# Patient Record
Sex: MALE | Race: WHITE | Employment: OTHER | URBAN - NONMETROPOLITAN AREA
[De-identification: names, ages, dates, MRNs, and addresses within clinical notes are randomized per-mention and may not be internally consistent; named-entity substitution may affect disease eponyms.]

---

## 2020-12-24 ENCOUNTER — APPOINTMENT (OUTPATIENT)
Dept: GENERAL RADIOLOGY | Age: 70
End: 2020-12-24
Payer: MEDICARE

## 2020-12-24 ENCOUNTER — HOSPITAL ENCOUNTER (EMERGENCY)
Age: 70
Discharge: ANOTHER HOSPITAL | End: 2020-12-24
Attending: EMERGENCY MEDICINE
Payer: MEDICARE

## 2020-12-24 VITALS
DIASTOLIC BLOOD PRESSURE: 80 MMHG | HEIGHT: 70 IN | SYSTOLIC BLOOD PRESSURE: 97 MMHG | OXYGEN SATURATION: 97 % | TEMPERATURE: 96.5 F | HEART RATE: 115 BPM | WEIGHT: 152 LBS | BODY MASS INDEX: 21.76 KG/M2 | RESPIRATION RATE: 28 BRPM

## 2020-12-24 PROBLEM — I95.9 ARTERIAL HYPOTENSION: Status: ACTIVE | Noted: 2020-12-24

## 2020-12-24 PROBLEM — R00.0 TACHYCARDIA: Status: ACTIVE | Noted: 2020-12-24

## 2020-12-24 PROBLEM — I44.7 NEW ONSET LEFT BUNDLE BRANCH BLOCK (LBBB): Status: ACTIVE | Noted: 2020-12-24

## 2020-12-24 PROBLEM — I21.4 ACUTE NON-ST ELEVATION MYOCARDIAL INFARCTION (NSTEMI) (HCC): Status: ACTIVE | Noted: 2020-12-24

## 2020-12-24 LAB
ABSOLUTE EOS #: 0.14 K/UL (ref 0–0.44)
ABSOLUTE IMMATURE GRANULOCYTE: 0.03 K/UL (ref 0–0.3)
ABSOLUTE LYMPH #: 2.37 K/UL (ref 1.1–3.7)
ABSOLUTE MONO #: 0.87 K/UL (ref 0.1–1.2)
ANION GAP SERPL CALCULATED.3IONS-SCNC: 11 MMOL/L (ref 9–17)
BASOPHILS # BLD: 0 % (ref 0–2)
BASOPHILS ABSOLUTE: 0.03 K/UL (ref 0–0.2)
BNP INTERPRETATION: ABNORMAL
BUN BLDV-MCNC: 18 MG/DL (ref 8–23)
BUN/CREAT BLD: 22 (ref 9–20)
CALCIUM SERPL-MCNC: 9.6 MG/DL (ref 8.6–10.4)
CHLORIDE BLD-SCNC: 101 MMOL/L (ref 98–107)
CO2: 23 MMOL/L (ref 20–31)
CREAT SERPL-MCNC: 0.82 MG/DL (ref 0.7–1.2)
DIFFERENTIAL TYPE: ABNORMAL
EOSINOPHILS RELATIVE PERCENT: 2 % (ref 1–4)
GFR AFRICAN AMERICAN: >60 ML/MIN
GFR NON-AFRICAN AMERICAN: >60 ML/MIN
GFR SERPL CREATININE-BSD FRML MDRD: ABNORMAL ML/MIN/{1.73_M2}
GFR SERPL CREATININE-BSD FRML MDRD: ABNORMAL ML/MIN/{1.73_M2}
GLUCOSE BLD-MCNC: 156 MG/DL (ref 70–99)
HCT VFR BLD CALC: 40.5 % (ref 40.7–50.3)
HEMOGLOBIN: 12.7 G/DL (ref 13–17)
IMMATURE GRANULOCYTES: 0 %
LYMPHOCYTES # BLD: 27 % (ref 24–43)
MCH RBC QN AUTO: 31.8 PG (ref 25.2–33.5)
MCHC RBC AUTO-ENTMCNC: 31.4 G/DL (ref 28.4–34.8)
MCV RBC AUTO: 101.5 FL (ref 82.6–102.9)
MONOCYTES # BLD: 10 % (ref 3–12)
NRBC AUTOMATED: 0 PER 100 WBC
PDW BLD-RTO: 13.6 % (ref 11.8–14.4)
PLATELET # BLD: 207 K/UL (ref 138–453)
PLATELET ESTIMATE: ABNORMAL
PMV BLD AUTO: 10.8 FL (ref 8.1–13.5)
POTASSIUM SERPL-SCNC: 4 MMOL/L (ref 3.7–5.3)
PRO-BNP: 9840 PG/ML
RBC # BLD: 3.99 M/UL (ref 4.21–5.77)
RBC # BLD: ABNORMAL 10*6/UL
SARS-COV-2, RAPID: NOT DETECTED
SARS-COV-2: NORMAL
SARS-COV-2: NORMAL
SEG NEUTROPHILS: 61 % (ref 36–65)
SEGMENTED NEUTROPHILS ABSOLUTE COUNT: 5.36 K/UL (ref 1.5–8.1)
SODIUM BLD-SCNC: 135 MMOL/L (ref 135–144)
SOURCE: NORMAL
TROPONIN INTERP: ABNORMAL
TROPONIN INTERP: ABNORMAL
TROPONIN T: ABNORMAL NG/ML
TROPONIN T: ABNORMAL NG/ML
TROPONIN, HIGH SENSITIVITY: 1112 NG/L (ref 0–22)
TROPONIN, HIGH SENSITIVITY: 1271 NG/L (ref 0–22)
WBC # BLD: 8.8 K/UL (ref 3.5–11.3)
WBC # BLD: ABNORMAL 10*3/UL

## 2020-12-24 PROCEDURE — 96366 THER/PROPH/DIAG IV INF ADDON: CPT

## 2020-12-24 PROCEDURE — 96372 THER/PROPH/DIAG INJ SC/IM: CPT

## 2020-12-24 PROCEDURE — 71045 X-RAY EXAM CHEST 1 VIEW: CPT

## 2020-12-24 PROCEDURE — 6360000002 HC RX W HCPCS

## 2020-12-24 PROCEDURE — 96367 TX/PROPH/DG ADDL SEQ IV INF: CPT

## 2020-12-24 PROCEDURE — 36415 COLL VENOUS BLD VENIPUNCTURE: CPT

## 2020-12-24 PROCEDURE — 83880 ASSAY OF NATRIURETIC PEPTIDE: CPT

## 2020-12-24 PROCEDURE — 2500000003 HC RX 250 WO HCPCS: Performed by: EMERGENCY MEDICINE

## 2020-12-24 PROCEDURE — U0003 INFECTIOUS AGENT DETECTION BY NUCLEIC ACID (DNA OR RNA); SEVERE ACUTE RESPIRATORY SYNDROME CORONAVIRUS 2 (SARS-COV-2) (CORONAVIRUS DISEASE [COVID-19]), AMPLIFIED PROBE TECHNIQUE, MAKING USE OF HIGH THROUGHPUT TECHNOLOGIES AS DESCRIBED BY CMS-2020-01-R: HCPCS

## 2020-12-24 PROCEDURE — 99291 CRITICAL CARE FIRST HOUR: CPT

## 2020-12-24 PROCEDURE — 85025 COMPLETE CBC W/AUTO DIFF WBC: CPT

## 2020-12-24 PROCEDURE — 99285 EMERGENCY DEPT VISIT HI MDM: CPT

## 2020-12-24 PROCEDURE — 6360000002 HC RX W HCPCS: Performed by: EMERGENCY MEDICINE

## 2020-12-24 PROCEDURE — 2580000003 HC RX 258: Performed by: EMERGENCY MEDICINE

## 2020-12-24 PROCEDURE — 96368 THER/DIAG CONCURRENT INF: CPT

## 2020-12-24 PROCEDURE — U0002 COVID-19 LAB TEST NON-CDC: HCPCS

## 2020-12-24 PROCEDURE — 96375 TX/PRO/DX INJ NEW DRUG ADDON: CPT

## 2020-12-24 PROCEDURE — 93005 ELECTROCARDIOGRAM TRACING: CPT | Performed by: EMERGENCY MEDICINE

## 2020-12-24 PROCEDURE — 84484 ASSAY OF TROPONIN QUANT: CPT

## 2020-12-24 PROCEDURE — 6370000000 HC RX 637 (ALT 250 FOR IP): Performed by: EMERGENCY MEDICINE

## 2020-12-24 PROCEDURE — 96365 THER/PROPH/DIAG IV INF INIT: CPT

## 2020-12-24 PROCEDURE — 80048 BASIC METABOLIC PNL TOTAL CA: CPT

## 2020-12-24 RX ORDER — MORPHINE SULFATE 2 MG/ML
2 INJECTION, SOLUTION INTRAMUSCULAR; INTRAVENOUS ONCE
Status: COMPLETED | OUTPATIENT
Start: 2020-12-24 | End: 2020-12-24

## 2020-12-24 RX ORDER — HEPARIN SODIUM 5000 [USP'U]/ML
4000 INJECTION, SOLUTION INTRAVENOUS; SUBCUTANEOUS ONCE
Status: COMPLETED | OUTPATIENT
Start: 2020-12-24 | End: 2020-12-24

## 2020-12-24 RX ORDER — NITROGLYCERIN 0.4 MG/1
0.4 TABLET SUBLINGUAL ONCE
Status: DISCONTINUED | OUTPATIENT
Start: 2020-12-24 | End: 2020-12-25 | Stop reason: HOSPADM

## 2020-12-24 RX ORDER — TERBUTALINE SULFATE 1 MG/ML
0.25 INJECTION, SOLUTION SUBCUTANEOUS ONCE
Status: COMPLETED | OUTPATIENT
Start: 2020-12-24 | End: 2020-12-24

## 2020-12-24 RX ORDER — METHYLPREDNISOLONE SODIUM SUCCINATE 125 MG/2ML
125 INJECTION, POWDER, LYOPHILIZED, FOR SOLUTION INTRAMUSCULAR; INTRAVENOUS ONCE
Status: COMPLETED | OUTPATIENT
Start: 2020-12-24 | End: 2020-12-24

## 2020-12-24 RX ORDER — SODIUM CHLORIDE 9 MG/ML
INJECTION, SOLUTION INTRAVENOUS CONTINUOUS
Status: DISCONTINUED | OUTPATIENT
Start: 2020-12-24 | End: 2020-12-25 | Stop reason: HOSPADM

## 2020-12-24 RX ORDER — DEXTROSE MONOHYDRATE 50 MG/ML
INJECTION, SOLUTION INTRAVENOUS
Status: DISCONTINUED
Start: 2020-12-24 | End: 2020-12-25 | Stop reason: HOSPADM

## 2020-12-24 RX ORDER — DOPAMINE HYDROCHLORIDE 160 MG/100ML
INJECTION, SOLUTION INTRAVENOUS
Status: COMPLETED
Start: 2020-12-24 | End: 2020-12-24

## 2020-12-24 RX ORDER — DOPAMINE HYDROCHLORIDE 160 MG/100ML
10 INJECTION, SOLUTION INTRAVENOUS CONTINUOUS
Status: DISCONTINUED | OUTPATIENT
Start: 2020-12-24 | End: 2020-12-24

## 2020-12-24 RX ORDER — NOREPINEPHRINE BITARTRATE 1 MG/ML
INJECTION, SOLUTION INTRAVENOUS
Status: DISCONTINUED
Start: 2020-12-24 | End: 2020-12-25 | Stop reason: HOSPADM

## 2020-12-24 RX ORDER — 0.9 % SODIUM CHLORIDE 0.9 %
250 INTRAVENOUS SOLUTION INTRAVENOUS ONCE
Status: COMPLETED | OUTPATIENT
Start: 2020-12-24 | End: 2020-12-24

## 2020-12-24 RX ORDER — ONDANSETRON 2 MG/ML
4 INJECTION INTRAMUSCULAR; INTRAVENOUS ONCE
Status: COMPLETED | OUTPATIENT
Start: 2020-12-24 | End: 2020-12-24

## 2020-12-24 RX ORDER — ASPIRIN 325 MG
325 TABLET ORAL DAILY
COMMUNITY
End: 2021-01-07 | Stop reason: DRUGHIGH

## 2020-12-24 RX ORDER — HEPARIN SODIUM 10000 [USP'U]/100ML
1000 INJECTION, SOLUTION INTRAVENOUS CONTINUOUS
Status: DISCONTINUED | OUTPATIENT
Start: 2020-12-24 | End: 2020-12-25 | Stop reason: HOSPADM

## 2020-12-24 RX ORDER — ASPIRIN 81 MG/1
324 TABLET, CHEWABLE ORAL ONCE
Status: COMPLETED | OUTPATIENT
Start: 2020-12-24 | End: 2020-12-24

## 2020-12-24 RX ADMIN — METHYLPREDNISOLONE SODIUM SUCCINATE 125 MG: 125 INJECTION, POWDER, FOR SOLUTION INTRAMUSCULAR; INTRAVENOUS at 17:27

## 2020-12-24 RX ADMIN — ASPIRIN 81 MG CHEWABLE TABLET 324 MG: 81 TABLET CHEWABLE at 17:25

## 2020-12-24 RX ADMIN — HEPARIN SODIUM 4000 UNITS: 5000 INJECTION INTRAVENOUS; SUBCUTANEOUS at 18:33

## 2020-12-24 RX ADMIN — SODIUM CHLORIDE 250 ML: 9 INJECTION, SOLUTION INTRAVENOUS at 20:05

## 2020-12-24 RX ADMIN — MORPHINE SULFATE 2 MG: 2 INJECTION, SOLUTION INTRAMUSCULAR; INTRAVENOUS at 17:29

## 2020-12-24 RX ADMIN — SODIUM CHLORIDE: 9 INJECTION, SOLUTION INTRAVENOUS at 17:37

## 2020-12-24 RX ADMIN — DOPAMINE HYDROCHLORIDE IN DEXTROSE 10 MCG/KG/MIN: 1.6 INJECTION, SOLUTION INTRAVENOUS at 17:53

## 2020-12-24 RX ADMIN — ONDANSETRON 4 MG: 2 INJECTION INTRAMUSCULAR; INTRAVENOUS at 17:31

## 2020-12-24 RX ADMIN — NOREPINEPHRINE BITARTRATE 3 MCG/MIN: 1 INJECTION, SOLUTION, CONCENTRATE INTRAVENOUS at 20:03

## 2020-12-24 RX ADMIN — DOPAMINE HYDROCHLORIDE 10 MCG/KG/MIN: 160 INJECTION, SOLUTION INTRAVENOUS at 17:53

## 2020-12-24 RX ADMIN — TICAGRELOR 180 MG: 90 TABLET ORAL at 17:25

## 2020-12-24 RX ADMIN — HEPARIN SODIUM AND DEXTROSE 1000 UNITS/HR: 10000; 5 INJECTION INTRAVENOUS at 18:33

## 2020-12-24 RX ADMIN — TERBUTALINE SULFATE 0.25 MG: 1 INJECTION, SOLUTION SUBCUTANEOUS at 17:34

## 2020-12-24 SDOH — HEALTH STABILITY: MENTAL HEALTH: HOW OFTEN DO YOU HAVE A DRINK CONTAINING ALCOHOL?: NEVER

## 2020-12-24 ASSESSMENT — ENCOUNTER SYMPTOMS
DIARRHEA: 0
ABDOMINAL PAIN: 0
COUGH: 1
BACK PAIN: 0
CONSTIPATION: 0
WHEEZING: 0
SHORTNESS OF BREATH: 1
ABDOMINAL DISTENTION: 0
NAUSEA: 0
VOMITING: 0

## 2020-12-24 ASSESSMENT — PAIN DESCRIPTION - FREQUENCY: FREQUENCY: CONTINUOUS

## 2020-12-24 ASSESSMENT — PAIN DESCRIPTION - LOCATION: LOCATION: CHEST

## 2020-12-24 ASSESSMENT — PAIN DESCRIPTION - DESCRIPTORS: DESCRIPTORS: PRESSURE

## 2020-12-24 ASSESSMENT — PAIN SCALES - GENERAL: PAINLEVEL_OUTOF10: 2

## 2020-12-24 ASSESSMENT — PAIN DESCRIPTION - PAIN TYPE: TYPE: ACUTE PAIN

## 2020-12-24 NOTE — ED NOTES
Patient placed on oxygen via nasal cannula at 2L/min.  SpO2 is now 96%     Patricia Cerna RN  12/24/20 1861

## 2020-12-24 NOTE — ED NOTES
Received call from [place] at [facility] as she was unable to get [facility] access. She states they do not have any open beds at this time.       Pita Cornelius  12/24/20 7476

## 2020-12-24 NOTE — ED PROVIDER NOTES
[facility] ED  EMERGENCY DEPARTMENT ENCOUNTER      Pt Name:Pascual Peters Jr. MRN: [id number]  Birthdate 1950  Date of evaluation: 12/24/2020  Provider: MD Taras Phelps     Chief Complaint   Patient presents with    Chest Pain     onset 2 days ago; onset a few hours ago    Shortness of Breath     since Monday         HISTORY OF PRESENT ILLNESS   (Location/Symptom, Timing/Onset, Context/Setting, Quality, Duration, Modifying Factors, Severity)  Note limiting factors. HPI the patient is a 80-year-old male, who has been short of breath for the last 3 days. Over the last 2 days he has had intermittent chest pain. When I questioned him about chest pain he denied having any however, I see the nurse put down chest pain of 2. He has a cough that is productive of yellow mucus. Patient is a smoker. Patient has had 2 MIs in the past.  He has had open heart surgery in the past.    Nursing Notes were reviewed. REVIEW OF SYSTEMS    (2-9 systems for level 4, 10 or more for level 5)     Review of Systems   Constitutional: Positive for activity change. Negative for chills, diaphoresis, fatigue and fever. HENT: Positive for congestion. Eyes: Negative for visual disturbance. Respiratory: Positive for cough and shortness of breath. Negative for wheezing. Cardiovascular: Positive for chest pain. Negative for palpitations and leg swelling. Gastrointestinal: Negative for abdominal distention, abdominal pain, constipation, diarrhea, nausea and vomiting. Genitourinary: Negative for difficulty urinating, dysuria and flank pain. Musculoskeletal: Negative for back pain, gait problem and neck pain. Skin: Negative for pallor. Neurological: Negative for dizziness, light-headedness and headaches. Psychiatric/Behavioral: Negative for confusion, decreased concentration and dysphoric mood. MEDICAL HISTORY   History reviewed.  No pertinent past medical history. SURGICAL HISTORY       Past Surgical History:   Procedure Laterality Date    CARDIAC SURGERY  2008         CURRENT MEDICATIONS       Previous Medications    ASPIRIN 325 MG TABLET    Take 325 mg by mouth daily       ALLERGIES     Patient has no known allergies. FAMILY HISTORY     History reviewed. No pertinent family history. SOCIAL HISTORY       Social History     Socioeconomic History    Marital status: None     Spouse name: None    Number of children: None    Years of education: None    Highest education level: None   Occupational History    None   Social Needs    Financial resource strain: None    Food insecurity     Worry: None     Inability: None    Transportation needs     Medical: None     Non-medical: None   Tobacco Use    Smoking status: Current Every Day Smoker     Packs/day: 1.00     Types: Cigarettes   Substance and Sexual Activity    Alcohol use: Not Currently     Frequency: Never    Drug use: Never    Sexual activity: None   Lifestyle    Physical activity     Days per week: None     Minutes per session: None    Stress: None   Relationships    Social connections     Talks on phone: None     Gets together: None     Attends Orthodoxy service: None     Active member of club or organization: None     Attends meetings of clubs or organizations: None     Relationship status: None    Intimate partner violence     Fear of current or ex partner: None     Emotionally abused: None     Physically abused: None     Forced sexual activity: None   Other Topics Concern    None   Social History Narrative    None       SCREENINGS             PHYSICAL EXAM  (up to 7 for level 4, 8 or more for level 5)     ED Triage Vitals [12/24/20 1646]   BP Temp Temp Source Pulse Resp SpO2 Height Weight   115/69 96.5 °F (35.8 °C) Tympanic 132 24 95 % 5' 10\" (1.778 m) 152 lb (68.9 kg)       Physical Exam  Constitutional:       General: He is not in acute distress.      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 Franklin Quezada radiographic images are visualized and preliminarily interpreted by the emergency physician     Interpretation per the Radiologist below, if available at the time of this note:    XR CHEST 1 VIEW   Final Result   Diffuse bilateral interstitial markings compatible with edema or an atypical   pneumonitis. ED BEDSIDE ULTRASOUND:   Performed by ED Physician - none    LABS:  Labs Reviewed   CBC WITH AUTO DIFFERENTIAL - Abnormal; Notable for the following components:       Result Value    RBC 3.99 (*)     Hemoglobin 12.7 (*)     Hematocrit 40.5 (*)     All other components within normal limits   BASIC METABOLIC PANEL - Abnormal; Notable for the following components:    Glucose 156 (*)     Bun/Cre Ratio 22 (*)     All other components within normal limits   TROPONIN - Abnormal; Notable for the following components:    Troponin, High Sensitivity 1,112 (*)     All other components within normal limits   BRAIN NATRIURETIC PEPTIDE - Abnormal; Notable for the following components:    Pro-BNP 9,840 (*)     All other components within normal limits   COVID-19       EMERGENCY DEPARTMENT COURSE and DIFFERENTIAL DIAGNOSIS/MDM:   Vitals:    Vitals:    12/24/20 1800 12/24/20 1801 12/24/20 1805 12/24/20 1811   BP: (!) 72/59 (!) 72/59 (!) 72/59 95/73   Pulse: 115 125 141 154   Resp: (!) 35 28  20   Temp:       TempSrc:       SpO2: 95% 93% 93% 96%   Weight:       Height:               MDM the patient most probably had his MRI this past Monday which is 4 days ago. His troponin is extremely high. His EKG shows a left bundle branch block which is far as I know is new. Sermon that the MI was an inferior MI. The normal cardiac meds were started. Including heparin. He became hypotensive with a blood pressure 72/59. Dopamine drip has been started. At 15 mics per kilogram per minute the blood pressures up to 95/73. The MAP is 79. And his pulse is 131. The patient is also in congestive heart failure.   I have not given a diuretic because patient was hypotensive. Patient is turned over to Dr. Vera Howard at change of shifts. Currently awaiting [place] to call us back. [facility] in [address] declined taking him. [facility] is not receiving patient is either. CONSULTS:  None    PROCEDURES:  Unless otherwise noted below, none     Procedures    FINAL IMPRESSION      1. Acute non-ST elevation myocardial infarction (NSTEMI) ([facility]    2. Other specified hypotension    3. Tachycardia    4. New onset left bundle branch block (LBBB)          DISPOSITION/PLAN   DISPOSITION Decision To Transfer 12/24/2020 06:28:43 PM      PATIENT REFERRED TO:  No follow-up provider specified. DISCHARGE MEDICATIONS:  New Prescriptions    No medications on file        Due to the immediate potential for life-threatening deterioration due to myocardial infarction and congestive heart failure and hypotension, I spent 60 minutes providing critical care. This time is excluding time spent performing procedures.         (Please note that portions ofthis note were completed with a voice recognition program.  Efforts were made to edit the dictations but occasionally words are mis-transcribed.)      Hugo Harry MD (electronically signed)  Attending Emergency Physician          Jasiel Pavon MD  12/24/20 7497

## 2020-12-24 NOTE — ED NOTES
Called [facility] back to contact [facility] in [place] for possible transfer. They will contact [address] [facility].  Waiting for call back     Nelly Rosa  12/24/20 0809

## 2020-12-25 NOTE — ED NOTES
Called [facility] and they connected call to transfer center at [facility] as they have an intermediate bed.  Connected call to Dr Tye Sellers  12/24/20 1956

## 2020-12-25 NOTE — ED NOTES
[facility] transfer center is looking for open bed for patient     Aldia Larsen  12/24/20 1915

## 2020-12-25 NOTE — ED PROVIDER NOTES
View    Result Date: 12/24/2020  EXAMINATION: ONE XRAY VIEW OF THE CHEST 12/24/2020 5:12 pm COMPARISON: Chest radiograph 12/21/2009. HISTORY: ORDERING SYSTEM PROVIDED HISTORY: Shortness of breath TECHNOLOGIST PROVIDED HISTORY: Shortness of breath FINDINGS: Status post median sternotomy. The cardiac silhouette is upper limits of normal in size. The remaining mediastinal contours are unremarkable. Diffuse bilateral interstitial markings. No pneumothorax, consolidation, or pleural effusion. No acute osseous abnormality. Diffuse bilateral interstitial markings compatible with edema or an atypical pneumonitis. RECENT VITALS:     Temp: 96.5 °F (35.8 °C),  Pulse: 154, Resp: 20, BP: 95/73, SpO2: 96 %    This patient is a 79 y.o. Male with NSTEMI, hypertension, new left bundle branch block, new ST segment depressions and aVF, V6. Patient has a history of CABG, previous physician tried [facility] and [facility] but there was no bed available. I talked with the transfer line at [facility] in [place], patient is accepted but they will let us know the bed situation. I evaluated the patient, patient remained chest pain-free at this time but significant tachycardia and some ectopy on the cardiac monitor. Given the frequent PVCs that the patient was having, I switched his dopamine to Levophed. We will try a small 250 cc bolus of fluids to see if the tachycardia improves. Otherwise patient remains chest pain-free, alert, awake, oriented. Already had aspirin. No STEMI on the EKG but significant depressions and elevated troponins. 8:00 am: Once the dopamine was stopped, tachycardia is now better. His heart rate was in the 140s and 150s while on the dopamine with significant PVCs, his heart rate is down into the 1  range now. On a very low-dose of Levophed at 3 for now, will increase as needed.     Patient continues to do well on the Levophed and was subsequently transferred without any further issue. troponins were continuing to uptrend and patient did remain chest pain-free. He was accepted and transferred to [address] [place] [address] / RECOMMENDATIONS:    1. Reassess, pending transfer     FINAL IMPRESSION:     1. Acute non-ST elevation myocardial infarction (NSTEMI) ([facility]    2. Other specified hypotension    3. Tachycardia    4. New onset left bundle branch block (LBBB)    5. Acute congestive heart failure, unspecified heart failure type ([facility]        DISPOSITION:         DISPOSITION:  []  Discharge   [x]  Transfer    []  Admission -     []  Against Medical Advice   []  Eloped   FOLLOW-UP: No follow-up provider specified.    DISCHARGE MEDICATIONS: New Prescriptions    No medications on file           Stephanie Hidalgo MD  Emergency Medicine Attending        Stephanie Hidalgo MD  12/25/20 5683

## 2020-12-25 NOTE — ED NOTES
[facility] called back with Arturo Avila from [address], connected call to Dr Tye Sellers  12/24/20 1902

## 2020-12-26 LAB
EKG ATRIAL RATE: 115 BPM
EKG ATRIAL RATE: 126 BPM
EKG P AXIS: 47 DEGREES
EKG P AXIS: 67 DEGREES
EKG P-R INTERVAL: 152 MS
EKG P-R INTERVAL: 174 MS
EKG Q-T INTERVAL: 314 MS
EKG Q-T INTERVAL: 388 MS
EKG QRS DURATION: 128 MS
EKG QRS DURATION: 132 MS
EKG QTC CALCULATION (BAZETT): 454 MS
EKG QTC CALCULATION (BAZETT): 536 MS
EKG R AXIS: 110 DEGREES
EKG R AXIS: 167 DEGREES
EKG T AXIS: -66 DEGREES
EKG T AXIS: -68 DEGREES
EKG VENTRICULAR RATE: 115 BPM
EKG VENTRICULAR RATE: 126 BPM

## 2020-12-26 PROCEDURE — 93010 ELECTROCARDIOGRAM REPORT: CPT | Performed by: INTERNAL MEDICINE

## 2021-01-15 ENCOUNTER — HOSPITAL ENCOUNTER (OUTPATIENT)
Age: 71
Discharge: HOME | End: 2021-01-15
Payer: MEDICARE

## 2021-01-15 ENCOUNTER — OFFICE VISIT (OUTPATIENT)
Dept: CARDIOLOGY | Age: 71
End: 2021-01-15
Payer: MEDICARE

## 2021-01-15 VITALS
HEART RATE: 89 BPM | WEIGHT: 161 LBS | RESPIRATION RATE: 18 BRPM | HEIGHT: 70 IN | OXYGEN SATURATION: 97 % | SYSTOLIC BLOOD PRESSURE: 110 MMHG | BODY MASS INDEX: 23.05 KG/M2 | DIASTOLIC BLOOD PRESSURE: 71 MMHG

## 2021-01-15 DIAGNOSIS — Z95.1 S/P CABG X 3: ICD-10-CM

## 2021-01-15 DIAGNOSIS — E78.2 MIXED HYPERLIPIDEMIA: ICD-10-CM

## 2021-01-15 DIAGNOSIS — I95.9 HYPOTENSION, UNSPECIFIED HYPOTENSION TYPE: ICD-10-CM

## 2021-01-15 DIAGNOSIS — I25.5 ISCHEMIC CARDIOMYOPATHY: ICD-10-CM

## 2021-01-15 DIAGNOSIS — I21.4 NSTEMI (NON-ST ELEVATED MYOCARDIAL INFARCTION) (HCC): ICD-10-CM

## 2021-01-15 DIAGNOSIS — I25.810 CORONARY ARTERY DISEASE INVOLVING CORONARY BYPASS GRAFT OF NATIVE HEART WITHOUT ANGINA PECTORIS: Primary | ICD-10-CM

## 2021-01-15 LAB
ANION GAP SERPL CALCULATED.3IONS-SCNC: 10 MMOL/L (ref 9–17)
BUN BLDV-MCNC: 18 MG/DL (ref 8–23)
BUN/CREAT BLD: 24 (ref 9–20)
CALCIUM SERPL-MCNC: 9.3 MG/DL (ref 8.6–10.4)
CHLORIDE BLD-SCNC: 102 MMOL/L (ref 98–107)
CO2: 24 MMOL/L (ref 20–31)
CREAT SERPL-MCNC: 0.76 MG/DL (ref 0.7–1.2)
GFR AFRICAN AMERICAN: >60 ML/MIN
GFR NON-AFRICAN AMERICAN: >60 ML/MIN
GFR SERPL CREATININE-BSD FRML MDRD: ABNORMAL ML/MIN/{1.73_M2}
GFR SERPL CREATININE-BSD FRML MDRD: ABNORMAL ML/MIN/{1.73_M2}
GLUCOSE BLD-MCNC: 96 MG/DL (ref 70–99)
POTASSIUM SERPL-SCNC: 4.2 MMOL/L (ref 3.7–5.3)
SODIUM BLD-SCNC: 136 MMOL/L (ref 135–144)

## 2021-01-15 PROCEDURE — G8427 DOCREV CUR MEDS BY ELIG CLIN: HCPCS | Performed by: FAMILY MEDICINE

## 2021-01-15 PROCEDURE — G8484 FLU IMMUNIZE NO ADMIN: HCPCS | Performed by: FAMILY MEDICINE

## 2021-01-15 PROCEDURE — 1123F ACP DISCUSS/DSCN MKR DOCD: CPT | Performed by: FAMILY MEDICINE

## 2021-01-15 PROCEDURE — 36415 COLL VENOUS BLD VENIPUNCTURE: CPT

## 2021-01-15 PROCEDURE — 4040F PNEUMOC VAC/ADMIN/RCVD: CPT | Performed by: FAMILY MEDICINE

## 2021-01-15 PROCEDURE — G8420 CALC BMI NORM PARAMETERS: HCPCS | Performed by: FAMILY MEDICINE

## 2021-01-15 PROCEDURE — 4004F PT TOBACCO SCREEN RCVD TLK: CPT | Performed by: FAMILY MEDICINE

## 2021-01-15 PROCEDURE — 80048 BASIC METABOLIC PNL TOTAL CA: CPT

## 2021-01-15 PROCEDURE — 99204 OFFICE O/P NEW MOD 45 MIN: CPT | Performed by: FAMILY MEDICINE

## 2021-01-15 PROCEDURE — 99202 OFFICE O/P NEW SF 15 MIN: CPT | Performed by: FAMILY MEDICINE

## 2021-01-15 PROCEDURE — 3017F COLORECTAL CA SCREEN DOC REV: CPT | Performed by: FAMILY MEDICINE

## 2021-01-15 RX ORDER — RAMIPRIL 5 MG/1
5 CAPSULE ORAL 2 TIMES DAILY
COMMUNITY
End: 2021-02-19 | Stop reason: ALTCHOICE

## 2021-01-15 NOTE — PROGRESS NOTES
Silvio Valdovinos am scribing for and in the presence of Linnea Lopez. Martínez BUSTILLOS, MS, F.A.C.C..    Patient: Pascual Peters Jr. : 1950  Date of Visit: January 15, 2021    REASON FOR VISIT / CONSULTATION: New Patient (HX: CAD S/p CABG in , Recent NSTEMI  Cath done on  and had two stents placed, Tachy, LBBB, CHF, HTN, HLD. Pt states he is doing ok. Denies: CP, Palpitaitons, Lightheaded/dizziness, SOB. )      History of Present Illness:        Dear Denman Leyden, MD    I had the pleasure of seeing Carolynree Estrada. in my office today. Mr. Tory Moreno is a 70 y.o. male with a history of atherosclerotic heart disease. He also had a heart attack in . 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 He used to see a cardiologist in the past however he started to feel better and did not think he needed to follow up anymore with any doctor. Most recently linyd came into the ER on 2020 and had a NSTEMI and was transferred to [facility] at [facility] in [place]. He did report that he actually started feeling \"weird\", no pain but just feels doom feeling on the Monday before Susan Paulina however he did not want to think this was a heart attack. He did report feeling about the same as he did in . He went home and went to bed than his breathing got worse and worse and that's when he came into the hospital. He then had a heart cath done on 2020 and had two stents placed REJI x 2 to SVG to posterior lateral branch of the RCA. An echocardiogram at that same time showed an EF of 15% and was sent home with a Life Vest. On 21 spironolactone was added and although he says his BP is usually low, less than 695 systolic, denies any known problems with the medication including any lightheadedness or dizziness. He did not remember the last time he was told what his heart strength was he does not remember. Mr. Tory Moreno today reports doing well.  His breathing has improved since his stents placement. He denies any chest pain now or in the recent past, increased shortness of breath, abdominal pain, bleeding problems, problems with his medications or any other concerns at this time. Bleeding Risks: Mr. Tita Perez denies any current or recent bleeding problems including a history of a GI bleed, ulcers, recent or upcoming surgeries, blood in his stool or black tarry stools or blood in his urine. PAST MEDICAL HISTORY:         Past Medical History:   Diagnosis Date    Coronary artery disease     s/p CABG x3 in 2008 and 2 stents on 12/24/2020    Hyperlipidemia        CURRENT ALLERGIES: Patient has no known allergies. REVIEW OF SYSTEMS: 14 systems were reviewed. Pertinent positives and negatives as above, all else negative.      Past Surgical History:   Procedure Laterality Date    CORONARY ANGIOPLASTY WITH STENT PLACEMENT  12/24/2020    x 2 at [address]  2008    x 3 vessels    Social History:  Social History     Tobacco Use    Smoking status: Current Every Day Smoker     Packs/day: 0.25     Years: 52.00     Pack years: 13.00     Types: Cigarettes    Smokeless tobacco: Never Used   Substance Use Topics    Alcohol use: Not Currently     Frequency: Never    Drug use: Never        CURRENT MEDICATIONS:        Outpatient Medications Marked as Taking for the 1/15/21 encounter (Office Visit) with Eric Crystal MD   Medication Sig Dispense Refill    ramipril (ALTACE) 2.5 MG capsule Take 2.5 mg by mouth daily      atorvastatin (LIPITOR) 80 MG tablet Take 80 mg by mouth nightly      metoprolol succinate (TOPROL XL) 25 MG extended release tablet Take 12.5 mg by mouth daily      nitroGLYCERIN (NITROSTAT) 0.3 MG SL tablet Place 0.3 mg under the tongue every 5 minutes as needed      spironolactone (ALDACTONE) 25 MG tablet Take 12.5 mg by mouth daily      ticagrelor (BRILINTA) 90 MG TABS tablet Take 90 mg by mouth 2 times daily      aspirin 81 MG chewable tablet Take 81 mg by mouth daily         FAMILY HISTORY: family history includes Heart Disease (age of onset: 79) in his father; Other in his mother; Rheum Arthritis in his mother. Physical Examination:     /71 (Site: Right Upper Arm, Position: Sitting, Cuff Size: Small Adult)   Pulse 89   Resp 18   Ht 5' 10\" (1.778 m)   Wt 161 lb (73 kg)   SpO2 97%   BMI 23.10 kg/m²  Body mass index is 23.1 kg/m². Constitutional: He appeared oriented to person and place. He appears well-developed and well-nourished. In no acute distress. HEENT: Normocephalic and atraumatic. No JVD present. Carotid bruit is not present. No mass and no thyromegaly present. No lymphadenopathy noted. Cardiovascular: Normal rate, regular rhythm, normal heart sounds. Exam reveals no gallop and no friction rubs. 1/6 systolic murmur, 5th intercostal space on the LEFT in the mid-clavicular line (cardiac apex). Pulmonary/Chest: Effort normal and breath sounds normal. No respiratory distress. He has no wheezes, rhonchi or rales. Abdominal: Soft, non-tender. He exhibits no organomegaly, mass or bruit. Extremities: None. No cyanosis or clubbing. 2+ radial and carotid pulses. Distal extremity pulses: 2+ bilaterally. .  Neurological: Alertness and orientation as per Constitutional exam. No evidence of gross cranial nerve deficit. Coordination appeared normal.   Skin: Skin is warm and dry. There is no rash or diaphoresis. Psychiatric: He has a normal mood and affect. His speech is normal and behavior is normal.      MOST RECENT LABS ON RECORD:   Lab Results   Component Value Date    WBC 8.8 12/24/2020    HGB 12.7 (L) 12/24/2020    HCT 40.5 (L) 12/24/2020     12/24/2020     12/24/2020    K 4.0 12/24/2020     12/24/2020    CREATININE 0.82 12/24/2020    BUN 18 12/24/2020    CO2 23 12/24/2020       ASSESSMENT:     1. Coronary artery disease involving coronary bypass graft of native heart without angina pectoris    2.  S/P CABG x 3    3. NSTEMI (non-ST elevated myocardial infarction) ([facility]    4. Ischemic cardiomyopathy    5. Hypotension, unspecified hypotension type    6. Mixed hyperlipidemia       PLAN:         Atherosclerotic Heart Disease: History of CABG x3 and most recently NSTEMI with a heart cath that was done on 12/28/2020 and he had two stents placed, REJI x 2 to SVG to posterior lateral branch of the RCA.  Antiplatelet Agent: Continue aspirin 81 mg daily and Ticagrelor (Brilinta) 90 mg twice daily.  Beta Blocker: Continue Metoprolol succinate (Toprol XL) 25 mg 1/2 tab daily.  Anti-anginal medications: Continue nitroglycerin 0.4 mg tablets as needed for chest pain.  Cholesterol Reduction Therapy: Continue Atorvastatin (Lipitor) 80 mg daily.  Additional counseling: I advised them to call our office or go to the emergency room if they developed worsening or persistent chest pain or increased shortness of breath as this could be life threatening.  The access site was clean, dry and free of signs of infection. The radial artery pulse was also intact. He did mention having a pulling feeling when he lifts his left arm up. I did let him know that this could be due to the catheter from his heart cath. I did let him know to keep an eye on this and if his symptoms worsen then he can call my office if needed. He did report that was all ready getting better.  He is scheduled to start phase II cardiac rehab here at [address] We discussed the potential benefits of cardiac rehab to improve both his cardiac condition as well as improve his exercise tolerance and overall quality of life. He was very agreeable with this and therefore I made the referral for Phase II cardiac rehab.  Ischemic Cardiomyopathy: EF from his Heart Cath that was done on 12/28/2020 was 15%. We did discuss the process of getting his heart strength back to normal or at least better.  We also discussed the medications that prove to better his heart strength. However we also discussed the fact that he does have Hypotension and the risks or this getting worse if we were to start him on different medications. In the end we decided to keep his medications the same and we will get a repeat Echo in a month to reassess his heart strength. If it does not improve then we can discussed possible switching him to [facility]. We did discuss having his life vest transfer to us however he would like to keep his apt with Dr. Kevin Bassett on the 27th of this month which of course is fine. We discussed having two much less 3 cardiologists can be difficult for him, especially when it comes to travel and conflicting recommendations, however I did not want to pressure him and told him just to let us know what he decides to do.  Beta Blocker: Continue Metoprolol succinate (Toprol XL) 25 mg 1/2 tab daily.  ACE Inibitor/ARB: Continue Ramipril (Altace) 2.5 mg daily. May consider switching to [facility] in future, especially if BP comes up.  Diuretics: Continue Spironolactone (Aldactone) 25 mg, 1/2 tab daily. I also discussed the potential side effects of this medication including lightheadedness and dizziness and instructed them to stop the medication of this occurs and call our office if this occurs.  Heart failure counseling: I advised them to try and keep their legs up whenever possible and to limit salt in their diet.  Additional Testing List: I ordered a echocardiogram to better assess for the etiology of this problem and to help guide future management. We will have this done in about 4-6 weeks to reassess his heart strength. · Hyperlipidemia: Mixed  · Cholesterol Reduction Therapy: Continue Atorvastatin (Lipitor) 80 mg daily. Finally, I recommended that he continue his current medications and follow up with you as previously scheduled.      FOLLOW UP:   I told Mr. Lesli Bustillo to call my office if he had any problems, but otherwise I asked him to Return in about 6 weeks (around 2/26/2021). However, I would be happy to see him sooner should the need arise. Sincerely,  Madonna . Martínez BUSTILLOS, MS, F.A.C.C. [facility] Cardiology Specialist    [address] [place] [address]  Phone: [phone], Fax: [phone]     I believe that the risk of significant morbidity and mortality related to the patient's current medical conditions are: intermediate-high. The documentation recorded by the scribe, accurately and completely reflects the services I personally performed and the decisions made by me. Alejandrina Breaux MD, MS, F.A.C.C.  January 15, 2021

## 2021-01-22 ENCOUNTER — HOSPITAL ENCOUNTER (OUTPATIENT)
Dept: CARDIAC REHAB | Age: 71
Setting detail: THERAPIES SERIES
Discharge: HOME | End: 2021-01-22
Payer: MEDICARE

## 2021-01-22 VITALS
SYSTOLIC BLOOD PRESSURE: 92 MMHG | RESPIRATION RATE: 20 BRPM | BODY MASS INDEX: 22.02 KG/M2 | OXYGEN SATURATION: 95 % | WEIGHT: 153.8 LBS | HEART RATE: 110 BPM | HEIGHT: 70 IN | DIASTOLIC BLOOD PRESSURE: 60 MMHG

## 2021-01-22 ASSESSMENT — PATIENT HEALTH QUESTIONNAIRE - PHQ9
SUM OF ALL RESPONSES TO PHQ QUESTIONS 1-9: 3
SUM OF ALL RESPONSES TO PHQ QUESTIONS 1-9: 3

## 2021-01-22 NOTE — PROGRESS NOTES
Cardiac Rehab Initial History and Assessment    Pascual Peters Jr. 1950  2021    Primary Diagnosis: NSTEMI/PTCA with stents/Cardiomyopathy with EF 15%  Living Will: No   On File: N/A  Durable Power of :No    Medical History  Past Medical History:   Diagnosis Date    Coronary artery disease     s/p CABG x3 in  and 2 stents on 2020    Hyperlipidemia      Past Surgical History:   Procedure Laterality Date    CORONARY ANGIOPLASTY WITH STENT PLACEMENT  12/24/2020    x 2 at [facility] [address] GRAFT  2008    x 3 vessels       Family History  Family History   Problem Relation Age of Onset    Other Mother          from [place] age\" 68    Rheum Arthritis Mother     Heart Disease Father 79         from CHF at age 79         Symptoms:  3. Angina   [x] None   [] Tightness   [] Shortness of Breath   [] Pressure    [] Nausea   [] Sharp, Stabbing  [] Pallor   [] Indigestion, Heartburn [] Sweaty    Where was discomfort located? Precipitating Factors? Relieved by:    2. Arrhythmia   [x] None   [] Irregular Beats (skips) [] Pacer    [] Atrial Fibrillation  [] AICD    On any Medications? 3. Congestive Heart Failure   [] None   [] Pedal Edema  [] Unusual weight gain   [x] SOB with mild exertion [x] Fatigue    4. Vascular   [x] None   [] Carotid Narrowing  [] R [] L   [] Peripheral claudication [] R [] L    5. Musculoskeletal    [x] None   [] Back Pain  Where? [] Joint discomfort Where?      Socio-Economic    Marital Status:     Nutrition    Appetite:  []  Too Good    [x]  Good  []  Poor    Diet: Low sodium  Eating out Less than1 times/wk  Alcohol Consumption: [] Yes [x] No   Type:  Frequency:    Caffeine: [x] Yes [] No  Type:Coffee  Amount:Less than 1 pot/day    Water intake per day: 1 bottle/day  Vitamins/Natural herbal products: None    Psychological    [] Depression  [] Tearful  [] Fearful  [] Cheerful  [] Anxious  [x] Motivated  [] Overwhelmed    Treatment: N/A    Diabetes    [] Yes  [x] No  How long:   Latest BS:   Frequency of Checks:  Medication:     Stress    Source: Denies  Relaxation techniques: Watch tv  Hobbies: Motorcycles    Level of Education    [] 8th Grade  [] Associates  [] Masters  [x] [employer] [] Bachelor  []  Other:    Depression Screening:  [] Have you been feeling sad. ..down in the dumps? [] Have you lost interest in your job, sports, hobbies, friends? [x] Do you often feel tired? [] Do you have trouble sleeping or do you sleep too much? [] Have you been gaining or losing weight? [] Do you often feel down on yourself, that everything is your fault? [] Do you have troubled making decisions or concentrating on your work? [] Do you often feel agitated or like you can barely move? [] Do you ever feel that life isn't worth living?    *If greater than 5 symptoms listed,  notified. Cardiac Rehab Pre - Test  1. The heart is a muscle that acts like a pump to deliver oxygen and blood to the rest of the body. [x] True   [] False  2. Healing from the damage of a heart attach is complete in two weeks. [] True   [x] False  3. Smoking has no direct effect on the heart - it only effects your lungs. [] True   [x] False  4. Using all the salt you want is acceptable for all heart patients. [] True   [x] False  5. Chest pain that is relieved by rest or nitroglycerine is called Angina. [] True   [x] False  6. Shortness of breath, indigestion, sweating, tightness or pain in your chest are symptoms of a heart attack. [x] True   [] False  7. Swelling of the feet and ankles only means you've been on your feet too much. [] True   [x] False  8. Saturated fats raised your blood cholesterol level more than anything else in your diet. [x] True   [] False  9. High Blood pressure can take care of itself by rest alone. [] True   [x] False  10.  Walking is one of the best exercises for heart attack patients. [x] True   [] False    Physical Findings   BP: 92/60   Pulse: 110   Resp: 20   SpO2: 95 %   Alert and oriented to person, place and time. Skin warm, dry and pale. Respirations regular and unlabored. Reports  dyspnea with any exertion. Lungs clear throughout. Heart tones distant and regular. Reports heart rate usually 026-826 and systolic BP . Wearing [employer] Life vest. Reports   runny nose and cough-productive of clear and sometimes light yellow mucus for 5-6 days. States has been taking Sudafed for same. Dr Trina Leal notified of same and HR and BP. Instructed to stop Sudafed and use Coricidin HB/HBP instead to decrease heart rate. Patient informed of same and instructed to see PCP or go to ER if symptoms persist, increase or other symptoms develop. Understanding voiced.     Goals:   -increased stamina/strength to 30-50 total exercise by increasing 1-2 level/wk and 1-2   min/wk  to achieve THR and RPE 11-13  -introduce weights/ therabands 2-4# for 5-10 reps  -manage BP better  -improved cholesterol and  Triglycerides  -develop regular exercise 30 min daily

## 2021-02-08 ENCOUNTER — HOSPITAL ENCOUNTER (OUTPATIENT)
Dept: NON INVASIVE DIAGNOSTICS | Age: 71
Discharge: HOME | End: 2021-02-08
Payer: MEDICARE

## 2021-02-08 ENCOUNTER — HOSPITAL ENCOUNTER (OUTPATIENT)
Dept: CARDIAC REHAB | Age: 71
Setting detail: THERAPIES SERIES
Discharge: HOME | End: 2021-02-08
Payer: MEDICARE

## 2021-02-08 DIAGNOSIS — I25.810 CORONARY ARTERY DISEASE INVOLVING CORONARY BYPASS GRAFT OF NATIVE HEART WITHOUT ANGINA PECTORIS: ICD-10-CM

## 2021-02-08 DIAGNOSIS — I21.4 NSTEMI (NON-ST ELEVATED MYOCARDIAL INFARCTION) (HCC): ICD-10-CM

## 2021-02-08 DIAGNOSIS — Z95.1 S/P CABG X 3: ICD-10-CM

## 2021-02-08 DIAGNOSIS — I95.9 HYPOTENSION, UNSPECIFIED HYPOTENSION TYPE: ICD-10-CM

## 2021-02-08 DIAGNOSIS — I25.5 ISCHEMIC CARDIOMYOPATHY: ICD-10-CM

## 2021-02-08 DIAGNOSIS — E78.2 MIXED HYPERLIPIDEMIA: ICD-10-CM

## 2021-02-08 LAB
LV EF: 18 %
LVEF MODALITY: NORMAL

## 2021-02-08 PROCEDURE — 93306 TTE W/DOPPLER COMPLETE: CPT

## 2021-02-08 PROCEDURE — 93798 PHYS/QHP OP CAR RHAB W/ECG: CPT

## 2021-02-08 NOTE — PROGRESS NOTES
Cardiac Rehabilitation   Physician Order Form    Pascual Peters .  1950    [x] Phase 2 ECG Monitored Cardiac Rehabilitation    [x] MI   [] PTCA with/without stents  [] CABG  [] Heart Valve Repair/Replaced   [] Stable Angina [] Other:     Onset Date: 12/24/20                    Cardiac Education Goals: (see individualized treatment plan for specific goals, progression & compliance)    [x] Hypertension [x] Physical Inactivity  [x] A & P  [x] Smoking  [x] Coping/Depression [x] Medications  [x] Diabetes  [x] Obesity   [x] Angina  [x] Hyperlipidemia [x] Stress   [x] Home Exercise                     Prescribed Exercise Plan:    Target HR:       Duration: 31-60 minutes  Frequency: 3 x week  Initial Met Level: 2.1  Limitations: wears LifeVest    Modalities:  [x]Treadmill   [x] Recumb. Stepper/bike  [] Elliptical  [x] Weights/therabands    · Aerobic exercise to total 31-60 minutes. Progressing by 1-2 minutes per week and/or 1-2 levels per week per patient tolerance using various modalities; according to Yasmine RPE Scale 12-16 and THR  · Strength training starting with weights 1-3 # / 5-8 reps progressing to 5-10 # / 15-20 reps; therabands red-gray 5-20 reps. Per patient symptoms use:  · Appropriate ACLS Algorhythm for Cardiac Events. · Nitroglycerine 0.4mg SLq 5mins X 3 for angina pain. · 12 lead EKG for c/o chest pain or change in rhythm. · Nasal O2 for SaO2 <90% or symptoms warranted. · Blood sugar monitoring for Hyper/Hypoglycemia symptoms.

## 2021-02-08 NOTE — PROGRESS NOTES
diet    Intervention:   [] Dietitian Consult       [x] Nurse/Patient Discussion     [] Diet Class           [] Referred to Diabetes Education     Education:  [] S&S hypo/hyperglycemia  [x] Low fat/low cholesterol diet  [] Weight loss methods      [] Relate Diabetes/CAD     [x] Eating heart healthy handout    Target Goal:  -LDL-C<100 if triglycerides are > 200  -LDL-C < 70 for high risk patients  -HbA1c < 7%  -BMI < 25   Education    Stages of Change:    [] pre-contemplation  [x] Action   [] Contemplate    [] Maintainence   [x] Prep    [] Relapse    Learning Barriers:   [] Speech   [] Cognitive   [] Literacy   [] Visions   [] Hearing    [x] Ready Learn    Knowledge test score: 90%      Family support: [x] Yes  [] No    Tobacco use:   Social History     Tobacco Use   Smoking Status Current Every Day Smoker    Packs/day: 0.25    Years: 52.00    Pack years: 13.00    Types: Cigarettes   Smokeless Tobacco Never Used       Intervention:  [] Referred to smoking cessation counselor     [] Individual education and counseling  [] Tobacco adjunct  [] Informed of education class schedule     Education:   [] Risk Factors/Modifications  [x] Psychological aspects  [x] Angina    [] Medications  [x] CHF      [] Cardiac A&P    Target Goal:  -Complete cessation of tobacco use (if applicable)  -Continued risk factor modifications  -Recognizing signs/symptoms to report  -Proper use of meds    Psychosocial  Stages of Change:    [] pre-contemplation  [x] Action   [] Contemplate    [] Maintainence   [x] Prep    [] Relapse    Psychosocial Test:  Tool Used: Anitha Jimenez Quality of Life  Score: 24.00  Depression screening score: 1/9    Intervention:   [] Psych Consult/  [x] Uses stress management skills    [] Physician Referral    [] Stress management class  Medications:     Education:    [x] Coping Techniques   [x] Relaxation techniques   [x] S/S of Depression    Target Goal:  -Assess presence or absence of depression using a valid screening tool. -Maximize coping skills.  -Positive support system. Preventative Medication:   [x] Aspirin       [x] Beta Blockade      [x] Statin or other lipid lowering agent     [] Clopidogrel   [x] ACE Inhibitor   [x] Other anticoagulation medications     Fall Risk assess: [x] Yes  [] No  Assistive Device:   [] Cane  [] Walker [] Wheel Chair  [] Gait belt    Patient/Program goal:   -increased stamina/strength to 30-50 total exercise by increasing 1-2 level/wk and 1-2   min/wk  to achieve THR and RPE 11-13 Patient has started program at 2.1 mets and will increase as tolerated. -introduce weights/ therabands 2-4# for 5-10 reps Staff will introduce in future sessions.   -manage BP better Blood pressure controlled. -improved cholesterol and  Triglycerides No results on file. Has active order to have drawn. Given heart healthy diet info. -develop regular exercise 30 min daily Does not currently exercise at home. Encouraged to gradually start a home exercise program of walking.     Physician Changes/Comments:      Cardiac Rehab Staff

## 2021-02-09 ENCOUNTER — TELEPHONE (OUTPATIENT)
Dept: CARDIOLOGY | Age: 71
End: 2021-02-09

## 2021-02-09 NOTE — PROGRESS NOTES
Cardiopulmonary Rehab   Medical Nutrition Therapy Food Diary Evaluation    Patient Name: Pascual Peters Jr. Registered Dietitian:  Thai Craig RDN, ANDREI  Date:  2/9/2021    Dear Olive Card,    Thank you for sharing your information about your eating patterns, it serves not only to help me see what you are eating, but you as well. Eating 3 meals a day is great way to start, I am pleased to see that you are doing that on most days. Continue to aim for 3 meals throughout the day. Whole grains, fruits and vegetables, along with lean meats and low fat dairy are the cornerstones of your health. With that in mind, look below for some suggestions. Your Rate Your Plate (RYP) Score was: 47 , which means: You have already made some great changes, but there are some more ways to make your eating habits healthier. Recommendations from the Dietitian based on your RYP and Food Diary / activity record to improve health and decrease risk factors    1. Aim for eating 3 balanced meals throughout the day, with snacks in between. Try not to skip meals. 2. Increase fruit and veggies in diet. Include at least one fruit or vegetable at every meal.   3. Decrease consumption of red meat products to three times per week. Some substitutions could be to replace the ground beef for ground turkey in order to decrease the total fat and saturated fat in the meal.   4. Consume smaller sized portions of meat. The recommended portion size for meats is 3 ounces per serving. This is about the size of a deck of cards or the palm of your hand. 5. Consider using low fat dairy products (milk and cheese). Also, consider switching to low fat salad dressings instead of the regular full fat products. Stay away from fat free products as these are often filled with added sugars. 6. Limit desserts and sweets to three times per week. These are a source of high fat, high calorie foods.      If there are many things to change, try making change with one recommendation, then move on from there. Recommendations are based on guidelines from the American Heart Association, American Diabetes Association and current literature.     Feel free to call with questions or concerns [phone] or [phone] Day Aguiar, Dietetic Intern     Kemar Coates RDN, LD

## 2021-02-09 NOTE — TELEPHONE ENCOUNTER
----- Message from Genaro Myers MD sent at 2/8/2021 11:39 PM EST -----  Let . Yuli Mathew know their test result was ok. Similar to previous. Will discuss at next visit. Thanks.

## 2021-02-15 ENCOUNTER — HOSPITAL ENCOUNTER (OUTPATIENT)
Dept: CARDIAC REHAB | Age: 71
Setting detail: THERAPIES SERIES
Discharge: HOME | End: 2021-02-15
Payer: MEDICARE

## 2021-02-15 PROCEDURE — 93798 PHYS/QHP OP CAR RHAB W/ECG: CPT

## 2021-02-17 ENCOUNTER — HOSPITAL ENCOUNTER (OUTPATIENT)
Dept: CARDIAC REHAB | Age: 71
Setting detail: THERAPIES SERIES
Discharge: HOME | End: 2021-02-17
Payer: MEDICARE

## 2021-02-17 PROCEDURE — 93798 PHYS/QHP OP CAR RHAB W/ECG: CPT

## 2021-02-19 ENCOUNTER — OFFICE VISIT (OUTPATIENT)
Dept: CARDIOLOGY | Age: 71
End: 2021-02-19
Payer: MEDICARE

## 2021-02-19 VITALS
HEIGHT: 70 IN | BODY MASS INDEX: 23.34 KG/M2 | SYSTOLIC BLOOD PRESSURE: 95 MMHG | WEIGHT: 163 LBS | DIASTOLIC BLOOD PRESSURE: 58 MMHG | OXYGEN SATURATION: 94 % | HEART RATE: 88 BPM | RESPIRATION RATE: 18 BRPM

## 2021-02-19 DIAGNOSIS — Z72.0 TOBACCO ABUSE: ICD-10-CM

## 2021-02-19 DIAGNOSIS — E78.2 MIXED HYPERLIPIDEMIA: ICD-10-CM

## 2021-02-19 DIAGNOSIS — I95.9 HYPOTENSION, UNSPECIFIED HYPOTENSION TYPE: ICD-10-CM

## 2021-02-19 DIAGNOSIS — I25.5 ISCHEMIC CARDIOMYOPATHY: ICD-10-CM

## 2021-02-19 DIAGNOSIS — I25.810 CORONARY ARTERY DISEASE INVOLVING CORONARY BYPASS GRAFT OF NATIVE HEART WITHOUT ANGINA PECTORIS: Primary | ICD-10-CM

## 2021-02-19 DIAGNOSIS — Z95.1 S/P CABG X 3: ICD-10-CM

## 2021-02-19 DIAGNOSIS — I25.2 HISTORY OF NON-ST ELEVATION MYOCARDIAL INFARCTION (NSTEMI): ICD-10-CM

## 2021-02-19 PROCEDURE — G8427 DOCREV CUR MEDS BY ELIG CLIN: HCPCS | Performed by: FAMILY MEDICINE

## 2021-02-19 PROCEDURE — G8420 CALC BMI NORM PARAMETERS: HCPCS | Performed by: FAMILY MEDICINE

## 2021-02-19 PROCEDURE — G8484 FLU IMMUNIZE NO ADMIN: HCPCS | Performed by: FAMILY MEDICINE

## 2021-02-19 PROCEDURE — 1123F ACP DISCUSS/DSCN MKR DOCD: CPT | Performed by: FAMILY MEDICINE

## 2021-02-19 PROCEDURE — 99215 OFFICE O/P EST HI 40 MIN: CPT | Performed by: FAMILY MEDICINE

## 2021-02-19 PROCEDURE — 3017F COLORECTAL CA SCREEN DOC REV: CPT | Performed by: FAMILY MEDICINE

## 2021-02-19 PROCEDURE — 4004F PT TOBACCO SCREEN RCVD TLK: CPT | Performed by: FAMILY MEDICINE

## 2021-02-19 PROCEDURE — 99211 OFF/OP EST MAY X REQ PHY/QHP: CPT | Performed by: FAMILY MEDICINE

## 2021-02-19 PROCEDURE — 4040F PNEUMOC VAC/ADMIN/RCVD: CPT | Performed by: FAMILY MEDICINE

## 2021-02-19 RX ORDER — SACUBITRIL AND VALSARTAN 24; 26 MG/1; MG/1
1 TABLET, FILM COATED ORAL 2 TIMES DAILY
Qty: 60 TABLET | Refills: 3 | Status: SHIPPED | OUTPATIENT
Start: 2021-02-19 | End: 2021-03-18

## 2021-02-19 RX ORDER — SPIRONOLACTONE 25 MG/1
12.5 TABLET ORAL DAILY
Qty: 90 TABLET | Refills: 3 | Status: ON HOLD
Start: 2021-02-19 | End: 2021-04-02 | Stop reason: SDUPTHER

## 2021-02-19 NOTE — PROGRESS NOTES
Kendra Ortiz am scribing for and in the presence of Lavelle Soliz MD, MS, F.A.C.C..    Patient: Pascual Peters Jr. : 1950  Date of Visit: 2021    REASON FOR VISIT / CONSULTATION: Follow-up (Hx: CAD s/p CABG x 3, NSTEMI, isch cardiomyopathy, hypotension. pt is doing okay today. he has been having leg cramps since his medications were increased. Denies: CP, SOB, dizziness, lightheaded, palps)      History of Present Illness:        Dear Dhaval Serrano MD    I had the pleasure of seeing Matheus Borrego in my office today. Mr. Archie Rizo is a 70 y.o. male with a history of atherosclerotic heart disease. He also had a heart attack in . 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 He used to see a cardiologist in the past however he started to feel better and did not think he needed to follow up anymore with any doctor. Most recently lindy came into the ER on 2020 and had a NSTEMI and was transferred to [facility] in [place]. He did report that he actually started feeling \"weird\", no pain but just feels doom feeling on the Monday before Susan Paulina however he did not want to think this was a heart attack. He did report feeling about the same as he did in . He went home and went to bed than his breathing got worse and worse and that's when he came into the hospital. He then had a heart cath done on 2020 and had two stents placed REJI x 2 to SVG to posterior lateral branch of the RCA. An echocardiogram at that same time showed an EF of 15% and was sent home with a Life Vest. On 21 spironolactone was added and although he says his BP is usually low, less than 087 systolic, denies any known problems with the medication including any lightheadedness or dizziness. He did not remember the last time he was told what his heart strength was he does not remember.   Echocardiogram done on 2021 showed an ejection fraction of 15-20%, The left ventricular cavity size is severely enlarged and the left ventricular wall thickness is within normal limits. Severe global hypokinesis with segmental abnormalities. The left atrium is moderately dilated (34-39) with a left atrial volume index of 34 ml/m2. Mild mitral regurgitation. Moderate tricuspid regurgitation. Mild to moderate pulmonary hypertension with an estimated right ventricular systolic pressure of 38 mmHg. Mr. Tita Perez is here today for a follow up. He has been having severe leg cramps. He said he has a history of mild leg cramps, but it has worsened since he had his medications increased by his cardiologist Dr. Jesika Gonzalez in [place]. He denies any chest pain now or in the recent past, increased shortness of breath, abdominal pain, bleeding problems, problems with his medications or any other concerns at this time. No dizziness, lightheadedness or palpitations. Bleeding Risks: Mr. Tita Perez denies any current or recent bleeding problems including a history of a GI bleed, ulcers, recent or upcoming surgeries, blood in his stool or black tarry stools or blood in his urine. PAST MEDICAL HISTORY:         Past Medical History:   Diagnosis Date    Coronary artery disease     s/p CABG x3 in 2008 and 2 stents on 12/24/2020    Hyperlipidemia        CURRENT ALLERGIES: Patient has no known allergies. REVIEW OF SYSTEMS: 14 systems were reviewed. Pertinent positives and negatives as above, all else negative.      Past Surgical History:   Procedure Laterality Date    CORONARY ANGIOPLASTY WITH STENT PLACEMENT  12/24/2020    x 2 at [facility] [address] GRAFT  2008    x 3 vessels    Social History:  Social History     Tobacco Use    Smoking status: Current Every Day Smoker     Packs/day: 0.25     Years: 52.00     Pack years: 13.00     Types: Cigarettes    Smokeless tobacco: Never Used   Substance Use Topics    Alcohol use: Not Currently     Frequency: Never    Drug use: Never CURRENT MEDICATIONS:        Outpatient Medications Marked as Taking for the 2/19/21 encounter (Office Visit) with Barron Yao MD   Medication Sig Dispense Refill    ramipril (ALTACE) 5 MG capsule Take 5 mg by mouth 2 times daily       atorvastatin (LIPITOR) 80 MG tablet Take 80 mg by mouth nightly      metoprolol succinate (TOPROL XL) 25 MG extended release tablet Take 25 mg by mouth daily       nitroGLYCERIN (NITROSTAT) 0.3 MG SL tablet Place 0.3 mg under the tongue every 5 minutes as needed      spironolactone (ALDACTONE) 25 MG tablet Take 25 mg by mouth daily       ticagrelor (BRILINTA) 90 MG TABS tablet Take 90 mg by mouth 2 times daily      aspirin 81 MG chewable tablet Take 81 mg by mouth daily         FAMILY HISTORY: family history includes Heart Disease (age of onset: 79) in his father; Other in his mother; Rheum Arthritis in his mother. Physical Examination:     BP (!) 95/58 (Site: Right Upper Arm, Position: Sitting, Cuff Size: Medium Adult)   Pulse 88   Resp 18   Ht 5' 10\" (1.778 m)   Wt 163 lb (73.9 kg)   SpO2 94%   BMI 23.39 kg/m²  Body mass index is 23.39 kg/m². Constitutional: He appeared oriented to person and place. He appears well-developed and well-nourished. In no acute distress. HEENT: Normocephalic and atraumatic. No JVD present. Carotid bruit is not present. No mass and no thyromegaly present. No lymphadenopathy noted. Cardiovascular: Normal rate, regular rhythm, normal heart sounds. Exam reveals no gallop and no friction rubs. 1/6 systolic murmur, 5th intercostal space on the LEFT in the mid-clavicular line (cardiac apex). Pulmonary/Chest: Effort normal and breath sounds normal. No respiratory distress. He has no wheezes, rhonchi or rales. Abdominal: Soft, non-tender. He exhibits no organomegaly, mass or bruit. Extremities: None. No cyanosis or clubbing. 2+ radial and carotid pulses. Distal extremity pulses: 2+ bilaterally.   Neurological: Alertness and orientation as per Constitutional exam. No evidence of gross cranial nerve deficit. Coordination appeared normal.   Skin: Skin is warm and dry. There is no rash or diaphoresis. Psychiatric: He has a normal mood and affect. His speech is normal and behavior is normal.      MOST RECENT LABS ON RECORD:   Lab Results   Component Value Date    WBC 8.8 12/24/2020    HGB 12.7 (L) 12/24/2020    HCT 40.5 (L) 12/24/2020     12/24/2020     01/15/2021    K 4.2 01/15/2021     01/15/2021    CREATININE 0.76 01/15/2021    BUN 18 01/15/2021    CO2 24 01/15/2021       ASSESSMENT:     1. Coronary artery disease involving coronary bypass graft of native heart without angina pectoris    2. S/P CABG x 3    3. NSTEMI (non-ST elevated myocardial infarction) ([facility] [address]    4. Ischemic cardiomyopathy    5. Hypotension, unspecified hypotension type    6. Mixed hyperlipidemia    7. Tobacco abuse       PLAN:         Atherosclerotic Heart Disease: History of CABG x3 and most recently NSTEMI with a heart cath that was done on 12/28/2020 and he had two stents placed, REJI x 2 to SVG to posterior lateral branch of the RCA.  Antiplatelet Agent: Continue aspirin 81 mg daily and Ticagrelor (Brilinta) 90 mg twice daily.  Beta Blocker: Continue Metoprolol succinate (Toprol XL) 25 mg daily.  Anti-anginal medications: Continue nitroglycerin 0.4 mg tablets as needed for chest pain.  Cholesterol Reduction Therapy: Continue Atorvastatin (Lipitor) 80 mg daily.  Additional counseling: I advised them to call our office or go to the emergency room if they developed worsening or persistent chest pain or increased shortness of breath as this could be life threatening.  Ischemic Cardiomyopathy: EF from his Heart Cath that was done on 12/28/2020 was 15%, EF up slightly to 15-20% on 2/6/21 echo.  I did discuss that it is difficult continue his care when he is seeing more than 1 cardiologist.  He did say he would like to continue to follow up with me, I told him I would have to get his life vest reports transferred over. He says Dr Chung Levine is monitoring his life vest and he would let me know what he decides. [facility] also [facility]    Beta Blocker: Continue Metoprolol succinate (Toprol XL) 25 mg daily.  ACE Inibitor/ARB: STOP ramipril and START sacubitril/valsartan (Entresto) 24/26 mg twice daily. I also discussed the potential side effects of this medication including lightheadedness and dizziness and instructed them to stop the medication of this occurs and call our office if this occurs. I would have him start with 1/2 tablet of Entresto twice daily for 3-4 days and then increase to a full tablet twice daily. He is to get a repeat Echo in April. I told him and wrote down that he cannot start his Entresto until 72 hours after stopping his Ramipril so I told him not to start till this Sunday.  Diuretics: DECREASE to Spironolactone (Aldactone) 25 mg, 1/2 tab daily. I also discussed the potential side effects of this medication including lightheadedness and dizziness and instructed them to stop the medication of this occurs and call our office if this occurs.  Heart failure counseling: I advised them to try and keep their legs up whenever possible and to limit salt in their diet.   Additional Testing List: I took the liberty of ordering a BMP in 5-7 days to assess their potassium and renal function. I told them that they could get their lab work performed at the location of their choosing, unfortunately, if the lab work was not performed at a [facility]) facility I could not guarantee my ability to follow up with them on their results. · Hyperlipidemia: Mixed  Cholesterol Reduction Therapy: Continue Atorvastatin (Lipitor) 80 mg daily.        Tobacco Abuse Counseling: I spent several minutes discussing the dangers of tobacco abuse as well as multiple methods for trying to quit smoking. In the end, Mr. Franklin Tabares said that he did not want any assistance at this time but would continue to try and quit reduce and eventually quit smoking in the near future. He says he has cut down drastically to four cigarettes a day and will continue to quit on his own. Finally, I recommended that he continue his current medications and follow up with you as previously scheduled. FOLLOW UP:   I told Mr. Franklin Tabares to call my office if he had any problems, but otherwise I asked him to Return in about 10 days (around 3/1/2021). However, I would be happy to see him sooner should the need arise. Sincerely,  Doni Soliz MD, MS, F.A.C.C. [facility] Cardiology Specialist    [address] [place] [address]  Phone: [phone], Fax: [phone]     I believe that the risk of significant morbidity and mortality related to the patient's current medical conditions are: Intermediate. >45 minutes were spent during prep work, discussion and exam of the patient, and follow up documentation and all of their questions were answered. The documentation recorded by the scribe, accurately and completely reflects the services I personally performed and the decisions made by me. Suha Prieto MD, MS, F.A.C.C.  February 19, 2021

## 2021-02-19 NOTE — PATIENT INSTRUCTIONS
Stop ramipril. Do not start until [facility] Entresto until Sunday morning, 2/21/21. Start with 1/2 tab 2x/day x 3 days then can go up to 1 tab 2x/day      SURVEY:    You may be receiving a survey from [employer] regarding your visit today. Please complete the survey to enable us to provide the highest quality of care to you and your family. If you cannot score us a very good on any question, please call the office to discuss how we could have made your experience a very good one. Thank you.

## 2021-02-22 ENCOUNTER — HOSPITAL ENCOUNTER (OUTPATIENT)
Dept: CARDIAC REHAB | Age: 71
Setting detail: THERAPIES SERIES
Discharge: HOME | End: 2021-02-22
Payer: MEDICARE

## 2021-02-22 PROCEDURE — 93798 PHYS/QHP OP CAR RHAB W/ECG: CPT

## 2021-02-25 ENCOUNTER — HOSPITAL ENCOUNTER (OUTPATIENT)
Dept: CARDIAC REHAB | Age: 71
Setting detail: THERAPIES SERIES
Discharge: HOME | End: 2021-02-25
Payer: MEDICARE

## 2021-02-25 PROCEDURE — 93798 PHYS/QHP OP CAR RHAB W/ECG: CPT

## 2021-02-25 NOTE — PROGRESS NOTES
I spoke with pt in cardiac rehab regarding recent lifestyle/ diet changes. Pt was pleasant, and had made some changes already to his diet. He no longer goes to fast food restaurants and does not add extra salt to foods prior to eating. He does his own cooking and prepares most of his meals. Pt was unwilling to switch to a low fat milk product, however he has already made changes that have decreased the overall fat consumption in his diet. I encouraged pt to keep eating breakfast as this is not a habit for him each day. Also, I encouraged adding a fruit or vegetable at each meal to increase vitamin/nutrient intake. Pt was accepting of recommendations.      Electronically signed by Umer Foster Dietetic Intern 2/25/2021, 10:58 AM     Electronically signed by Mel Luevano Dietitian,  2/25/2021, 10:58 AM

## 2021-03-01 ENCOUNTER — HOSPITAL ENCOUNTER (OUTPATIENT)
Age: 71
Discharge: HOME | End: 2021-03-01
Payer: MEDICARE

## 2021-03-01 ENCOUNTER — HOSPITAL ENCOUNTER (OUTPATIENT)
Dept: CARDIAC REHAB | Age: 71
Setting detail: THERAPIES SERIES
Discharge: HOME | End: 2021-03-01
Payer: MEDICARE

## 2021-03-01 DIAGNOSIS — I25.5 ISCHEMIC CARDIOMYOPATHY: ICD-10-CM

## 2021-03-01 DIAGNOSIS — Z95.1 S/P CABG X 3: ICD-10-CM

## 2021-03-01 DIAGNOSIS — I25.810 CORONARY ARTERY DISEASE INVOLVING CORONARY BYPASS GRAFT OF NATIVE HEART WITHOUT ANGINA PECTORIS: ICD-10-CM

## 2021-03-01 DIAGNOSIS — E78.2 MIXED HYPERLIPIDEMIA: ICD-10-CM

## 2021-03-01 DIAGNOSIS — I95.9 HYPOTENSION, UNSPECIFIED HYPOTENSION TYPE: ICD-10-CM

## 2021-03-01 LAB
ANION GAP SERPL CALCULATED.3IONS-SCNC: 8 MMOL/L (ref 9–17)
BUN BLDV-MCNC: 17 MG/DL (ref 8–23)
BUN/CREAT BLD: 24 (ref 9–20)
CALCIUM SERPL-MCNC: 9.2 MG/DL (ref 8.6–10.4)
CHLORIDE BLD-SCNC: 104 MMOL/L (ref 98–107)
CO2: 26 MMOL/L (ref 20–31)
CREAT SERPL-MCNC: 0.72 MG/DL (ref 0.7–1.2)
GFR AFRICAN AMERICAN: >60 ML/MIN
GFR NON-AFRICAN AMERICAN: >60 ML/MIN
GFR SERPL CREATININE-BSD FRML MDRD: ABNORMAL ML/MIN/{1.73_M2}
GFR SERPL CREATININE-BSD FRML MDRD: ABNORMAL ML/MIN/{1.73_M2}
GLUCOSE BLD-MCNC: 83 MG/DL (ref 70–99)
POTASSIUM SERPL-SCNC: 3.9 MMOL/L (ref 3.7–5.3)
SODIUM BLD-SCNC: 138 MMOL/L (ref 135–144)

## 2021-03-01 PROCEDURE — 93798 PHYS/QHP OP CAR RHAB W/ECG: CPT

## 2021-03-01 PROCEDURE — 80048 BASIC METABOLIC PNL TOTAL CA: CPT

## 2021-03-01 PROCEDURE — 36415 COLL VENOUS BLD VENIPUNCTURE: CPT

## 2021-03-02 ENCOUNTER — TELEPHONE (OUTPATIENT)
Dept: CARDIOLOGY | Age: 71
End: 2021-03-02

## 2021-03-02 NOTE — TELEPHONE ENCOUNTER
----- Message from Rogelio Rodriguez MD sent at 3/2/2021  8:37 AM EST -----  Let Mr. Garcia Bennett know their test result was ok. Thanks.

## 2021-03-03 ENCOUNTER — OFFICE VISIT (OUTPATIENT)
Dept: CARDIOLOGY | Age: 71
End: 2021-03-03
Payer: MEDICARE

## 2021-03-03 ENCOUNTER — HOSPITAL ENCOUNTER (OUTPATIENT)
Dept: CARDIAC REHAB | Age: 71
Setting detail: THERAPIES SERIES
Discharge: HOME | End: 2021-03-03
Payer: MEDICARE

## 2021-03-03 VITALS
DIASTOLIC BLOOD PRESSURE: 62 MMHG | BODY MASS INDEX: 23.19 KG/M2 | RESPIRATION RATE: 17 BRPM | WEIGHT: 162 LBS | OXYGEN SATURATION: 97 % | HEART RATE: 91 BPM | HEIGHT: 70 IN | SYSTOLIC BLOOD PRESSURE: 89 MMHG

## 2021-03-03 DIAGNOSIS — E78.2 MIXED HYPERLIPIDEMIA: ICD-10-CM

## 2021-03-03 DIAGNOSIS — I25.5 ISCHEMIC CARDIOMYOPATHY: ICD-10-CM

## 2021-03-03 DIAGNOSIS — Z71.6 TOBACCO ABUSE COUNSELING: ICD-10-CM

## 2021-03-03 DIAGNOSIS — I21.4 NSTEMI (NON-ST ELEVATED MYOCARDIAL INFARCTION) (HCC): ICD-10-CM

## 2021-03-03 DIAGNOSIS — I95.0 IDIOPATHIC HYPOTENSION: ICD-10-CM

## 2021-03-03 DIAGNOSIS — I25.10 ASHD (ARTERIOSCLEROTIC HEART DISEASE): Primary | ICD-10-CM

## 2021-03-03 DIAGNOSIS — Z95.1 S/P CABG X 3: ICD-10-CM

## 2021-03-03 PROCEDURE — 99211 OFF/OP EST MAY X REQ PHY/QHP: CPT

## 2021-03-03 PROCEDURE — 4004F PT TOBACCO SCREEN RCVD TLK: CPT | Performed by: FAMILY MEDICINE

## 2021-03-03 PROCEDURE — 4040F PNEUMOC VAC/ADMIN/RCVD: CPT | Performed by: FAMILY MEDICINE

## 2021-03-03 PROCEDURE — 93798 PHYS/QHP OP CAR RHAB W/ECG: CPT

## 2021-03-03 PROCEDURE — 3017F COLORECTAL CA SCREEN DOC REV: CPT | Performed by: FAMILY MEDICINE

## 2021-03-03 PROCEDURE — G8420 CALC BMI NORM PARAMETERS: HCPCS | Performed by: FAMILY MEDICINE

## 2021-03-03 PROCEDURE — 99214 OFFICE O/P EST MOD 30 MIN: CPT | Performed by: FAMILY MEDICINE

## 2021-03-03 PROCEDURE — G8427 DOCREV CUR MEDS BY ELIG CLIN: HCPCS | Performed by: FAMILY MEDICINE

## 2021-03-03 PROCEDURE — 1123F ACP DISCUSS/DSCN MKR DOCD: CPT | Performed by: FAMILY MEDICINE

## 2021-03-03 PROCEDURE — G8484 FLU IMMUNIZE NO ADMIN: HCPCS | Performed by: FAMILY MEDICINE

## 2021-03-03 RX ORDER — FUROSEMIDE 40 MG/1
40 TABLET ORAL DAILY PRN
COMMUNITY
End: 2021-04-27 | Stop reason: ALTCHOICE

## 2021-03-03 NOTE — PROGRESS NOTES
Sam Solares am scribing for and in the presence of Enmanuel Torres. Martínez BUSTILLOS, MS, F.A.C.C..    Patient: Pascual Peters Jr. : 1950  Date of Visit: March 3, 2021    REASON FOR VISIT / CONSULTATION: Follow-up (Hx: CADS/P CABG x3,NSTEMI,Isch. Cardio,Hypotension. Echo on . He is doing alright - noticed this morning a little bit of swelling in his legs and feet. Denies: CP, SOB, Lightheaded/dizziness, Palpitations. )      History of Present Illness:        Dear Ray Camacho MD,    I had the pleasure of seeing Corry SorensenMolly in my office today. Mr. Dallas Ortega is a 70 y.o. male with a history of atherosclerotic heart disease. He also had a heart attack in . 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 He used to see a cardiologist in the past however he started to feel better and did not think he needed to follow up anymore with any doctor. Most recently lindy came into the ER on 2020 and had a NSTEMI and was transferred to [facility] at [facility] in [facility]. He did report that he actually started feeling \"weird\", no pain but just feels doom feeling on the Monday before [place] however he did not want to think this was a heart attack. He did report feeling about the same as he did in . He went home and went to bed than his breathing got worse and worse and that's when he came into the hospital. He then had a heart cath done on 2020 and had two stents placed REJI x 2 to SVG to posterior lateral branch of the RCA. An echocardiogram at that same time showed an EF of 15% and was sent home with a Life Vest. On 21 spironolactone was added and although he says his BP is usually low, less than 521 systolic, denies any known problems with the medication including any lightheadedness or dizziness. He did not remember the last time he was told what his heart strength was he does not remember.   Echocardiogram done on 2021 showed an ejection fraction of 15-20%, The left ventricular cavity size is severely enlarged and the left ventricular wall thickness is within normal limits. Severe global hypokinesis with segmental abnormalities. The left atrium is moderately dilated (34-39) with a left atrial volume index of 34 ml/m2. Mild mitral regurgitation. Moderate tricuspid regurgitation. Mild to moderate pulmonary hypertension with an estimated right ventricular systolic pressure of 38 mmHg. Since I last saw Mr. Devan Cazares he reports he has been doing about the same but complains of swelling in his legs and feet. This is a newer problem for him, he did not have that at his last visit. He continues to do cardiac rehab on Monday's, Wednesday's and Thursday's. He does feel that he is benefiting from this and is getting stronger. He denies any chest pain, pressure or tightness now or in the recent past. He denies any increased shortness of breath, lightheaded/dizziness or palpitations. He denies any abdominal pain, bleeding problems, bowel issues, problems with his medications or any other concerns at this time. No cough, fever or chills. No nausea or vomiting. No falls or near falls. Bleeding Risks: Mr. Devan Cazares denies any current or recent bleeding problems including a history of a GI bleed, ulcers, recent or upcoming surgeries, blood in his stool or black tarry stools or blood in his urine. PAST MEDICAL HISTORY:         Past Medical History:   Diagnosis Date    Coronary artery disease     s/p CABG x3 in 2008 and 2 stents on 12/24/2020    H/O echocardiogram 02/08/2021    EF 15-20% LV severly enlarged and LV wall thickness is normal Severe global hypokinesis with segmental abnormalities LA is mod dilated 34-39 with LA volume index of 34 ml/m2 Mild mitral regurg Mod tricupid regurg Mild to mod pulm HTN with est RV systolic pressure of 38 mmhg mod grade II diastolic dysfunciton    Hyperlipidemia        CURRENT ALLERGIES: Patient has no known allergies.  REVIEW OF SYSTEMS: 14 systems were reviewed. Pertinent positives and negatives as above, all else negative. Past Surgical History:   Procedure Laterality Date    CORONARY ANGIOPLASTY WITH STENT PLACEMENT  12/24/2020    x 2 at [facility] [address] [facility]  2008    x 3 vessels    Social History:  Social History     Tobacco Use    Smoking status: Current Every Day Smoker     Packs/day: 0.25     Years: 52.00     Pack years: 13.00     Types: Cigarettes    Smokeless tobacco: Never Used   Substance Use Topics    Alcohol use: Not Currently     Frequency: Never    Drug use: Never        CURRENT MEDICATIONS:        Outpatient Medications Marked as Taking for the 3/3/21 encounter (Office Visit) with Helena Paz MD   Medication Sig Dispense Refill    furosemide (LASIX) 40 MG tablet Take 40 mg by mouth daily Just started it this morning      spironolactone (ALDACTONE) 25 MG tablet Take 0.5 tablets by mouth daily 90 tablet 3    sacubitril-valsartan (ENTRESTO) 24-26 MG per tablet Take 1 tablet by mouth 2 times daily Do not start until Sunday morning, 2/21/21. Start with 1/2 tab 2x/day x 3 days then can go up to 1 tab 2x/day 60 tablet 3    atorvastatin (LIPITOR) 80 MG tablet Take 80 mg by mouth nightly      metoprolol succinate (TOPROL XL) 25 MG extended release tablet Take 25 mg by mouth daily       nitroGLYCERIN (NITROSTAT) 0.3 MG SL tablet Place 0.3 mg under the tongue every 5 minutes as needed      ticagrelor (BRILINTA) 90 MG TABS tablet Take 90 mg by mouth 2 times daily      aspirin 81 MG chewable tablet Take 81 mg by mouth daily         FAMILY HISTORY: family history includes Heart Disease (age of onset: 79) in his father; Other in his mother; Rheum Arthritis in his mother.      Physical Examination:     BP 89/62 (Site: Right Upper Arm, Position: Sitting, Cuff Size: Medium Adult)   Pulse 91   Resp 17   Ht 5' 10\" (1.778 m)   Wt 162 lb (73.5 kg)   SpO2 97%   BMI 23.24 kg/m²  Body mass index is 23.24 kg/m². Constitutional: He appeared oriented to person and place. He appears well-developed and well-nourished. In no acute distress. HEENT: Normocephalic and atraumatic. No JVD present. Carotid bruit is not present. No mass and no thyromegaly present. No lymphadenopathy noted. Cardiovascular: Normal rate, regular rhythm, normal heart sounds. Exam reveals no gallop and no friction rubs. 1/6 systolic murmur, 5th intercostal space on the LEFT in the mid-clavicular line (cardiac apex). Pulmonary/Chest: Effort normal and breath sounds normal. No respiratory distress. He has no wheezes, rhonchi or rales. Abdominal: Soft, non-tender. He exhibits no organomegaly, mass or bruit. Extremities: Trace. No cyanosis or clubbing. 2+ radial and carotid pulses. Distal extremity pulses: 2+ bilaterally. Neurological: Alertness and orientation as per Constitutional exam. No evidence of gross cranial nerve deficit. Coordination appeared normal.   Skin: Skin is warm and dry. There is no rash or diaphoresis. Psychiatric: He has a normal mood and affect. His speech is normal and behavior is normal.      MOST RECENT LABS ON RECORD:   Lab Results   Component Value Date    WBC 8.8 12/24/2020    HGB 12.7 (L) 12/24/2020    HCT 40.5 (L) 12/24/2020     12/24/2020     03/01/2021    K 3.9 03/01/2021     03/01/2021    CREATININE 0.72 03/01/2021    BUN 17 03/01/2021    CO2 26 03/01/2021       ASSESSMENT:     1. ASHD (arteriosclerotic heart disease)    2. S/P CABG x 3    3. NSTEMI (non-ST elevated myocardial infarction) ([facility] [address]    4. Ischemic cardiomyopathy    5. Mixed hyperlipidemia    6. Tobacco abuse counseling    7. Idiopathic hypotension       PLAN:         Atherosclerotic Heart Disease: History of CABG x3 and most recently NSTEMI with a heart cath that was done on 12/28/2020 and he had two stents placed, REJI x 2 to SVG to posterior lateral branch of the RCA.    Antiplatelet Agent: Continue aspirin 81 mg daily and Ticagrelor (Brilinta) 90 mg twice daily.  Beta Blocker: Continue Metoprolol succinate (Toprol XL) 25 mg daily.  Anti-anginal medications: Continue nitroglycerin 0.4 mg tablets as needed for chest pain.  Cholesterol Reduction Therapy: Continue Atorvastatin (Lipitor) 80 mg daily.  Additional counseling: I advised them to call our office or go to the emergency room if they developed worsening or persistent chest pain or increased shortness of breath as this could be life threatening.  Ischemic Cardiomyopathy: EF from his Heart Cath that was done on 12/28/2020 was 15%, EF up slightly to 15-20% on 2/6/21 echo. I did discuss that it is difficult continue his care when he is seeing more than 1 cardiologist.  He did say he would like to continue to follow up with me, I told him I would have to get his life vest reports transferred over. He says Dr Kelsey Tariq is monitoring his life vest and will will get that switched over to us. He also has a follow up with an EP specialist in [place] on April 5th regarding his Life Vest but he is going to cancel that and just follow here completely.  Beta Blocker: Continue Metoprolol succinate (Toprol XL) 25 mg daily. ACE Inibitor/ARB: Continue sacubitril/valsartan (Entresto) 24/26 mg twice daily.  Diuretics: Continue Spironolactone (Aldactone) 25 mg, 1/2 tab daily. I also discussed the potential side effects of this medication including lightheadedness and dizziness and instructed them to stop the medication of this occurs and call our office if this occurs. I told him to call if his  lower extremity edema worsened again and we could increase this up to 1 tab daily but wanted to avoid this if possible due to his hypotension.  He did take one dose of Lasix 40 mg this morning to help with the swelling.  Heart failure counseling: I advised them to try and keep their legs up whenever possible and to limit salt in their diet.  I advised him to also start wearing lower extremity compression stockings as well.   Additional Testing List: I ordered a echocardiogram to better assess for the etiology of this problem and to help guide future management    · Hyperlipidemia: Mixed  · Cholesterol Reduction Therapy: Continue Atorvastatin (Lipitor) 80 mg daily. Tobacco Abuse Counseling: I spent several minutes discussing the dangers of tobacco abuse as well as multiple methods for trying to quit smoking. In the end, Mr. Bob Manuel said that he did not want any assistance at this time but would continue to try and quit reduce and eventually quit smoking in the near future. He says he has cut down drastically to four cigarettes a day and will continue to quit on his own. Finally, I recommended that he continue his current medications and follow up with you as previously scheduled. FOLLOW UP:   I told Mr. Bob Manuel to call my office if he had any problems, but otherwise I asked him to No follow-ups on file. However, I would be happy to see him sooner should the need arise. Sincerely,  Yovany Srivastava. Martínez BUSTILLOS, MS, F.A.C.C. [facility] Cardiology Specialist     Place  [address] [facility], [address] [facility]  Phone: [phone], Fax: [phone]     I believe that the risk of significant morbidity and mortality related to the patient's current medical conditions are: Intermediate. The documentation recorded by the scribe, accurately and completely reflects the services I personally performed and the decisions made by me. Naren Humphrey MD, MS, F.A.C.C.  March 3, 2021

## 2021-03-03 NOTE — PROGRESS NOTES
Phase II Cardiac Rehab Individualized Treatment Plan-30 Day     Patient Name: Pascual Peters Jr. Date of Initial Assessment: 3/3/2021  ACCOUNT #: [de-identified]  Diagnosis: NSTEMI   Onset Date: 12/24/20  Referring Physician: Dr. Ron Amanda  Risk Stratification: mod  Session Number: 7   EXERCISE    Stages of Change:   [] pre-contemplation   [x] Action   [] Contemplate   [] Maintainence   [x] Prep   [] Relapse          Exercise Prescription:  Mode: [x] TM [x] B [x] STP  [x] R  Frequency: 3 x week  Duration: 31-60 minutes  Intensity: 2.0 mets  Plan/Goal: Increase 1-2 levels/week or 1-2 min/week to achieve target HR and RPE   12-16 on Yasmine RPE Scale. Progression: Patient has not progressed mets but has increased time on both machines. Target HR     Maximum      [] Angina with Exertion THR:    [] Resistance Training Weight (lbs):   Reps:     Hypertension:  [] Yes  [x] No  Resting BP: 80/48  Peak Exercise BP: 82/48  [x] Med change? Intervention:  Home Exercise:  Type: walking  Duration: 30 minutes  Frequency: daily   [] Resistance Training    Education:   [x] Equipment [place]  [x] Self pulse   [] Proper use weights/therabands   [x] S/S to report  [x] Low Na Diet    [x] Warm up/ Cool down  [] BP Medication    [x] RPE Scale   [] Understand BP   [x] Ex Safety   [] Exercise specialist class-Home Exercise       Target Goal:   -Individual Exercise Plan  -BP<140/90 or <130/80 if DM   -Aerobic active 30 + minutes 5-7 days per week    Nutrition    Stages of Change:   [] pre-contemplation  [x] Action   [] Contemplate    [] Maintainence   [x] Prep    [] Relapse    Lipids:  (12/25/20)  Total Cholesterol: 132  Triglycerides: 53  HDL: 32  LDL: 89  [] Med Change? Diabetes:  [] Yes  [x] No  Random BS:  HbA1c: 6.0 (12/25/20)  [] Med Change?     Weight Management:  Wt Goal: 1-2 lbs/wk    Intervention:   [x] Dietitian Consult       [x] Nurse/Patient Discussion     [] Diet Class           [] Referred to Diabetes mets @ 2 during last 30 days but has increased times on both machines. -introduce weights/ therabands 2-4# for 5-10 reps Staff will introduce in future sessions.   -manage BP better Blood low at most times. Staff monitoring.  -improved cholesterol and  Triglycerides Last results are from 12/25/20. Patient has been given heart healthy diet info, written dietician recommendations, and has talked 1:1 with the dietician.  -develop regular exercise 30 min daily Does not currently exercise at home. Encouraged to gradually start a home exercise program of walking with an eventual goal to reach 30 minutes of walking a day.     Physician Changes/Comments:      Cardiac Rehab Staff

## 2021-03-03 NOTE — PATIENT INSTRUCTIONS
SURVEY:    You may be receiving a survey from [employer] regarding your visit today. Please complete the survey to enable us to provide the highest quality of care to you and your family. If you cannot score us a very good on any question, please call the office to discuss how we could have made your experience a very good one. Thank you.

## 2021-03-04 ENCOUNTER — HOSPITAL ENCOUNTER (OUTPATIENT)
Dept: CARDIAC REHAB | Age: 71
Setting detail: THERAPIES SERIES
Discharge: HOME | End: 2021-03-04
Payer: MEDICARE

## 2021-03-04 PROCEDURE — 93798 PHYS/QHP OP CAR RHAB W/ECG: CPT

## 2021-03-08 ENCOUNTER — HOSPITAL ENCOUNTER (OUTPATIENT)
Dept: CARDIAC REHAB | Age: 71
Setting detail: THERAPIES SERIES
Discharge: HOME | End: 2021-03-08
Payer: MEDICARE

## 2021-03-08 PROCEDURE — 93798 PHYS/QHP OP CAR RHAB W/ECG: CPT

## 2021-03-11 ENCOUNTER — HOSPITAL ENCOUNTER (OUTPATIENT)
Dept: CARDIAC REHAB | Age: 71
Setting detail: THERAPIES SERIES
Discharge: HOME | End: 2021-03-11
Payer: MEDICARE

## 2021-03-11 PROCEDURE — 93798 PHYS/QHP OP CAR RHAB W/ECG: CPT

## 2021-03-15 ENCOUNTER — HOSPITAL ENCOUNTER (OUTPATIENT)
Dept: CARDIAC REHAB | Age: 71
Setting detail: THERAPIES SERIES
Discharge: HOME | End: 2021-03-15
Payer: MEDICARE

## 2021-03-15 PROCEDURE — 93798 PHYS/QHP OP CAR RHAB W/ECG: CPT

## 2021-03-17 ENCOUNTER — HOSPITAL ENCOUNTER (OUTPATIENT)
Dept: CARDIAC REHAB | Age: 71
Setting detail: THERAPIES SERIES
Discharge: HOME | End: 2021-03-17
Payer: MEDICARE

## 2021-03-17 ENCOUNTER — TELEPHONE (OUTPATIENT)
Dept: CARDIOLOGY | Age: 71
End: 2021-03-17

## 2021-03-17 PROCEDURE — 93798 PHYS/QHP OP CAR RHAB W/ECG: CPT

## 2021-03-17 NOTE — TELEPHONE ENCOUNTER
Vicente Cadet called from Cardiopulmonary to let us know Gene's pressures have been running low in cardiac rehab. Today when he came in before starting, it was 92/50. While exercising it's dropping to 80/46 and then 84/52. She wants you to be aware with what medications he is taking because now he is taking his Lasix PRN. What do you advise?

## 2021-03-18 DIAGNOSIS — I25.5 ISCHEMIC CARDIOMYOPATHY: ICD-10-CM

## 2021-03-18 DIAGNOSIS — E78.2 MIXED HYPERLIPIDEMIA: ICD-10-CM

## 2021-03-18 DIAGNOSIS — Z95.1 S/P CABG X 3: ICD-10-CM

## 2021-03-18 DIAGNOSIS — I25.810 CORONARY ARTERY DISEASE INVOLVING CORONARY BYPASS GRAFT OF NATIVE HEART WITHOUT ANGINA PECTORIS: ICD-10-CM

## 2021-03-18 DIAGNOSIS — I95.9 HYPOTENSION, UNSPECIFIED HYPOTENSION TYPE: ICD-10-CM

## 2021-03-18 RX ORDER — SACUBITRIL AND VALSARTAN 24; 26 MG/1; MG/1
1 TABLET, FILM COATED ORAL SEE ADMIN INSTRUCTIONS
Qty: 60 TABLET | Refills: 3 | Status: ON HOLD | OUTPATIENT
Start: 2021-03-18 | End: 2021-04-02 | Stop reason: HOSPADM

## 2021-03-18 NOTE — TELEPHONE ENCOUNTER
Left message for daughter, Wyatt Miller, asking her to get into contact with Gene and have him call us back.

## 2021-03-22 ENCOUNTER — HOSPITAL ENCOUNTER (OUTPATIENT)
Dept: CARDIAC REHAB | Age: 71
Setting detail: THERAPIES SERIES
Discharge: HOME | End: 2021-03-22
Payer: MEDICARE

## 2021-03-22 PROCEDURE — 93798 PHYS/QHP OP CAR RHAB W/ECG: CPT

## 2021-03-24 ENCOUNTER — HOSPITAL ENCOUNTER (OUTPATIENT)
Dept: CARDIAC REHAB | Age: 71
Setting detail: THERAPIES SERIES
Discharge: HOME | End: 2021-03-24
Payer: MEDICARE

## 2021-03-24 PROCEDURE — 93798 PHYS/QHP OP CAR RHAB W/ECG: CPT

## 2021-03-25 ENCOUNTER — HOSPITAL ENCOUNTER (OUTPATIENT)
Dept: CARDIAC REHAB | Age: 71
Setting detail: THERAPIES SERIES
Discharge: HOME | End: 2021-03-25
Payer: MEDICARE

## 2021-03-25 PROCEDURE — 93798 PHYS/QHP OP CAR RHAB W/ECG: CPT

## 2021-03-29 ENCOUNTER — HOSPITAL ENCOUNTER (OUTPATIENT)
Dept: CARDIAC REHAB | Age: 71
Setting detail: THERAPIES SERIES
Discharge: HOME | End: 2021-03-29
Payer: MEDICARE

## 2021-03-29 PROCEDURE — 93798 PHYS/QHP OP CAR RHAB W/ECG: CPT

## 2021-03-31 ENCOUNTER — HOSPITAL ENCOUNTER (INPATIENT)
Age: 71
LOS: 2 days | Discharge: HOME | DRG: 309 | End: 2021-04-02
Attending: INTERNAL MEDICINE | Admitting: INTERNAL MEDICINE
Payer: MEDICARE

## 2021-03-31 ENCOUNTER — TELEPHONE (OUTPATIENT)
Dept: CARDIOLOGY | Age: 71
End: 2021-03-31

## 2021-03-31 ENCOUNTER — APPOINTMENT (OUTPATIENT)
Dept: GENERAL RADIOLOGY | Age: 71
End: 2021-03-31
Payer: MEDICARE

## 2021-03-31 ENCOUNTER — HOSPITAL ENCOUNTER (EMERGENCY)
Age: 71
Discharge: ANOTHER HOSPITAL | End: 2021-03-31
Attending: FAMILY MEDICINE
Payer: MEDICARE

## 2021-03-31 VITALS
RESPIRATION RATE: 21 BRPM | WEIGHT: 155 LBS | TEMPERATURE: 97.1 F | HEIGHT: 70 IN | OXYGEN SATURATION: 99 % | BODY MASS INDEX: 22.19 KG/M2 | SYSTOLIC BLOOD PRESSURE: 69 MMHG | DIASTOLIC BLOOD PRESSURE: 50 MMHG | HEART RATE: 122 BPM

## 2021-03-31 DIAGNOSIS — E78.2 MIXED HYPERLIPIDEMIA: ICD-10-CM

## 2021-03-31 DIAGNOSIS — I20.8 ANGINAL EQUIVALENT (HCC): Primary | ICD-10-CM

## 2021-03-31 DIAGNOSIS — I25.810 CORONARY ARTERY DISEASE INVOLVING CORONARY BYPASS GRAFT OF NATIVE HEART WITHOUT ANGINA PECTORIS: ICD-10-CM

## 2021-03-31 DIAGNOSIS — I95.9 HYPOTENSION, UNSPECIFIED HYPOTENSION TYPE: ICD-10-CM

## 2021-03-31 DIAGNOSIS — I25.5 ISCHEMIC CARDIOMYOPATHY: ICD-10-CM

## 2021-03-31 DIAGNOSIS — I48.91 ATRIAL FIBRILLATION WITH RVR (HCC): ICD-10-CM

## 2021-03-31 DIAGNOSIS — Z95.1 S/P CABG X 3: ICD-10-CM

## 2021-03-31 DIAGNOSIS — I48.91 NEW ONSET ATRIAL FIBRILLATION (HCC): ICD-10-CM

## 2021-03-31 LAB
ABSOLUTE EOS #: 0.17 K/UL (ref 0–0.44)
ABSOLUTE IMMATURE GRANULOCYTE: 0.07 K/UL (ref 0–0.3)
ABSOLUTE LYMPH #: 2.02 K/UL (ref 1.1–3.7)
ABSOLUTE MONO #: 1.13 K/UL (ref 0.1–1.2)
ALBUMIN SERPL-MCNC: 3.5 G/DL (ref 3.5–5.1)
ALBUMIN SERPL-MCNC: 3.6 G/DL (ref 3.5–5.2)
ALBUMIN/GLOBULIN RATIO: 1.1 (ref 1–2.5)
ALP BLD-CCNC: 78 U/L (ref 38–126)
ALP BLD-CCNC: 87 U/L (ref 40–129)
ALT SERPL-CCNC: 16 U/L (ref 11–66)
ALT SERPL-CCNC: 17 U/L (ref 5–41)
ANION GAP SERPL CALCULATED.3IONS-SCNC: 10 MEQ/L (ref 8–16)
ANION GAP SERPL CALCULATED.3IONS-SCNC: 13 MMOL/L (ref 9–17)
APTT: 40.2 SECONDS (ref 22–38)
AST SERPL-CCNC: 19 U/L
AST SERPL-CCNC: 19 U/L (ref 5–40)
BASOPHILS # BLD: 0 % (ref 0–2)
BASOPHILS ABSOLUTE: <0.03 K/UL (ref 0–0.2)
BILIRUB SERPL-MCNC: 0.6 MG/DL (ref 0.3–1.2)
BILIRUB SERPL-MCNC: 0.6 MG/DL (ref 0.3–1.2)
BNP INTERPRETATION: ABNORMAL
BUN BLDV-MCNC: 24 MG/DL (ref 7–22)
BUN BLDV-MCNC: 24 MG/DL (ref 8–23)
BUN/CREAT BLD: 24 (ref 9–20)
CALCIUM SERPL-MCNC: 8.7 MG/DL (ref 8.5–10.5)
CALCIUM SERPL-MCNC: 9.6 MG/DL (ref 8.6–10.4)
CHLORIDE BLD-SCNC: 103 MMOL/L (ref 98–107)
CHLORIDE BLD-SCNC: 104 MEQ/L (ref 98–111)
CO2: 20 MEQ/L (ref 23–33)
CO2: 20 MMOL/L (ref 20–31)
CREAT SERPL-MCNC: 0.8 MG/DL (ref 0.4–1.2)
CREAT SERPL-MCNC: 0.99 MG/DL (ref 0.7–1.2)
DIFFERENTIAL TYPE: ABNORMAL
EKG ATRIAL RATE: 86 BPM
EKG Q-T INTERVAL: 292 MS
EKG Q-T INTERVAL: 304 MS
EKG QRS DURATION: 104 MS
EKG QRS DURATION: 108 MS
EKG QTC CALCULATION (BAZETT): 409 MS
EKG QTC CALCULATION (BAZETT): 472 MS
EKG R AXIS: -44 DEGREES
EKG R AXIS: -50 DEGREES
EKG T AXIS: 106 DEGREES
EKG T AXIS: 116 DEGREES
EKG VENTRICULAR RATE: 118 BPM
EKG VENTRICULAR RATE: 145 BPM
EOSINOPHILS RELATIVE PERCENT: 2 % (ref 1–4)
ERYTHROCYTE [DISTWIDTH] IN BLOOD BY AUTOMATED COUNT: 14.5 % (ref 11.5–14.5)
ERYTHROCYTE [DISTWIDTH] IN BLOOD BY AUTOMATED COUNT: 53.9 FL (ref 35–45)
GFR AFRICAN AMERICAN: >60 ML/MIN
GFR NON-AFRICAN AMERICAN: >60 ML/MIN
GFR SERPL CREATININE-BSD FRML MDRD: > 90 ML/MIN/1.73M2
GFR SERPL CREATININE-BSD FRML MDRD: ABNORMAL ML/MIN/{1.73_M2}
GFR SERPL CREATININE-BSD FRML MDRD: ABNORMAL ML/MIN/{1.73_M2}
GLUCOSE BLD-MCNC: 110 MG/DL (ref 70–108)
GLUCOSE BLD-MCNC: 151 MG/DL (ref 70–99)
HCT VFR BLD CALC: 37.9 % (ref 42–52)
HCT VFR BLD CALC: 38.1 % (ref 40.7–50.3)
HEMOGLOBIN: 11.8 GM/DL (ref 14–18)
HEMOGLOBIN: 12.5 G/DL (ref 13–17)
IMMATURE GRANULOCYTES: 1 %
LYMPHOCYTES # BLD: 18 % (ref 24–43)
MCH RBC QN AUTO: 31.6 PG (ref 25.2–33.5)
MCH RBC QN AUTO: 31.6 PG (ref 26–33)
MCHC RBC AUTO-ENTMCNC: 31.1 GM/DL (ref 32.2–35.5)
MCHC RBC AUTO-ENTMCNC: 32.8 G/DL (ref 28.4–34.8)
MCV RBC AUTO: 101.3 FL (ref 80–94)
MCV RBC AUTO: 96.2 FL (ref 82.6–102.9)
MONOCYTES # BLD: 10 % (ref 3–12)
MRSA SCREEN RT-PCR: NEGATIVE
NRBC AUTOMATED: 0 PER 100 WBC
PARTIAL THROMBOPLASTIN TIME: 21 SEC (ref 23.9–33.8)
PDW BLD-RTO: 14.2 % (ref 11.8–14.4)
PLATELET # BLD: 209 THOU/MM3 (ref 130–400)
PLATELET # BLD: 211 K/UL (ref 138–453)
PLATELET ESTIMATE: ABNORMAL
PMV BLD AUTO: 10.7 FL (ref 8.1–13.5)
PMV BLD AUTO: 10.9 FL (ref 9.4–12.4)
POTASSIUM REFLEX MAGNESIUM: 4.1 MEQ/L (ref 3.5–5.2)
POTASSIUM SERPL-SCNC: 3.7 MMOL/L (ref 3.7–5.3)
PRO-BNP: 3852 PG/ML
RBC # BLD: 3.74 MILL/MM3 (ref 4.7–6.1)
RBC # BLD: 3.96 M/UL (ref 4.21–5.77)
RBC # BLD: ABNORMAL 10*6/UL
SARS-COV-2, RAPID: NOT DETECTED
SEG NEUTROPHILS: 69 % (ref 36–65)
SEGMENTED NEUTROPHILS ABSOLUTE COUNT: 7.78 K/UL (ref 1.5–8.1)
SODIUM BLD-SCNC: 134 MEQ/L (ref 135–145)
SODIUM BLD-SCNC: 136 MMOL/L (ref 135–144)
SPECIMEN DESCRIPTION: NORMAL
TOTAL PROTEIN: 6.6 G/DL (ref 6.1–8)
TOTAL PROTEIN: 6.8 G/DL (ref 6.4–8.3)
TROPONIN INTERP: ABNORMAL
TROPONIN T: 0.02 NG/ML
TROPONIN T: ABNORMAL NG/ML
TROPONIN, HIGH SENSITIVITY: 45 NG/L (ref 0–22)
TSH SERPL DL<=0.05 MIU/L-ACNC: 1.56 UIU/ML (ref 0.4–4.2)
TSH SERPL DL<=0.05 MIU/L-ACNC: 1.66 MIU/L (ref 0.3–5)
VANCOMYCIN RESISTANT ENTEROCOCCUS: NEGATIVE
WBC # BLD: 10.5 THOU/MM3 (ref 4.8–10.8)
WBC # BLD: 11.2 K/UL (ref 3.5–11.3)
WBC # BLD: ABNORMAL 10*3/UL

## 2021-03-31 PROCEDURE — 6360000002 HC RX W HCPCS: Performed by: FAMILY MEDICINE

## 2021-03-31 PROCEDURE — C9803 HOPD COVID-19 SPEC COLLECT: HCPCS

## 2021-03-31 PROCEDURE — 2580000003 HC RX 258: Performed by: FAMILY MEDICINE

## 2021-03-31 PROCEDURE — 87635 SARS-COV-2 COVID-19 AMP PRB: CPT

## 2021-03-31 PROCEDURE — 80053 COMPREHEN METABOLIC PANEL: CPT

## 2021-03-31 PROCEDURE — 85027 COMPLETE CBC AUTOMATED: CPT

## 2021-03-31 PROCEDURE — 84443 ASSAY THYROID STIM HORMONE: CPT

## 2021-03-31 PROCEDURE — 99285 EMERGENCY DEPT VISIT HI MDM: CPT | Performed by: FAMILY MEDICINE

## 2021-03-31 PROCEDURE — 96366 THER/PROPH/DIAG IV INF ADDON: CPT

## 2021-03-31 PROCEDURE — 6360000002 HC RX W HCPCS: Performed by: STUDENT IN AN ORGANIZED HEALTH CARE EDUCATION/TRAINING PROGRAM

## 2021-03-31 PROCEDURE — 83880 ASSAY OF NATRIURETIC PEPTIDE: CPT

## 2021-03-31 PROCEDURE — 2000000000 HC ICU R&B

## 2021-03-31 PROCEDURE — 84484 ASSAY OF TROPONIN QUANT: CPT

## 2021-03-31 PROCEDURE — 36415 COLL VENOUS BLD VENIPUNCTURE: CPT

## 2021-03-31 PROCEDURE — 85730 THROMBOPLASTIN TIME PARTIAL: CPT

## 2021-03-31 PROCEDURE — 6370000000 HC RX 637 (ALT 250 FOR IP): Performed by: STUDENT IN AN ORGANIZED HEALTH CARE EDUCATION/TRAINING PROGRAM

## 2021-03-31 PROCEDURE — 93005 ELECTROCARDIOGRAM TRACING: CPT | Performed by: STUDENT IN AN ORGANIZED HEALTH CARE EDUCATION/TRAINING PROGRAM

## 2021-03-31 PROCEDURE — 2580000003 HC RX 258: Performed by: STUDENT IN AN ORGANIZED HEALTH CARE EDUCATION/TRAINING PROGRAM

## 2021-03-31 PROCEDURE — 87641 MR-STAPH DNA AMP PROBE: CPT

## 2021-03-31 PROCEDURE — 2500000003 HC RX 250 WO HCPCS: Performed by: STUDENT IN AN ORGANIZED HEALTH CARE EDUCATION/TRAINING PROGRAM

## 2021-03-31 PROCEDURE — 87500 VANOMYCIN DNA AMP PROBE: CPT

## 2021-03-31 PROCEDURE — 93010 ELECTROCARDIOGRAM REPORT: CPT | Performed by: INTERNAL MEDICINE

## 2021-03-31 PROCEDURE — 71045 X-RAY EXAM CHEST 1 VIEW: CPT

## 2021-03-31 PROCEDURE — 85025 COMPLETE CBC W/AUTO DIFF WBC: CPT

## 2021-03-31 PROCEDURE — 99291 CRITICAL CARE FIRST HOUR: CPT | Performed by: INTERNAL MEDICINE

## 2021-03-31 PROCEDURE — 87081 CULTURE SCREEN ONLY: CPT

## 2021-03-31 PROCEDURE — 99285 EMERGENCY DEPT VISIT HI MDM: CPT

## 2021-03-31 PROCEDURE — 96376 TX/PRO/DX INJ SAME DRUG ADON: CPT

## 2021-03-31 PROCEDURE — 96365 THER/PROPH/DIAG IV INF INIT: CPT

## 2021-03-31 PROCEDURE — 93005 ELECTROCARDIOGRAM TRACING: CPT | Performed by: FAMILY MEDICINE

## 2021-03-31 RX ORDER — ACETAMINOPHEN 325 MG/1
650 TABLET ORAL EVERY 6 HOURS PRN
Status: DISCONTINUED | OUTPATIENT
Start: 2021-03-31 | End: 2021-04-02 | Stop reason: HOSPADM

## 2021-03-31 RX ORDER — PROMETHAZINE HYDROCHLORIDE 25 MG/1
12.5 TABLET ORAL EVERY 6 HOURS PRN
Status: DISCONTINUED | OUTPATIENT
Start: 2021-03-31 | End: 2021-04-02 | Stop reason: HOSPADM

## 2021-03-31 RX ORDER — HEPARIN SODIUM 10000 [USP'U]/100ML
352-1000 INJECTION, SOLUTION INTRAVENOUS CONTINUOUS
Status: DISCONTINUED | OUTPATIENT
Start: 2021-03-31 | End: 2021-03-31 | Stop reason: HOSPADM

## 2021-03-31 RX ORDER — SODIUM CHLORIDE 9 MG/ML
25 INJECTION, SOLUTION INTRAVENOUS PRN
Status: DISCONTINUED | OUTPATIENT
Start: 2021-03-31 | End: 2021-04-02 | Stop reason: HOSPADM

## 2021-03-31 RX ORDER — SODIUM CHLORIDE 9 MG/ML
INJECTION, SOLUTION INTRAVENOUS CONTINUOUS
Status: DISCONTINUED | OUTPATIENT
Start: 2021-03-31 | End: 2021-03-31

## 2021-03-31 RX ORDER — ONDANSETRON 2 MG/ML
4 INJECTION INTRAMUSCULAR; INTRAVENOUS EVERY 6 HOURS PRN
Status: DISCONTINUED | OUTPATIENT
Start: 2021-03-31 | End: 2021-04-02 | Stop reason: HOSPADM

## 2021-03-31 RX ORDER — 0.9 % SODIUM CHLORIDE 0.9 %
500 INTRAVENOUS SOLUTION INTRAVENOUS ONCE
Status: COMPLETED | OUTPATIENT
Start: 2021-03-31 | End: 2021-03-31

## 2021-03-31 RX ORDER — METOPROLOL SUCCINATE 25 MG/1
25 TABLET, EXTENDED RELEASE ORAL DAILY
Status: DISCONTINUED | OUTPATIENT
Start: 2021-03-31 | End: 2021-04-02 | Stop reason: HOSPADM

## 2021-03-31 RX ORDER — SODIUM CHLORIDE 0.9 % (FLUSH) 0.9 %
10 SYRINGE (ML) INJECTION EVERY 12 HOURS SCHEDULED
Status: DISCONTINUED | OUTPATIENT
Start: 2021-03-31 | End: 2021-04-02 | Stop reason: HOSPADM

## 2021-03-31 RX ORDER — ASPIRIN 81 MG/1
81 TABLET, CHEWABLE ORAL DAILY
Status: DISCONTINUED | OUTPATIENT
Start: 2021-04-01 | End: 2021-04-02 | Stop reason: HOSPADM

## 2021-03-31 RX ORDER — HEPARIN SODIUM 1000 [USP'U]/ML
4000 INJECTION, SOLUTION INTRAVENOUS; SUBCUTANEOUS PRN
Status: DISCONTINUED | OUTPATIENT
Start: 2021-03-31 | End: 2021-03-31 | Stop reason: HOSPADM

## 2021-03-31 RX ORDER — ACETAMINOPHEN 650 MG/1
650 SUPPOSITORY RECTAL EVERY 6 HOURS PRN
Status: DISCONTINUED | OUTPATIENT
Start: 2021-03-31 | End: 2021-04-02 | Stop reason: HOSPADM

## 2021-03-31 RX ORDER — HEPARIN SODIUM 1000 [USP'U]/ML
4000 INJECTION, SOLUTION INTRAVENOUS; SUBCUTANEOUS ONCE
Status: COMPLETED | OUTPATIENT
Start: 2021-03-31 | End: 2021-03-31

## 2021-03-31 RX ORDER — NOREPINEPHRINE BIT/0.9 % NACL 16MG/250ML
2-100 INFUSION BOTTLE (ML) INTRAVENOUS CONTINUOUS
Status: DISCONTINUED | OUTPATIENT
Start: 2021-03-31 | End: 2021-04-01

## 2021-03-31 RX ORDER — HEPARIN SODIUM 1000 [USP'U]/ML
2000 INJECTION, SOLUTION INTRAVENOUS; SUBCUTANEOUS PRN
Status: DISCONTINUED | OUTPATIENT
Start: 2021-03-31 | End: 2021-03-31 | Stop reason: HOSPADM

## 2021-03-31 RX ORDER — POLYETHYLENE GLYCOL 3350 17 G/17G
17 POWDER, FOR SOLUTION ORAL DAILY PRN
Status: DISCONTINUED | OUTPATIENT
Start: 2021-03-31 | End: 2021-04-02 | Stop reason: HOSPADM

## 2021-03-31 RX ORDER — SODIUM CHLORIDE 0.9 % (FLUSH) 0.9 %
10 SYRINGE (ML) INJECTION PRN
Status: DISCONTINUED | OUTPATIENT
Start: 2021-03-31 | End: 2021-04-02 | Stop reason: HOSPADM

## 2021-03-31 RX ORDER — HEPARIN SODIUM 10000 [USP'U]/100ML
5-30 INJECTION, SOLUTION INTRAVENOUS CONTINUOUS
Status: DISCONTINUED | OUTPATIENT
Start: 2021-03-31 | End: 2021-04-01

## 2021-03-31 RX ORDER — ATORVASTATIN CALCIUM 80 MG/1
80 TABLET, FILM COATED ORAL NIGHTLY
Status: DISCONTINUED | OUTPATIENT
Start: 2021-03-31 | End: 2021-04-02 | Stop reason: HOSPADM

## 2021-03-31 RX ADMIN — SODIUM CHLORIDE 500 ML: 9 INJECTION, SOLUTION INTRAVENOUS at 12:59

## 2021-03-31 RX ADMIN — AMIODARONE HYDROCHLORIDE 1 MG/MIN: 1.8 INJECTION, SOLUTION INTRAVENOUS at 17:34

## 2021-03-31 RX ADMIN — Medication 2 MCG/MIN: at 15:19

## 2021-03-31 RX ADMIN — SODIUM CHLORIDE 500 ML: 9 INJECTION, SOLUTION INTRAVENOUS at 11:35

## 2021-03-31 RX ADMIN — ATORVASTATIN CALCIUM 80 MG: 80 TABLET, FILM COATED ORAL at 21:05

## 2021-03-31 RX ADMIN — HEPARIN SODIUM AND DEXTROSE 12 UNITS/KG/HR: 10000; 5 INJECTION INTRAVENOUS at 11:46

## 2021-03-31 RX ADMIN — AMIODARONE HYDROCHLORIDE 1 MG/MIN: 50 INJECTION, SOLUTION INTRAVENOUS at 11:39

## 2021-03-31 RX ADMIN — HEPARIN SODIUM 4000 UNITS: 1000 INJECTION INTRAVENOUS; SUBCUTANEOUS at 11:45

## 2021-03-31 RX ADMIN — SODIUM CHLORIDE, PRESERVATIVE FREE 10 ML: 5 INJECTION INTRAVENOUS at 21:06

## 2021-03-31 RX ADMIN — TICAGRELOR 90 MG: 90 TABLET ORAL at 21:05

## 2021-03-31 RX ADMIN — HEPARIN SODIUM 18 UNITS/KG/HR: 10000 INJECTION, SOLUTION INTRAVENOUS at 18:12

## 2021-03-31 RX ADMIN — AMIODARONE HYDROCHLORIDE 150 MG: 1.5 INJECTION, SOLUTION INTRAVENOUS at 17:21

## 2021-03-31 RX ADMIN — SACUBITRIL AND VALSARTAN 0.5 TABLET: 24; 26 TABLET, FILM COATED ORAL at 21:06

## 2021-03-31 RX ADMIN — SODIUM CHLORIDE: 9 INJECTION, SOLUTION INTRAVENOUS at 15:18

## 2021-03-31 ASSESSMENT — PAIN SCALES - GENERAL: PAINLEVEL_OUTOF10: 0

## 2021-03-31 ASSESSMENT — ENCOUNTER SYMPTOMS
ALLERGIC/IMMUNOLOGIC NEGATIVE: 1
COUGH: 0
VOMITING: 0
EYES NEGATIVE: 1
TROUBLE SWALLOWING: 0
DIARRHEA: 0
GASTROINTESTINAL NEGATIVE: 1
SHORTNESS OF BREATH: 0
CHEST TIGHTNESS: 0
APNEA: 0
STRIDOR: 0
BACK PAIN: 0
SHORTNESS OF BREATH: 1
WHEEZING: 0
SINUS PAIN: 0
SORE THROAT: 0
WHEEZING: 0
BLOOD IN STOOL: 0
NAUSEA: 0
COUGH: 0
BACK PAIN: 0
PHOTOPHOBIA: 0
ABDOMINAL PAIN: 0
CHOKING: 0

## 2021-03-31 NOTE — ED NOTES
Dr Tian Chambers at bedside     [facility], [address] [place] [address]  03/31/21 4995

## 2021-03-31 NOTE — ED NOTES
[address] Heather Galindo for [facility] ED per Dr. Te Mora request.     José Thorpe  03/31/21 1142

## 2021-03-31 NOTE — PROGRESS NOTES
Patient is a 70year old from [facility] ED with Dr Wong Him transferring. Patient with history of cardiomyopathy EF 15% arrived with c/o weakness, nausea, and life vest having gone off. Patient was found to be in new onset A-fib RVR rate 140-150's. BP has ranged from 72/54 to 86/58. Patient was started on an amiodarone gtt and HR still in 140's. Patient was given fluids and was started on a Heparin gtt as well. BNP 3852, high sensitivity troponin 45. CXR normal. Patient to be admitted to [facility] under Dr Shilpi Kuhn.

## 2021-03-31 NOTE — ED NOTES
Pt states that his life vest went off once this morning prior to arrival     Renetta Hickman, [facility]  03/31/21 6033

## 2021-03-31 NOTE — ED NOTES
Per Dr. Jerry Call states that per Dr. Semaj Jovel no meds to be given for tachycardia, and Dr. Semaj Jovel will be over to see him.      Laura Horton RN  03/31/21 1473

## 2021-03-31 NOTE — ED NOTES
This writer called Dr Kindra Garsia office to see when he is coming to see pt due to A-fib with RVR and hypotension. Per Deja RN she told him pt was on [facility]. She is updating Dr Beti Gonzalez at this time.      Magda Silverio, TANIKA  03/31/21 1126

## 2021-03-31 NOTE — H&P
lasted approximately 45 minutes before resolution. The patient contacted his cardiologist, Dr. Agata Cummings, who recommended he report to the emergency department. Of considerable note the patient had an MI in 2020 and now wears a life vest secondary to global hypokinesis. The patient stated that at approximately 21  his life vest alerted him of need for defibrillation twice which he canceled both times. The patient decided to report [facility] emergency department at 56. Upon arrival to [facility] Emergency Department the patient was found to be in A-fib with RVR which was new onset. The patient was also hypotensive with SBP ~80 which per the patient is his baseline. The patient was initiated on Amiodarone bolus followed by drip and heparin gtt. The patient was transferred to [facility] ICU as a direct admission via [address]. Upon arrival the patient was alert, oriented, and had no complaints. The patient was restarted on Amiodarone, heparin, and levophed. Past Medical History: Per HPI  Family History: Mother () - RA. Father () - CHF. Social History: Current smoker 13-pack years. Denies ETOH or illicit drug use. Review of Systems   Constitutional: Negative for chills and fever. HENT: Negative for sinus pain, sore throat and trouble swallowing. Eyes: Negative for photophobia and visual disturbance. Respiratory: Negative for cough, shortness of breath and wheezing. Cardiovascular: Negative for chest pain and palpitations. Gastrointestinal: Negative for abdominal pain, blood in stool, diarrhea, nausea and vomiting. Endocrine: Negative for polydipsia and polyuria. Genitourinary: Negative for difficulty urinating, dysuria, hematuria and urgency. Musculoskeletal: Negative for back pain and neck pain. Skin: Negative for rash and wound. Allergic/Immunologic: Negative for food allergies and immunocompromised state.    Neurological: Negative for dizziness, weakness, Neurological:      General: No focal deficit present. Mental Status: He is alert and oriented to person, place, and time. Mental status is at baseline. GCS: GCS eye subscore is 4. GCS verbal subscore is 5. GCS motor subscore is 6. Cranial Nerves: Cranial nerves are intact. Sensory: Sensation is intact. Motor: Motor function is intact. Coordination: Coordination is intact. Psychiatric:         Attention and Perception: Attention and perception normal.         Mood and Affect: Mood and affect normal.         Speech: Speech normal.         Behavior: Behavior normal. Behavior is cooperative. Thought Content: Thought content normal.         Cognition and Memory: Cognition and memory normal.         Judgment: Judgment normal.         Data: (All radiographs, tracings, PFTs, and imaging are personally viewed and interpreted unless otherwise noted).  Sd=365, K=4.1, Chloride=104, CO2=20, BUN=24, Cr=0.8, Glucose=110, Anion Gap=10   WBC=10.5, Hgb=11.8, HCT=37.9, Platelets=209   Telemetry demonstrates A-fib RVR   TSH=1.560   Troponin=0.024   Pro-TYN=3430   CXR 3/31/21 - No acute airspace disease   EKG 3/31/21 at 1622 - A-fib with RVR      Seen with multidisciplinary ICU team.  Meets Continued ICU Level Care Criteria:  [x] Yes  [] No - Transfer Planned to listed location:  [] HOSPITALIST CONTACTED -     Case and plan discussed with Dr. Debbie Lindsey. Electronically signed by Dali Crouch DO on 3/31/2021 at 2:55 PM  CRITICAL CARE SPECIALIST  Patient seen by me. Case discussed with resident physician. May need to add digoxin. CC time 35 minutes. Time was discontiguous. Time does not include procedures. Time does include my direct assessment of the patient and coordination of care.   Electronically signed by Herlinda Sky MD on 3/31/2021 at 7:12 PM

## 2021-03-31 NOTE — ED NOTES
[facility] at bedside for transport. Pt report given to transport crew.       Nathaly Phelan RN  03/31/21 8348

## 2021-03-31 NOTE — ED PROVIDER NOTES
[facility] ED  EMERGENCY DEPARTMENT ENCOUNTER      Pt Name: Clair Miller. MRN: [id number]  Birthdate 1950  Date of evaluation: 3/31/2021  Provider: Karena Davalos MD    [address] [place] [address]     Chief Complaint   Patient presents with    Shortness of Breath     At rest, onset this AM, worse with exertion. Pt has history of MI in Dec and wears a life vest         HISTORY OF PRESENT ILLNESS   (Location/Symptom, Timing/Onset, Context/Setting,Quality, Duration, Modifying Factors, Severity)  Note limiting factors. Pascula Spear. is a78 y.o. male who presents to the emergency department      Is a 70years old gentleman with a history of CAD/ischemic cardiomyopathy/CHF currently on Entresto, Brilinta presented to our ER complaining of not feeling well. Reports having had some jaw pain on the left side however no chest pain. He says he never has a chest pain with his cardiac episodes just mostly jaw pain. At this time/when we see him he reports that his pain is gone and he feels better. He states that he had a similar episode on Sunday that was short-lived and he started feeling better after a while. He reports that he called the office of  -and he was told to come to the ER. Nursing Notes werereviewed. REVIEW OF SYSTEMS    (2-9 systems for level 4, 10 or more for level 5)     Review of Systems   Constitutional: Positive for fatigue. Negative for activity change, appetite change, chills, diaphoresis, fever and unexpected weight change. HENT: Negative. Eyes: Negative. Respiratory: Positive for shortness of breath. Negative for apnea, cough, choking, chest tightness, wheezing and stridor. Cardiovascular:        Jaw pain and palpitations. Gastrointestinal: Negative. Endocrine: Negative. Genitourinary: Negative. Musculoskeletal: Negative for arthralgias, back pain, gait problem, joint swelling, myalgias, neck pain and neck stiffness. Skin: Negative. Allergic/Immunologic: Negative. Neurological: Positive for weakness. Hematological: Negative. Psychiatric/Behavioral: Negative. Except as noted above the remainder of the review of systems was reviewed and negative. PAST MEDICAL HISTORY     Past Medical History:   Diagnosis Date    Coronary artery disease     s/p CABG x3 in 2008 and 2 stents on 12/24/2020    H/O echocardiogram 02/08/2021    EF 15-20% LV severly enlarged and LV wall thickness is normal Severe global hypokinesis with segmental abnormalities LA is mod dilated 34-39 with LA volume index of 34 ml/m2 Mild mitral regurg Mod tricupid regurg Mild to mod pulm HTN with est RV systolic pressure of 38 mmhg mod grade II diastolic dysfunciton    Hyperlipidemia          SURGICALHISTORY       Past Surgical History:   Procedure Laterality Date    CORONARY ANGIOPLASTY WITH STENT PLACEMENT  12/24/2020    x 2 [address]  2008    x 3 vessels         CURRENT MEDICATIONS       Discharge Medication List as of 3/31/2021  2:21 PM      CONTINUE these medications which have NOT CHANGED    Details   sacubitril-valsartan (ENTRESTO) 24-26 MG per tablet Take 1 tablet by mouth See Admin Instructions Do not start until Sunday morning, 2/21/21.  Start with 1/2 tab 2x/day x 3 days then can go up to 1 tab 2x/day    Take 1/2 tablet BID, Disp-60 tablet, R-3Normal      furosemide (LASIX) 40 MG tablet Take 40 mg by mouth daily Just started it this morningHistorical Med      spironolactone (ALDACTONE) 25 MG tablet Take 0.5 tablets by mouth daily, Disp-90 tablet, R-3NO PRINT      atorvastatin (LIPITOR) 80 MG tablet Take 80 mg by mouth nightlyHistorical Med      metoprolol succinate (TOPROL XL) 25 MG extended release tablet Take 25 mg by mouth daily Historical Med      ticagrelor (BRILINTA) 90 MG TABS tablet Take 90 mg by mouth 2 times dailyHistorical Med      aspirin 81 MG chewable tablet Take 81 mg by mouth dailyHistorical ED Triage Vitals   BP Temp Temp src Pulse Resp SpO2 Height Weight   03/31/21 1051 03/31/21 1051 -- 03/31/21 1051 03/31/21 1051 03/31/21 1052 03/31/21 1051 03/31/21 1051   (!) 79/45 97.1 °F (36.2 °C)  154 24 98 % 5' 10\" (1.778 m) 155 lb (70.3 kg)       Physical Exam  Vitals signs and nursing note reviewed. Constitutional:       Appearance: He is well-developed. Interventions: He is not intubated. HENT:      Head: Normocephalic and atraumatic. Mouth/Throat:      Pharynx: No pharyngeal swelling or oropharyngeal exudate. Neck:      Musculoskeletal: Normal range of motion and neck supple. Cardiovascular:      Rate and Rhythm: Tachycardia present. Rhythm irregular. No extrasystoles are present. Pulses: No decreased pulses. Heart sounds: No murmur. No friction rub. No gallop. Pulmonary:      Effort: No tachypnea, bradypnea, accessory muscle usage or respiratory distress. He is not intubated. Breath sounds: No stridor. Examination of the right-lower field reveals rales. Examination of the left-lower field reveals rales. Rales present. No wheezing or rhonchi. Chest:      Chest wall: No mass, deformity, tenderness, crepitus or edema. There is no dullness to percussion. Musculoskeletal: Normal range of motion. Skin:     General: Skin is warm. Capillary Refill: Capillary refill takes less than 2 seconds. Neurological:      General: No focal deficit present. Mental Status: He is alert.          DIAGNOSTIC RESULTS     EKG: All EKG's are interpreted by the Emergency Department Physician who either signs orCo-signs this chart in the absence of a cardiologist.        RADIOLOGY:   plain film images such as CT, Ultrasound and MRI are read by the radiologist. Plain radiographic images are visualized and preliminarily interpreted by the emergency physician with the below findings:        Interpretation per the Radiologist below, ifavailable at the time of this note:    XR CHEST PORTABLE   Final Result   No acute airspace disease identified. ED BEDSIDE ULTRASOUND:   Performed by ED Physician - none    LABS:  Labs Reviewed   CBC WITH AUTO DIFFERENTIAL - Abnormal; Notable for the following components:       Result Value    RBC 3.96 (*)     Hemoglobin 12.5 (*)     Hematocrit 38.1 (*)     Seg Neutrophils 69 (*)     Lymphocytes 18 (*)     Immature Granulocytes 1 (*)     All other components within normal limits   TROPONIN - Abnormal; Notable for the following components:    Troponin, High Sensitivity 45 (*)     All other components within normal limits   BRAIN NATRIURETIC PEPTIDE - Abnormal; Notable for the following components:    Pro-BNP 3,852 (*)     All other components within normal limits   COMPREHENSIVE METABOLIC PANEL - Abnormal; Notable for the following components:    Glucose 151 (*)     BUN 24 (*)     Bun/Cre Ratio 24 (*)     All other components within normal limits   APTT - Abnormal; Notable for the following components:    PTT 21.0 (*)     All other components within normal limits   COVID-19, RAPID   TSH WITHOUT REFLEX   APTT   APTT       All other labs were within normal range ornot returned as of this dictation. EMERGENCY DEPARTMENT COURSE and DIFFERENTIAL DIAGNOSIS/MDM:   Vitals:    Vitals:    03/31/21 1330 03/31/21 1340 03/31/21 1350 03/31/21 1400   BP: (!) 76/51 (!) 84/46 80/62 (!) 69/50   Pulse: 128 121 120 122   Resp: 23 18 24 21   Temp:       SpO2: 96% 96% 98% 99%   Weight:       Height:               MDM  Number of Diagnoses or Management Options  Diagnosis management comments: 3785 -patient with a history of significant CHF/ischemic cardiomyopathy comes to the ER with shortness of breath not feeling well found to be in A. fib with RVR. At this time discussed with Dr. Fam Perez who will notify Heather Hirsch -his cardiologist and he will come see him in the ER.   I did inquire about the possibility of starting him on a rate control medication namely amiodarone or Dr. Brianna Veras started it would be dangerous right now to hold off until Dr. Agata Cummings will be here to see him. He might need to be cardioverted however since we do not really know how long he has been in A. fib with RVR he will need to be anticoagulated as well -due to the fact that he has CHF and A. fib with high risk of forming clots. Return to places patient in [place] since he was there last year in December however we appreciate it would take too long such that we called Marci Scott in [facility] and we have transported down there by [employer]. At time of discharge patient was a stable however his blood pressure was too low and his heart rate was in the 120s. Plan was discussed in detail with the patient and the family and he understands and agrees. CRITICAL CARE TIME   Total CriticalCare time was 90 minutes, excluding separately reportable procedures. There was a high probability of clinically significant/life threatening deterioration in the patient's condition which required my urgent intervention. CONSULTS:  None    PROCEDURES:  Unlessotherwise noted below, none     Procedures    FINAL IMPRESSION      1. Anginal equivalent ([address]    2. Atrial fibrillation with RVR ([address]    3. New onset atrial fibrillation ([address]    4. Ischemic cardiomyopathy    5. Hypotension, unspecified hypotension type          DISPOSITION/PLAN   DISPOSITION        PATIENT REFERRED TO:  No follow-up provider specified.     DISCHARGE MEDICATIONS:  Discharge Medication List as of 3/31/2021  2:21 PM                 (Please note that portions of this note were completed with a voice recognition program.  Efforts were made to edit the dictations but occasionally words are mis-transcribed.)      Osiel Claudio MD (electronically signed)  Attending Emergency Physician            Osiel Claudio MD  03/31/21 0692 [facility], [place]  03/31/21 [id number]

## 2021-03-31 NOTE — TELEPHONE ENCOUNTER
Mr. Vazquez White called and states this morning he suddenly got sweaty, jaw pain, arm weakness he laid down and felt better. He also had episode on Sunday not as bad. I told him he should come to ER he verbalized understanding. His BP 79/52 , 88/73 , 67/48 , 100/90 , 68/41 HR 93 these are today.

## 2021-03-31 NOTE — FLOWSHEET NOTE
1450 Patient arrived to unit from [place] via lifeflight. Patient transferred to ICU bed and placed on continuous ICU bedside monitor. Patient admitted for Hypotension [I95.9]. Vitals obtained. See flowsheets. Patient's IV access includes RFA 20G, RW 20G. Current infusions and rates of infusion include nothing infusing. Heparin and Amiodarone spiked and attached, remains clamped, not infusing. Assessment completed by Ratna Corley. Two nurse skin assessment [facility]. See flowsheets for assessment details. Policies and procedures of ICU able to be explained to patient at this time. Family member(s)/representative(s) present at time of admission include none. Patient rights explained to family member(s)/representatives and patient, as able. Patient/patient's family member(s)/representative(s) N/A to have physician notified of their admission. All questions posed by patient's family member(s)/representative(s) and patient answered at this time. Life vest on.

## 2021-03-31 NOTE — ED NOTES
Connected Dr. Tian Chambers to [facility].      Marisol Thorpe  03/31/21 8718

## 2021-04-01 LAB
ANION GAP SERPL CALCULATED.3IONS-SCNC: 15 MEQ/L (ref 8–16)
APTT: 36.3 SECONDS (ref 22–38)
APTT: 66.2 SECONDS (ref 22–38)
APTT: 93.6 SECONDS (ref 22–38)
BASOPHILS # BLD: 0.3 %
BASOPHILS ABSOLUTE: 0 THOU/MM3 (ref 0–0.1)
BUN BLDV-MCNC: 20 MG/DL (ref 7–22)
CALCIUM IONIZED: 1.05 MMOL/L (ref 1.12–1.32)
CALCIUM SERPL-MCNC: 8.7 MG/DL (ref 8.5–10.5)
CHLORIDE BLD-SCNC: 108 MEQ/L (ref 98–111)
CO2: 17 MEQ/L (ref 23–33)
CREAT SERPL-MCNC: 0.7 MG/DL (ref 0.4–1.2)
EOSINOPHIL # BLD: 1.7 %
EOSINOPHILS ABSOLUTE: 0.2 THOU/MM3 (ref 0–0.4)
ERYTHROCYTE [DISTWIDTH] IN BLOOD BY AUTOMATED COUNT: 14.6 % (ref 11.5–14.5)
ERYTHROCYTE [DISTWIDTH] IN BLOOD BY AUTOMATED COUNT: 55.3 FL (ref 35–45)
GFR SERPL CREATININE-BSD FRML MDRD: > 90 ML/MIN/1.73M2
GLUCOSE BLD-MCNC: 131 MG/DL (ref 70–108)
HCT VFR BLD CALC: 34.9 % (ref 42–52)
HEMOGLOBIN: 10.8 GM/DL (ref 14–18)
IMMATURE GRANS (ABS): 0.03 THOU/MM3 (ref 0–0.07)
IMMATURE GRANULOCYTES: 0.3 %
LYMPHOCYTES # BLD: 19.2 %
LYMPHOCYTES ABSOLUTE: 1.7 THOU/MM3 (ref 1–4.8)
MCH RBC QN AUTO: 31.9 PG (ref 26–33)
MCHC RBC AUTO-ENTMCNC: 30.9 GM/DL (ref 32.2–35.5)
MCV RBC AUTO: 102.9 FL (ref 80–94)
MONOCYTES # BLD: 9.4 %
MONOCYTES ABSOLUTE: 0.8 THOU/MM3 (ref 0.4–1.3)
NUCLEATED RED BLOOD CELLS: 0 /100 WBC
PLATELET # BLD: 191 THOU/MM3 (ref 130–400)
PMV BLD AUTO: 10.9 FL (ref 9.4–12.4)
POTASSIUM SERPL-SCNC: 4.4 MEQ/L (ref 3.5–5.2)
RBC # BLD: 3.39 MILL/MM3 (ref 4.7–6.1)
SEG NEUTROPHILS: 69.1 %
SEGMENTED NEUTROPHILS ABSOLUTE COUNT: 6.2 THOU/MM3 (ref 1.8–7.7)
SODIUM BLD-SCNC: 140 MEQ/L (ref 135–145)
TROPONIN T: 0.02 NG/ML
TROPONIN T: 0.04 NG/ML
WBC # BLD: 9 THOU/MM3 (ref 4.8–10.8)

## 2021-04-01 PROCEDURE — 6370000000 HC RX 637 (ALT 250 FOR IP): Performed by: STUDENT IN AN ORGANIZED HEALTH CARE EDUCATION/TRAINING PROGRAM

## 2021-04-01 PROCEDURE — 93005 ELECTROCARDIOGRAM TRACING: CPT | Performed by: INTERNAL MEDICINE

## 2021-04-01 PROCEDURE — 85730 THROMBOPLASTIN TIME PARTIAL: CPT

## 2021-04-01 PROCEDURE — 2580000003 HC RX 258: Performed by: STUDENT IN AN ORGANIZED HEALTH CARE EDUCATION/TRAINING PROGRAM

## 2021-04-01 PROCEDURE — 94761 N-INVAS EAR/PLS OXIMETRY MLT: CPT

## 2021-04-01 PROCEDURE — 84484 ASSAY OF TROPONIN QUANT: CPT

## 2021-04-01 PROCEDURE — 80048 BASIC METABOLIC PNL TOTAL CA: CPT

## 2021-04-01 PROCEDURE — 36415 COLL VENOUS BLD VENIPUNCTURE: CPT

## 2021-04-01 PROCEDURE — 99291 CRITICAL CARE FIRST HOUR: CPT | Performed by: INTERNAL MEDICINE

## 2021-04-01 PROCEDURE — 85025 COMPLETE CBC W/AUTO DIFF WBC: CPT

## 2021-04-01 PROCEDURE — 2140000000 HC CCU INTERMEDIATE R&B

## 2021-04-01 PROCEDURE — 93307 TTE W/O DOPPLER COMPLETE: CPT

## 2021-04-01 PROCEDURE — 82330 ASSAY OF CALCIUM: CPT

## 2021-04-01 RX ORDER — AMIODARONE HYDROCHLORIDE 200 MG/1
200 TABLET ORAL DAILY
Status: DISCONTINUED | OUTPATIENT
Start: 2021-04-01 | End: 2021-04-02 | Stop reason: HOSPADM

## 2021-04-01 RX ADMIN — ASPIRIN 81 MG: 81 TABLET, CHEWABLE ORAL at 09:01

## 2021-04-01 RX ADMIN — APIXABAN 5 MG: 5 TABLET, FILM COATED ORAL at 13:45

## 2021-04-01 RX ADMIN — SODIUM CHLORIDE, PRESERVATIVE FREE 10 ML: 5 INJECTION INTRAVENOUS at 09:05

## 2021-04-01 RX ADMIN — APIXABAN 5 MG: 5 TABLET, FILM COATED ORAL at 21:07

## 2021-04-01 RX ADMIN — TICAGRELOR 90 MG: 90 TABLET ORAL at 21:07

## 2021-04-01 RX ADMIN — ATORVASTATIN CALCIUM 80 MG: 80 TABLET, FILM COATED ORAL at 21:07

## 2021-04-01 RX ADMIN — SACUBITRIL AND VALSARTAN 0.5 TABLET: 24; 26 TABLET, FILM COATED ORAL at 09:00

## 2021-04-01 RX ADMIN — METOPROLOL SUCCINATE 25 MG: 25 TABLET, FILM COATED, EXTENDED RELEASE ORAL at 09:01

## 2021-04-01 RX ADMIN — AMIODARONE HYDROCHLORIDE 200 MG: 200 TABLET ORAL at 13:45

## 2021-04-01 RX ADMIN — SACUBITRIL AND VALSARTAN 0.5 TABLET: 24; 26 TABLET, FILM COATED ORAL at 21:07

## 2021-04-01 RX ADMIN — TICAGRELOR 90 MG: 90 TABLET ORAL at 09:01

## 2021-04-01 RX ADMIN — SODIUM CHLORIDE, PRESERVATIVE FREE 10 ML: 5 INJECTION INTRAVENOUS at 21:07

## 2021-04-01 ASSESSMENT — PAIN SCALES - GENERAL: PAINLEVEL_OUTOF10: 0

## 2021-04-01 NOTE — SIGNIFICANT EVENT
4/1/21    Patient transferred to [facility]. Patient remains hemodynamically stable.  Report given to Dr. Helena Hendricks MD with Wild Acuna MD   PGY-1 Internal Medicine Resident

## 2021-04-01 NOTE — CONSULTS
Patient: Pascual Peters Jr. : 1950  Date of Visit: 2021    REASON FOR VISIT / CONSULTATION: Shortness of Breath (At rest, onset this AM, worse with exertion. Pt has history of MI in Dec and wears a life vest)      History of Present Illness:          I had the pleasure of seeing Pascual Peters Jr. in urgent ER consultation today. Mr. Cheryl Lau is a 70 y.o. male with a history of atherosclerotic heart disease. He also had a heart attack in . 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 He used to see a cardiologist in the past however he started to feel better and did not think he needed to follow up anymore with any doctor. Most recently lindy came into the ER on 2020 and had a NSTEMI and was transferred to [facility], [facility] in [place]. He did report that he actually started feeling \"weird\", no pain but just feels doom feeling on the Monday before [place] however he did not want to think this was a heart attack. He did report feeling about the same as he did in . He went home and went to bed than his breathing got worse and worse and that's when he came into the hospital. He then had a heart cath done on 2020 and had two stents placed REJI x 2 to SVG to posterior lateral branch of the RCA. An echocardiogram at that same time showed an EF of 15% and was sent home with a Life Vest. On 21 spironolactone was added and although he says his BP is usually low, less than 938 systolic, denies any known problems with the medication including any lightheadedness or dizziness. He did not remember the last time he was told what his heart strength was he does not remember. Echocardiogram done on 2021 showed an ejection fraction of 15-20%, The left ventricular cavity size is severely enlarged and the left ventricular wall thickness is within normal limits. Severe global hypokinesis with segmental abnormalities.  The left atrium is moderately dilated (34-39) with a left atrial volume index of 34 ml/m2. Mild mitral regurgitation. Moderate tricuspid regurgitation. Mild to moderate pulmonary hypertension with an estimated right ventricular systolic pressure of 38 mmHg. Mr. Jannette Nelson came into the ER today after he felt his heart begin to race badly and he developed severe shortness of breath. He says that his life vest tried to shock him 2x but he delayed therapy and he was not shocked. In the ER he was found to be hypotensive in the 70's with  atrial fibrillation with RVR as high as 170's. He reported pretty severe shortness of breath but says this has improved significantly since being in the ER. He denies any current chest pain, pressure or tightness now but says he did have this this morning. He denies any abdominal pain, bleeding problems, bowel issues, problems with his medications or any other concerns at this time. No cough, fever or chills. No nausea or vomiting. No falls or near falls. Bleeding Risks: Mr. Jannette Nelson denies any current or recent bleeding problems including a history of a GI bleed, ulcers, recent or upcoming surgeries, blood in his stool or black tarry stools or blood in his urine. PAST MEDICAL HISTORY:         Past Medical History:   Diagnosis Date    Coronary artery disease     s/p CABG x3 in 2008 and 2 stents on 12/24/2020    H/O echocardiogram 02/08/2021    EF 15-20% LV severly enlarged and LV wall thickness is normal Severe global hypokinesis with segmental abnormalities LA is mod dilated 34-39 with LA volume index of 34 ml/m2 Mild mitral regurg Mod tricupid regurg Mild to mod pulm HTN with est RV systolic pressure of 38 mmhg mod grade II diastolic dysfunciton    Hyperlipidemia     Smoker        CURRENT ALLERGIES: Patient has no known allergies. REVIEW OF SYSTEMS: 14 systems were reviewed. Pertinent positives and negatives as above, all else negative.      Past Surgical History:   Procedure Laterality Date    CARDIAC SURGERY  CORONARY ANGIOPLASTY WITH STENT PLACEMENT  12/24/2020    x 2 [address]  2008    x 3 vessels    VASCULAR SURGERY      cabg harvests    Social History:  Social History     Tobacco Use    Smoking status: Current Every Day Smoker     Packs/day: 0.25     Years: 52.00     Pack years: 13.00     Types: Cigarettes    Smokeless tobacco: Never Used   Substance Use Topics    Alcohol use: Not Currently     Frequency: Never    Drug use: Never        CURRENT MEDICATIONS:        Outpatient Medications Marked as Taking for the 3/31/21 encounter [facility] Encounter)   Medication Sig Dispense Refill    sacubitril-valsartan (ENTRESTO) 24-26 MG per tablet Take 1 tablet by mouth See Admin Instructions Do not start until Sunday morning, 2/21/21. Start with 1/2 tab 2x/day x 3 days then can go up to 1 tab 2x/day    Take 1/2 tablet BID (Patient taking differently: Take 0.5 tablets by mouth 2 times daily Take 1/2 tablet BID) 60 tablet 3    furosemide (LASIX) 40 MG tablet Take 40 mg by mouth daily as needed For feet swelling      spironolactone (ALDACTONE) 25 MG tablet Take 0.5 tablets by mouth daily 90 tablet 3    atorvastatin (LIPITOR) 80 MG tablet Take 80 mg by mouth nightly      metoprolol succinate (TOPROL XL) 25 MG extended release tablet Take 25 mg by mouth nightly       ticagrelor (BRILINTA) 90 MG TABS tablet Take 90 mg by mouth 2 times daily      aspirin 81 MG chewable tablet Take 81 mg by mouth daily         FAMILY HISTORY: family history includes Heart Disease (age of onset: 79) in his father; Other in his mother; Rheum Arthritis in his mother. Physical Examination:     BP (!) 69/50   Pulse 122   Temp 97.1 °F (36.2 °C)   Resp 21   Ht 5' 10\" (1.778 m)   Wt 155 lb (70.3 kg)   SpO2 99%   BMI 22.24 kg/m²  Body mass index is 22.24 kg/m². Constitutional: He appeared oriented to person and place. He appears well-developed and well-nourished. In no acute distress. HEENT: Normocephalic and atraumatic. No JVD present. Carotid bruit is not present. No mass and no thyromegaly present. No lymphadenopathy noted. Cardiovascular: Tachycardic rate, irregularly irregular rhythm, normal heart sounds. Exam reveals no gallop and no friction rubs. 1/6 systolic murmur, 5th intercostal space on the LEFT in the mid-clavicular line (cardiac apex). Pulmonary/Chest: Effort normal and breath sounds normal. No respiratory distress. He has no wheezes, rhonchi or rales. Abdominal: Soft, non-tender. He exhibits no organomegaly, mass or bruit. Extremities: Trace. No cyanosis or clubbing. 2+ radial and carotid pulses. Distal extremity pulses: 1+ bilaterally. Neurological: Alertness and orientation as per Constitutional exam. No evidence of gross cranial nerve deficit. Coordination appeared normal.   Skin: Skin is warm and dry. There is no rash or diaphoresis. Psychiatric: He has a normal mood and affect. His speech is normal and behavior is normal.      MOST RECENT LABS ON RECORD:   Lab Results   Component Value Date    WBC 10.5 03/31/2021    HGB 11.8 (L) 03/31/2021    HCT 37.9 (L) 03/31/2021     03/31/2021    ALT 16 03/31/2021    AST 19 03/31/2021     (L) 03/31/2021    K 4.1 03/31/2021     03/31/2021    CREATININE 0.8 03/31/2021    BUN 24 (H) 03/31/2021    CO2 20 (L) 03/31/2021    TSH 1.560 03/31/2021       ASSESSMENT:     1. Anginal equivalent ([address]    2. Atrial fibrillation with RVR ([address]    3. New onset atrial fibrillation ([address]    4. Ischemic cardiomyopathy    5. Hypotension, unspecified hypotension type       PLAN:        · New onset  atrial fibrillation with RVR with hypotension: Dicussed case with ER and suggested starting IVF and amiodarone drip without the bolus. This has led to improvements in his BP as well as his shortness of breath and he appears more stable.  However, he will clearly need more definitive treatment including the possibility of further amiodarone loading, possible angioplasty, possible placement of and ICD and/or potentially even an ablation. [facility] of his event this morning which reportedly was attempting to deliver defibrillation therapy. I spoke with Dr. Nima Gibbons at [place] [id number] who was also in agreement with accepting him for transfer.  Atherosclerotic Heart Disease: History of CABG x3 and most recently NSTEMI with a heart cath that was done on 12/28/2020 and he had two stents placed, REJI x 2 to SVG to posterior lateral branch of the RCA.  Antiplatelet Agent: Continue aspirin 81 mg daily and Ticagrelor (Brilinta) 90 mg twice daily.  Beta Blocker: Continue Metoprolol succinate (Toprol XL) 25 mg daily. However, hold for now given signs and symptoms.  Anti-anginal medications: Continue nitroglycerin 0.4 mg tablets as needed for chest pain.  Cholesterol Reduction Therapy: Continue Atorvastatin (Lipitor) 80 mg daily.  Additional counseling: I advised them to call our office or go to the emergency room if they developed worsening or persistent chest pain or increased shortness of breath as this could be life threatening.  Ischemic Cardiomyopathy: EF from his Heart Cath that was done on 12/28/2020 was 15%, EF up slightly to 15-20% on 2/6/21 echo. Repeat echo was scheduled for tomorrow.  Beta Blocker: Continue Metoprolol succinate (Toprol XL) 25 mg daily. However, hold for now given signs and symptoms. ACE Inibitor/ARB: Continue sacubitril/valsartan (Entresto) 24/26 mg twice daily. However, hold for now given signs and symptoms.  Diuretics: Continue Spironolactone (Aldactone) 25 mg, 1/2 tab daily. I also discussed the potential side effects of this medication including lightheadedness and dizziness and instructed them to stop the medication of this occurs and call our office if this occurs. However, hold for now given signs and symptoms.    Heart failure counseling: I advised them to try and keep their legs up whenever possible and to limit salt in their diet. I advised him to also start wearing lower extremity compression stockings as well.   Additional Testing List: I ordered a echocardiogram to better assess for the etiology of this problem and to help guide future management    · Hyperlipidemia: Mixed  · Cholesterol Reduction Therapy: Continue Atorvastatin (Lipitor) 80 mg daily. Tobacco Abuse Counseling: I spent several minutes discussing the dangers of tobacco abuse as well as multiple methods for trying to quit smoking. I discussed the plan with the ER doctor who was in agreement with the plan. Sincerely,  Krsitel Xavier. Martínez BUSTILLOS, MS, F.A.[facility] Cardiology Specialist    [address] [facility], [address] [place] [address]  Phone: [phone], Fax: [phone]     I believe that the risk of significant morbidity and mortality related to the patient's current medical conditions are: intermediate-high.         April 1, 2021

## 2021-04-01 NOTE — CARE COORDINATION
4/1/21, 8:06 AM EDT  DISCHARGE PLANNING EVALUATION:    Pascual Peters Jr. Admitted: 3/31/2021/ [facility] day: 1   Location: -06/006-A Reason for admit: Hypotension [I95.9]   PMH:  has a past medical history of Coronary artery disease, H/O echocardiogram, Hyperlipidemia, and Smoker. Procedure:  3/31 CXR: No acute findings    Barriers to Discharge: Presented to [facility] ED with c/o \"not feeling well\" and \"heaviness\" of arms with jaw ache that began in morning of day of presentation. Symptoms lasted 45 minutes before resolving. He contacted his Cardiologist, who referred him to ED. Pt had MI in December 2020 and now wears a life vest. Patient reports that at approximately 21  that morning (3/31) his life vest alerted him of need for defib twice, which he cancelled both times. He then went to ED. On arrival, he was found to be in afib RVR - new onset. Hypotensive with SBP 80's - which patient reports is his baseline; however also had SBP 70's at [place]. Given amio bolus and drip, started on heparin drip, and transferred to [facility] via [address]. On arrival to [facility] ICU, amio and heparin drips were spiked and attached, clamped, not infusing. Cardiology consulted for new afib RVR with life vest. Echo ordered. Levophed drip started yesterday; off early this morning. Afebrile. Afib 90's. On room air. Ox4. Amio @ 0.5 mg/min, heparin drip, asa, lipitor, toprol xl, entresto, brilinta. Na+ 134, pro-bnp 3852, trop 0.024 - then 0.019, hgb 12.5 - now 10.8. COVID 19 was neg. PCP: Bruce Fuller MD  Readmission Risk Score: 15%    Patient Goals/Plan/Treatment Preferences: Spoke with Pascual; states he lives at home alone and did not use any DME or have any HH services PTA. He cares for himself independently. He states he does not drive since getting Life Vest, and his step-daughter and sister help with shopping and taking him to appointments. He has a PCP.  Gene states he plans to return home at discharge, denies needs, and declines HH. Transportation/Food Security/Housekeeping Addressed:  No issues identified.

## 2021-04-01 NOTE — FLOWSHEET NOTE
1300 IV Amiodarone and Heparin stopped. Will transfer to stepdown. Start Eliquist and Cordarone tabs.

## 2021-04-01 NOTE — FLOWSHEET NOTE
0730 Mr Peters rests in the bed without apparent distress. He denies pain, SOB or arm or jaw heaviness. He has converted back to SR this AM. Another EKG has  been ordered. He has Amiodarone and heparin gtts infusing through peripheral IV's. He voids without difficulty. 0830 He was assisted up to the [facility]. He tolerates this well.

## 2021-04-01 NOTE — PROGRESS NOTES
CRITICAL CARE PROGRESS NOTE      Patient:  Pascual Peters Jr. Unit/Bed:4D-06/006-A  YOB: 1950  MRN: [id number]   PCP: Nicole Payan MD  Date of Admission: 3/31/2021  Chief Complaint:- Hypotension, shortness of breath     Assessment and Plan:      1. HFrEF NYHA III- Without exacerbation. No evidence of edema, fluid overload, or CXR with infiltrates ECHO on 2/28/21 demonstrated EF=15-20% with severely enlarged left ventricle with normal wall thickness. There is evidence of global hypokinesis with segmental abnormalities. LA is moderately dilated. Mild mitral regurgitation, moderated tricuspid regurgitation. Echo limited->pending. Continue Entresto 24-26 0.5 mg twice a day, Brilinta 9- mh twice a day, Aspirin 81 mg. EKG does not reveal ischemic changes. 2. Indeterminently elevated troponin - history of MI in 12/20. Initial troponin elevated 0.024. This morning troponin 0.019, trending down. Slight chest tightness but no crushing pain or shortness of breath. Will repeat troponin x1 and an EKG. 3. New onset A-fib with RVR - [id number] 3. On presentation patient in afib. Received Amiodarone bolus and was started on Amiodarone gtt. Converted  normal sinus. Will transition to oral Amiodarone today. Continue Metoprolol 25 mg. Patient is at risk for stroke, on Heparin. Transition to Eliquis. Echo pending to evaluate for change in heart's function/structure. 4. Acute on chronic hypotension - Secondary to HFrEF. Patient stated that his BP has been low since his MI in 2020. His baseline SBP is ~80-85. Patient is requiring vasopressor support to maintain MAP>65 on presentation. MAP>65, Levophed weaned off this morning. Continue to monitor blood pressure.         5. Hx of CAD -  s/p Stent x2 (2001), 3-vessel bypass (2008), and PCI with stenting to vein graft to PL branch (12/24/2020). There was also critical stenosis of native L circulation and small caliber vessels which were not amenable to intervention. Wears life vest.Continue Entresto, Brilinta, aspirin, metoprolol     6. Hx of HLD - Noted. Continue Lipitor 81 mg. INITIAL H AND P AND ICU COURSE:  Pascual Peters Jr. Is a 70 y.o. male with PMHx of CAD, ischemic cardiomyopathy, CHF and HLD who presented to [facility] with complaint of \"not feeling well\". The patient started to have \"heaviness\" of his arms with a jaw ache that began at approximately 0715 today (3/31/21). He stated that these symptoms lasted approximately 45 minutes before resolution. The patient contacted his cardiologist, Dr. Meenakshi Ellison, who recommended he report to the emergency department. Of considerable note the patient had an MI in 2020 and now wears a life vest secondary to global hypokinesis. The patient stated that at approximately 21  his life vest alerted him of need for defibrillation twice which he canceled both times. The patient decided to report [facility] emergency department at 21 .     Upon arrival to [place] [address] Emergency Department the patient was found to be in A-fib with RVR which was new onset. The patient was also hypotensive with SBP ~80 which per the patient is his baseline. The patient was initiated on Amiodarone bolus followed by drip and heparin gtt.     The patient was transferred to [facility] ICU as a direct admission via [address]. Upon arrival the patient was alert, oriented, and had no complaints. The patient was restarted on Amiodarone, heparin, and levophed. Past Medical History: Per HPI  Family History: Mother () - RA. Father () - CHF. Social History: Current smoker 13-pack years. Denies ETOH or illicit drug use. ROS   Constitutional: Negative for chills and fever. HENT: Negative for sinus pain, sore throat and trouble swallowing. Eyes: Negative for photophobia and visual disturbance. Respiratory: Negative for cough, shortness of breath and wheezing.     Cardiovascular: Negative for chest pain and palpitations. Gastrointestinal: Negative for abdominal pain, blood in stool, diarrhea, nausea and vomiting. Endocrine: Negative for polydipsia and polyuria. Genitourinary: Negative for difficulty urinating, dysuria, hematuria and urgency. Musculoskeletal: Negative for back pain and neck pain. Skin: Negative for rash and wound. Allergic/Immunologic: Negative for food allergies and immunocompromised state. Neurological: Negative for dizziness, weakness, numbness and headaches. Hematological: Negative for adenopathy. Does not bruise/bleed easily. Psychiatric/Behavioral: Negative for agitation, confusion and sleep disturbance. All other systems reviewed and are negative. Scheduled Meds:   sodium chloride flush  10 mL Intravenous 2 times per day    metoprolol succinate  25 mg Oral Daily    sacubitril-valsartan  0.5 tablet Oral BID    ticagrelor  90 mg Oral BID    aspirin  81 mg Oral Daily    atorvastatin  80 mg Oral Nightly     Continuous Infusions:   sodium chloride      norepinephrine Stopped (04/01/21 4274)    amiodarone 0.5 mg/min (03/31/21 0960)    heparin (PORCINE) Infusion 18 Units/kg/hr (04/01/21 0105)       PHYSICAL EXAMINATION:  T:  97.7. P:  78. RR:  26. B/P:  116/62. O2 Sat:  94%. I/O: 1.1L/800mL  Body mass index is 22.78 kg/m². GCS:   15  General: Chronically ill male   HEENT:  normocephalic and atraumatic. No scleral icterus. PERR  Neck: supple. No Thyromegaly. Lungs: clear to auscultation. No retractions  Cardiac: RRR. No JVD. Abdomen: soft. Nontender. Extremities:  No clubbing, cyanosis, or edema x 4. Vasculature: capillary refill < 3 seconds. Palpable dorsalis pedis pulses. Skin:  warm and dry. Psych:  Alert and oriented x3. Affect appropriate  Lymph:  No supraclavicular adenopathy. Neurologic:  No focal deficit. No seizures. Data: (All radiographs, tracings, PFTs, and imaging are personally viewed and interpreted unless otherwise noted).     Sodium

## 2021-04-02 VITALS
TEMPERATURE: 97.8 F | HEIGHT: 70 IN | DIASTOLIC BLOOD PRESSURE: 50 MMHG | RESPIRATION RATE: 18 BRPM | OXYGEN SATURATION: 94 % | WEIGHT: 158.63 LBS | SYSTOLIC BLOOD PRESSURE: 82 MMHG | BODY MASS INDEX: 22.71 KG/M2 | HEART RATE: 85 BPM

## 2021-04-02 PROBLEM — I48.91 NEW ONSET ATRIAL FIBRILLATION (HCC): Status: RESOLVED | Noted: 2021-03-31 | Resolved: 2021-04-02

## 2021-04-02 LAB
APTT: 37.1 SECONDS (ref 22–38)
APTT: 37.4 SECONDS (ref 22–38)
APTT: 38.4 SECONDS (ref 22–38)
EKG ATRIAL RATE: 84 BPM
EKG P AXIS: 66 DEGREES
EKG P-R INTERVAL: 202 MS
EKG Q-T INTERVAL: 416 MS
EKG QRS DURATION: 110 MS
EKG QTC CALCULATION (BAZETT): 468 MS
EKG R AXIS: -59 DEGREES
EKG T AXIS: 175 DEGREES
EKG VENTRICULAR RATE: 76 BPM
ERYTHROCYTE [DISTWIDTH] IN BLOOD BY AUTOMATED COUNT: 14.3 % (ref 11.5–14.5)
ERYTHROCYTE [DISTWIDTH] IN BLOOD BY AUTOMATED COUNT: 52.8 FL (ref 35–45)
HCT VFR BLD CALC: 35.7 % (ref 42–52)
HEMOGLOBIN: 11.2 GM/DL (ref 14–18)
MCH RBC QN AUTO: 31.3 PG (ref 26–33)
MCHC RBC AUTO-ENTMCNC: 31.4 GM/DL (ref 32.2–35.5)
MCV RBC AUTO: 99.7 FL (ref 80–94)
MRSA SCREEN: NORMAL
PLATELET # BLD: 188 THOU/MM3 (ref 130–400)
PMV BLD AUTO: 10.3 FL (ref 9.4–12.4)
RBC # BLD: 3.58 MILL/MM3 (ref 4.7–6.1)
WBC # BLD: 8.7 THOU/MM3 (ref 4.8–10.8)

## 2021-04-02 PROCEDURE — 6370000000 HC RX 637 (ALT 250 FOR IP): Performed by: STUDENT IN AN ORGANIZED HEALTH CARE EDUCATION/TRAINING PROGRAM

## 2021-04-02 PROCEDURE — 2580000003 HC RX 258: Performed by: STUDENT IN AN ORGANIZED HEALTH CARE EDUCATION/TRAINING PROGRAM

## 2021-04-02 PROCEDURE — 99239 HOSP IP/OBS DSCHRG MGMT >30: CPT | Performed by: FAMILY MEDICINE

## 2021-04-02 PROCEDURE — 93010 ELECTROCARDIOGRAM REPORT: CPT | Performed by: INTERNAL MEDICINE

## 2021-04-02 PROCEDURE — 36415 COLL VENOUS BLD VENIPUNCTURE: CPT

## 2021-04-02 PROCEDURE — 85730 THROMBOPLASTIN TIME PARTIAL: CPT

## 2021-04-02 PROCEDURE — 85027 COMPLETE CBC AUTOMATED: CPT

## 2021-04-02 RX ORDER — MIDODRINE HYDROCHLORIDE 5 MG/1
5 TABLET ORAL
Status: DISCONTINUED | OUTPATIENT
Start: 2021-04-02 | End: 2021-04-02

## 2021-04-02 RX ORDER — MIDODRINE HYDROCHLORIDE 5 MG/1
5 TABLET ORAL
Status: DISCONTINUED | OUTPATIENT
Start: 2021-04-02 | End: 2021-04-02 | Stop reason: HOSPADM

## 2021-04-02 RX ORDER — MIDODRINE HYDROCHLORIDE 5 MG/1
5 TABLET ORAL
Qty: 90 TABLET | Refills: 0 | Status: SHIPPED | OUTPATIENT
Start: 2021-04-02 | End: 2021-05-03 | Stop reason: SDUPTHER

## 2021-04-02 RX ORDER — SPIRONOLACTONE 25 MG/1
12.5 TABLET ORAL DAILY
Qty: 30 TABLET | Refills: 0 | Status: SHIPPED | OUTPATIENT
Start: 2021-04-02 | End: 2021-07-14 | Stop reason: SDUPTHER

## 2021-04-02 RX ORDER — ATORVASTATIN CALCIUM 80 MG/1
80 TABLET, FILM COATED ORAL NIGHTLY
Qty: 30 TABLET | Refills: 0 | Status: SHIPPED | OUTPATIENT
Start: 2021-04-02 | End: 2021-05-03 | Stop reason: SDUPTHER

## 2021-04-02 RX ORDER — AMIODARONE HYDROCHLORIDE 200 MG/1
200 TABLET ORAL DAILY
Qty: 30 TABLET | Refills: 0 | Status: SHIPPED | OUTPATIENT
Start: 2021-04-03 | End: 2021-05-03 | Stop reason: SDUPTHER

## 2021-04-02 RX ORDER — SACUBITRIL AND VALSARTAN 24; 26 MG/1; MG/1
0.5 TABLET, FILM COATED ORAL 2 TIMES DAILY
Qty: 30 TABLET | Refills: 0 | Status: SHIPPED | OUTPATIENT
Start: 2021-04-02 | End: 2021-05-10

## 2021-04-02 RX ADMIN — TICAGRELOR 90 MG: 90 TABLET ORAL at 08:39

## 2021-04-02 RX ADMIN — SODIUM CHLORIDE, PRESERVATIVE FREE 10 ML: 5 INJECTION INTRAVENOUS at 08:40

## 2021-04-02 RX ADMIN — AMIODARONE HYDROCHLORIDE 200 MG: 200 TABLET ORAL at 08:40

## 2021-04-02 RX ADMIN — ASPIRIN 81 MG: 81 TABLET, CHEWABLE ORAL at 08:40

## 2021-04-02 RX ADMIN — MIDODRINE HYDROCHLORIDE 5 MG: 5 TABLET ORAL at 13:05

## 2021-04-02 RX ADMIN — SACUBITRIL AND VALSARTAN 0.5 TABLET: 24; 26 TABLET, FILM COATED ORAL at 08:40

## 2021-04-02 RX ADMIN — APIXABAN 5 MG: 5 TABLET, FILM COATED ORAL at 08:40

## 2021-04-02 NOTE — PLAN OF CARE
Problem: Falls - Risk of:  Goal: Will remain free from falls  Description: Will remain free from falls  4/2/2021 1259 by Sotero Forbes RN  Note: Patient safety maintained. No falls or injuries to this point in shift. Will continue to monitor. 4/2/2021 0612 by Antonio Sterling RN  Outcome: Ongoing  Note: Patient has been free from falls this shift. Problem: Skin Integrity:  Goal: Will show no infection signs and symptoms  Description: Will show no infection signs and symptoms  4/2/2021 1259 by Sotero Forbes RN  Note: No new skin breakdown noted. Will continue to monitor. 4/2/2021 0612 by Antonio Sterling RN  Outcome: Ongoing  Note: No skin issues noted. Problem: Falls - Risk of:  Goal: Absence of physical injury  Description: Absence of physical injury  Note: Patient safety maintained. No falls or injuries to this point in shift. Will continue to monitor.

## 2021-04-02 NOTE — CARE COORDINATION
4/2/21, 1:45 PM EDT    Patient goals/plan/ treatment preferences discussed by  and . Patient goals/plan/ treatment preferences reviewed with patient/ family. Patient/ family verbalize understanding of discharge plan and are in agreement with goal/plan/treatment preferences. Understanding was demonstrated using the teach back method. AVS provided by RN at time of discharge, which includes all necessary medical information pertaining to the patients current course of illness, treatment, post-discharge goals of care, and treatment preferences. IMM Letter  IMM Letter given to Patient/Family/Significant other/Guardian/POA/by[de-identified]   IMM Letter date given[de-identified] 04/02/21  IMM Letter time given[de-identified] 9102       Transferred from ICU yesterday and discharged home today. Spoke with pt this am, denied any discharge needs.      Electronically signed by Kaley Gutierrez RN on 4/2/2021 at 1:45 PM

## 2021-04-02 NOTE — DISCHARGE SUMMARY
DISCHARGE SUMMARY      Patient Identification:   Pascual Peters Jr. : 1950  MRN: [id number]   Account: [de-identified]      Patient's PCP: James Michael MD    Admit Date: 3/31/2021     Discharge Date: 21    Admitting Physician: Ashley Beal MD     Discharge Physician: Sheri Resendiz DO     Discharge Diagnoses: Active Hospital Problems    Diagnosis Date Noted    Hypotension [I95.9] 2021    Atrial fibrillation (HCC) [I48.91] 2021   Acute on Chronic Hypotension, improved  New Onset Atrial Fibrillation with RVR, converted to NSR  HFrEF NYHA III  Elevated troponin  History of CAD  History of HLD    The patient was seen and examined on day of discharge and this discharge summary is in conjunction with any daily progress note from day of discharge. Patient stable at time of discharge. Hospital Course:   Pascual Newellswjulieth is a 70 y.o. male admitted to [address] [place] [address] on 3/31/2021 for shortness of breath. Pascual Peters Jr. Is a 70 y. o. male with PMHx of CAD, ischemic cardiomyopathy, CHF and HLD who presented to [facility] with complaint of \"not feeling well\". The patient started to have \"heaviness\" of his arms with a jaw ache that began at approximately 0715 today (3/31/21). He stated that these symptoms lasted approximately 45 minutes before resolution. The patient contacted his cardiologist, Dr. Eduardo Resendiz, who recommended he report to the emergency department. Of considerable note the patient had an MI in 2020 and now wears a life vest secondary to global hypokinesis. The patient stated that at approximately 21  his life vest alerted him of need for defibrillation twice which he canceled both times. The patient decided to report [facility] emergency department at 21 [phone].     Upon arrival to [facility] Emergency Department the patient was found to be in A-fib with RVR which was new onset.  The patient was also hypotensive with SBP ~80 which per the patient is his baseline. The patient was initiated on Amiodarone bolus followed by drip and heparin gtt.     The patient was transferred to [facility] ICU as a direct admission via [address]. Upon arrival the patient was alert, oriented, and had no complaints. The patient was restarted on Amiodarone, heparin, and levophed. Patient experienced blood pressures lower than his baseline and required levophed in the ICU, likely acutely exacerbated 2/2 Atrial Fibrillation. Patient was found to have new onset atrial fibrillation with RVR. He was started on amiodarone bolus and amiodarone gtt. He was converted to normal sinus rhythm and was transitioned to oral amiodarone. Patient received Heparin and was transitioned to Eliquis. Consuella Ohara was restarted (patient taking 0.5 mg 24-26 dosage), spironolactone resumed. Midodrine 5 mg TID was added to his regimen. The levophed was weaned off on 4/1 and patient was transitioned to step down with improved blood pressures. He denied symptoms on 4/1 and denied feeling lightheaded or dizzy at time of discharge. Consider discontinuing Entresto if further decline in blood pressure. Discussed with patient having close follow up with Dr Georgia Zaidi. Can consider starting Farxiga to reduce risk of hospitalization due to heart failure. Consider AICD outpatient. Discussed with patient if experiences symptoms to go to ED or call Dr Georgia Zaidi.     Exam:     Vitals:  Vitals:    04/02/21 0015 04/02/21 0430 04/02/21 0826 04/02/21 1113   BP: (!) 86/54 (!) 81/54 94/61 (!) 82/50   Pulse: 88 102 79 85   Resp: 18 18 16 18   Temp:  99.4 °F (37.4 °C) 98.4 °F (36.9 °C) 97.8 °F (36.6 °C)   TempSrc:  Oral Oral Oral   SpO2: 95% 94% 96% 94%   Weight:  158 lb 10 oz (72 kg)     Height:         Weight: Weight: 158 lb 10 oz (72 kg)     24 hour intake/output:    Intake/Output Summary (Last 24 hours) at 4/2/2021 1414  Last data filed at 4/2/2021 1341  Gross per 24 hour   Intake 875 ml   Output 625 ml   Net 250 ml aspirin 81 MG chewable tablet  Take 81 mg by mouth daily             atorvastatin (LIPITOR) 80 MG tablet  Take 1 tablet by mouth nightly             furosemide (LASIX) 40 MG tablet  Take 40 mg by mouth daily as needed For feet swelling             metoprolol succinate (TOPROL XL) 25 MG extended release tablet  Take 25 mg by mouth nightly              midodrine (PROAMATINE) 5 MG tablet  Take 1 tablet by mouth 3 times daily (with meals)             nitroGLYCERIN (NITROSTAT) 0.3 MG SL tablet  Place 0.3 mg under the tongue every 5 minutes as needed             sacubitril-valsartan (ENTRESTO) 24-26 MG per tablet  Take 0.5 tablets by mouth 2 times daily             spironolactone (ALDACTONE) 25 MG tablet  Take 0.5 tablets by mouth daily             ticagrelor (BRILINTA) 90 MG TABS tablet  Take 90 mg by mouth 2 times daily                 Time Spent on discharge is more than 45 minutes in the examination, evaluation, counseling and review of medications and discharge plan. Signed: Thank you Brandie Aguilera MD for the opportunity to be involved in this patient's care.     Electronically signed by Samira Ordoñez DO on 4/2/2021 at 2:14 PM

## 2021-04-02 NOTE — PROGRESS NOTES
IV removed x3, discharge instructions reviewed with patient and signed, patient spoke with sister who is on her way  patient, patient states he can dress on his own and get his personal items together.

## 2021-04-03 ENCOUNTER — CARE COORDINATION (OUTPATIENT)
Dept: CASE MANAGEMENT | Age: 71
End: 2021-04-03

## 2021-04-03 DIAGNOSIS — I48.0 PAROXYSMAL ATRIAL FIBRILLATION (HCC): Primary | ICD-10-CM

## 2021-04-03 PROCEDURE — 1111F DSCHRG MED/CURRENT MED MERGE: CPT | Performed by: INTERNAL MEDICINE

## 2021-04-03 NOTE — CARE COORDINATION
[facility] Transitions Initial Follow Up Call    Call within 2 business days of discharge: Yes    Patient: Pascual Peters Jr. Patient : 1950   MRN: [id number]  Reason for Admission: Coronary artery disease / A-fib  Discharge Date: 21 RARS: Readmission Risk Score: 12      Last Discharge [facility]       Complaint Diagnosis Description Type Department Provider    3/31/21  Coronary artery disease involving coronary bypass graft of native heart without angina pectoris . .. Admission (Discharged) Yemi Baltazar MD    3/31/21 Shortness of Breath Anginal equivalent ([facility] [address] . .. ED (TRANSFER) [facility] AT THE [facility] Austin Stephens MD           Spoke with: Patient    TRANSITION OF CARE CALL: KRISTI INITIAL CALL     Charting Summary:     Patient states he is doing well. Patient denies weight gain, SOB or wheezing, coughing, edema, abdominal edema, adhering to low NA diet and fluid restriction. Denies episodes of rapid heart rate or palpitations, dizziness, fatigue, SOB or chest pain at this time. Instructed patient on the importance of weighing daily, in the morning after urinating, and that if the patient has weight gain of 3# over night or 5# weight gain in a week to call their physician immediately and report. Reviewed medications with patient. Patient confirms they have all medications and are taking medications as directed. Patient has no questions concerning medications at this time. 1111F sent to PCP    Patient is eating well and has s good appetite. Patient is drinking adequate fluids. Normal Bladder and Bowel elimination patterns. Had BM last night. Explained the BPCI-A program and that the patient is followed for 90 days after discharge. Expresses understanding.         Will Hand Off patient to [facility], TANIKA Arteaga, JAZZY    [phone]  Bernice Reason / [address] Planning:   Does patient have an Advance Directive:  not on file.     Was this a readmission? No  Patient stated reason for admission: SOB  Patients top risk factors for readmission: lack of knowledge about disease      Care Transition Nurse (CTN) contacted the patient by telephone to perform post hospital discharge assessment. Verified name and  with patient as identifiers. Provided introduction to self, and explanation of the CTN role. CTN reviewed discharge instructions, medical action plan and red flags with patient who verbalized understanding. Patient given an opportunity to ask questions and does not have any further questions or concerns at this time. Were discharge instructions available to patient? Yes. Reviewed appropriate site of care based on symptoms and resources available to patient including: PCP, Specialist and When to call 911. The patient agrees to contact the PCP office for questions related to their healthcare. Medication reconciliation was performed with patient, who verbalizes understanding of administration of home medications. Advised obtaining a 90-day supply of all daily and as-needed medications. Was patient discharged with a pulse oximeter? No     Discussed follow-up appointments. If no appointment was previously scheduled, appointment scheduling offered: No. Is follow up appointment scheduled within 7 days of discharge? Yes  Non-[facility] follow up appointment(s): 2021    Plan for follow-up call in 5-7 days based on severity of symptoms and risk factors. Plan for next call: symptom management-SOB  CTN provided contact information for future needs.       Key Salvador LPN    [phone]  Rhea Saravia / [address] [place] [address] Transitions 24 Hour Call    Schedule Follow Up Appointment with PCP: Completed  Do you have any ongoing symptoms?: No  Do you have a copy of your discharge instructions?: Yes  Do you have all of your prescriptions and are they filled?: Yes  Have Raven Salguero LPN

## 2021-04-03 NOTE — PROGRESS NOTES
Physician Progress Note      Pritesh Foster  CSN #:                  [id number]  :                       1950  ADMIT DATE:       3/31/2021 2:49 PM  [address] DATE:        2021 4:13 PM  RESPONDING  PROVIDER #:        Quincy Alaniz MD          QUERY TEXT:    Pt admitted with new onset afib with RVR and hypotension secondary to HFrEF   without exacerbation. Pt noted to have P > 100, R 20-30s, hypotension   (systolic as low as 77N), MAP as low as 51. Pt admitted to ICU and is   receiving Levophed drip. If possible, please respond below and document in   the progress notes and discharge summary if you are evaluating and/or treating   any of the following: The medical record reflects the following:  Risk Factors: new onset afib with RVR, HFrEF without exacerbation, advanced   age  Clinical Indicators: H&P states, \"hypotension secondary to HFrEF\"; P > 100, R   20-30s, hypotension (systolic as low as 83S), MAP as low as 51  Treatment: Levophed drip, Amiodarone drip, Heparin drip, metoprolol succinate,   ICU monitoring, tele, pending Cardiology consult    Thank you! Bibi Patel, RN, BSN, RHIT, CCDS, [facility]  RN Clinical   [phone]  Options provided:  -- Cardiogenic Shock  -- Hypovolemic Shock  -- Hypotension without shock  -- Other - I will add my own diagnosis  -- Disagree - Not applicable / Not valid  -- Disagree - Clinically unable to determine / Unknown  -- Refer to Clinical Documentation Reviewer    PROVIDER RESPONSE TEXT:    This patient has hypotension without shock.     Query created by: Dejan Nolen on 2021 10:21 AM      Electronically signed by:  Quincy Alaniz MD 4/3/2021 8:24 AM

## 2021-04-05 ENCOUNTER — TELEPHONE (OUTPATIENT)
Dept: CARDIOLOGY | Age: 71
End: 2021-04-05

## 2021-04-09 ENCOUNTER — CARE COORDINATION (OUTPATIENT)
Dept: CASE MANAGEMENT | Age: 71
End: 2021-04-09

## 2021-04-09 ENCOUNTER — HOSPITAL ENCOUNTER (OUTPATIENT)
Age: 71
Discharge: HOME | End: 2021-04-09
Payer: MEDICARE

## 2021-04-09 DIAGNOSIS — R73.01 IMPAIRED FASTING GLUCOSE: ICD-10-CM

## 2021-04-09 DIAGNOSIS — I21.4 ACUTE NON-ST ELEVATION MYOCARDIAL INFARCTION (NSTEMI) (HCC): ICD-10-CM

## 2021-04-09 LAB
ALT SERPL-CCNC: 14 U/L (ref 5–41)
ANION GAP SERPL CALCULATED.3IONS-SCNC: 11 MMOL/L (ref 9–17)
AST SERPL-CCNC: 18 U/L
BUN BLDV-MCNC: 19 MG/DL (ref 8–23)
BUN/CREAT BLD: 20 (ref 9–20)
CALCIUM SERPL-MCNC: 10.3 MG/DL (ref 8.6–10.4)
CHLORIDE BLD-SCNC: 102 MMOL/L (ref 98–107)
CHOLESTEROL/HDL RATIO: 3
CHOLESTEROL: 106 MG/DL
CO2: 24 MMOL/L (ref 20–31)
CREAT SERPL-MCNC: 0.96 MG/DL (ref 0.7–1.2)
GFR AFRICAN AMERICAN: >60 ML/MIN
GFR NON-AFRICAN AMERICAN: >60 ML/MIN
GFR SERPL CREATININE-BSD FRML MDRD: ABNORMAL ML/MIN/{1.73_M2}
GFR SERPL CREATININE-BSD FRML MDRD: ABNORMAL ML/MIN/{1.73_M2}
GLUCOSE BLD-MCNC: 110 MG/DL (ref 70–99)
HDLC SERPL-MCNC: 35 MG/DL
LDL CHOLESTEROL: 55 MG/DL (ref 0–130)
POTASSIUM SERPL-SCNC: 4.3 MMOL/L (ref 3.7–5.3)
SODIUM BLD-SCNC: 137 MMOL/L (ref 135–144)
TRIGL SERPL-MCNC: 82 MG/DL
VLDLC SERPL CALC-MCNC: ABNORMAL MG/DL (ref 1–30)

## 2021-04-09 PROCEDURE — 80048 BASIC METABOLIC PNL TOTAL CA: CPT

## 2021-04-09 PROCEDURE — 83036 HEMOGLOBIN GLYCOSYLATED A1C: CPT

## 2021-04-09 PROCEDURE — 80061 LIPID PANEL: CPT

## 2021-04-09 PROCEDURE — 84460 ALANINE AMINO (ALT) (SGPT): CPT

## 2021-04-09 PROCEDURE — 36415 COLL VENOUS BLD VENIPUNCTURE: CPT

## 2021-04-09 PROCEDURE — 84450 TRANSFERASE (AST) (SGOT): CPT

## 2021-04-09 NOTE — CARE COORDINATION
[facility] Transitions Follow Up Call    2021    Patient: Pascual Peters Jr. Patient : 1950   MRN: <[id number]>  Reason for Admission:   Discharge Date: 21 RARS: Readmission Risk Score: 12         Spoke with: Gene    Care Transitions Subsequent and Final Call    Subsequent and Final Calls  Do you have any ongoing symptoms?: No  Have your medications changed?: No  Do you have any questions related to your medications?: No  Do you currently have any active services?: No  Do you have any needs or concerns that I can assist you with?: No  Care Transitions Interventions  Other Interventions:       States he's doing fine. Denies CP, palpitations, lightheadedness, dizziness, fatigue, or lack of appetite. States he feels back to normal. He has follow up with PCP and cardiology next week. Reports taking medications as prescribed and denies questions or concerns at this time. Follow Up  Future Appointments   Date Time Provider Charley Pacheco   2021  1:30 PM [facility] CARDIOPULM REHAB RM 2 [facility] AT THE [facility] CARDIAC [place]   2021  4:30 PM Carmela Dus, MD Hercules Crooks C.D. Kristie Moritz   2021  9:40 AM [facility]ina Gabriel [facility]   2021  1:30 PM MTH CARDIOPULM REHAB RM 2 [facility] CARDIAC [place]   4/15/2021  1:30 PM [facility] [place]   2021  1:30 PM MTH CARDIOPULM REHAB RM 2 [facility] CARDIAC [place]   2021  1:30 PM MTH CARDIOPULM REHAB RM 2 [facility] CARDIAC [place]   2021  1:30 PM MTH CARDIOPULM REHAB RM 2 [facility] CARDIAC [place]   2021  1:30 PM MTH CARDIOPULM REHAB RM 2 [facility] CARDIAC [place]   2021  1:30 PM MTH CARDIOPULM REHAB RM 2 [facility] CARDIAC [place]   2021  1:30 PM MTH CARDIOPULM REHAB RM 2 [facility] CARDIAC [place]   5/3/2021  1:30 PM MTH CARDIOPULM REHAB RM 2 [facility] CARDIAC [place]   2021  1:30 PM MTH CARDIOPULM REHAB RM 2 [facility] CARDIAC [place]   2021  1:30 PM MTH CARDIOPULM REHAB RM 2 [facility] CARDIAC [place]   5/10/2021  1:30 PM MTH CARDIOPULM REHAB RM 2

## 2021-04-11 LAB
ESTIMATED AVERAGE GLUCOSE: 137 MG/DL
HBA1C MFR BLD: 6.4 % (ref 4–6)

## 2021-04-13 ENCOUNTER — TELEPHONE (OUTPATIENT)
Dept: CARDIOLOGY CLINIC | Age: 71
End: 2021-04-13

## 2021-04-13 ENCOUNTER — TELEPHONE (OUTPATIENT)
Dept: CARDIOLOGY | Age: 71
End: 2021-04-13

## 2021-04-13 ENCOUNTER — OFFICE VISIT (OUTPATIENT)
Dept: CARDIOLOGY | Age: 71
End: 2021-04-13
Payer: MEDICARE

## 2021-04-13 VITALS
SYSTOLIC BLOOD PRESSURE: 95 MMHG | OXYGEN SATURATION: 98 % | HEIGHT: 70 IN | RESPIRATION RATE: 17 BRPM | BODY MASS INDEX: 22.48 KG/M2 | WEIGHT: 157 LBS | DIASTOLIC BLOOD PRESSURE: 60 MMHG | HEART RATE: 60 BPM

## 2021-04-13 DIAGNOSIS — I48.0 PAF (PAROXYSMAL ATRIAL FIBRILLATION) (HCC): ICD-10-CM

## 2021-04-13 DIAGNOSIS — I21.4 NSTEMI (NON-ST ELEVATED MYOCARDIAL INFARCTION) (HCC): ICD-10-CM

## 2021-04-13 DIAGNOSIS — Z95.5 STENTED CORONARY ARTERY: Primary | ICD-10-CM

## 2021-04-13 DIAGNOSIS — Z71.6 TOBACCO ABUSE COUNSELING: ICD-10-CM

## 2021-04-13 DIAGNOSIS — I25.5 ISCHEMIC CARDIOMYOPATHY: Primary | ICD-10-CM

## 2021-04-13 DIAGNOSIS — E78.2 MIXED HYPERLIPIDEMIA: ICD-10-CM

## 2021-04-13 DIAGNOSIS — I25.10 ASHD (ARTERIOSCLEROTIC HEART DISEASE): ICD-10-CM

## 2021-04-13 DIAGNOSIS — Z95.820 S/P ANGIOPLASTY WITH STENT: ICD-10-CM

## 2021-04-13 DIAGNOSIS — Z95.1 S/P CABG X 3: ICD-10-CM

## 2021-04-13 DIAGNOSIS — Z79.01 ENCOUNTER FOR CURRENT LONG-TERM USE OF ANTICOAGULANTS: ICD-10-CM

## 2021-04-13 DIAGNOSIS — I25.10 CORONARY ARTERY DISEASE INVOLVING NATIVE CORONARY ARTERY OF NATIVE HEART WITHOUT ANGINA PECTORIS: ICD-10-CM

## 2021-04-13 DIAGNOSIS — R42 DIZZINESS AND GIDDINESS: ICD-10-CM

## 2021-04-13 PROCEDURE — 93292 WCD DEVICE INTERROGATE: CPT | Performed by: FAMILY MEDICINE

## 2021-04-13 PROCEDURE — 1123F ACP DISCUSS/DSCN MKR DOCD: CPT | Performed by: FAMILY MEDICINE

## 2021-04-13 PROCEDURE — 4004F PT TOBACCO SCREEN RCVD TLK: CPT | Performed by: FAMILY MEDICINE

## 2021-04-13 PROCEDURE — 3017F COLORECTAL CA SCREEN DOC REV: CPT | Performed by: FAMILY MEDICINE

## 2021-04-13 PROCEDURE — 4040F PNEUMOC VAC/ADMIN/RCVD: CPT | Performed by: FAMILY MEDICINE

## 2021-04-13 PROCEDURE — 99211 OFF/OP EST MAY X REQ PHY/QHP: CPT | Performed by: FAMILY MEDICINE

## 2021-04-13 PROCEDURE — 1111F DSCHRG MED/CURRENT MED MERGE: CPT | Performed by: FAMILY MEDICINE

## 2021-04-13 PROCEDURE — G8420 CALC BMI NORM PARAMETERS: HCPCS | Performed by: FAMILY MEDICINE

## 2021-04-13 PROCEDURE — 99214 OFFICE O/P EST MOD 30 MIN: CPT | Performed by: FAMILY MEDICINE

## 2021-04-13 PROCEDURE — G8427 DOCREV CUR MEDS BY ELIG CLIN: HCPCS | Performed by: FAMILY MEDICINE

## 2021-04-13 NOTE — PROGRESS NOTES
Adela Monday am scribing for and in the presence of Laurel Settfloresita. Martínez BUSTILLOS, MS, F.A.C.C..    Patient: Pascual Peters Jr. : 1950  Date of Visit: 2021    REASON FOR VISIT / CONSULTATION: 1 Month Follow-Up (Hx:ASHD,S/P CABG x3,NSTEMI,Isch. Cardio, HLD, Idiopathic Hypotension, Tobacco Abuse. Echo limited 3-31-21. Had an episode of afib before , wants to dicuss medications. Denies: CP, SOB, Lightheaded/dizziness, Palpitations. )      History of Present Illness:        Dear Kevin Frost MD,    I had the pleasure of seeing Murtaza CopeMolly in my office today. Mr. Josias Johnson is a 70 y.o. male with a history of atherosclerotic heart disease. He also had a heart attack in . 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 He used to see a cardiologist in the past however he started to feel better and did not think he needed to follow up anymore with any doctor. Most recently lindy came into the ER on 2020 and had a NSTEMI and was transferred to [facility] in [place]. He did report that he actually started feeling \"weird\", no pain but just feels doom feeling on the Monday before Susan Paulina however he did not want to think this was a heart attack. He did report feeling about the same as he did in . He went home and went to bed than his breathing got worse and worse and that's when he came into the hospital. He then had a heart cath done on 2020 and had two stents placed REJI x 2 to SVG to posterior lateral branch of the RCA. An echocardiogram at that same time showed an EF of 15% and was sent home with a Life Vest. On 21 spironolactone was added and although he says his BP is usually low, less than 143 systolic, denies any known problems with the medication including any lightheadedness or dizziness. He did not remember the last time he was told what his heart strength was he does not remember.   Echocardiogram done on 2021 showed an ejection fraction of 15-20%, The left ventricular cavity size is severely enlarged and the left ventricular wall thickness is within normal limits. Severe global hypokinesis with segmental abnormalities. The left atrium is moderately dilated (34-39) with a left atrial volume index of 34 ml/m2. Mild mitral regurgitation. Moderate tricuspid regurgitation. Mild to moderate pulmonary hypertension with an estimated right ventricular systolic pressure of 38 mmHg. He came to the ER on 3/31/21 due to shortness of breath, he was transferred to [facility]. Repeat echocardiogram done on 3/31/2021 showed an EF of 10-15% Moderate to severely dilated LV size and severely reduced systolic function. Severe global hypokinesis. Since I last saw Mr. Ruddy Michelle he reports he has been doing good. He reports his breathing has been stable. He denies having any pain. He denies any chest pain, pressure or tightness. He denies any increased shortness of breath, lightheaded/dizziness or palpitations. He denies any abdominal pain, bleeding problems, bowel issues, problems with his medications or any other concerns at this time. No cough, fever or chills. No nausea or vomiting. No falls or near falls. Bleeding Risks: Mr. Ruddy Michelle denies any current or recent bleeding problems including a history of a GI bleed, ulcers, recent or upcoming surgeries, blood in his stool or black tarry stools or blood in his urine.        PAST MEDICAL HISTORY:         Past Medical History:   Diagnosis Date    Coronary artery disease     s/p CABG x3 in 2008 and 2 stents on 12/24/2020    H/O echocardiogram 02/08/2021    EF 15-20% LV severly enlarged and LV wall thickness is normal Severe global hypokinesis with segmental abnormalities LA is mod dilated 34-39 with LA volume index of 34 ml/m2 Mild mitral regurg Mod tricupid regurg Mild to mod pulm HTN with est RV systolic pressure of 38 mmhg mod grade II diastolic dysfunciton    H/O echocardiogram 04/01/2021    EF 10-15% LA size was mildly dilated IVC sice is mildly dilated with reduced respiratory phasic changes CVP 5-10 mmHg    Hyperlipidemia     Smoker        CURRENT ALLERGIES: Patient has no known allergies. REVIEW OF SYSTEMS: 14 systems were reviewed. Pertinent positives and negatives as above, all else negative.      Past Surgical History:   Procedure Laterality Date    CARDIAC SURGERY      CORONARY ANGIOPLASTY WITH STENT PLACEMENT  12/24/2020    x 2 at [address]  2008    x 3 vessels    VASCULAR SURGERY      cabg harvests    Social History:  Social History     Tobacco Use    Smoking status: Current Every Day Smoker     Packs/day: 0.25     Years: 52.00     Pack years: 13.00     Types: Cigarettes    Smokeless tobacco: Never Used   Substance Use Topics    Alcohol use: Not Currently     Frequency: Never    Drug use: Never        CURRENT MEDICATIONS:        Outpatient Medications Marked as Taking for the 4/13/21 encounter (Office Visit) with Germaine Russo MD   Medication Sig Dispense Refill    amiodarone (CORDARONE) 200 MG tablet Take 1 tablet by mouth daily 30 tablet 0    midodrine (PROAMATINE) 5 MG tablet Take 1 tablet by mouth 3 times daily (with meals) 90 tablet 0    sacubitril-valsartan (ENTRESTO) 24-26 MG per tablet Take 0.5 tablets by mouth 2 times daily 30 tablet 0    apixaban (ELIQUIS) 5 MG TABS tablet Take 1 tablet by mouth 2 times daily 60 tablet 0    atorvastatin (LIPITOR) 80 MG tablet Take 1 tablet by mouth nightly 30 tablet 0    spironolactone (ALDACTONE) 25 MG tablet Take 0.5 tablets by mouth daily 30 tablet 0    furosemide (LASIX) 40 MG tablet Take 40 mg by mouth daily as needed For feet swelling      metoprolol succinate (TOPROL XL) 25 MG extended release tablet Take 25 mg by mouth nightly       ticagrelor (BRILINTA) 90 MG TABS tablet Take 90 mg by mouth 2 times daily      aspirin 81 MG chewable tablet Take 81 mg by mouth daily         FAMILY HISTORY: family history includes Heart Disease (age of onset: 79) in his father; Other in his mother; Rheum Arthritis in his mother. Physical Examination:     BP 95/60 (Site: Left Upper Arm, Position: Sitting, Cuff Size: Medium Adult)   Pulse 60   Resp 17   Ht 5' 10\" (1.778 m)   Wt 157 lb (71.2 kg)   SpO2 98%   BMI 22.53 kg/m²  Body mass index is 22.53 kg/m². Constitutional: He appeared oriented to person and place. He appears well-developed and well-nourished. In no acute distress. HEENT: Normocephalic and atraumatic. No JVD present. Carotid bruit is not present. No mass and no thyromegaly present. No lymphadenopathy noted. Cardiovascular: Bradycardic rate, regular rhythm, normal heart sounds. Exam reveals no gallop and no friction rubs. 1/6 systolic murmur, 5th intercostal space on the LEFT in the mid-clavicular line (cardiac apex). Pulmonary/Chest: Effort normal and breath sounds normal. No respiratory distress. He has no wheezes, rhonchi or rales. Abdominal: Soft, non-tender. He exhibits no organomegaly, mass or bruit. Extremities: Trace. No cyanosis or clubbing. 2+ radial and carotid pulses. Distal extremity pulses: 2+ bilaterally. Neurological: Alertness and orientation as per Constitutional exam. No evidence of gross cranial nerve deficit. Coordination appeared normal.   Skin: Skin is warm and dry. There is no rash or diaphoresis. Psychiatric: He has a normal mood and affect.  His speech is normal and behavior is normal.      MOST RECENT LABS ON RECORD:   Lab Results   Component Value Date    WBC 8.7 04/02/2021    HGB 11.2 (L) 04/02/2021    HCT 35.7 (L) 04/02/2021     04/02/2021    CHOL 106 04/09/2021    TRIG 82 04/09/2021    HDL 35 (L) 04/09/2021    ALT 14 04/09/2021    AST 18 04/09/2021     04/09/2021    K 4.3 04/09/2021     04/09/2021    CREATININE 0.96 04/09/2021    BUN 19 04/09/2021    CO2 24 04/09/2021    TSH 1.560 03/31/2021    LABA1C 6.4 (H) 04/09/2021 ASSESSMENT:     1. Ischemic cardiomyopathy    2. PAF (paroxysmal atrial fibrillation) ([facility] [address]    3. ASHD (arteriosclerotic heart disease)    4. S/P CABG x 3    5. S/P angioplasty with stent    6. Mixed hyperlipidemia    7. Tobacco abuse counseling    8. Encounter for current long-term use of anticoagulants       PLAN:        Paroxysmal Atrial Fibrillation: Rhythm Control Asymptomatic  Beta Blocker: Continue Metoprolol succinate (Toprol XL) 25 mg daily. Calcium Channel Blocker: Not indicated at this time. Anti-Arrhythmic: Continue amiodarone (Pacerone) 200 mg daily. Monitoring: Since they will being maintained on Amiodarone, I told them that we will need to closely monitor them for potential side effects. These include monitoring LFTs and TSH at least every 6 months as well as chest x-rays, pulmonary function tests, and eye exams at least yearly. [id number] Score for Atrial Fibrillation Stroke Risk   Risk   Factors  Component Value   C CHF Yes 1   H HTN No 0   A2 Age >= 76 No,  (75 y.o.) 0   D DM No 0   S2 Prior Stroke/TIA No 0   V Vascular Disease Yes 1   A Age 74-69 Yes,  (75 y.o.) 1   Sc Sex male 0    [id number]  Score  3   Score last updated 4/33/62 36:60 AM EDT  Click here for a link to the UpToDate guideline \"Atrial Fibrillation: Anticoagulation therapy to prevent embolization  Disclaimer: Risk Score calculation is dependent on accuracy of patient problem list and past encounter diagnosis. Stroke Risk: CHADS2-VASc Score: 3/9 (3.2% stroke risk)  · Anticoagulation: Continue Apixaban (Eliquis) 5 mg every 12 hours. · ICD referral placed as listed below      Atherosclerotic Heart Disease: History of CABG x3 and most recently NSTEMI with a heart cath that was done on 12/28/2020 and he had two stents placed, REJI x 2 to SVG to posterior lateral branch of the RCA.  Antiplatelet Agent: STOP aspirin   Antiplatelet Agent: Continue Ticagrelor (Brilinta) 90 mg twice daily.    Beta Blocker: Continue Metoprolol succinate (Toprol XL) 25 mg daily.  Anti-anginal medications: Continue nitroglycerin 0.4 mg tablets as needed for chest pain.  Cholesterol Reduction Therapy: Continue Atorvastatin (Lipitor) 80 mg daily.  Additional counseling: I advised them to call our office or go to the emergency room if they developed worsening or persistent chest pain or increased shortness of breath as this could be life threatening.  Ischemic Cardiomyopathy: EF from his Heart Cath that was done on 12/28/2020 was 15%, EF 10-15% on 3/31/21 echo.  Beta Blocker: Continue Metoprolol succinate (Toprol XL) 25 mg daily. ACE Inibitor/ARB: Continue sacubitril/valsartan (Entresto) 24/26 mg 1/2 tablet twice daily.  Diuretics: Continue Spironolactone (Aldactone) 25 mg, 1/2 tab daily. I also discussed the potential side effects of this medication including lightheadedness and dizziness and instructed them to stop the medication of this occurs and call our office if this occurs.  Heart failure counseling: I advised them to try and keep their legs up whenever possible and to limit salt in their diet. I advised him to also start wearing lower extremity compression stockings as well.   Additional Testing List: Because of the patient reduced EF <35% in spite of guideline directed maximal medical management, he is at increased risk of sudden cardiac death. Therefore we discussed the risks, benefits and alternatives to placement of an ICD. He was agreeable with proceeding and therefore I have taken the liberty of referring them to Dr. Art Sutton in [facility]. I spoke with Dr. Art Sutton to day who agreed with the plan and would try and get him in in the next 1-2 weeks.  I reviewed his Life Vest today which showed no new events and a wear time of 75%.  I told him to keep wearing this until he gets his ICD in.    · Hyperlipidemia: Mixed - Last LDL on 4/9/2021 was 55 mg/dL   · Cholesterol Reduction Therapy: Continue Atorvastatin (Lipitor) 80

## 2021-04-13 NOTE — PATIENT INSTRUCTIONS
SURVEY:    You may be receiving a survey from [employer] regarding your visit today. Please complete the survey to enable us to provide the highest quality of care to you and your family. If you cannot score us a very good on any question, please call the office to discuss how we could have made your experience a very good one. Thank you.

## 2021-04-13 NOTE — TELEPHONE ENCOUNTER
Per Dr Sarita Cranker, pt of Dr Yue Lopez needing ICD, offered appt. , states he has no questions and prefers implant, Dr Sarita Cranker aware, will decide at implant how many leads pt needs         ASSESSMENT:     1. PAF (paroxysmal atrial fibrillation) ([address]    2. ASHD (arteriosclerotic heart disease)    3. S/P CABG x 3    4. S/P angioplasty with stent    5. Ischemic cardiomyopathy    6. Mixed hyperlipidemia    7. Tobacco abuse counseling      PLAN:   Paroxysmal Atrial Fibrillation: Rhythm Control Asymptomatic   Beta Blocker: Continue Metoprolol succinate (Toprol XL) 25 mg daily. Calcium Channel Blocker: Not indicated at this time. Anti-Arrhythmic: Continue amiodarone (Pacerone) 200 mg daily. Monitoring: Since they will being maintained on Amiodarone, I told them that we will need to closely monitor them for potential side effects. These include monitoring LFTs and TSH at least every 6 months as well as chest x-rays, pulmonary function tests, and eye exams at least yearly. [id number] Score for Atrial Fibrillation Stroke Risk   Risk   Factors  Component Value   C CHF Yes 1   H HTN No 0   A2 Age >= 76 No,   (75 y.o.) 0   D DM No 0   S2 Prior Stroke/TIA No 0   V Vascular Disease Yes 1   A Age 74-69 Yes,   (75 y.o.) 1   Sc Sex male 0    [id number]   Score  3   Score last updated 4/13/21 79:88 AM EDT   Click here for a link to the UpToDate guideline \"Atrial Fibrillation: Anticoagulation therapy to prevent embolization   Disclaimer: Risk Score calculation is dependent on accuracy of patient problem list and past encounter diagnosis. Stroke Risk: CHADS2-VASc Score: 3/9 (3.2% stroke risk)  Anticoagulation: Continue Apixaban (Eliquis) 5 mg every 12 hours. ICD referral placed as listed below   Atherosclerotic Heart Disease: History of CABG x3 and most recently NSTEMI with a heart cath that was done on 12/28/2020 and he had two stents placed, REJI x 2 to SVG to posterior lateral branch of the RCA.    Antiplatelet Agent: STOP aspirin Antiplatelet Agent: Continue Ticagrelor (Brilinta) 90 mg twice daily. Beta Blocker: Continue Metoprolol succinate (Toprol XL) 25 mg daily. Anti-anginal medications: Continue nitroglycerin 0.4 mg tablets as needed for chest pain. Cholesterol Reduction Therapy: Continue Atorvastatin (Lipitor) 80 mg daily. Additional counseling: I advised them to call our office or go to the emergency room if they developed worsening or persistent chest pain or increased shortness of breath as this could be life threatening. Ischemic Cardiomyopathy: EF from his Heart Cath that was done on 12/28/2020 was 15%, EF 10-15% on 3/31/21 echo. Beta Blocker: Continue Metoprolol succinate (Toprol XL) 25 mg daily. ACE Inibitor/ARB: Continue sacubitril/valsartan (Entresto) 24/26 mg 1/2 tablet twice daily. Diuretics: Continue Spironolactone (Aldactone) 25 mg, 1/2 tab daily. I also discussed the potential side effects of this medication including lightheadedness and dizziness and instructed them to stop the medication of this occurs and call our office if this occurs. Heart failure counseling: I advised them to try and keep their legs up whenever possible and to limit salt in their diet. I advised him to also start wearing lower extremity compression stockings as well. Additional Testing List: Because of the patient reduced EF <35% in spite of guideline directed maximal medical management, he is at increased risk of sudden cardiac death. Therefore we discussed the risks, benefits and alternatives to placement of an ICD. He was agreeable with proceeding and therefore I have taken the liberty of referring them to Dr. Diane Schmid in [facility]. Hyperlipidemia: Mixed - Last LDL 4/9/2021 was 55 mg/dL   Cholesterol Reduction Therapy: Continue Atorvastatin (Lipitor) 80 mg daily. Tobacco Abuse Counseling: I spent several minutes discussing the dangers of tobacco abuse as well as multiple methods for trying to quit smoking.  In the end,  Yamil Joselin

## 2021-04-13 NOTE — TELEPHONE ENCOUNTER
Brenabe Headings from Cardiopulmonary called over wanting to know if it was okay for Gene to restart Cardiac Rehab tomorrow and then if so, a new order needs put in and signed. Order is pended. Thanks!

## 2021-04-15 NOTE — TELEPHONE ENCOUNTER
How many days would you like the patient to hold Brilinta? most recent stent 12.28.2020. How many days to hold Eliquis?

## 2021-04-15 NOTE — TELEPHONE ENCOUNTER
Procedure: New ICD   Date: 04.23.2021  Arrival Time: 7am   Meds to Hold: eliquis 3 days prior to the procedure. Covid:  testing ordered, to be done: none   1 wk incision/ device check with Dr. Altaf Dennison office     Instructions verbalized to the patient. Instructions mailed to the patient.

## 2021-04-16 ENCOUNTER — CARE COORDINATION (OUTPATIENT)
Dept: CASE MANAGEMENT | Age: 71
End: 2021-04-16

## 2021-04-16 NOTE — CARE COORDINATION
1:30 PM [facility] CARDIOPULM REHAB RM 2 [facility] CARDIAC [place]   5/19/2021  1:30 PM [facility] CARDIOPULM REHAB RM 2 [facility] CARDIAC [place]   7/9/2021  9:15 AM MD Arnulfo Santamaria C.D. Bearl Rota   4/18/2022  9:30 AM MD Arnulfo Santamaria C.D., RN

## 2021-04-19 ENCOUNTER — HOSPITAL ENCOUNTER (OUTPATIENT)
Dept: CARDIAC REHAB | Age: 71
Setting detail: THERAPIES SERIES
Discharge: HOME | End: 2021-04-19
Payer: MEDICARE

## 2021-04-19 PROCEDURE — 93798 PHYS/QHP OP CAR RHAB W/ECG: CPT

## 2021-04-21 ENCOUNTER — HOSPITAL ENCOUNTER (OUTPATIENT)
Dept: CARDIAC REHAB | Age: 71
Setting detail: THERAPIES SERIES
Discharge: HOME | End: 2021-04-21
Payer: MEDICARE

## 2021-04-21 PROCEDURE — 93798 PHYS/QHP OP CAR RHAB W/ECG: CPT

## 2021-04-22 ENCOUNTER — PRE-PROCEDURE TELEPHONE (OUTPATIENT)
Dept: INPATIENT UNIT | Age: 71
End: 2021-04-22

## 2021-04-22 ENCOUNTER — PREP FOR PROCEDURE (OUTPATIENT)
Dept: CARDIOLOGY | Age: 71
End: 2021-04-22

## 2021-04-22 ENCOUNTER — HOSPITAL ENCOUNTER (OUTPATIENT)
Dept: CARDIAC REHAB | Age: 71
Setting detail: THERAPIES SERIES
Discharge: HOME | End: 2021-04-22
Payer: MEDICARE

## 2021-04-22 PROCEDURE — 93798 PHYS/QHP OP CAR RHAB W/ECG: CPT

## 2021-04-22 RX ORDER — SODIUM CHLORIDE 9 MG/ML
25 INJECTION, SOLUTION INTRAVENOUS PRN
Status: CANCELLED | OUTPATIENT
Start: 2021-04-22

## 2021-04-22 RX ORDER — SODIUM CHLORIDE 0.9 % (FLUSH) 0.9 %
10 SYRINGE (ML) INJECTION EVERY 12 HOURS SCHEDULED
Status: CANCELLED | OUTPATIENT
Start: 2021-04-22

## 2021-04-22 RX ORDER — SODIUM CHLORIDE 0.9 % (FLUSH) 0.9 %
10 SYRINGE (ML) INJECTION PRN
Status: CANCELLED | OUTPATIENT
Start: 2021-04-22

## 2021-04-22 NOTE — TELEPHONE ENCOUNTER
Patient scheduled for new ICD implant on 04.23.21  Takes Brilinta (which he was to continue) and Eliquis (which he was to hold for 3 days)    Dr. Dnaa Pyle,   Patient called in he states that he forgot to hold his Eliquis for 3 days. His last dose was yesterday morning. Move forward with case on 4.23.2021 or move back until 04.27.2021 (next available)?   Please advise,

## 2021-04-22 NOTE — PROGRESS NOTES
NPO after midnight  Bring drivers license and insurance information  Wear comfortable clean clothes  Shower morning of and night before with liquid antibacterial soap  Remove jewelry   May have to stay overnight if have PTCA/stent  Bring medications in original bottles  Made aware of visitors limit to 1 at a time  Follow all instructions given by your physician  Please notify doctor office if you need to cancel or reschedule your procedure   needed at discharge  Patient states he took his Eliquis this am, Forgot to stop it.   Verbalizes understanding to notify Dr. Anai Leon office

## 2021-04-23 ENCOUNTER — APPOINTMENT (OUTPATIENT)
Dept: GENERAL RADIOLOGY | Age: 71
End: 2021-04-23
Attending: INTERNAL MEDICINE
Payer: MEDICARE

## 2021-04-23 PROCEDURE — 71046 X-RAY EXAM CHEST 2 VIEWS: CPT

## 2021-04-26 ENCOUNTER — HOSPITAL ENCOUNTER (OUTPATIENT)
Dept: CARDIAC REHAB | Age: 71
Setting detail: THERAPIES SERIES
End: 2021-04-26
Payer: MEDICARE

## 2021-04-27 ENCOUNTER — OFFICE VISIT (OUTPATIENT)
Dept: CARDIOLOGY | Age: 71
End: 2021-04-27
Payer: MEDICARE

## 2021-04-27 VITALS
WEIGHT: 155.4 LBS | SYSTOLIC BLOOD PRESSURE: 98 MMHG | RESPIRATION RATE: 18 BRPM | OXYGEN SATURATION: 97 % | HEIGHT: 70 IN | BODY MASS INDEX: 22.25 KG/M2 | DIASTOLIC BLOOD PRESSURE: 58 MMHG | HEART RATE: 79 BPM

## 2021-04-27 DIAGNOSIS — I25.10 ASHD (ARTERIOSCLEROTIC HEART DISEASE): ICD-10-CM

## 2021-04-27 DIAGNOSIS — Z95.1 S/P CABG X 3: ICD-10-CM

## 2021-04-27 DIAGNOSIS — I25.5 ISCHEMIC CARDIOMYOPATHY: ICD-10-CM

## 2021-04-27 DIAGNOSIS — Z95.810 DUAL ICD (IMPLANTABLE CARDIOVERTER-DEFIBRILLATOR) IN PLACE: ICD-10-CM

## 2021-04-27 DIAGNOSIS — E78.2 MIXED HYPERLIPIDEMIA: ICD-10-CM

## 2021-04-27 DIAGNOSIS — I48.0 PAF (PAROXYSMAL ATRIAL FIBRILLATION) (HCC): Primary | ICD-10-CM

## 2021-04-27 PROCEDURE — 99214 OFFICE O/P EST MOD 30 MIN: CPT | Performed by: FAMILY MEDICINE

## 2021-04-27 PROCEDURE — 1123F ACP DISCUSS/DSCN MKR DOCD: CPT | Performed by: FAMILY MEDICINE

## 2021-04-27 PROCEDURE — 3017F COLORECTAL CA SCREEN DOC REV: CPT | Performed by: FAMILY MEDICINE

## 2021-04-27 PROCEDURE — 99211 OFF/OP EST MAY X REQ PHY/QHP: CPT | Performed by: FAMILY MEDICINE

## 2021-04-27 PROCEDURE — 1111F DSCHRG MED/CURRENT MED MERGE: CPT | Performed by: FAMILY MEDICINE

## 2021-04-27 PROCEDURE — 4004F PT TOBACCO SCREEN RCVD TLK: CPT | Performed by: FAMILY MEDICINE

## 2021-04-27 PROCEDURE — 4040F PNEUMOC VAC/ADMIN/RCVD: CPT | Performed by: FAMILY MEDICINE

## 2021-04-27 PROCEDURE — G8427 DOCREV CUR MEDS BY ELIG CLIN: HCPCS | Performed by: FAMILY MEDICINE

## 2021-04-27 PROCEDURE — G8420 CALC BMI NORM PARAMETERS: HCPCS | Performed by: FAMILY MEDICINE

## 2021-04-27 RX ORDER — FUROSEMIDE 20 MG/1
20 TABLET ORAL DAILY PRN
Qty: 90 TABLET | Refills: 3 | Status: CANCELLED
Start: 2021-04-27

## 2021-04-27 NOTE — PATIENT INSTRUCTIONS
SURVEY:    You may be receiving a survey from [employer] regarding your visit today. Please complete the survey to enable us to provide the highest quality of care to you and your family. If you cannot score us a very good on any question, please call the office to discuss how we could have made your experience a very good one. Thank you.

## 2021-04-27 NOTE — PROGRESS NOTES
Kathe Martin am scribing for and in the presence of Juanda Brittle. Martínez BUSTILLOS, MS, F.A.C.C..    Patient: Pascual Peters Jr. : 1950  Date of Visit: 2021    REASON FOR VISIT / CONSULTATION: Follow-up (Hx: PAF, ASHD, ischemic cardiomyopathy, HLD. Pt had ICD implant on 21. it is sore. stable SOB Denies: CP, dizziness, lightheaded, palps)      History of Present Illness:        Dear Mariluz Mace MD,    I had the pleasure of seeing Nilo Laureen in my office today. Mr. Sukhi Hall is a 70 y.o. male with a history of atherosclerotic heart disease. He also had a heart attack in . 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 He used to see a cardiologist in the past however he started to feel better and did not think he needed to follow up anymore with any doctor. Most recently lindy came into the ER on 2020 and had a NSTEMI and was transferred to [facility] at [facility] in [facility]. He did report that he actually started feeling \"weird\", no pain but just feels doom feeling on the Monday before [place] however he did not want to think this was a heart attack. He did report feeling about the same as he did in . He went home and went to bed than his breathing got worse and worse and that's when he came into the hospital. He then had a heart cath done on 2020 and had two stents placed REJI x 2 to SVG to posterior lateral branch of the RCA. An echocardiogram at that same time showed an EF of 15% and was sent home with a Life Vest. On 21 spironolactone was added and although he says his BP is usually low, less than 974 systolic, denies any known problems with the medication including any lightheadedness or dizziness. He did not remember the last time he was told what his heart strength was he does not remember.   Echocardiogram done on 2021 showed an ejection fraction of 15-20%, The left ventricular cavity size is severely enlarged and the left ventricular wall thickness is within normal limits. Severe global hypokinesis with segmental abnormalities. The left atrium is moderately dilated (34-39) with a left atrial volume index of 34 ml/m2. Mild mitral regurgitation. Moderate tricuspid regurgitation. Mild to moderate pulmonary hypertension with an estimated right ventricular systolic pressure of 38 mmHg. He came to the ER on 3/31/21 due to shortness of breath, he was transferred to [facility] then. Repeat echocardiogram done on 3/31/2021 showed an EF of 10-15% Moderate to severely dilated LV size and severely reduced systolic function. Severe global hypokinesis. Medtronic Dual chamber AICD implantation on 4/23/2021 by Dr Trever Madden. Since I last saw Mr. Fuentes he reports doing okay. He did his ICD implanted on 4/23/2021 by Dr Trever Madden and he has some mild soreness around the site. He says he was sitting in his chair last night and he could feel his heart beating a little hard for about 30 seconds then it went away on its own and denies any known recurrence. He denies any chest pain, pressure or tightness. He denies any increased shortness of breath, lightheaded/dizziness or palpitations. He denies any abdominal pain, bleeding problems, bowel issues, problems with his medications or any other concerns at this time. No cough, fever or chills. No nausea or vomiting. No falls or near falls. Bleeding Risks: Mr. Fuentes denies any current or recent bleeding problems including a history of a GI bleed, ulcers, recent or upcoming surgeries, blood in his stool or black tarry stools or blood in his urine.        PAST MEDICAL HISTORY:         Past Medical History:   Diagnosis Date    Chronic kidney disease     Coronary artery disease     s/p CABG x3 in 2008 and 2 stents on 12/24/2020    H/O echocardiogram 02/08/2021    EF 15-20% LV severly enlarged and LV wall thickness is normal Severe global hypokinesis with segmental abnormalities LA is mod dilated 34-39 with LA volume index of 34 ml/m2 Mild mitral regurg Mod tricupid regurg Mild to mod pulm HTN with est RV systolic pressure of 38 mmhg mod grade II diastolic dysfunciton    H/O echocardiogram 04/01/2021    EF 10-15% LA size was mildly dilated IVC sice is mildly dilated with reduced respiratory phasic changes CVP 5-10 mmHg    Hyperlipidemia     Smoker        CURRENT ALLERGIES: Patient has no known allergies. REVIEW OF SYSTEMS: 14 systems were reviewed. Pertinent positives and negatives as above, all else negative.      Past Surgical History:   Procedure Laterality Date    CARDIAC SURGERY      CORONARY ANGIOPLASTY WITH STENT PLACEMENT  12/24/2020    x 2 at [address]  2008    x 3 vessels    VASCULAR SURGERY      cabg harvests    Social History:  Social History     Tobacco Use    Smoking status: Current Every Day Smoker     Packs/day: 0.25     Years: 52.00     Pack years: 13.00     Types: Cigarettes    Smokeless tobacco: Never Used   Substance Use Topics    Alcohol use: Not Currently     Frequency: Never    Drug use: Never        CURRENT MEDICATIONS:        Outpatient Medications Marked as Taking for the 4/27/21 encounter (Office Visit) with Josh Fisher MD   Medication Sig Dispense Refill    amiodarone (CORDARONE) 200 MG tablet Take 1 tablet by mouth daily 30 tablet 0    midodrine (PROAMATINE) 5 MG tablet Take 1 tablet by mouth 3 times daily (with meals) 90 tablet 0    sacubitril-valsartan (ENTRESTO) 24-26 MG per tablet Take 0.5 tablets by mouth 2 times daily 30 tablet 0    apixaban (ELIQUIS) 5 MG TABS tablet Take 1 tablet by mouth 2 times daily 60 tablet 0    atorvastatin (LIPITOR) 80 MG tablet Take 1 tablet by mouth nightly 30 tablet 0    spironolactone (ALDACTONE) 25 MG tablet Take 0.5 tablets by mouth daily 30 tablet 0    furosemide (LASIX) 40 MG tablet Take 40 mg by mouth daily as needed For feet swelling      metoprolol succinate (TOPROL XL) 25 MG extended release posterior lateral branch of the RCA. Antiplatelet Agent: Continue Ticagrelor (Brilinta) 90 mg twice daily.  Beta Blocker: Continue Metoprolol succinate (Toprol XL) 25 mg daily.  Anti-anginal medications: Continue nitroglycerin 0.4 mg tablets as needed for chest pain.  Cholesterol Reduction Therapy: Continue Atorvastatin (Lipitor) 80 mg daily.  Additional counseling: I advised them to call our office or go to the emergency room if they developed worsening or persistent chest pain or increased shortness of breath as this could be life threatening.  Ischemic Cardiomyopathy: EF from his Heart Cath that was done on 12/28/2020 was 15%, EF 10-15% on 3/31/21 echo.  Beta Blocker: Continue Metoprolol succinate (Toprol XL) 25 mg daily. ACE Inibitor/ARB: Continue sacubitril/valsartan (Entresto) 24/26 mg 1/2 tablet twice daily.  Diuretics: Continue Spironolactone (Aldactone) 25 mg, 1/2 tab daily. I also discussed the potential side effects of this medication including lightheadedness and dizziness and instructed them to stop the medication of this occurs and call our office if this occurs.  Diuretics: STOP furosemide (Lasix) but I told him to call if he develops worsening lower extremity edema     Heart failure counseling: I advised them to try and keep their legs up whenever possible and to limit salt in their diet.  Dual chamber Implantable Cardioverter Defibrillator (AICD): Medtronic implanted on 4/23/2021 by Dr Raquel Gonzalez Indication for Device Placement: Ischemic Cardiomyopathy   Interrogation Findings: We will plan to recheck their device at their next scheduled appointment date. · Hyperlipidemia: Mixed - Last LDL on 4/9/2021 was 55 mg/dL   · Cholesterol Reduction Therapy: Continue Atorvastatin (Lipitor) 80 mg daily. Finally, I recommended that he continue his current medications and follow up with you as previously scheduled.      FOLLOW UP:   I told Mr. Leny Enriquez to call my office if he had any problems, but otherwise I asked him to Return in about 3 weeks (around 5/18/2021). However, I would be happy to see him sooner should the need arise. Sincerely,  Kassy Barraza. Martínez BUSTILLOS, MS, F.A.C.C. [facility] Cardiology Specialist    [address] [place] [address] [facility], [address] [place] [address]  Phone: [phone], Fax: [phone]     I believe that the risk of significant morbidity and mortality related to the patient's current medical conditions are: Intermediate. The documentation recorded by the scribe, accurately and completely reflects the services I personally performed and the decisions made by me. Juan Brewer MD, MS, F.A.C.C.  April 27, 2021

## 2021-04-28 ENCOUNTER — APPOINTMENT (OUTPATIENT)
Dept: CARDIAC REHAB | Age: 71
End: 2021-04-28
Payer: MEDICARE

## 2021-04-29 ENCOUNTER — CARE COORDINATION (OUTPATIENT)
Dept: CASE MANAGEMENT | Age: 71
End: 2021-04-29

## 2021-04-29 ENCOUNTER — APPOINTMENT (OUTPATIENT)
Dept: CARDIAC REHAB | Age: 71
End: 2021-04-29
Payer: MEDICARE

## 2021-04-29 NOTE — CARE COORDINATION
[facility] Transitions Follow Up Call    2021    Patient: Tyrese Paredes. Patient : 1950   MRN: [id number]  Reason for Admission:   Discharge Date: 21 RARS: Readmission Risk Score: 15         Spoke with: Pascual Peters Bipin March, patient    Contacted patient for BPCI-A follow up. Spoke very briefly with patient. He stated that he is feeling pretty good. Denies having any c/o chest pain/discomfort, palpitations, elevated HR, shortness of breath, dizziness/lightheadedness. He is aware of when to contact his doctor. No needs or concerns at this time. Will continue to follow. Follow Up  Future Appointments   Date Time Provider Charley Pacheco   5/10/2021  9:40 AM Santosh Davis MD [facility]   2021  9:15 AM MD Papo Ellis C.D. [place]   2022  9:30 AM MD Papo Ellis C.D., RN

## 2021-04-29 NOTE — PROGRESS NOTES
Phase II Cardiac Rehab Individualized Treatment Plan-90 Day     Patient Name: Francheska Woodard. Date of Initial Assessment: 4/29/2021  ACCOUNT #: [de-identified]  Diagnosis: NSTEMI/PTCA with stents?cardiomyopathy with EF 15%   Onset Date: 12/24/2021  Referring Physician: Dr Drue Angelucci  Risk Stratification: High  Session Number: 19   EXERCISE    Stages of Change:   [] pre-contemplation   [x] Action   [] Contemplate   [] Maintainence   [] Prep   [] Relapse          Exercise Prescription:  Mode: [x] TM [x] B [x] STP [] EL [x] R  Frequency: 3X/week  Duration: 31-60 minutes  Intensity: METS 2.2  Progression: Increase 1-2 levels/week or 1-2 min/week to achieve target HR and RPE 11-13. [x] Angina with Exertion THR: 100-120   [] Resistance Training Weight (lbs):   Reps:     Hypertension:  [] Yes  [x] No  Resting BP: 98/62  Peak Exercise BP: 88/62  [] Med change? Intervention:  Home Exercise:  Type: Walking  Duration: 30 minutes  Frequency: Daily   [] Resistance Training    Education:   [x] Equipment [place]  [x] Self pulse   [] Proper use weights/therabands   [x] S/S to report  [x] Low Na Diet    [x] Warm up/ Cool down  [] BP Medication    [x] RPE Scale   [] Understand BP   [] Ex Safety   [] Exercise specialist class-Home Exercise       Target Goal:   -Individual Exercise Plan  -BP<140/90 or <130/80 if DM   -Aerobic active 30 + minutes 5-7 days per week    Nutrition    Stages of Change:   [] pre-contemplation   [] Action   [] Contemplate   [] Maintainence   [] Prep   [] Relapse    Lipids: No new results available  Total Cholesterol:   Triglycerides:   HDL:   LDL:   [] Med Change? Diabetes:  [] Yes  [x] No  Random BS:  HbA1c:  [] Med Change?     Weight Management:  Wt Goal: 1-2 lbs/wk    Intervention:   [x] Dietitian Consult       [x] Nurse/Patient Discussion     [] Diet Class           [] Referred to Diabetes Education     Education:  [] S&S hypo/hyperglycemia  [] Low fat/low cholesterol diet  [] Weight loss methods      [] Relate Diabetes/CAD     [] Eating heart healthy handout    Target Goal:  -LDL-C<100 if triglycerides are > 200  -LDL-C < 70 for high risk patients  -HbA1c < 7%  -BMI < 25   Education    Stages of Change:    [] pre-contemplation   [] Action   [] Contemplate   [] Maintainence   [] Prep   [] Relapse    Family support: [x] Yes  [] No    Tobacco use: [] Yes  [x] No    Intervention:  [] Referred to smoking cessation counselor     [] Individual education and counseling  [] Tobacco adjunct  [] Informed of education class schedule     Education:   [] Risk Factors/Modifications  [] Psychological aspects  [] Angina    [] Medications  [] CHF      [] Cardiac A&P    Target Goal:  -Complete cessation of tobacco use (if applicable)  -Continued risk factor modifications  -Recognizing signs/symptoms to report  -Proper use of meds    Psychosocial  Stages of Change:    [] pre-contemplation   [] Action   [] Contemplate   [] Maintainence   [] Prep   [] Relapse    Intervention:   [] Psych Consult/  [x] Uses stress management skills    [] Physician Referral    [] Stress management class   [] Med Change? Education:    [] Coping Techniques   [] Relaxation techniques   [x] S/S of Depression    Target Goal:  -Assess presence or absence of depression using a valid screening tool. -Maximize coping skills.  -Positive support system. Preventative Medication:   [] Aspirin       [x] Beta Blockade      [x] Statin or other lipid lowering agent     [] Clopidogrel   [] ACE Inhibitor   [x] Other anticoagulation medications     Fall Risk assess: [x] Yes  [] No  Assistive Device:   [] Cane  [] Walker [] Wheel Chair  [] Gait belt    Patient/Program goal:   increased stamina/strength to 30-50 total exercise by increasing 1-2 level/wk and 1-2 min/wk  to achieve THR and RPE 11-13 Patient has maintained a mets @ 2 during last 30 days but has increased times on both machines and workload on [employer].  Currently in hospital due use weights/therabands   [x] S/S to report  [x] Low Na Diet    [x] Warm up/ Cool down  [] BP Medication    [x] RPE Scale   [] Understand BP   [x] Ex Safety   [] Exercise specialist class-Home Exercise       Target Goal:   -Individual Exercise Plan  -BP<140/90 or <130/80 if DM   -Aerobic active 30 + minutes 5-7 days per week    Nutrition    Stages of Change:   [] pre-contemplation   [x] Action   [] Contemplate   [] Maintainence   [] Prep   [] Relapse    Lipids: No new results available  Total Cholesterol:   Triglycerides:   HDL:   LDL:   [] Med Change? Diabetes:  [] Yes  [x] No  Random BS:  HbA1c:  [] Med Change?     Weight Management:  Wt Goal: 1-2 lbs/wk    Intervention:   [x] Dietitian Consult       [x] Nurse/Patient Discussion     [] Diet Class           [] Referred to Diabetes Education     Education:  [] S&S hypo/hyperglycemia  [x] Low fat/low cholesterol diet  [] Weight loss methods      [] Relate Diabetes/CAD     [x] Eating heart healthy handout    Target Goal:  -LDL-C<100 if triglycerides are > 200  -LDL-C < 70 for high risk patients  -HbA1c < 7%  -BMI < 25   Education    Stages of Change:    [] pre-contemplation   [x] Action   [] Contemplate   [] Maintainence   [] Prep   [] Relapse    Family support: [x] Yes  [] No    Tobacco use: [] Yes  [x] No    Intervention:  [] Referred to smoking cessation counselor     [] Individual education and counseling  [] Tobacco adjunct  [] Informed of education class schedule     Education:   [] Risk Factors/Modifications  [x] Psychological aspects  [x] Angina    [] Medications  [x] CHF      [] Cardiac A&P    Target Goal:  -Complete cessation of tobacco use (if applicable)  -Continued risk factor modifications  -Recognizing signs/symptoms to report  -Proper use of meds    Psychosocial  Stages of Change:    [] pre-contemplation   [x] Action   [] Contemplate   [] Maintainence   [] Prep   [] Relapse    Intervention:   [] Psych Consult/  [x] Uses stress management skills    [] Physician Referral    [] Stress management class   [] Med Change? Education:    [] Coping Techniques   [] Relaxation techniques   [x] S/S of Depression    Target Goal:  -Assess presence or absence of depression using a valid screening tool. -Maximize coping skills.  -Positive support system. Preventative Medication:   [] Aspirin       [x] Beta Blockade      [x] Statin or other lipid lowering agent     [] Clopidogrel   [] ACE Inhibitor   [x] Other anticoagulation medications     Fall Risk assess: [x] Yes  [] No  Assistive Device:   [] Cane  [] Walker [] Wheel Chair  [] Gait belt    Patient/Program goal:   increased stamina/strength to 30-50 total exercise by increasing 1-2 level/wk and 1-2 min/wk  to achieve THR and RPE 11-13 Patient has increased METS from 2.0 to 2.2 in past 30 days. Has attended only 3 sessions in past 30 days due to hospitalization for CP/new onset A.fib. Was reluctant to return to Rehab initially and is now off after ICD implanted on 4/23/2021.  -introduce weights/ therabands 2-4# for 5-10 reps Staff will introduce in future sessions.   -manage BP better Bloodpressure low at most times. Staff monitoring. Dr Alyssa Frazier aware and Josephine Oh was decreased. -improved cholesterol and  Triglycerides No new results available. -develop regular exercise 30 min daily Does not currently exercise at home. Encouraged to gradually start a home exercise program of walking with an eventual goal to reach 30 minutes of walking a day.       Physician Changes/Comments:      Cardiac Rehab Staff

## 2021-05-03 RX ORDER — AMIODARONE HYDROCHLORIDE 200 MG/1
200 TABLET ORAL DAILY
Qty: 90 TABLET | Refills: 3 | Status: SHIPPED | OUTPATIENT
Start: 2021-05-03 | End: 2022-04-25

## 2021-05-03 RX ORDER — MIDODRINE HYDROCHLORIDE 5 MG/1
5 TABLET ORAL
Qty: 180 TABLET | Refills: 3 | Status: SHIPPED | OUTPATIENT
Start: 2021-05-03 | End: 2022-03-21 | Stop reason: SDUPTHER

## 2021-05-03 RX ORDER — ATORVASTATIN CALCIUM 80 MG/1
80 TABLET, FILM COATED ORAL NIGHTLY
Qty: 90 TABLET | Refills: 3 | Status: SHIPPED | OUTPATIENT
Start: 2021-05-03 | End: 2022-08-18 | Stop reason: SDUPTHER

## 2021-05-10 ENCOUNTER — OFFICE VISIT (OUTPATIENT)
Dept: CARDIOLOGY | Age: 71
End: 2021-05-10
Payer: MEDICARE

## 2021-05-10 VITALS
HEART RATE: 93 BPM | DIASTOLIC BLOOD PRESSURE: 73 MMHG | OXYGEN SATURATION: 97 % | WEIGHT: 156 LBS | SYSTOLIC BLOOD PRESSURE: 124 MMHG | RESPIRATION RATE: 18 BRPM | BODY MASS INDEX: 22.33 KG/M2 | HEIGHT: 70 IN

## 2021-05-10 DIAGNOSIS — Z95.810 DUAL ICD (IMPLANTABLE CARDIOVERTER-DEFIBRILLATOR) IN PLACE: ICD-10-CM

## 2021-05-10 DIAGNOSIS — Z95.1 S/P CABG X 3: ICD-10-CM

## 2021-05-10 DIAGNOSIS — I25.5 ISCHEMIC CARDIOMYOPATHY: Primary | ICD-10-CM

## 2021-05-10 DIAGNOSIS — I25.10 ASHD (ARTERIOSCLEROTIC HEART DISEASE): ICD-10-CM

## 2021-05-10 DIAGNOSIS — E78.2 MIXED HYPERLIPIDEMIA: ICD-10-CM

## 2021-05-10 DIAGNOSIS — I48.0 PAF (PAROXYSMAL ATRIAL FIBRILLATION) (HCC): ICD-10-CM

## 2021-05-10 PROCEDURE — 4040F PNEUMOC VAC/ADMIN/RCVD: CPT | Performed by: FAMILY MEDICINE

## 2021-05-10 PROCEDURE — 1123F ACP DISCUSS/DSCN MKR DOCD: CPT | Performed by: FAMILY MEDICINE

## 2021-05-10 PROCEDURE — 3017F COLORECTAL CA SCREEN DOC REV: CPT | Performed by: FAMILY MEDICINE

## 2021-05-10 PROCEDURE — G8420 CALC BMI NORM PARAMETERS: HCPCS | Performed by: FAMILY MEDICINE

## 2021-05-10 PROCEDURE — 99214 OFFICE O/P EST MOD 30 MIN: CPT | Performed by: FAMILY MEDICINE

## 2021-05-10 PROCEDURE — G8427 DOCREV CUR MEDS BY ELIG CLIN: HCPCS | Performed by: FAMILY MEDICINE

## 2021-05-10 PROCEDURE — 93289 INTERROG DEVICE EVAL HEART: CPT | Performed by: FAMILY MEDICINE

## 2021-05-10 PROCEDURE — 4004F PT TOBACCO SCREEN RCVD TLK: CPT | Performed by: FAMILY MEDICINE

## 2021-05-10 PROCEDURE — 99211 OFF/OP EST MAY X REQ PHY/QHP: CPT | Performed by: FAMILY MEDICINE

## 2021-05-10 RX ORDER — CLOPIDOGREL BISULFATE 75 MG/1
75 TABLET ORAL DAILY
Qty: 90 TABLET | Refills: 3 | Status: SHIPPED | OUTPATIENT
Start: 2021-05-10 | End: 2022-07-07 | Stop reason: SDUPTHER

## 2021-05-10 RX ORDER — RAMIPRIL 5 MG/1
5 CAPSULE ORAL DAILY
Qty: 90 CAPSULE | Refills: 3
Start: 2021-05-10 | End: 2022-03-09 | Stop reason: SDUPTHER

## 2021-05-10 NOTE — PROGRESS NOTES
Nolan Matthew am scribing for and in the presence of Korina Montanez. Martínez BUSTILLOS, MS, F.A.C.C..    Patient: Pascual Peters Jr. : 1950  Date of Visit: May 10, 2021    REASON FOR VISIT / CONSULTATION: Follow-up (Hx: PAF, ASHD, s/p CABG x 3, ischemic cardiomyopathy, ICD, HLD. pt is here for 1 month f/u. Pt is doing okay. stable SOB Denies: CP, dizziness, lightheaded, palps)      History of Present Illness:        Dear Katie Gunn MD,    I had the pleasure of seeing Francheska Donovan in my office today. Mr. Viviane Moe is a 70 y.o. male with a history of atherosclerotic heart disease. He also had a heart attack in . 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 He used to see a cardiologist in the past however he started to feel better and did not think he needed to follow up anymore with any doctor. Most recently lindy came into the ER on 2020 and had a NSTEMI and was transferred to [facility] in [place]. He did report that he actually started feeling \"weird\", no pain but just feels doom feeling on the Monday before Susan Paulina however he did not want to think this was a heart attack. He did report feeling about the same as he did in . He went home and went to bed than his breathing got worse and worse and that's when he came into the hospital. He then had a heart cath done on 2020 and had two stents placed REJI x 2 to SVG to posterior lateral branch of the RCA. An echocardiogram at that same time showed an EF of 15% and was sent home with a Life Vest. On 21 spironolactone was added and although he says his BP is usually low, less than 201 systolic, denies any known problems with the medication including any lightheadedness or dizziness. He did not remember the last time he was told what his heart strength was he does not remember.   Echocardiogram done on 2021 showed an ejection fraction of 15-20%, The left ventricular cavity size is severely enlarged and the left ventricular wall thickness is within normal limits. Severe global hypokinesis with segmental abnormalities. The left atrium is moderately dilated (34-39) with a left atrial volume index of 34 ml/m2. Mild mitral regurgitation. Moderate tricuspid regurgitation. Mild to moderate pulmonary hypertension with an estimated right ventricular systolic pressure of 38 mmHg. He came to the ER on 3/31/21 due to shortness of breath, he was transferred to [facility]. Repeat echocardiogram done on 3/31/2021 showed an EF of 10-15% Moderate to severely dilated LV size and severely reduced systolic function. Severe global hypokinesis. Medtronic Dual chamber AICD implantation on 4/23/2021 by Dr Joseluis Schaefer. Since I last saw Mr. Carlos Mei he reports doing well. He is not happy with the suture job of his ICD site as he said he had to pull out a suture from the central edge of the incision site. He did say he pulled part of a suture out that was sticking out but denies any other problems. He says he stopped taking Entresto because it was to expensive and says his [employer] is also very expensive. He denies any chest pain, pressure or tightness. He denies any increased shortness of breath, lightheaded/dizziness or palpitations. He denies any abdominal pain, bleeding problems, bowel issues, problems with his medications or any other concerns at this time. No cough, fever or chills. No nausea or vomiting. No falls or near falls. Bleeding Risks: Mr. Carlos Mei denies any current or recent bleeding problems including a history of a GI bleed, ulcers, recent or upcoming surgeries, blood in his stool or black tarry stools or blood in his urine.      PAST MEDICAL HISTORY:         Past Medical History:   Diagnosis Date    Chronic kidney disease     Coronary artery disease     s/p CABG x3 in 2008 and 2 stents on 12/24/2020    H/O echocardiogram 02/08/2021    EF 15-20% LV severly enlarged and LV wall thickness is normal Severe global hypokinesis with segmental abnormalities LA is mod dilated 34-39 with LA volume index of 34 ml/m2 Mild mitral regurg Mod tricupid regurg Mild to mod pulm HTN with est RV systolic pressure of 38 mmhg mod grade II diastolic dysfunciton    H/O echocardiogram 04/01/2021    EF 10-15% LA size was mildly dilated IVC sice is mildly dilated with reduced respiratory phasic changes CVP 5-10 mmHg    Hyperlipidemia     Smoker        CURRENT ALLERGIES: Patient has no known allergies. REVIEW OF SYSTEMS: 14 systems were reviewed. Pertinent positives and negatives as above, all else negative.      Past Surgical History:   Procedure Laterality Date    CARDIAC SURGERY      CORONARY ANGIOPLASTY WITH STENT PLACEMENT  12/24/2020    x 2 at [address]  2008    x 3 vessels    VASCULAR SURGERY      cabg harvests    Social History:  Social History     Tobacco Use    Smoking status: Current Every Day Smoker     Packs/day: 0.25     Years: 52.00     Pack years: 13.00     Types: Cigarettes    Smokeless tobacco: Never Used   Substance Use Topics    Alcohol use: Not Currently     Frequency: Never    Drug use: Never        CURRENT MEDICATIONS:        Outpatient Medications Marked as Taking for the 5/10/21 encounter (Office Visit) with Yodit Ruelas MD   Medication Sig Dispense Refill    amiodarone (CORDARONE) 200 MG tablet Take 1 tablet by mouth daily 90 tablet 3    midodrine (PROAMATINE) 5 MG tablet Take 1 tablet by mouth 3 times daily (with meals) 180 tablet 3    atorvastatin (LIPITOR) 80 MG tablet Take 1 tablet by mouth nightly 90 tablet 3    apixaban (ELIQUIS) 5 MG TABS tablet Take 1 tablet by mouth 2 times daily 60 tablet 0    spironolactone (ALDACTONE) 25 MG tablet Take 0.5 tablets by mouth daily 30 tablet 0    metoprolol succinate (TOPROL XL) 25 MG extended release tablet Take 25 mg by mouth nightly       nitroGLYCERIN (NITROSTAT) 0.3 MG SL tablet Place 0.3 mg under the tongue every 5 minutes as needed      ticagrelor (BRILINTA) 90 MG TABS tablet Take 90 mg by mouth 2 times daily         FAMILY HISTORY: family history includes Heart Disease (age of onset: 79) in his father; Other in his mother; Rheum Arthritis in his mother. Physical Examination:     /73 (Site: Right Upper Arm, Position: Sitting, Cuff Size: Medium Adult)   Pulse 93   Resp 18   Ht 5' 10\" (1.778 m)   Wt 156 lb (70.8 kg)   SpO2 97%   BMI 22.38 kg/m²  Body mass index is 22.38 kg/m². Constitutional: He appeared oriented to person and place. He appears well-developed and well-nourished. In no acute distress. HEENT: Normocephalic and atraumatic. No JVD present. Carotid bruit is not present. No mass and no thyromegaly present. No lymphadenopathy noted. Cardiovascular: Normal rate, regular rhythm, normal heart sounds. Exam reveals no gallop and no friction rubs. 1/6 systolic murmur, 5th intercostal space on the LEFT in the mid-clavicular line (cardiac apex). Pulmonary/Chest: Effort normal and breath sounds normal. No respiratory distress. He has no wheezes, rhonchi or rales. Abdominal: Soft, non-tender. He exhibits no organomegaly, mass or bruit. Extremities: Trace. No cyanosis or clubbing. 2+ radial and carotid pulses. Distal extremity pulses: 2+ bilaterally. Neurological: Alertness and orientation as per Constitutional exam. No evidence of gross cranial nerve deficit. Coordination appeared normal.   Skin: Skin is warm and dry. There is no rash or diaphoresis. Psychiatric: He has a normal mood and affect.  His speech is normal and behavior is normal.      MOST RECENT LABS ON RECORD:   Lab Results   Component Value Date    WBC 9.3 04/23/2021    HGB 11.4 (L) 04/23/2021    HCT 36.4 (L) 04/23/2021     04/23/2021    CHOL 106 04/09/2021    TRIG 82 04/09/2021    HDL 35 (L) 04/09/2021    ALT 14 04/09/2021    AST 18 04/09/2021     04/23/2021    K 4.1 04/23/2021     04/23/2021    CREATININE 0.9 04/23/2021    BUN 23 (H) 04/23/2021    CO2 23 04/23/2021    TSH 1.560 03/31/2021    INR 1.34 (H) 04/23/2021    LABA1C 6.4 (H) 04/09/2021       ASSESSMENT:     1. PAF (paroxysmal atrial fibrillation) ([facility] [address]    2. ASHD (arteriosclerotic heart disease)    3. S/P CABG x 3    4. Ischemic cardiomyopathy    5. Dual ICD (implantable cardioverter-defibrillator) in place    6. Mixed hyperlipidemia       PLAN:        Paroxysmal Atrial Fibrillation: Rhythm Control Asymptomatic. No recurence on ICD check today  · Beta Blocker: Continue Metoprolol succinate (Toprol XL) 25 mg daily. · Calcium Channel Blocker: Not indicated at this time. · Anti-Arrhythmic: Continue amiodarone (Pacerone) 200 mg daily. Monitoring: Since they will being maintained on Amiodarone, I told them that we will need to closely monitor them for potential side effects. These include monitoring LFTs and TSH at least every 6 months as well as chest x-rays, pulmonary function tests, and eye exams at least yearly. [id number] Score for Atrial Fibrillation Stroke Risk   Risk   Factors  Component Value   C CHF Yes 1   H HTN No 0   A2 Age >= 76 No,  (75 y.o.) 0   D DM No 0   S2 Prior Stroke/TIA No 0   V Vascular Disease Yes 1   A Age 74-69 Yes,  (75 y.o.) 1   Sc Sex male 0    [id number]  Score  3   Score last updated 8/10/49 42:45 AM EDT  Click here for a link to the UpToDate guideline \"Atrial Fibrillation: Anticoagulation therapy to prevent embolization  Disclaimer: Risk Score calculation is dependent on accuracy of patient problem list and past encounter diagnosis. Stroke Risk: CHADS2-VASc Score: 3/9 (3.2% stroke risk)  · Anticoagulation: Continue Apixaban (Eliquis) 5 mg every 12 hours. · Atherosclerotic Heart Disease: History of CABG x3 and most recently NSTEMI with a heart cath that was done on 12/28/2020 and he had two stents placed, REJI x 2 to SVG to posterior lateral branch of the RCA.   Antiplatelet Agent: STOP {Ticagrelor (Brilinta) and START clopidogrel (Plavix): Plavix 75 mg daily. I also reminded them to watch for signs of bloody or black tarry stools and stop the medication immediately if this develops as this could be life threatening.  Beta Blocker: Continue Metoprolol succinate (Toprol XL) 25 mg daily.  Anti-anginal medications: Continue nitroglycerin 0.4 mg tablets as needed for chest pain.  Cholesterol Reduction Therapy: Continue Atorvastatin (Lipitor) 80 mg daily.  Additional counseling: I advised them to call our office or go to the emergency room if they developed worsening or persistent chest pain or increased shortness of breath as this could be life threatening.  Ischemic Cardiomyopathy: EF from his Heart Cath that was done on 12/28/2020 was 15%, EF 10-15% on 3/31/21 echo.  Beta Blocker: Continue Metoprolol succinate (Toprol XL) 25 mg daily. ACE Inibitor/ARB: Stop sacubitril/valsartan (Entresto) 24/26 mg 1/2 tablet twice daily. Patient cannot afford his Rx so we willSTART Ramipril 5 mg daily   Diuretics: Continue Spironolactone (Aldactone) 25 mg, 1/2 tab daily. I also discussed the potential side effects of this medication including lightheadedness and dizziness and instructed them to stop the medication of this occurs and call our office if this occurs. Heart failure counseling: I advised them to try and keep their legs up whenever possible and to limit salt in their diet.  Dual chamber Implantable Cardioverter Defibrillator (AICD): Medtronic implanted on 4/23/2021 by Dr Antonio Mas Indication for Device Placement: Ischemic Cardiomyopathy   Interrogation Findings: Within normal limits and therefore no changes were made.  Device check by Med[employer] rep in office today 5/10/021. We also checked his ICD today and was pleased to see that the measurements were all within normal limits and no changes were made.       · Hyperlipidemia: Mixed - Last LDL on 4/9/2021 was 55 mg/dL   · Cholesterol Reduction Therapy: Continue Atorvastatin (Lipitor) 80 mg daily. Finally, I recommended that he continue his current medications and follow up with you as previously scheduled. FOLLOW UP:   I told Mr. Veronica Oneil to call my office if he had any problems, but otherwise I asked him to Return in about 3 months (around 8/10/2021). However, I would be happy to see him sooner should the need arise. Sincerely,  Allison Kirby. Martínez BUSTILLOS, MS, F.A.C.C. [facility] Cardiology Specialist    [address] [place] [address] [place]  Phone: [phone], Fax: [phone]     I believe that the risk of significant morbidity and mortality related to the patient's current medical conditions are: Intermediate. The documentation recorded by the scribe, accurately and completely reflects the services I personally performed and the decisions made by me. Rashmi Gr MD, MS, F.A.C.C.  May 10, 2021

## 2021-05-11 ENCOUNTER — TELEPHONE (OUTPATIENT)
Dept: CARDIOLOGY | Age: 71
End: 2021-05-11

## 2021-05-11 ENCOUNTER — HOSPITAL ENCOUNTER (OUTPATIENT)
Age: 71
Discharge: HOME | End: 2021-05-11
Payer: MEDICARE

## 2021-05-11 ENCOUNTER — CARE COORDINATION (OUTPATIENT)
Dept: CASE MANAGEMENT | Age: 71
End: 2021-05-11

## 2021-05-11 DIAGNOSIS — R31.9 HEMATURIA, UNSPECIFIED TYPE: ICD-10-CM

## 2021-05-11 DIAGNOSIS — I48.0 PAF (PAROXYSMAL ATRIAL FIBRILLATION) (HCC): Primary | ICD-10-CM

## 2021-05-11 DIAGNOSIS — I48.0 PAF (PAROXYSMAL ATRIAL FIBRILLATION) (HCC): ICD-10-CM

## 2021-05-11 LAB
-: NORMAL
AMORPHOUS: NORMAL
BACTERIA: NORMAL
BILIRUBIN URINE: ABNORMAL
CASTS UA: NORMAL /LPF
COLOR: ABNORMAL
COMMENT UA: ABNORMAL
CRYSTALS, UA: NORMAL /HPF
EPITHELIAL CELLS UA: NORMAL /HPF (ref 0–5)
GLUCOSE URINE: NEGATIVE
HCT VFR BLD CALC: 37.2 % (ref 40.7–50.3)
HEMOGLOBIN: 11.6 G/DL (ref 13–17)
KETONES, URINE: NEGATIVE
LEUKOCYTE ESTERASE, URINE: NEGATIVE
MCH RBC QN AUTO: 31.7 PG (ref 25.2–33.5)
MCHC RBC AUTO-ENTMCNC: 31.2 G/DL (ref 28.4–34.8)
MCV RBC AUTO: 101.6 FL (ref 82.6–102.9)
MUCUS: NORMAL
NITRITE, URINE: NEGATIVE
NRBC AUTOMATED: 0 PER 100 WBC
OTHER OBSERVATIONS UA: NORMAL
PDW BLD-RTO: 15.1 % (ref 11.8–14.4)
PH UA: 5.5 (ref 5–9)
PLATELET # BLD: 248 K/UL (ref 138–453)
PMV BLD AUTO: 10.1 FL (ref 8.1–13.5)
PROTEIN UA: ABNORMAL
RBC # BLD: 3.66 M/UL (ref 4.21–5.77)
RBC UA: NORMAL /HPF (ref 0–2)
RENAL EPITHELIAL, UA: NORMAL /HPF
SPECIFIC GRAVITY UA: 1.01 (ref 1.01–1.02)
TRICHOMONAS: NORMAL
TURBIDITY: CLEAR
URINE HGB: ABNORMAL
UROBILINOGEN, URINE: NORMAL
WBC # BLD: 7.9 K/UL (ref 3.5–11.3)
WBC UA: NORMAL /HPF (ref 0–5)
YEAST: NORMAL

## 2021-05-11 PROCEDURE — 81001 URINALYSIS AUTO W/SCOPE: CPT

## 2021-05-11 PROCEDURE — 85027 COMPLETE CBC AUTOMATED: CPT

## 2021-05-11 PROCEDURE — 36415 COLL VENOUS BLD VENIPUNCTURE: CPT

## 2021-05-11 NOTE — TELEPHONE ENCOUNTER
Mr. Vazquez White came into the office today due to having blood in his urine. He did bring us in a \"sample\". I did talk to Dr. Lacey Thorpe about this to see what he would like to do. He also mentioned having some bloody noses from time to time. Per Dr. Lacey Thorpe he would like him to get a CBC and Urinalysis done today. Also to HOLD his Plavix and Eliquis for now however to START on ASA 81 mg daily. Pt verbalized understanding. I did let Mr. Vazquez White know that we will call him with his results and further instructions if needed. Thanks!

## 2021-05-12 ENCOUNTER — TELEPHONE (OUTPATIENT)
Dept: CARDIOLOGY | Age: 71
End: 2021-05-12

## 2021-05-12 ENCOUNTER — CARE COORDINATION (OUTPATIENT)
Dept: CASE MANAGEMENT | Age: 71
End: 2021-05-12

## 2021-05-12 NOTE — CARE COORDINATION
Ryan 45 Transitions Follow Up Call    2021    Patient: Clair Miller. Patient : 1950   MRN: [id number]  Reason for Admission:   Discharge Date: 21 RARS: Readmission Risk Score: 12         Spoke with: Pascual Peters, patient    Contacted patient for BPCI-A follow up. Mr. Jermaine Landeros reports that he already spoke with the cardiology office. He stated Eliquis and Plavix is on hold and he will be taking Aspirin 81 mg for 2 weeks. He stated he continues to have blood in his urine. Reports the blood was lighter in color during his last urine output. Denies having any pain/discomfort, burning. He stated that last time this happened he had kidney stones which was in . Mr. Jermaine Landeros stated he contacted his PCP office and scheduled an appointment for 21. No questions or concerns for CTN at this time. Will continue to follow. Follow Up  Future Appointments   Date Time Provider Charley Pacheco   2021  1:30 PM Carmela Dus, MD [facility] Crooks C.D. Kristie Moritz   2021  9:15 AM Carmela Dus, MD Hercules Crooks C.D. Kristie Moritz   2021 10:00 AM Trina Gabriel MD TIFF CARD [facility]   2021  8:00 AM SCHEDULE, [facility]   2022  9:30 AM Carmela Dus, MD Hercules Crooks C.D. Kristie Moritz   2022 10:40 AM Tirna Gabriel MD TIFF CARD [facility]       Tan Garcia RN

## 2021-05-13 ENCOUNTER — HOSPITAL ENCOUNTER (OUTPATIENT)
Age: 71
Discharge: HOME | End: 2021-05-13
Payer: MEDICARE

## 2021-05-13 DIAGNOSIS — R31.0 GROSS HEMATURIA: ICD-10-CM

## 2021-05-13 LAB
-: ABNORMAL
AMORPHOUS: ABNORMAL
BACTERIA: ABNORMAL
BILIRUBIN URINE: NEGATIVE
CASTS UA: ABNORMAL /LPF
COLOR: YELLOW
COMMENT UA: ABNORMAL
CRYSTALS, UA: ABNORMAL /HPF
EPITHELIAL CELLS UA: ABNORMAL /HPF (ref 0–5)
GLUCOSE URINE: NEGATIVE
KETONES, URINE: NEGATIVE
LEUKOCYTE ESTERASE, URINE: ABNORMAL
MUCUS: ABNORMAL
NITRITE, URINE: NEGATIVE
OTHER OBSERVATIONS UA: ABNORMAL
PH UA: 5.5 (ref 5–9)
PROTEIN UA: ABNORMAL
RBC UA: ABNORMAL /HPF (ref 0–2)
RENAL EPITHELIAL, UA: ABNORMAL /HPF
SPECIFIC GRAVITY UA: 1.01 (ref 1.01–1.02)
TRICHOMONAS: ABNORMAL
TURBIDITY: CLEAR
URINE HGB: ABNORMAL
UROBILINOGEN, URINE: NORMAL
WBC UA: ABNORMAL /HPF (ref 0–5)
YEAST: ABNORMAL

## 2021-05-13 PROCEDURE — 81001 URINALYSIS AUTO W/SCOPE: CPT

## 2021-05-18 ENCOUNTER — CARE COORDINATION (OUTPATIENT)
Dept: CASE MANAGEMENT | Age: 71
End: 2021-05-18

## 2021-05-18 NOTE — CARE COORDINATION
Ryan 45 Transitions Follow Up Call    2021    Patient: Rachid Simmons Patient : 1950   MRN: [id number]  Reason for Admission: Cardiac arrhythmia    Discharge Date: 21 RARS: Readmission Risk Score: 12         Spoke with: Gene    Spoke to Gene who stated his urine is clear so far today. Denies pain, urinary frequency or hesitancy, cloudy or foul smelling urine. Stated he did not hear back from PCP office regarding recent labs. Pt continues to have Plavix and Eliquis on hold since 21, is taking ASA 81 MG as directed. Will route to PCP to request office to call pt with results. Stated he does not have MyChart, is not interested in signing up. Care Transitions Subsequent and Final Call    Subsequent and Final Calls  Do you have any ongoing symptoms?: No  Have your medications changed?: No  Do you have any questions related to your medications?: No  Do you currently have any active services?: No  Do you have any needs or concerns that I can assist you with?: No  Identified Barriers: None  Care Transitions Interventions  Other Interventions: Follow Up  Future Appointments   Date Time Provider Charley Pacheco   2021  9:15 AM MD Marycarmen Reed C.D.   2021 10:00 AM [facility]   2021  8:00 AM SCHEDULE, [facility] [facility]   2022  9:30 AM MD Marycarmen Reed C.D.   2022 10:40 AM [facility]       Hanny Andrade RN

## 2021-05-26 ENCOUNTER — HOSPITAL ENCOUNTER (OUTPATIENT)
Age: 71
Setting detail: SPECIMEN
Discharge: HOME | End: 2021-05-26
Payer: MEDICARE

## 2021-05-26 ENCOUNTER — OFFICE VISIT (OUTPATIENT)
Dept: UROLOGY | Age: 71
End: 2021-05-26
Payer: MEDICARE

## 2021-05-26 VITALS
HEART RATE: 67 BPM | SYSTOLIC BLOOD PRESSURE: 112 MMHG | DIASTOLIC BLOOD PRESSURE: 68 MMHG | BODY MASS INDEX: 22.48 KG/M2 | WEIGHT: 157 LBS | HEIGHT: 70 IN | TEMPERATURE: 97.1 F

## 2021-05-26 DIAGNOSIS — R31.0 GROSS HEMATURIA: ICD-10-CM

## 2021-05-26 DIAGNOSIS — R31.0 GROSS HEMATURIA: Primary | ICD-10-CM

## 2021-05-26 DIAGNOSIS — Z12.5 SCREENING PSA (PROSTATE SPECIFIC ANTIGEN): ICD-10-CM

## 2021-05-26 PROCEDURE — G8427 DOCREV CUR MEDS BY ELIG CLIN: HCPCS | Performed by: NURSE PRACTITIONER

## 2021-05-26 PROCEDURE — 4040F PNEUMOC VAC/ADMIN/RCVD: CPT | Performed by: NURSE PRACTITIONER

## 2021-05-26 PROCEDURE — G8420 CALC BMI NORM PARAMETERS: HCPCS | Performed by: NURSE PRACTITIONER

## 2021-05-26 PROCEDURE — 99204 OFFICE O/P NEW MOD 45 MIN: CPT | Performed by: NURSE PRACTITIONER

## 2021-05-26 PROCEDURE — 1123F ACP DISCUSS/DSCN MKR DOCD: CPT | Performed by: NURSE PRACTITIONER

## 2021-05-26 PROCEDURE — 3017F COLORECTAL CA SCREEN DOC REV: CPT | Performed by: NURSE PRACTITIONER

## 2021-05-26 PROCEDURE — 87086 URINE CULTURE/COLONY COUNT: CPT

## 2021-05-26 PROCEDURE — 99211 OFF/OP EST MAY X REQ PHY/QHP: CPT

## 2021-05-26 PROCEDURE — 4004F PT TOBACCO SCREEN RCVD TLK: CPT | Performed by: NURSE PRACTITIONER

## 2021-05-26 ASSESSMENT — ENCOUNTER SYMPTOMS
BACK PAIN: 0
COUGH: 0
ABDOMINAL PAIN: 0
VOMITING: 0
NAUSEA: 0
CONSTIPATION: 0
COLOR CHANGE: 0
SHORTNESS OF BREATH: 0
WHEEZING: 0
EYE REDNESS: 0

## 2021-05-26 NOTE — PROGRESS NOTES
HPI:          Patient is a 70 y.o. male in no acute distress. He is alert and oriented to person, place, and time. New patient referral from Dr. Vannesa Rutherford for gross hematuria. He has had intermittent episodes of gross hematuria. This was not associated with flank or abdominal pain. He denies baseline nocturia, frequency, urgency or incontinence. He is a current smoker. He does take Eliquis and Plavix. He states that cardiology instructed him to stop this due to hematuria. He does have a history of stones. He did undergo ESWL in 1988 and has history of spontaneous stone passage. He is circumcised. He denies any changes in his urinary stream.  He denies any dysuria.       Past Medical History:   Diagnosis Date    Chronic kidney disease     Coronary artery disease     s/p CABG x3 in 2008 and 2 stents on 12/24/2020    H/O echocardiogram 02/08/2021    EF 15-20% LV severly enlarged and LV wall thickness is normal Severe global hypokinesis with segmental abnormalities LA is mod dilated 34-39 with LA volume index of 34 ml/m2 Mild mitral regurg Mod tricupid regurg Mild to mod pulm HTN with est RV systolic pressure of 38 mmhg mod grade II diastolic dysfunciton    H/O echocardiogram 04/01/2021    EF 10-15% LA size was mildly dilated IVC sice is mildly dilated with reduced respiratory phasic changes CVP 5-10 mmHg    Hyperlipidemia     Smoker      Past Surgical History:   Procedure Laterality Date    CARDIAC SURGERY      CORONARY ANGIOPLASTY WITH STENT PLACEMENT  12/24/2020    x 2 [address]  2008    x 3 vessels    VASCULAR SURGERY      cabg harvests     Outpatient Encounter Medications as of 5/26/2021   Medication Sig Dispense Refill    ramipril (ALTACE) 5 MG capsule Take 1 capsule by mouth daily 90 capsule 3    amiodarone (CORDARONE) 200 MG tablet Take 1 tablet by mouth daily 90 tablet 3    midodrine (PROAMATINE) 5 MG tablet Take 1 tablet by mouth 3 times daily (with meals) 180 tablet 3    atorvastatin (LIPITOR) 80 MG tablet Take 1 tablet by mouth nightly 90 tablet 3    spironolactone (ALDACTONE) 25 MG tablet Take 0.5 tablets by mouth daily 30 tablet 0    metoprolol succinate (TOPROL XL) 25 MG extended release tablet Take 25 mg by mouth nightly       clopidogrel (PLAVIX) 75 MG tablet Take 1 tablet by mouth daily (Patient not taking: Reported on 5/13/2021) 90 tablet 3    apixaban (ELIQUIS) 5 MG TABS tablet Take 1 tablet by mouth 2 times daily (Patient not taking: Reported on 5/13/2021) 60 tablet 0    nitroGLYCERIN (NITROSTAT) 0.3 MG SL tablet Place 0.3 mg under the tongue every 5 minutes as needed (Patient not taking: Reported on 5/26/2021)       No facility-administered encounter medications on file as of 5/26/2021. Current Outpatient Medications on File Prior to Visit   Medication Sig Dispense Refill    ramipril (ALTACE) 5 MG capsule Take 1 capsule by mouth daily 90 capsule 3    amiodarone (CORDARONE) 200 MG tablet Take 1 tablet by mouth daily 90 tablet 3    midodrine (PROAMATINE) 5 MG tablet Take 1 tablet by mouth 3 times daily (with meals) 180 tablet 3    atorvastatin (LIPITOR) 80 MG tablet Take 1 tablet by mouth nightly 90 tablet 3    spironolactone (ALDACTONE) 25 MG tablet Take 0.5 tablets by mouth daily 30 tablet 0    metoprolol succinate (TOPROL XL) 25 MG extended release tablet Take 25 mg by mouth nightly       clopidogrel (PLAVIX) 75 MG tablet Take 1 tablet by mouth daily (Patient not taking: Reported on 5/13/2021) 90 tablet 3    apixaban (ELIQUIS) 5 MG TABS tablet Take 1 tablet by mouth 2 times daily (Patient not taking: Reported on 5/13/2021) 60 tablet 0    nitroGLYCERIN (NITROSTAT) 0.3 MG SL tablet Place 0.3 mg under the tongue every 5 minutes as needed (Patient not taking: Reported on 5/26/2021)       No current facility-administered medications on file prior to visit. Patient has no known allergies.   Family History Problem Relation Age of Onset    Other Mother          from [place] age\" 68    Rheum Arthritis Mother     Heart Disease Father 79         from CHF at age 79     Social History     Tobacco Use   Smoking Status Current Every Day Smoker    Packs/day: 0.25    Years: 52.00    Pack years: 13.00    Types: Cigarettes   Smokeless Tobacco Never Used       Social History     Substance and Sexual Activity   Alcohol Use Not Currently       Review of Systems   Constitutional: Negative for appetite change, chills and fever. Eyes: Negative for redness and visual disturbance. Respiratory: Negative for cough, shortness of breath and wheezing. Cardiovascular: Negative for chest pain and leg swelling. Gastrointestinal: Negative for abdominal pain, constipation, nausea and vomiting. Genitourinary: Positive for hematuria. Negative for decreased urine volume, difficulty urinating, discharge, dysuria, enuresis, flank pain, frequency, penile pain, scrotal swelling, testicular pain and urgency. Musculoskeletal: Negative for back pain, joint swelling and myalgias. Skin: Negative for color change, rash and wound. Neurological: Negative for dizziness, tremors and numbness. Hematological: Negative for adenopathy. Does not bruise/bleed easily. /68 (Site: Right Upper Arm, Position: Sitting, Cuff Size: Medium Adult)   Pulse 67   Temp 97.1 °F (36.2 °C)   Ht 5' 10\" (1.778 m)   Wt 157 lb (71.2 kg)   BMI 22.53 kg/m²       PHYSICAL EXAM:  Constitutional: Patient in no acute distress; Neuro: alert and oriented to person place and time. Psych: Mood and affect normal.  Skin: Normal  Lungs: Respiratory effort normal  Cardiovascular:  Normal peripheral pulses  Abdomen: Soft, non-tender, non-distended with no CVA, flank pain  Bladder non-tender and not distended.       Lab Results   Component Value Date    BUN 23 (H) 2021     Lab Results   Component Value Date    CREATININE 0.9 2021     No results found for: PSA    ASSESSMENT:   Diagnosis Orders   1. Gross hematuria  Culture, Urine    BUN & Creatinine    CT UROGRAM   2. Screening PSA (prostate specific antigen)  PSA screening         PLAN:  We will proceed with a hematuria work-up. We will obtain a CT urogram, then he will return to the office for lower tract visualization. We will check a urine culture today. Prior to his cystoscopy appointment he will obtain a screening PSA.

## 2021-05-27 ENCOUNTER — CARE COORDINATION (OUTPATIENT)
Dept: CASE MANAGEMENT | Age: 71
End: 2021-05-27

## 2021-05-27 LAB
CULTURE: NO GROWTH
Lab: NORMAL
SPECIMEN DESCRIPTION: NORMAL

## 2021-05-27 NOTE — CARE COORDINATION
[facility] Transitions Follow Up Call    2021    Patient: Pascual Peters Jr. Patient : 1950   MRN: [id number]  Reason for Admission:   Discharge Date: 21 RARS: Readmission Risk Score: 15         Spoke with: Pascual Turnermarckniki., patient    Contacted patient for BPCI-A follow up. Pascual stated that he is doing alright. He denies having any c/o chest pain/discomfort, palpitations, increased shortness of breath, dizziness/lightheadedness. Reports he went to the urologist yesterday. Stated he gave another urine sample. Denies having any c/o pain or burning, odor, difficulty urinating. Reports he no longer has visible blood in his urine. He will be having a CT urogram on 21 and Cystoscopy on 21. He does not have any needs or concerns at this time. Will continue to follow. Care Transitions Subsequent and Final Call    Subsequent and Final Calls  Do you have any ongoing symptoms?: No  Do you currently have any active services?: No  Do you have any needs or concerns that I can assist you with?: No  Identified Barriers: None  Care Transitions Interventions  Other Interventions: Follow Up  Future Appointments   Date Time Provider Charley Pacheco   2021 12:15 PM Junior KEENE [facility] Rad   2021  2:15 PM Elizabeth Burk MD [facility]   2021  9:15 AM MD Ilsa Modi C.D.   2021 10:00 AM [facility]   2021  8:00 AM SCHEDULE, [facility] JONNATHANFIN CAR [facility] TIFF CARD [facility]   2022  9:30 AM MD Ilsa Modi C.D.   2022 10:40 AM Altagracia Pisano MD TIFF CARD [facility]       Britney Camarena RN

## 2021-05-28 ENCOUNTER — TELEPHONE (OUTPATIENT)
Dept: UROLOGY | Age: 71
End: 2021-05-28

## 2021-05-28 NOTE — TELEPHONE ENCOUNTER
----- Message from LOCO Cordova - CNP sent at 5/28/2021  9:49 AM EDT -----  Call pt - urine cx reviewed and negative for UTI

## 2021-06-08 ENCOUNTER — HOSPITAL ENCOUNTER (OUTPATIENT)
Age: 71
Discharge: HOME | End: 2021-06-08
Payer: MEDICARE

## 2021-06-08 ENCOUNTER — HOSPITAL ENCOUNTER (OUTPATIENT)
Dept: CT IMAGING | Age: 71
Discharge: HOME | End: 2021-06-10
Payer: MEDICARE

## 2021-06-08 DIAGNOSIS — Z12.5 SCREENING PSA (PROSTATE SPECIFIC ANTIGEN): ICD-10-CM

## 2021-06-08 DIAGNOSIS — R31.0 GROSS HEMATURIA: ICD-10-CM

## 2021-06-08 LAB
BUN BLDV-MCNC: 20 MG/DL (ref 8–23)
CREAT SERPL-MCNC: 0.97 MG/DL (ref 0.7–1.2)
GFR AFRICAN AMERICAN: >60 ML/MIN
GFR NON-AFRICAN AMERICAN: >60 ML/MIN
GFR SERPL CREATININE-BSD FRML MDRD: NORMAL ML/MIN/{1.73_M2}
GFR SERPL CREATININE-BSD FRML MDRD: NORMAL ML/MIN/{1.73_M2}
PROSTATE SPECIFIC ANTIGEN: 1.17 UG/L

## 2021-06-08 PROCEDURE — 82565 ASSAY OF CREATININE: CPT

## 2021-06-08 PROCEDURE — 74178 CT ABD&PLV WO CNTR FLWD CNTR: CPT

## 2021-06-08 PROCEDURE — 84520 ASSAY OF UREA NITROGEN: CPT

## 2021-06-08 PROCEDURE — G0103 PSA SCREENING: HCPCS

## 2021-06-08 PROCEDURE — 36415 COLL VENOUS BLD VENIPUNCTURE: CPT

## 2021-06-08 PROCEDURE — 6360000004 HC RX CONTRAST MEDICATION: Performed by: NURSE PRACTITIONER

## 2021-06-08 RX ADMIN — IOPAMIDOL 120 ML: 755 INJECTION, SOLUTION INTRAVENOUS at 11:57

## 2021-06-09 ENCOUNTER — CARE COORDINATION (OUTPATIENT)
Dept: CASE MANAGEMENT | Age: 71
End: 2021-06-09

## 2021-06-10 ENCOUNTER — TELEPHONE (OUTPATIENT)
Dept: UROLOGY | Age: 71
End: 2021-06-10

## 2021-06-10 ENCOUNTER — OFFICE VISIT (OUTPATIENT)
Dept: UROLOGY | Age: 71
End: 2021-06-10
Payer: MEDICARE

## 2021-06-10 VITALS
HEART RATE: 80 BPM | BODY MASS INDEX: 22.1 KG/M2 | DIASTOLIC BLOOD PRESSURE: 67 MMHG | SYSTOLIC BLOOD PRESSURE: 114 MMHG | WEIGHT: 154 LBS

## 2021-06-10 DIAGNOSIS — R31.0 GROSS HEMATURIA: Primary | ICD-10-CM

## 2021-06-10 DIAGNOSIS — N20.1 URETERAL STONE: ICD-10-CM

## 2021-06-10 DIAGNOSIS — N20.0 RENAL STONE: ICD-10-CM

## 2021-06-10 DIAGNOSIS — Z12.5 SCREENING PSA (PROSTATE SPECIFIC ANTIGEN): ICD-10-CM

## 2021-06-10 PROCEDURE — 3017F COLORECTAL CA SCREEN DOC REV: CPT | Performed by: NURSE PRACTITIONER

## 2021-06-10 PROCEDURE — 4004F PT TOBACCO SCREEN RCVD TLK: CPT | Performed by: NURSE PRACTITIONER

## 2021-06-10 PROCEDURE — G8420 CALC BMI NORM PARAMETERS: HCPCS | Performed by: NURSE PRACTITIONER

## 2021-06-10 PROCEDURE — G8427 DOCREV CUR MEDS BY ELIG CLIN: HCPCS | Performed by: NURSE PRACTITIONER

## 2021-06-10 PROCEDURE — 1123F ACP DISCUSS/DSCN MKR DOCD: CPT | Performed by: NURSE PRACTITIONER

## 2021-06-10 PROCEDURE — 4040F PNEUMOC VAC/ADMIN/RCVD: CPT | Performed by: NURSE PRACTITIONER

## 2021-06-10 PROCEDURE — 99215 OFFICE O/P EST HI 40 MIN: CPT | Performed by: NURSE PRACTITIONER

## 2021-06-10 PROCEDURE — 99211 OFF/OP EST MAY X REQ PHY/QHP: CPT

## 2021-06-10 ASSESSMENT — ENCOUNTER SYMPTOMS
COLOR CHANGE: 0
VOMITING: 0
SHORTNESS OF BREATH: 0
WHEEZING: 0
CONSTIPATION: 0
BACK PAIN: 0
NAUSEA: 0
ABDOMINAL PAIN: 0
COUGH: 0
EYE REDNESS: 0

## 2021-06-10 NOTE — PROGRESS NOTES
Dispense Refill    ramipril (ALTACE) 5 MG capsule Take 1 capsule by mouth daily 90 capsule 3    amiodarone (CORDARONE) 200 MG tablet Take 1 tablet by mouth daily 90 tablet 3    midodrine (PROAMATINE) 5 MG tablet Take 1 tablet by mouth 3 times daily (with meals) 180 tablet 3    atorvastatin (LIPITOR) 80 MG tablet Take 1 tablet by mouth nightly 90 tablet 3    spironolactone (ALDACTONE) 25 MG tablet Take 0.5 tablets by mouth daily 30 tablet 0    metoprolol succinate (TOPROL XL) 25 MG extended release tablet Take 25 mg by mouth nightly       clopidogrel (PLAVIX) 75 MG tablet Take 1 tablet by mouth daily (Patient not taking: Reported on 5/13/2021) 90 tablet 3    apixaban (ELIQUIS) 5 MG TABS tablet Take 1 tablet by mouth 2 times daily (Patient not taking: Reported on 5/13/2021) 60 tablet 0    nitroGLYCERIN (NITROSTAT) 0.3 MG SL tablet Place 0.3 mg under the tongue every 5 minutes as needed (Patient not taking: Reported on 5/26/2021)       No facility-administered encounter medications on file as of 6/10/2021.       Current Outpatient Medications on File Prior to Visit   Medication Sig Dispense Refill    ramipril (ALTACE) 5 MG capsule Take 1 capsule by mouth daily 90 capsule 3    amiodarone (CORDARONE) 200 MG tablet Take 1 tablet by mouth daily 90 tablet 3    midodrine (PROAMATINE) 5 MG tablet Take 1 tablet by mouth 3 times daily (with meals) 180 tablet 3    atorvastatin (LIPITOR) 80 MG tablet Take 1 tablet by mouth nightly 90 tablet 3    spironolactone (ALDACTONE) 25 MG tablet Take 0.5 tablets by mouth daily 30 tablet 0    metoprolol succinate (TOPROL XL) 25 MG extended release tablet Take 25 mg by mouth nightly       clopidogrel (PLAVIX) 75 MG tablet Take 1 tablet by mouth daily (Patient not taking: Reported on 5/13/2021) 90 tablet 3    apixaban (ELIQUIS) 5 MG TABS tablet Take 1 tablet by mouth 2 times daily (Patient not taking: Reported on 5/13/2021) 60 tablet 0    nitroGLYCERIN (NITROSTAT) 0.3 MG SL tablet Place 0.3 mg under the tongue every 5 minutes as needed (Patient not taking: Reported on 2021)       No current facility-administered medications on file prior to visit. Patient has no known allergies. Family History   Problem Relation Age of Onset    Other Mother          from [place] age\" 68    Rheum Arthritis Mother     Heart Disease Father 79         from CHF at age 79     Social History     Tobacco Use   Smoking Status Current Every Day Smoker    Packs/day: 0.25    Years: 52.00    Pack years: 13.00    Types: Cigarettes   Smokeless Tobacco Never Used       Social History     Substance and Sexual Activity   Alcohol Use Not Currently       Review of Systems   Constitutional: Negative for appetite change, chills and fever. Eyes: Negative for redness and visual disturbance. Respiratory: Negative for cough, shortness of breath and wheezing. Cardiovascular: Negative for chest pain and leg swelling. Gastrointestinal: Negative for abdominal pain, constipation, nausea and vomiting. Genitourinary: Negative for decreased urine volume, difficulty urinating, discharge, dysuria, enuresis, flank pain, frequency, hematuria, penile pain, scrotal swelling, testicular pain and urgency. Musculoskeletal: Negative for back pain, joint swelling and myalgias. Skin: Negative for color change, rash and wound. Neurological: Negative for dizziness, tremors and numbness. Hematological: Negative for adenopathy. Does not bruise/bleed easily. /67 (Site: Right Upper Arm, Position: Sitting, Cuff Size: Medium Adult)   Pulse 80   Wt 154 lb (69.9 kg)   BMI 22.10 kg/m²       PHYSICAL EXAM:  Constitutional: Patient in no acute distress; Neuro: alert and oriented to person place and time.     Psych: Mood and affect normal.  Skin: Normal  Lungs: Respiratory effort normal  Cardiovascular:  Normal peripheral pulses  Abdomen: Soft, non-tender, non-distended with no CVA, flank pain  Bladder non-tender and not distended. Lab Results   Component Value Date    BUN 20 06/08/2021     Lab Results   Component Value Date    CREATININE 0.97 06/08/2021     Lab Results   Component Value Date    PSA 1.17 06/08/2021       ASSESSMENT:   Diagnosis Orders   1. Gross hematuria     2. Ureteral stone     3. Renal stone     4. Screening PSA (prostate specific antigen)  PSA screening         PLAN:  Discussed risks and benefits of HLL and stent placement (including anesthesia, bleeding, infection, injury to  tract, need for multiple procedures, and the risk of major complications such as ureteral perforation). We discussed the details of the procedure. We did discuss what to expect post-operatively to include: hematuria for up to two weeks, stent pain, and pain after stent removal. Patient amenable to schedule LEFT HLL. We will also complete cystoscopy under anesthesia to complete the hematuria work-up. He does understand that it is likely we will only be able to treat the ureteral stone and we will have to come back at a later date to treat the renal stone. We will need cardiology clearance for instructions of if/when we can stop the Plavix and Eliquis. He is status post cardiac stents in 12/2020 so we may not be able to stop his blood thinners. This would complicate surgery due to the increased risk of bleeding. Screening PSA will be due in 1 year.

## 2021-06-10 NOTE — TELEPHONE ENCOUNTER
----- Message from LOCO Coppola CNP sent at 6/10/2021  9:07 AM EDT -----  Please have patient come in today, tomorrow or Monday to discuss CT results due to stones

## 2021-06-11 NOTE — PROGRESS NOTES
Patient instructed to only take Amiodarone with small sip of water the morning of surgery. Patient instructed to follow Dr. Gris Ahn instructions regarding stopping eliquis and plavix.

## 2021-06-17 ENCOUNTER — CARE COORDINATION (OUTPATIENT)
Dept: CASE MANAGEMENT | Age: 71
End: 2021-06-17

## 2021-06-17 NOTE — CARE COORDINATION
[facility] Transitions Follow Up Call    2021    Patient: Sage Sheridan Patient : 1950   MRN: <[id number]>  Reason for Admission: arrythmia  Discharge Date: 21 RARS: Readmission Risk Score: 15         Spoke with: Gene    Care Transitions Subsequent and Final Call    Subsequent and Final Calls  Do you have any ongoing symptoms?: No  Have your medications changed?: No  Do you have any questions related to your medications?: No  Do you currently have any active services?: No  Do you have any needs or concerns that I can assist you with?: No  Care Transitions Interventions  Other Interventions:       States he's doing well. Denies CP, palpitations, SOB, lightheadedness, dizziness, or other symptoms. States he has all of his medications and is taking them as prescribed. Denies any questions or concerns at this time. Follow Up  Future Appointments   Date Time Provider Charley Pacheco   2021 10:00 AM [facility]EDULE, [facility]   2021 10:15 AM MD JONNATHAN PickensF UROLOGY [facility]   2021  9:15 AM MD Dayton Matamoros C.D.   2021 10:00 AM MD JONNATHAN KcF CARD [facility]   2021  8:00 AM SCHEDULE, [facility] JONNATHANF CARD [facility]   2022  9:30 AM MD Dayton Matamoros C.D.   2022 10:40 AM MD JONNATHAN KcF CARD [facility]       Leopold Harps

## 2021-06-18 ENCOUNTER — HOSPITAL ENCOUNTER (OUTPATIENT)
Dept: PREADMISSION TESTING | Age: 71
Setting detail: SPECIMEN
Discharge: HOME | End: 2021-06-22
Payer: MEDICARE

## 2021-06-18 DIAGNOSIS — Z01.818 PREOP TESTING: Primary | ICD-10-CM

## 2021-06-18 PROCEDURE — C9803 HOPD COVID-19 SPEC COLLECT: HCPCS

## 2021-06-18 PROCEDURE — U0003 INFECTIOUS AGENT DETECTION BY NUCLEIC ACID (DNA OR RNA); SEVERE ACUTE RESPIRATORY SYNDROME CORONAVIRUS 2 (SARS-COV-2) (CORONAVIRUS DISEASE [COVID-19]), AMPLIFIED PROBE TECHNIQUE, MAKING USE OF HIGH THROUGHPUT TECHNOLOGIES AS DESCRIBED BY CMS-2020-01-R: HCPCS

## 2021-06-18 PROCEDURE — U0005 INFEC AGEN DETEC AMPLI PROBE: HCPCS

## 2021-06-19 LAB
SARS-COV-2: NORMAL
SARS-COV-2: NOT DETECTED
SOURCE: NORMAL

## 2021-06-21 ENCOUNTER — ANESTHESIA EVENT (OUTPATIENT)
Dept: OPERATING ROOM | Age: 71
End: 2021-06-21
Payer: MEDICARE

## 2021-06-21 PROBLEM — N20.1 URETERAL CALCULUS: Status: ACTIVE | Noted: 2021-06-21

## 2021-06-21 NOTE — H&P
History and Physical    Patient:  Pascual Peters Jr. MRN: [id number]    CHIEF COMPLAINT:  Left flank pain    HISTORY OF PRESENT ILLNESS:   The patient is a 70 y.o. male who presents with left flank pain. History  1988 ESWL and spontaneous stone passage     5/2021 referral from Dr. Tita Ren for gross hematuria. He is a current smoker. He does take Eliquis and Plavix.     Today  Here today to follow-up for gross hematuria. We did obtain a CT urogram.  This film was independently reviewed and shows a 5 mm proximal left ureteral stone with minimal hydronephrosis. There is also an 11 mm left renal stone and punctate right renal stones. Stones are not obvious on  film. I do not identify any suspicious renal mass or filling defect. He denies any further gross hematuria. He denies any dysuria, nocturia, frequency, urgency or incontinence. He denies any flank pain. He is to be taking Eliquis and Plavix. He is status post cardiac stents on 12/2020. We also ordered a screening PSA. This was 1.17.     Past Medical History:    Past Medical History:   Diagnosis Date    Chronic kidney disease     Coronary artery disease     s/p CABG x3 in 2008 and 2 stents on 12/24/2020    H/O echocardiogram 02/08/2021    EF 15-20% LV severly enlarged and LV wall thickness is normal Severe global hypokinesis with segmental abnormalities LA is mod dilated 34-39 with LA volume index of 34 ml/m2 Mild mitral regurg Mod tricupid regurg Mild to mod pulm HTN with est RV systolic pressure of 38 mmhg mod grade II diastolic dysfunciton    H/O echocardiogram 04/01/2021    EF 10-15% LA size was mildly dilated IVC sice is mildly dilated with reduced respiratory phasic changes CVP 5-10 mmHg    Hyperlipidemia     Smoker        Past Surgical History:    Past Surgical History:   Procedure Laterality Date    CARDIAC DEFIBRILLATOR PLACEMENT  04/23/2021    [address] CORONARY ANGIOPLASTY WITH STENT PLACEMENT 12/24/2020    x 2 at [address]  2008    x 3 vessels    VASCULAR SURGERY      cabg harvests       Medications Prior to Admission:    Prior to Admission medications    Medication Sig Start Date End Date Taking? Authorizing Provider   ramipril (ALTACE) 5 MG capsule Take 1 capsule by mouth daily 5/10/21   Altagracia Pisano MD   clopidogrel (PLAVIX) 75 MG tablet Take 1 tablet by mouth daily  Patient not taking: Reported on 5/13/2021 5/10/21   Altagracia Pisano MD   amiodarone (CORDARONE) 200 MG tablet Take 1 tablet by mouth daily 5/3/21   Altagracia Pisano MD   midodrine (PROAMATINE) 5 MG tablet Take 1 tablet by mouth 3 times daily (with meals) 5/3/21   Altagracia Pisano MD   atorvastatin (LIPITOR) 80 MG tablet Take 1 tablet by mouth nightly 5/3/21   Altagracia Pisano MD   apixaban (ELIQUIS) 5 MG TABS tablet Take 1 tablet by mouth 2 times daily  Patient not taking: Reported on 5/13/2021 4/2/21   Joao Lux DO   spironolactone (ALDACTONE) 25 MG tablet Take 0.5 tablets by mouth daily 4/2/21   Joao Lux DO   metoprolol succinate (TOPROL XL) 25 MG extended release tablet Take 25 mg by mouth nightly  12/29/20   Historical Provider, MD   nitroGLYCERIN (NITROSTAT) 0.3 MG SL tablet Place 0.3 mg under the tongue every 5 minutes as needed  Patient not taking: Reported on 5/26/2021 12/30/20   Historical Provider, MD       Allergies:  Patient has no known allergies.     Social History:    Social History     Socioeconomic History    Marital status:      Spouse name: Not on file    Number of children: 2    Years of education: Not on file    Highest education level: Not on file   Occupational History    Not on file   Tobacco Use    Smoking status: Current Every Day Smoker     Packs/day: 0.25     Years: 52.00     Pack years: 13.00     Types: Cigarettes    Smokeless tobacco: Never Used   Vaping Use    Vaping Use: Never used   Substance and Sexual Activity    Alcohol use: Not Currently  Drug use: Never    Sexual activity: Not Currently   Other Topics Concern    Not on file   Social History Narrative    Not on file     Social Determinants of Health     Financial Resource Strain: Low Risk     Difficulty of Paying Living Expenses: Not hard at all   Food Insecurity: No Food Insecurity    Worried About Running Out of Food in the Last Year: Never true    Isela of Food in the Last Year: Never true   Transportation Needs: No Transportation Needs    Lack of Transportation (Medical): No    Lack of Transportation (Non-Medical): No   Physical Activity:     Days of Exercise per Week:     Minutes of Exercise per Session:    Stress:     Feeling of Stress :    Social Connections:     Frequency of Communication with Friends and Family:     Frequency of Social Gatherings with Friends and Family:     Attends Yazidism Services:     Active Member of Clubs or Organizations:     Attends Club or Organization Meetings:     Marital Status:    Intimate Partner Violence:     Fear of Current or Ex-Partner:     Emotionally Abused:     Physically Abused:     Sexually Abused:        Family History:    Family History   Problem Relation Age of Onset    Other Mother          from [place] age\" 68    Rheum Arthritis Mother     Heart Disease Father 79         from CHF at age 79       REVIEW OF SYSTEMS:  All systems reviewed and negative except for that already noted in the HPI. Physical Exam:      No data found. Constitutional: Patient in no acute distress; Neuro: alert and oriented to person place and time. Psych: Mood and affect normal.  Skin: Normal  Lungs: Respiratory effort normal  Cardiovascular:  Normal peripheral pulses  Abdomen: Soft, non-tender, non-distended with no CVA, flank pain, hepatosplenomegaly or hernia. Kidneys normal.  Bladder non-tender and not distended.   Lymphatics: no palpable lymphadenopathy  Penis normal and circumcised  Urethral meatus normal  Scrotal exam normal  Testicles normal bilaterally  Epididymis normal bilaterally  No evidence of inguinal hernia  Anus and perineum normal  Normal rectal tone with no masses  Prostate soft, non-tender to palpation. No palpable nodules. Estimated 40 grams. Seminal vesicles not palpable. LABS:   No results for input(s): WBC, HGB, HCT, MCV, PLT in the last 72 hours. No results for input(s): NA, K, CL, CO2, PHOS, BUN, CREATININE in the last 72 hours. Invalid input(s): CA  Lab Results   Component Value Date    PSA 1.17 06/08/2021       Additional Lab/culture results:    Urinalysis: No results for input(s): COLORU, PHUR, LABCAST, WBCUA, RBCUA, MUCUS, TRICHOMONAS, YEAST, BACTERIA, CLARITYU, SPECGRAV, LEUKOCYTESUR, UROBILINOGEN, Rodo Bonier in the last 72 hours.     Invalid input(s): NITRATE, GLUCOSEUKETONESUAMORPHOUS     -----------------------------------------------------------------  Imaging Results:    Assessment and Plan   Impression:    Patient Active Problem List   Diagnosis    Acute non-ST elevation myocardial infarction (NSTEMI) ([facility] [address]    Idiopathic hypotension    Tachycardia    New onset left bundle branch block (LBBB)    History of non-ST elevation myocardial infarction (NSTEMI)    Hypotension    Atrial fibrillation (HCC)    Anginal equivalent ([address]    Ischemic cardiomyopathy    Coronary artery disease involving coronary bypass graft of native heart without angina pectoris    S/P CABG x 3    Chronic systolic congestive heart failure ([address]    Encounter for current long-term use of anticoagulants    Gross hematuria    Renal stone    Ureteral calculus       Plan: Cristal Bragg MD  5:13 PM 6/21/2021

## 2021-06-22 ENCOUNTER — APPOINTMENT (OUTPATIENT)
Dept: GENERAL RADIOLOGY | Age: 71
End: 2021-06-22
Attending: UROLOGY
Payer: MEDICARE

## 2021-06-22 ENCOUNTER — HOSPITAL ENCOUNTER (OUTPATIENT)
Age: 71
Setting detail: OUTPATIENT SURGERY
Discharge: HOME | End: 2021-06-22
Attending: UROLOGY | Admitting: UROLOGY
Payer: MEDICARE

## 2021-06-22 ENCOUNTER — ANESTHESIA (OUTPATIENT)
Dept: OPERATING ROOM | Age: 71
End: 2021-06-22
Payer: MEDICARE

## 2021-06-22 VITALS
RESPIRATION RATE: 18 BRPM | WEIGHT: 154.8 LBS | HEART RATE: 70 BPM | DIASTOLIC BLOOD PRESSURE: 72 MMHG | TEMPERATURE: 97.4 F | HEIGHT: 70 IN | SYSTOLIC BLOOD PRESSURE: 102 MMHG | BODY MASS INDEX: 22.16 KG/M2 | OXYGEN SATURATION: 96 %

## 2021-06-22 VITALS — TEMPERATURE: 96.8 F | OXYGEN SATURATION: 100 % | SYSTOLIC BLOOD PRESSURE: 101 MMHG | DIASTOLIC BLOOD PRESSURE: 66 MMHG

## 2021-06-22 PROCEDURE — 2720000010 HC SURG SUPPLY STERILE: Performed by: UROLOGY

## 2021-06-22 PROCEDURE — 3600000014 HC SURGERY LEVEL 4 ADDTL 15MIN: Performed by: UROLOGY

## 2021-06-22 PROCEDURE — 7100000011 HC PHASE II RECOVERY - ADDTL 15 MIN: Performed by: UROLOGY

## 2021-06-22 PROCEDURE — C1758 CATHETER, URETERAL: HCPCS | Performed by: UROLOGY

## 2021-06-22 PROCEDURE — 2709999900 HC NON-CHARGEABLE SUPPLY: Performed by: UROLOGY

## 2021-06-22 PROCEDURE — 3700000000 HC ANESTHESIA ATTENDED CARE: Performed by: UROLOGY

## 2021-06-22 PROCEDURE — 3600000004 HC SURGERY LEVEL 4 BASE: Performed by: UROLOGY

## 2021-06-22 PROCEDURE — 3700000001 HC ADD 15 MINUTES (ANESTHESIA): Performed by: UROLOGY

## 2021-06-22 PROCEDURE — 6370000000 HC RX 637 (ALT 250 FOR IP): Performed by: UROLOGY

## 2021-06-22 PROCEDURE — 7100000000 HC PACU RECOVERY - FIRST 15 MIN: Performed by: UROLOGY

## 2021-06-22 PROCEDURE — 2500000003 HC RX 250 WO HCPCS: Performed by: NURSE ANESTHETIST, CERTIFIED REGISTERED

## 2021-06-22 PROCEDURE — 2580000003 HC RX 258: Performed by: UROLOGY

## 2021-06-22 PROCEDURE — C2617 STENT, NON-COR, TEM W/O DEL: HCPCS | Performed by: UROLOGY

## 2021-06-22 PROCEDURE — C1769 GUIDE WIRE: HCPCS | Performed by: UROLOGY

## 2021-06-22 PROCEDURE — 7100000010 HC PHASE II RECOVERY - FIRST 15 MIN: Performed by: UROLOGY

## 2021-06-22 PROCEDURE — 6360000002 HC RX W HCPCS: Performed by: UROLOGY

## 2021-06-22 PROCEDURE — 7100000001 HC PACU RECOVERY - ADDTL 15 MIN: Performed by: UROLOGY

## 2021-06-22 PROCEDURE — 3209999900 FLUORO FOR SURGICAL PROCEDURES

## 2021-06-22 PROCEDURE — 6360000002 HC RX W HCPCS: Performed by: NURSE ANESTHETIST, CERTIFIED REGISTERED

## 2021-06-22 DEVICE — URETERAL STENT
Type: IMPLANTABLE DEVICE | Site: URETER | Status: FUNCTIONAL
Brand: PERCUFLEX™ PLUS

## 2021-06-22 RX ORDER — CIPROFLOXACIN 2 MG/ML
400 INJECTION, SOLUTION INTRAVENOUS
Status: COMPLETED | OUTPATIENT
Start: 2021-06-22 | End: 2021-06-22

## 2021-06-22 RX ORDER — SODIUM CHLORIDE 0.9 % (FLUSH) 0.9 %
5-40 SYRINGE (ML) INJECTION EVERY 12 HOURS SCHEDULED
Status: DISCONTINUED | OUTPATIENT
Start: 2021-06-22 | End: 2021-06-22 | Stop reason: HOSPADM

## 2021-06-22 RX ORDER — KETAMINE HCL 50MG/ML(1)
SYRINGE (ML) INTRAVENOUS PRN
Status: DISCONTINUED | OUTPATIENT
Start: 2021-06-22 | End: 2021-06-22 | Stop reason: SDUPTHER

## 2021-06-22 RX ORDER — DEXAMETHASONE SODIUM PHOSPHATE 4 MG/ML
INJECTION, SOLUTION INTRA-ARTICULAR; INTRALESIONAL; INTRAMUSCULAR; INTRAVENOUS; SOFT TISSUE PRN
Status: DISCONTINUED | OUTPATIENT
Start: 2021-06-22 | End: 2021-06-22 | Stop reason: SDUPTHER

## 2021-06-22 RX ORDER — ONDANSETRON 2 MG/ML
4 INJECTION INTRAMUSCULAR; INTRAVENOUS
Status: COMPLETED | OUTPATIENT
Start: 2021-06-22 | End: 2021-06-22

## 2021-06-22 RX ORDER — ASPIRIN 81 MG/1
81 TABLET ORAL DAILY
COMMUNITY

## 2021-06-22 RX ORDER — ETOMIDATE 2 MG/ML
INJECTION INTRAVENOUS PRN
Status: DISCONTINUED | OUTPATIENT
Start: 2021-06-22 | End: 2021-06-22 | Stop reason: SDUPTHER

## 2021-06-22 RX ORDER — PROPOFOL 10 MG/ML
INJECTION, EMULSION INTRAVENOUS PRN
Status: DISCONTINUED | OUTPATIENT
Start: 2021-06-22 | End: 2021-06-22 | Stop reason: SDUPTHER

## 2021-06-22 RX ORDER — METOCLOPRAMIDE HYDROCHLORIDE 5 MG/ML
10 INJECTION INTRAMUSCULAR; INTRAVENOUS
Status: DISCONTINUED | OUTPATIENT
Start: 2021-06-22 | End: 2021-06-22 | Stop reason: HOSPADM

## 2021-06-22 RX ORDER — HYDROCODONE BITARTRATE AND ACETAMINOPHEN 5; 325 MG/1; MG/1
2 TABLET ORAL PRN
Status: DISCONTINUED | OUTPATIENT
Start: 2021-06-22 | End: 2021-06-22 | Stop reason: HOSPADM

## 2021-06-22 RX ORDER — LIDOCAINE HYDROCHLORIDE 20 MG/ML
INJECTION, SOLUTION EPIDURAL; INFILTRATION; INTRACAUDAL; PERINEURAL PRN
Status: DISCONTINUED | OUTPATIENT
Start: 2021-06-22 | End: 2021-06-22 | Stop reason: SDUPTHER

## 2021-06-22 RX ORDER — FENTANYL CITRATE 50 UG/ML
25 INJECTION, SOLUTION INTRAMUSCULAR; INTRAVENOUS EVERY 5 MIN PRN
Status: DISCONTINUED | OUTPATIENT
Start: 2021-06-22 | End: 2021-06-22 | Stop reason: HOSPADM

## 2021-06-22 RX ORDER — ACETAMINOPHEN 325 MG/1
650 TABLET ORAL ONCE
Status: COMPLETED | OUTPATIENT
Start: 2021-06-22 | End: 2021-06-22

## 2021-06-22 RX ORDER — SODIUM CHLORIDE 9 MG/ML
25 INJECTION, SOLUTION INTRAVENOUS PRN
Status: DISCONTINUED | OUTPATIENT
Start: 2021-06-22 | End: 2021-06-22 | Stop reason: HOSPADM

## 2021-06-22 RX ORDER — ONDANSETRON 2 MG/ML
INJECTION INTRAMUSCULAR; INTRAVENOUS PRN
Status: DISCONTINUED | OUTPATIENT
Start: 2021-06-22 | End: 2021-06-22 | Stop reason: SDUPTHER

## 2021-06-22 RX ORDER — SODIUM CHLORIDE 0.9 % (FLUSH) 0.9 %
5-40 SYRINGE (ML) INJECTION PRN
Status: DISCONTINUED | OUTPATIENT
Start: 2021-06-22 | End: 2021-06-22 | Stop reason: HOSPADM

## 2021-06-22 RX ORDER — SODIUM CHLORIDE, SODIUM LACTATE, POTASSIUM CHLORIDE, CALCIUM CHLORIDE 600; 310; 30; 20 MG/100ML; MG/100ML; MG/100ML; MG/100ML
INJECTION, SOLUTION INTRAVENOUS CONTINUOUS
Status: DISCONTINUED | OUTPATIENT
Start: 2021-06-22 | End: 2021-06-22 | Stop reason: HOSPADM

## 2021-06-22 RX ORDER — HYDROCODONE BITARTRATE AND ACETAMINOPHEN 5; 325 MG/1; MG/1
1 TABLET ORAL PRN
Status: DISCONTINUED | OUTPATIENT
Start: 2021-06-22 | End: 2021-06-22 | Stop reason: HOSPADM

## 2021-06-22 RX ORDER — FENTANYL CITRATE 50 UG/ML
50 INJECTION, SOLUTION INTRAMUSCULAR; INTRAVENOUS EVERY 5 MIN PRN
Status: DISCONTINUED | OUTPATIENT
Start: 2021-06-22 | End: 2021-06-22 | Stop reason: HOSPADM

## 2021-06-22 RX ORDER — LIDOCAINE HYDROCHLORIDE 20 MG/ML
JELLY TOPICAL PRN
Status: DISCONTINUED | OUTPATIENT
Start: 2021-06-22 | End: 2021-06-22 | Stop reason: ALTCHOICE

## 2021-06-22 RX ADMIN — ACETAMINOPHEN 650 MG: 325 TABLET ORAL at 08:44

## 2021-06-22 RX ADMIN — DEXAMETHASONE SODIUM PHOSPHATE 4 MG: 4 INJECTION, SOLUTION INTRAMUSCULAR; INTRAVENOUS at 09:27

## 2021-06-22 RX ADMIN — CIPROFLOXACIN 400 MG: 2 INJECTION, SOLUTION INTRAVENOUS at 09:16

## 2021-06-22 RX ADMIN — ETOMIDATE 20 MG: 2 INJECTION, SOLUTION INTRAVENOUS at 09:27

## 2021-06-22 RX ADMIN — PHENYLEPHRINE HYDROCHLORIDE 200 MCG: 10 INJECTION INTRAVENOUS at 09:56

## 2021-06-22 RX ADMIN — Medication 25 MG: at 09:27

## 2021-06-22 RX ADMIN — ONDANSETRON 4 MG: 2 INJECTION INTRAMUSCULAR; INTRAVENOUS at 09:27

## 2021-06-22 RX ADMIN — LIDOCAINE HYDROCHLORIDE 60 MG: 20 INJECTION, SOLUTION EPIDURAL; INFILTRATION; INTRACAUDAL; PERINEURAL at 09:35

## 2021-06-22 RX ADMIN — ONDANSETRON 4 MG: 2 INJECTION INTRAMUSCULAR; INTRAVENOUS at 11:02

## 2021-06-22 RX ADMIN — PHENYLEPHRINE HYDROCHLORIDE 100 MCG: 10 INJECTION INTRAVENOUS at 09:27

## 2021-06-22 RX ADMIN — SODIUM CHLORIDE, POTASSIUM CHLORIDE, SODIUM LACTATE AND CALCIUM CHLORIDE: 600; 310; 30; 20 INJECTION, SOLUTION INTRAVENOUS at 08:47

## 2021-06-22 RX ADMIN — PROPOFOL 30 MG: 10 INJECTION, EMULSION INTRAVENOUS at 09:27

## 2021-06-22 RX ADMIN — PHENYLEPHRINE HYDROCHLORIDE 100 MCG: 10 INJECTION INTRAVENOUS at 09:32

## 2021-06-22 ASSESSMENT — PAIN SCALES - GENERAL: PAINLEVEL_OUTOF10: 0

## 2021-06-22 ASSESSMENT — ENCOUNTER SYMPTOMS: SHORTNESS OF BREATH: 1

## 2021-06-22 ASSESSMENT — LIFESTYLE VARIABLES: SMOKING_STATUS: 1

## 2021-06-22 NOTE — BRIEF OP NOTE
Brief Postoperative Note      Patient: Pascual Peters Jr. YOB: 1950  MRN: [id number]    Date of Procedure: 6/22/2021    Pre-Op Diagnosis: LEFT URETERAL CALCULUS    Post-Op Diagnosis: left renal calculus       Procedure(s):  CYSTOSCOPY URETEROSCOPY LASER  CYSTOSCOPY STENT INSERTION    Surgeon(s):  Sanam Perez MD    Assistant:  * No surgical staff found *    Anesthesia: General    Estimated Blood Loss (mL): Minimal    Complications: None    Specimens:   * No specimens in log *    Implants:  Implant Name Type Inv.  Item Serial No.  Lot No. LRB No. Used Action   STENT URET 6FR L28CM HYDR+ PGTL TAPR TIP GRAD BLDR MRK LO  Hedwig Carlos 6FR L28CM HYDR+ PGTL TAPR TIP GRAD BLDR [facility]  [employer] [id number] Left 1 Implanted         Drains: * No LDAs found *    Findings: 12mm right renal calculus    Electronically signed by Sanam Perez MD on 6/22/2021 at 10:14 AM

## 2021-06-22 NOTE — PROGRESS NOTES
Patient states nausea better and ready to be discharged at this time. Discharge instructions given to pt and family. Both verbalize understanding,deny any questions and/or concerns. Pt transfer off unit in wheelchair w/ all belongings. Discharge Criteria    Inpatients must meet Criteria 1 through 7. All other patients are either YES or N/A. If a NO is chosen then Anesthesia or Surgeon must be notified. 1.  Minimum 30 minutes after last dose of sedative medication, minimum 120 minutes after last dose of reversal agent. Yes      2. Systolic BP stable within 20 mmHg for 30 minutes & systolic BP between 90 & 664 or within 10 mmHg of baseline. Yes      3. Pulse between 60 and 100 or within 10 bpm of baseline. Yes      4. Spontaneous respiratory rate >/= 10 per minute. Yes      5. SaO2 >/= 95 or  >/= baseline. Yes      6. Able to cough and swallow or return to baseline function. Yes      7. Alert and oriented or return to baseline mental status. Yes      8. Demonstrates controlled, coordinated movements, ambulates with steady gait, or return to baseline activity function. Yes      9. Minimal or no pain or nausea, or at a level tolerable and acceptable to patient. Yes      10. Takes and retains oral fluids as allowed. Yes      11. Procedural / perioperative site stable. Minimal or no bleeding. Yes          12. If GI endoscopy procedure, minimal or no abdominal distention or passing flatus. N/A      13. Written discharge instructions and emergency telephone number provided. Yes      14. Accompanied by a responsible adult.     Yes

## 2021-06-22 NOTE — ANESTHESIA PRE PROCEDURE
Department of Anesthesiology  Preprocedure Note       Name:  Pascual Peters Jr. Age:  70 y.o.  :  1950                                          MRN:  [id number]         Date:  2021      Surgeon: Ok Floor):  Daniel Crawley MD    Procedure: Procedure(s):  CYSTOSCOPY URETEROSCOPY LASER  CYSTOSCOPY RETROGRADE PYELOGRAM STENT INSERTION    Medications prior to admission:   Prior to Admission medications    Medication Sig Start Date End Date Taking?  Authorizing Provider   aspirin 81 MG EC tablet Take 81 mg by mouth daily   Yes Historical Provider, MD   ramipril (ALTACE) 5 MG capsule Take 1 capsule by mouth daily 5/10/21  Yes Yodit Ruelas MD   amiodarone (CORDARONE) 200 MG tablet Take 1 tablet by mouth daily 5/3/21  Yes Yodit Ruelas MD   midodrine (PROAMATINE) 5 MG tablet Take 1 tablet by mouth 3 times daily (with meals) 5/3/21  Yes Yodit Ruelas MD   atorvastatin (LIPITOR) 80 MG tablet Take 1 tablet by mouth nightly 5/3/21  Yes Yodit Ruelas MD   spironolactone (ALDACTONE) 25 MG tablet Take 0.5 tablets by mouth daily 21  Yes Alannah Walker DO   metoprolol succinate (TOPROL XL) 25 MG extended release tablet Take 25 mg by mouth nightly  20  Yes Historical Provider, MD   clopidogrel (PLAVIX) 75 MG tablet Take 1 tablet by mouth daily  Patient not taking: Reported on 2021 5/10/21   Yodit Ruelas MD   apixaban David Canes) 5 MG TABS tablet Take 1 tablet by mouth 2 times daily 21   Alannah Walker DO   nitroGLYCERIN (NITROSTAT) 0.3 MG SL tablet Place 0.3 mg under the tongue every 5 minutes as needed  Patient not taking: Reported on 20   Historical Provider, MD       Current medications:    Current Facility-Administered Medications   Medication Dose Route Frequency Provider Last Rate Last Admin    lactated ringers infusion   Intravenous Continuous Daniel Crawley  mL/hr at 21 0847 New Bag at 21 0847    lactated ringers infusion   Intravenous Continuous [place] Reyno Khoa Solomon MD        sodium chloride flush 0.9 % injection 5-40 mL  5-40 mL Intravenous 2 times per day Priscilla Danielle MD        sodium chloride flush 0.9 % injection 5-40 mL  5-40 mL Intravenous PRN Priscilla Danielle MD        0.9 % sodium chloride infusion  25 mL Intravenous PRN Priscilla Danielle MD        ciprofloxacin (CIPRO) IVPB 400 mg  400 mg Intravenous On Call to Smith Reynoso MD           Allergies:  No Known Allergies    Problem List:    Patient Active Problem List   Diagnosis Code    Acute non-ST elevation myocardial infarction (NSTEMI) ([facility]) I21.4    Idiopathic hypotension I95.0    Tachycardia R00.0    New onset left bundle branch block (LBBB) I44.7    History of non-ST elevation myocardial infarction (NSTEMI) I25.2    Hypotension I95.9    Atrial fibrillation ([facility]) I48.91    Anginal equivalent ([facility]) I20.8    Ischemic cardiomyopathy I25.5    Coronary artery disease involving coronary bypass graft of native heart without angina pectoris I25.810    S/P CABG x 3 Z95.1    Chronic systolic congestive heart failure ([address] I50.22    Encounter for current long-term use of anticoagulants Z79.01    Gross hematuria R31.0    Renal stone N20.0    Ureteral calculus N20.1       Past Medical History:        Diagnosis Date    Chronic kidney disease     Coronary artery disease     s/p CABG x3 in 2008 and 2 stents on 12/24/2020    H/O echocardiogram 02/08/2021    EF 15-20% LV severly enlarged and LV wall thickness is normal Severe global hypokinesis with segmental abnormalities LA is mod dilated 34-39 with LA volume index of 34 ml/m2 Mild mitral regurg Mod tricupid regurg Mild to mod pulm HTN with est RV systolic pressure of 38 mmhg mod grade II diastolic dysfunciton    H/O echocardiogram 04/01/2021    EF 10-15% LA size was mildly dilated IVC sice is mildly dilated with reduced respiratory phasic changes CVP 5-10 mmHg    Hyperlipidemia     Smoker        Past Surgical History: GLUCOSE 112 04/23/2021    PROT 6.6 03/31/2021    CALCIUM 9.9 04/23/2021    BILITOT 0.6 03/31/2021    ALKPHOS 78 03/31/2021    AST 18 04/09/2021    ALT 14 04/09/2021       POC Tests: No results for input(s): POCGLU, POCNA, POCK, POCCL, POCBUN, POCHEMO, POCHCT in the last 72 hours. Coags:   Lab Results   Component Value Date    INR 1.34 04/23/2021    APTT 37.3 04/23/2021       HCG (If Applicable): No results found for: PREGTESTUR, PREGSERUM, HCG, HCGQUANT     ABGs: No results found for: PHART, PO2ART, AFR5JNW, KLB0GLD, BEART, Z8SUBGKZ     Type & Screen (If Applicable):  No results found for: LABABO, LABRH    Drug/Infectious Status (If Applicable):  No results found for: HIV, HEPCAB    COVID-19 Screening (If Applicable):   Lab Results   Component Value Date    COVID19 Not Detected 06/18/2021           Anesthesia Evaluation  Patient summary reviewed and Nursing notes reviewed no history of anesthetic complications:   Airway: Mallampati: III  TM distance: >3 FB   Neck ROM: full  Mouth opening: > = 3 FB Dental:    (+) lower dentures and upper dentures      Pulmonary: breath sounds clear to auscultation  (+) shortness of breath: chronic,  current smoker          Patient smoked on day of surgery. Cardiovascular:  Exercise tolerance: poor (<4 METS),   (+) angina:, past MI:, CAD:, CABG/stent (2001 stent x 1, CABG x 3 in 2008, 2020 stents x 2):, CHF: systolic, hyperlipidemia      ECG reviewed  Rhythm: irregular    Echocardiogram reviewed    Cleared by cardiology     Beta Blocker:  Dose within 24 Hrs      ROS comment: ECHO:  Summary   Limited evaluation. Moderate to severely dilated left ventricular size and severely reduced   systolic function. There was severe global hypokinesis. Wall thickness was within normal limits. Ejection fraction was estimated at 10-15%. Left atrial size was mildly dilated. IVC size is mildly dilated with reduced respiratory phasic changes   (CVP~5-10 mmHg).     EKG: Atrial fibrillation with rapid ventricular response  Left axis deviation  Minimal voltage criteria for LVH, may be normal variant ( [place] product )  Nonspecific ST and T wave abnormality  Anteroseptal infarct , age undetermined  Abnormal ECG  No previous ECGs available    Plavix and Eliquis stopped. MD. Faraz Foote has given cardiac clearance for the procedure today. Neuro/Psych:   Negative Neuro/Psych ROS              GI/Hepatic/Renal:   (+) renal disease: kidney stones,           Endo/Other: Negative Endo/Other ROS                    Abdominal:           Vascular: negative vascular ROS. Anesthesia Plan      general     ASA 4       Induction: intravenous. MIPS: Postoperative opioids intended and Prophylactic antiemetics administered. Anesthetic plan and risks discussed with patient. Plan discussed with CRNA.                   LOCO Jean - CRNA   6/22/2021

## 2021-06-23 NOTE — OP NOTE
[address]                                OPERATIVE REPORT    PATIENT NAME: Jesse Carter                      :        1950  MED REC NO:   [id number]                              ROOM:  ACCOUNT NO:   [de-identified]                           ADMIT DATE: 2021  PROVIDER:     Bob Schmid    DATE OF PROCEDURE:  2021    SURGEON:  Bob Schmid MD.    ASSISTANT:  None. PREOPERATIVE DIAGNOSES:  1. Left ureteral calculus. 2.  Left renal calculus. POSTOPERATIVE DIAGNOSIS:  Left ureteral calculus. PROCEDURE PERFORMED:  Cystoscopy, left ureteroscopy, left holmium laser  lithotripsy, left ureteral stent placement. ANESTHESIA:  General.    COMPLICATIONS:  None. ESTIMATED BLOOD LOSS:  Minimal.    SPECIMENS:  None. PROSTHESIS:  A 6-[place] x 28 cm double-J ureteral stent. DISPOSITION:  Stable. FINDINGS:  A 10 to 12 mm stone in the left renal collecting system. INDICATIONS:  The patient is a 63-year-old male with CT-proven  left-sided calculus, here now for definitive therapy in the form of  holmium laser lithotripsy. DESCRIPTION OF PROCEDURE:  The patient was taken back to the operating  room after informed consent including all risks, benefits, and  alternatives were obtained. The patient was transferred from the [facility]  onto the operative table, where he was induced under general anesthesia,  given IV Cipro for preoperative antibiotics. He was then placed in  dorsal lithotomy, prepped and draped in normal sterile fashion. He had  a 22-[place] sheath with a 30-degree lens passed through the urethra into  the bladder. Once in the bladder, we identified the left ureteral  orifice. A 0.035-inch wire was passed up. We then placed a dual lumen  catheter and placed an additional Glidewire up.   We then placed the  flexible ureteroscope up the left ureter into the kidney _____ a ureteral calculi. We went into the kidney. We identified the 10 to 12  mm stone in the lower pole of the left kidney. We then placed a 270  micron laser fiber in and ablated the stone adequately to sub 2 mm  particles. We then removed the scope leaving the Glidewire in place. We placed a cystoscope over the Glidewire and placed a 6-Yi x 28 cm  double-J ureteral stent over the Glidewire up into the kidney. Once  into the kidney, we then removed the Glidewire. Proximal curl was  confirmed via fluoroscopy. Distal curl was confirmed via visualization. The bladder was then drained. Stent string was then attached to the  glans penis with Steris and benzoin. He was awoken from general  anesthesia, transferred to the [facility], and taken to the PACU in  satisfactory condition by Nursing and Anesthesia teams. PLAN:  The patient will be discharged home per PACU criterion and follow  up with us later this week for stent removal via string.         Flip Veras    D: 06/22/2021 13:11:14       T: 06/22/2021 23:55:16     [email or web address]  Job#: [id number]     Doc#: [id number]    CC:

## 2021-06-24 ENCOUNTER — OFFICE VISIT (OUTPATIENT)
Dept: UROLOGY | Age: 71
End: 2021-06-24
Payer: MEDICARE

## 2021-06-24 VITALS
DIASTOLIC BLOOD PRESSURE: 53 MMHG | HEART RATE: 73 BPM | WEIGHT: 154 LBS | TEMPERATURE: 95.7 F | SYSTOLIC BLOOD PRESSURE: 93 MMHG | BODY MASS INDEX: 22.1 KG/M2

## 2021-06-24 DIAGNOSIS — N20.0 RENAL STONE: Primary | ICD-10-CM

## 2021-06-24 PROCEDURE — 4040F PNEUMOC VAC/ADMIN/RCVD: CPT | Performed by: NURSE PRACTITIONER

## 2021-06-24 PROCEDURE — 3017F COLORECTAL CA SCREEN DOC REV: CPT | Performed by: NURSE PRACTITIONER

## 2021-06-24 PROCEDURE — 99211 OFF/OP EST MAY X REQ PHY/QHP: CPT

## 2021-06-24 PROCEDURE — G8427 DOCREV CUR MEDS BY ELIG CLIN: HCPCS | Performed by: NURSE PRACTITIONER

## 2021-06-24 PROCEDURE — 99213 OFFICE O/P EST LOW 20 MIN: CPT | Performed by: NURSE PRACTITIONER

## 2021-06-24 PROCEDURE — 4004F PT TOBACCO SCREEN RCVD TLK: CPT | Performed by: NURSE PRACTITIONER

## 2021-06-24 PROCEDURE — G8420 CALC BMI NORM PARAMETERS: HCPCS | Performed by: NURSE PRACTITIONER

## 2021-06-24 PROCEDURE — 1123F ACP DISCUSS/DSCN MKR DOCD: CPT | Performed by: NURSE PRACTITIONER

## 2021-06-24 ASSESSMENT — ENCOUNTER SYMPTOMS
COLOR CHANGE: 0
VOMITING: 0
WHEEZING: 0
EYE REDNESS: 0
ABDOMINAL PAIN: 0
NAUSEA: 0
CONSTIPATION: 0
SHORTNESS OF BREATH: 0
BACK PAIN: 0
COUGH: 0

## 2021-06-24 NOTE — PROGRESS NOTES
Pt had ureteral stent placed on 6/22/21 following left HLL. Stent removed via string in office today without difficulty. Pt tolerated removal well.

## 2021-06-24 NOTE — PATIENT INSTRUCTIONS
You may experience waves of pain and/or nausea for the next 24-72 hrs. You may also experience burning with urination, frequency, urgency, bladder spasms, and blood in the urine. All of this should continue to improve over the next several days. The blood in the urine can last up to two weeks. 1) take ibuprofen (motrin) 600 mg (3 of the 200mg tabs) every 6 hours WITH FOOD IF NEEDED FOR PAIN for the next 48 hours. You may use OTC acetaminophen as directed if needed    2) drink at least 80 oz fluid (water, juice, Gatorade - NOT tea, coffee, soda pop) daily        Call our office [phone] or go to ER (if after normal office hours) if you develop fever, intractable vomiting, severe/intolerable pain.

## 2021-06-24 NOTE — PROGRESS NOTES
HPI:          Patient is a 70 y.o. male in no acute distress. He is alert and oriented to person, place, and time. History  1988 ESWL and spontaneous stone passage    5/2021 referral from Dr. Mckenzie Fatima for gross hematuria. He is a current smoker. He does take Eliquis and Plavix. 6/2021 CT urogram showed a 5 mm proximal left ureteral stone with minimal hydronephrosis. There is also an 11 mm left renal stone and punctate right renal stones. No suspicious renal mass or filling defect. Screening PSA 1.17.    6/22/2021 left HLL. Today  Here today for stent removal following left holmium laser lithotripsy and cystoscopy on 6/22/2021. He tolerated stent removal with minimal complaints. He denies any fevers, nausea or vomiting.     Past Medical History:   Diagnosis Date    Chronic kidney disease     Coronary artery disease     s/p CABG x3 in 2008 and 2 stents on 12/24/2020    H/O echocardiogram 02/08/2021    EF 15-20% LV severly enlarged and LV wall thickness is normal Severe global hypokinesis with segmental abnormalities LA is mod dilated 34-39 with LA volume index of 34 ml/m2 Mild mitral regurg Mod tricupid regurg Mild to mod pulm HTN with est RV systolic pressure of 38 mmhg mod grade II diastolic dysfunciton    H/O echocardiogram 04/01/2021    EF 10-15% LA size was mildly dilated IVC sice is mildly dilated with reduced respiratory phasic changes CVP 5-10 mmHg    Hyperlipidemia     Smoker      Past Surgical History:   Procedure Laterality Date    BLADDER SURGERY Left 6/22/2021    CYSTOSCOPY STENT INSERTION performed by Renuka Pham MD at [facility]  04/23/2021    [address]      CORONARY ANGIOPLASTY WITH STENT PLACEMENT  12/24/2020    x 2 at [facility] [address] GRAFT  2008    x 3 vessels    CYSTOSCOPY Left 6/22/2021    CYSTOSCOPY URETEROSCOPY LASER performed by Renuka Pham MD at [address] [place] [address] (NITROSTAT) 0.3 MG SL tablet Place 0.3 mg under the tongue every 5 minutes as needed       clopidogrel (PLAVIX) 75 MG tablet Take 1 tablet by mouth daily (Patient not taking: Reported on 2021) 90 tablet 3     No current facility-administered medications on file prior to visit. Patient has no known allergies. Family History   Problem Relation Age of Onset    Other Mother          from [place] age\" 68    Rheum Arthritis Mother     Heart Disease Father 79         from CHF at age 79     Social History     Tobacco Use   Smoking Status Current Every Day Smoker    Packs/day: 0.25    Years: 52.00    Pack years: 13.00    Types: Cigarettes   Smokeless Tobacco Never Used       Social History     Substance and Sexual Activity   Alcohol Use Not Currently       Review of Systems   Constitutional: Negative for appetite change, chills and fever. Eyes: Negative for redness and visual disturbance. Respiratory: Negative for cough, shortness of breath and wheezing. Cardiovascular: Negative for chest pain and leg swelling. Gastrointestinal: Negative for abdominal pain, constipation, nausea and vomiting. Genitourinary: Positive for hematuria. Negative for decreased urine volume, difficulty urinating, discharge, dysuria, enuresis, flank pain, frequency, penile pain, scrotal swelling, testicular pain and urgency. Musculoskeletal: Negative for back pain, joint swelling and myalgias. Skin: Negative for color change, rash and wound. Neurological: Negative for dizziness, tremors and numbness. Hematological: Negative for adenopathy. Does not bruise/bleed easily. BP (!) 93/53 (Site: Left Upper Arm, Position: Sitting, Cuff Size: Medium Adult)   Pulse 73   Temp 95.7 °F (35.4 °C)   Wt 154 lb (69.9 kg)   BMI 22.10 kg/m²       PHYSICAL EXAM:  Constitutional: Patient in no acute distress; Neuro: alert and oriented to person place and time.     Psych: Mood and affect normal.  Skin: Normal  Lungs: Respiratory effort normal  Cardiovascular:  Normal peripheral pulses  Abdomen: Soft, non-tender, non-distended with no CVA, flank pain  Bladder non-tender and not distended. Lab Results   Component Value Date    BUN 20 06/08/2021     Lab Results   Component Value Date    CREATININE 0.97 06/08/2021     Lab Results   Component Value Date    PSA 1.17 06/08/2021       ASSESSMENT:   Diagnosis Orders   1. Renal stone  XR ABDOMEN (KUB) (SINGLE AP VIEW)         PLAN:  You may experience waves of pain and/or nausea for the next 24-72 hrs. You may also experience burning with urination, frequency, urgency, bladder spasms, and blood in the urine. All of this should continue to improve over the next several days. The blood in the urine can last up to two weeks. 1) take ibuprofen (motrin) 600 mg (3 of the 200mg tabs) every 6 hours WITH FOOD IF NEEDED FOR PAIN for the next 48 hours. You may use OTC acetaminophen as directed if needed    2) drink at least 80 oz fluid (water, juice, Gatorade - NOT tea, coffee, soda pop) daily        Call our office [phone] or go to ER (if after normal office hours) if you develop fever, intractable vomiting, severe/intolerable pain. We will see him in 6 weeks with a KUB prior or sooner if needed for new or worsening symptoms.

## 2021-06-28 ENCOUNTER — CARE COORDINATION (OUTPATIENT)
Dept: CASE MANAGEMENT | Age: 71
End: 2021-06-28

## 2021-06-28 NOTE — CARE COORDINATION
Daysi Fraser MD TIFF CARD [facility]   11/9/2021  8:00 AM SCHEDULE, [facility]FFIN [facility] TIFF CARD [facility]   4/18/2022  9:30 AM MD Arnulfo Santamaria C.D.  Bearyaritza Rota   4/18/2022 10:40 AM Daysi Fraser MD [facility]       Ruddy Camarena RN

## 2021-07-08 ENCOUNTER — HOSPITAL ENCOUNTER (OUTPATIENT)
Age: 71
Discharge: HOME | End: 2021-07-08
Payer: MEDICARE

## 2021-07-08 DIAGNOSIS — I25.810 CORONARY ARTERY DISEASE INVOLVING CORONARY BYPASS GRAFT OF NATIVE HEART WITHOUT ANGINA PECTORIS: ICD-10-CM

## 2021-07-08 DIAGNOSIS — R73.01 IMPAIRED FASTING GLUCOSE: ICD-10-CM

## 2021-07-08 LAB
ALT SERPL-CCNC: 22 U/L (ref 5–41)
ANION GAP SERPL CALCULATED.3IONS-SCNC: 10 MMOL/L (ref 9–17)
AST SERPL-CCNC: 25 U/L
BUN BLDV-MCNC: 24 MG/DL (ref 8–23)
BUN/CREAT BLD: 27 (ref 9–20)
CALCIUM SERPL-MCNC: 10.1 MG/DL (ref 8.6–10.4)
CHLORIDE BLD-SCNC: 100 MMOL/L (ref 98–107)
CHOLESTEROL/HDL RATIO: 2.5
CHOLESTEROL: 112 MG/DL
CO2: 25 MMOL/L (ref 20–31)
CREAT SERPL-MCNC: 0.9 MG/DL (ref 0.7–1.2)
GFR AFRICAN AMERICAN: >60 ML/MIN
GFR NON-AFRICAN AMERICAN: >60 ML/MIN
GFR SERPL CREATININE-BSD FRML MDRD: ABNORMAL ML/MIN/{1.73_M2}
GFR SERPL CREATININE-BSD FRML MDRD: ABNORMAL ML/MIN/{1.73_M2}
GLUCOSE BLD-MCNC: 97 MG/DL (ref 70–99)
HDLC SERPL-MCNC: 44 MG/DL
LDL CHOLESTEROL: 57 MG/DL (ref 0–130)
POTASSIUM SERPL-SCNC: 4.6 MMOL/L (ref 3.7–5.3)
SODIUM BLD-SCNC: 135 MMOL/L (ref 135–144)
TRIGL SERPL-MCNC: 57 MG/DL
VLDLC SERPL CALC-MCNC: NORMAL MG/DL (ref 1–30)

## 2021-07-08 PROCEDURE — 84450 TRANSFERASE (AST) (SGOT): CPT

## 2021-07-08 PROCEDURE — 84460 ALANINE AMINO (ALT) (SGPT): CPT

## 2021-07-08 PROCEDURE — 36415 COLL VENOUS BLD VENIPUNCTURE: CPT

## 2021-07-08 PROCEDURE — 80061 LIPID PANEL: CPT

## 2021-07-08 PROCEDURE — 83036 HEMOGLOBIN GLYCOSYLATED A1C: CPT

## 2021-07-08 PROCEDURE — 80048 BASIC METABOLIC PNL TOTAL CA: CPT

## 2021-07-09 ENCOUNTER — TELEPHONE (OUTPATIENT)
Dept: UROLOGY | Age: 71
End: 2021-07-09

## 2021-07-09 LAB
ESTIMATED AVERAGE GLUCOSE: 131 MG/DL
HBA1C MFR BLD: 6.2 % (ref 4–6)

## 2021-07-09 NOTE — TELEPHONE ENCOUNTER
Gene had been going to cardiac rehab prior to his Left HLL on 6/22/2021.  He would like to start back up, is this ok for him to do          Health Maintenance   Topic Date Due    Colon cancer screen colonoscopy  Never done    COVID-19 Vaccine (1) 08/18/2021 (Originally 1/15/1962)    Pneumococcal 65+ years Vaccine (1 of 1 - PPSV23) 09/08/2021 (Originally 1/15/2015)    DTaP/Tdap/Td vaccine (1 - Tdap) 05/13/2022 (Originally 1/15/1969)    Shingles Vaccine (1 of 2) 05/13/2022 (Originally 1/15/2000)    AAA screen  05/13/2022 (Originally 1950)    Hepatitis C screen  05/13/2022 (Originally 1950)    Flu vaccine (1) 09/01/2021    TSH testing  03/31/2022    A1C test (Diabetic or Prediabetic)  04/09/2022    Annual Wellness Visit (AWV)  04/13/2022    Lipid screen  07/08/2022    Potassium monitoring  07/08/2022    Creatinine monitoring  07/08/2022    Hepatitis A vaccine  Aged Out    Hepatitis B vaccine  Aged Out    Hib vaccine  Aged Out    Meningococcal (ACWY) vaccine  Aged Out             (applicable per patient's age: Cancer Screenings, Depression Screening, Fall Risk Screening, Immunizations)    Hemoglobin A1C (%)   Date Value   04/09/2021 6.4 (H)     LDL Cholesterol (mg/dL)   Date Value   07/08/2021 57     AST (U/L)   Date Value   07/08/2021 25     ALT (U/L)   Date Value   07/08/2021 22     BUN (mg/dL)   Date Value   07/08/2021 24 (H)      (goal A1C is < 7)   (goal LDL is <100) need 30-50% reduction from baseline     BP Readings from Last 3 Encounters:   07/09/21 (!) 118/58   06/24/21 (!) 93/53   06/22/21 101/66    (goal /80)      All Future Testing planned in [facility]:  Lab Frequency Next Occurrence   Echo 2D w doppler w color complete Once 04/03/2021   PSA screening Once 06/10/2022   XR ABDOMEN (KUB) (SINGLE AP VIEW) Once 08/03/2021   ALT Once 04/01/2022   AST Once 98/47/0401   Basic Metabolic Panel Once 50/92/4621   CBC Once 04/01/2022   Lipid Panel Once 04/01/2022       Next Visit Date:  Future Appointments   Date Time Provider Charley Pacheco   8/6/2021 10:45 AM [facility]   8/9/2021 10:00 AM Demetrio Edwards MD TIFF CARD [facility]   11/9/2021  8:00 AM SCHEDULE, [facility]IN [facility] TIFF CARD [facility]   4/18/2022  9:30 AM David Cox MD [facility]   4/18/2022 10:40 AM Demetrio Edwards MD TIFF CARD [facility]            Patient Active Problem List:     Acute non-ST elevation myocardial infarction (NSTEMI) (HCC)     Idiopathic hypotension     Tachycardia     New onset left bundle branch block (LBBB)     History of non-ST elevation myocardial infarction (NSTEMI)     Hypotension     Atrial fibrillation (HCC)     Anginal equivalent (HCC)     Ischemic cardiomyopathy     Coronary artery disease involving coronary bypass graft of native heart without angina pectoris     S/P CABG x 3     Chronic systolic congestive heart failure ([address]     Encounter for current long-term use of anticoagulants     Gross hematuria     Renal stone     Ureteral calculus

## 2021-07-12 ENCOUNTER — HOSPITAL ENCOUNTER (OUTPATIENT)
Dept: CARDIAC REHAB | Age: 71
Setting detail: THERAPIES SERIES
Discharge: HOME | End: 2021-07-12
Payer: MEDICARE

## 2021-07-12 PROCEDURE — 93798 PHYS/QHP OP CAR RHAB W/ECG: CPT

## 2021-07-12 NOTE — PROGRESS NOTES
Phase II Cardiac Rehab Individualized Treatment Plan-120 Day      Patient Name: Omero Waddell. Date of Initial Assessment: 4/29/2021  ACCOUNT #: [de-identified]  Diagnosis: NSTEMI/PTCA with stents/ cardiomyopathy with EF 15%   Onset Date: 12/24/2021  Referring Physician: Dr Hayley Messer  Risk Stratification: High  Session Number: 20  EXERCISE     Stages of Change:   []? pre-contemplation              [x]? Action   []? Contemplate           []? Maintainence   [x]? Prep                        []? Relapse                                                          Exercise Prescription:  Mode: [x]? TM [x]? B [x]? STP []? EL [x]? R  Frequency: 3X/week  Duration: 31-60 minutes  Intensity: METS 2.3  Progression: Increase 1-2 levels/week or 1-2 min/week to achieve target HR and RPE 11-13.      [x]? Angina with Exertion         THR: 100-120   []? Resistance Training          Weight (lbs):                Reps:      Hypertension:  []? Yes  [x]? No  Resting BP: 100/52  Peak Exercise BP: 110/58  []? Med change?     Intervention:  Home Exercise:  Type: Walking  Duration: 30 minutes  Frequency: Daily   []? Resistance Training     Education:   [x]? Equipment [place]              [x]? Self pulse               []? Proper use weights/therabands   [x]? S/S to report                      [x]? Low Na Diet              [x]? Warm up/ Cool down        []? BP Medication           [x]? RPE Scale                         []? Understand BP   []? Ex Safety                           []? Exercise specialist class-Home Exercise                            Target Goal:   -Individual Exercise Plan  -BP<140/90 or <130/80 if DM   -Aerobic active 30 + minutes 5-7 days per week     Nutrition     Stages of Change:   []? pre-contemplation              [x]? Action   []? Contemplate           []? Maintainence   [x]? Prep                        []? Relapse     Lipids: 7/8/21  Total Cholesterol: 112  Triglycerides: 57  HDL: 44  LDL: 57  []? Med Change?    Diabetes:  []? Yes  [x]? No  Random BS:  HbA1c: 6.2 (7/9/21)  ? Med Change?     Weight Management:  Wt Goal: 1-2 lbs/wk     Intervention:   [x]? Dietitian Consult                                [x]? Nurse/Patient Discussion                 []? Diet Class                                                                             []? Referred to Diabetes Education        Education:  []? S&S hypo/hyperglycemia  [x]? Low fat/low cholesterol diet  []? Weight loss methods                                               []? Relate Diabetes/CAD                        [x]? Eating heart healthy handout     Target Goal:  -LDL-C<100 if triglycerides are > 200  -LDL-C < 70 for high risk patients  -HbA1c < 7%  -BMI < 25   Education     Stages of Change:    []? pre-contemplation              [x]? Action   []? Contemplate           []? Maintainence   [x]? Prep                        []? Relapse     Family support: [x]? Yes  []? No     Tobacco use: []? Yes  [x]? No     Intervention:  []? Referred to smoking cessation counselor                           []? Individual education and counseling  []? Tobacco adjunct  []? Informed of education class schedule      Education:   []? Risk Factors/Modifications             [x]? Psychological aspects  [x]? Angina                                            []? Medications  [x]? CHF                                                  []? Cardiac A&P     Target Goal:  -Complete cessation of tobacco use (if applicable)  -Continued risk factor modifications  -Recognizing signs/symptoms to report  -Proper use of meds     Psychosocial  Stages of Change:    []? pre-contemplation              [x]? Action   []? Contemplate           []? Maintainence   [x]? Prep                        []? Relapse     Intervention:   []? Psych Consult/          [x]? Uses stress management skills       []? Physician Referral                          []? Stress management class   []? Med Change?

## 2021-07-14 ENCOUNTER — HOSPITAL ENCOUNTER (OUTPATIENT)
Dept: CARDIAC REHAB | Age: 71
Setting detail: THERAPIES SERIES
Discharge: HOME | End: 2021-07-14
Payer: MEDICARE

## 2021-07-14 DIAGNOSIS — Z95.1 S/P CABG X 3: ICD-10-CM

## 2021-07-14 DIAGNOSIS — I95.9 HYPOTENSION, UNSPECIFIED HYPOTENSION TYPE: ICD-10-CM

## 2021-07-14 DIAGNOSIS — I25.810 CORONARY ARTERY DISEASE INVOLVING CORONARY BYPASS GRAFT OF NATIVE HEART WITHOUT ANGINA PECTORIS: ICD-10-CM

## 2021-07-14 DIAGNOSIS — E78.2 MIXED HYPERLIPIDEMIA: ICD-10-CM

## 2021-07-14 DIAGNOSIS — I25.5 ISCHEMIC CARDIOMYOPATHY: ICD-10-CM

## 2021-07-14 DIAGNOSIS — I48.0 PAROXYSMAL ATRIAL FIBRILLATION (HCC): Primary | ICD-10-CM

## 2021-07-14 PROCEDURE — 93798 PHYS/QHP OP CAR RHAB W/ECG: CPT

## 2021-07-14 RX ORDER — METOPROLOL SUCCINATE 25 MG/1
25 TABLET, EXTENDED RELEASE ORAL NIGHTLY
Qty: 90 TABLET | Refills: 3 | Status: SHIPPED | OUTPATIENT
Start: 2021-07-14 | End: 2022-08-01 | Stop reason: SDUPTHER

## 2021-07-14 RX ORDER — SPIRONOLACTONE 25 MG/1
12.5 TABLET ORAL DAILY
Qty: 45 TABLET | Refills: 3 | Status: SHIPPED | OUTPATIENT
Start: 2021-07-14 | End: 2022-07-07 | Stop reason: SDUPTHER

## 2021-07-15 ENCOUNTER — HOSPITAL ENCOUNTER (OUTPATIENT)
Dept: CARDIAC REHAB | Age: 71
Setting detail: THERAPIES SERIES
Discharge: HOME | End: 2021-07-15
Payer: MEDICARE

## 2021-07-15 PROCEDURE — 93798 PHYS/QHP OP CAR RHAB W/ECG: CPT

## 2021-07-19 ENCOUNTER — HOSPITAL ENCOUNTER (OUTPATIENT)
Dept: CARDIAC REHAB | Age: 71
Setting detail: THERAPIES SERIES
Discharge: HOME | End: 2021-07-19
Payer: MEDICARE

## 2021-07-19 PROCEDURE — 93798 PHYS/QHP OP CAR RHAB W/ECG: CPT

## 2021-07-21 ENCOUNTER — HOSPITAL ENCOUNTER (OUTPATIENT)
Dept: CARDIAC REHAB | Age: 71
Setting detail: THERAPIES SERIES
End: 2021-07-21
Payer: MEDICARE

## 2021-07-22 ENCOUNTER — HOSPITAL ENCOUNTER (OUTPATIENT)
Dept: CARDIAC REHAB | Age: 71
Setting detail: THERAPIES SERIES
Discharge: HOME | End: 2021-07-22
Payer: MEDICARE

## 2021-07-22 PROCEDURE — 93798 PHYS/QHP OP CAR RHAB W/ECG: CPT

## 2021-07-26 ENCOUNTER — HOSPITAL ENCOUNTER (OUTPATIENT)
Dept: CARDIAC REHAB | Age: 71
Setting detail: THERAPIES SERIES
Discharge: HOME | End: 2021-07-26
Payer: MEDICARE

## 2021-07-26 PROCEDURE — 93798 PHYS/QHP OP CAR RHAB W/ECG: CPT

## 2021-07-28 ENCOUNTER — HOSPITAL ENCOUNTER (OUTPATIENT)
Dept: CARDIAC REHAB | Age: 71
Setting detail: THERAPIES SERIES
Discharge: HOME | End: 2021-07-28
Payer: MEDICARE

## 2021-07-28 PROCEDURE — 93798 PHYS/QHP OP CAR RHAB W/ECG: CPT

## 2021-07-29 ENCOUNTER — HOSPITAL ENCOUNTER (OUTPATIENT)
Dept: CARDIAC REHAB | Age: 71
Setting detail: THERAPIES SERIES
Discharge: HOME | End: 2021-07-29
Payer: MEDICARE

## 2021-07-29 PROCEDURE — 93798 PHYS/QHP OP CAR RHAB W/ECG: CPT

## 2021-08-02 ENCOUNTER — HOSPITAL ENCOUNTER (OUTPATIENT)
Dept: CARDIAC REHAB | Age: 71
Setting detail: THERAPIES SERIES
Discharge: HOME | End: 2021-08-02
Payer: MEDICARE

## 2021-08-02 PROCEDURE — 93798 PHYS/QHP OP CAR RHAB W/ECG: CPT

## 2021-08-04 ENCOUNTER — HOSPITAL ENCOUNTER (OUTPATIENT)
Dept: CARDIAC REHAB | Age: 71
Setting detail: THERAPIES SERIES
Discharge: HOME | End: 2021-08-04
Payer: MEDICARE

## 2021-08-04 ENCOUNTER — HOSPITAL ENCOUNTER (OUTPATIENT)
Age: 71
Discharge: HOME | End: 2021-08-06
Payer: MEDICARE

## 2021-08-04 ENCOUNTER — HOSPITAL ENCOUNTER (OUTPATIENT)
Dept: GENERAL RADIOLOGY | Age: 71
Discharge: HOME | End: 2021-08-06
Payer: MEDICARE

## 2021-08-04 DIAGNOSIS — N20.0 RENAL STONE: ICD-10-CM

## 2021-08-04 PROCEDURE — 74018 RADEX ABDOMEN 1 VIEW: CPT

## 2021-08-04 PROCEDURE — 93798 PHYS/QHP OP CAR RHAB W/ECG: CPT

## 2021-08-05 ENCOUNTER — HOSPITAL ENCOUNTER (OUTPATIENT)
Dept: CARDIAC REHAB | Age: 71
Setting detail: THERAPIES SERIES
Discharge: HOME | End: 2021-08-05
Payer: MEDICARE

## 2021-08-05 PROCEDURE — 93798 PHYS/QHP OP CAR RHAB W/ECG: CPT

## 2021-08-06 ENCOUNTER — OFFICE VISIT (OUTPATIENT)
Dept: UROLOGY | Age: 71
End: 2021-08-06
Payer: MEDICARE

## 2021-08-06 VITALS
HEIGHT: 70 IN | DIASTOLIC BLOOD PRESSURE: 60 MMHG | BODY MASS INDEX: 22.19 KG/M2 | WEIGHT: 155 LBS | TEMPERATURE: 97.5 F | SYSTOLIC BLOOD PRESSURE: 106 MMHG

## 2021-08-06 DIAGNOSIS — N20.0 RENAL STONE: Primary | ICD-10-CM

## 2021-08-06 DIAGNOSIS — R31.0 GROSS HEMATURIA: ICD-10-CM

## 2021-08-06 DIAGNOSIS — Z12.5 SCREENING PSA (PROSTATE SPECIFIC ANTIGEN): ICD-10-CM

## 2021-08-06 PROCEDURE — 3017F COLORECTAL CA SCREEN DOC REV: CPT | Performed by: NURSE PRACTITIONER

## 2021-08-06 PROCEDURE — 4004F PT TOBACCO SCREEN RCVD TLK: CPT | Performed by: NURSE PRACTITIONER

## 2021-08-06 PROCEDURE — G8420 CALC BMI NORM PARAMETERS: HCPCS | Performed by: NURSE PRACTITIONER

## 2021-08-06 PROCEDURE — 4040F PNEUMOC VAC/ADMIN/RCVD: CPT | Performed by: NURSE PRACTITIONER

## 2021-08-06 PROCEDURE — G8427 DOCREV CUR MEDS BY ELIG CLIN: HCPCS | Performed by: NURSE PRACTITIONER

## 2021-08-06 PROCEDURE — 1123F ACP DISCUSS/DSCN MKR DOCD: CPT | Performed by: NURSE PRACTITIONER

## 2021-08-06 PROCEDURE — 99211 OFF/OP EST MAY X REQ PHY/QHP: CPT | Performed by: UROLOGY

## 2021-08-06 PROCEDURE — 99213 OFFICE O/P EST LOW 20 MIN: CPT | Performed by: NURSE PRACTITIONER

## 2021-08-06 ASSESSMENT — ENCOUNTER SYMPTOMS
WHEEZING: 0
COUGH: 0
ABDOMINAL PAIN: 0
COLOR CHANGE: 0
NAUSEA: 0
BACK PAIN: 0
VOMITING: 0
SHORTNESS OF BREATH: 0
CONSTIPATION: 0
EYE REDNESS: 0

## 2021-08-06 NOTE — PROGRESS NOTES
HPI:          Patient is a 70 y.o. male in no acute distress. He is alert and oriented to person, place, and time. History  1988 ESWL and spontaneous stone passage    5/2021 referral from Dr. Upton Poster for gross hematuria. He is a current smoker. He does take Eliquis and Plavix. 6/2021 CT urogram showed a 5 mm proximal left ureteral stone with minimal hydronephrosis. There is also an 11 mm left renal stone and punctate right renal stones. No suspicious renal mass or filling defect. Screening PSA 1.17.    6/22/2021 left HLL. Today  Here today to follow-up for stones. He denies any flank or abdominal pain. He denies dysuria, gross hematuria, frequency or urgency. He did have a KUB completed prior to today's visit. This film was independently reviewed and shows stones in the right. I do not identify any stones on the left.     Past Medical History:   Diagnosis Date    Chronic kidney disease     Coronary artery disease     s/p CABG x3 in 2008 and 2 stents on 12/24/2020    H/O echocardiogram 02/08/2021    EF 15-20% LV severly enlarged and LV wall thickness is normal Severe global hypokinesis with segmental abnormalities LA is mod dilated 34-39 with LA volume index of 34 ml/m2 Mild mitral regurg Mod tricupid regurg Mild to mod pulm HTN with est RV systolic pressure of 38 mmhg mod grade II diastolic dysfunciton    H/O echocardiogram 04/01/2021    EF 10-15% LA size was mildly dilated IVC sice is mildly dilated with reduced respiratory phasic changes CVP 5-10 mmHg    Hyperlipidemia     Kidney stone 06/2021    Smoker      Past Surgical History:   Procedure Laterality Date    BLADDER SURGERY Left 6/22/2021    CYSTOSCOPY STENT INSERTION performed by Сергей Hay MD at [facility]  04/23/2021    [address]      CORONARY ANGIOPLASTY WITH STENT PLACEMENT  12/24/2020    x 2 at [address]  2008    x 3 vessels    CYSTOSCOPY Left 6/22/2021    CYSTOSCOPY URETEROSCOPY LASER performed by Crystal Mcgill MD at [facility] CYSTOURETHROSCOPY/STENT REMOVAL Left 06/22/2021    VASCULAR SURGERY      cabg harvests     Outpatient Encounter Medications as of 8/6/2021   Medication Sig Dispense Refill    metoprolol succinate (TOPROL XL) 25 MG extended release tablet Take 1 tablet by mouth nightly 90 tablet 3    spironolactone (ALDACTONE) 25 MG tablet Take 0.5 tablets by mouth daily 45 tablet 3    ramipril (ALTACE) 5 MG capsule Take 1 capsule by mouth daily 90 capsule 3    clopidogrel (PLAVIX) 75 MG tablet Take 1 tablet by mouth daily 90 tablet 3    amiodarone (CORDARONE) 200 MG tablet Take 1 tablet by mouth daily 90 tablet 3    midodrine (PROAMATINE) 5 MG tablet Take 1 tablet by mouth 3 times daily (with meals) 180 tablet 3    atorvastatin (LIPITOR) 80 MG tablet Take 1 tablet by mouth nightly 90 tablet 3    aspirin 81 MG EC tablet Take 81 mg by mouth daily (Patient not taking: Reported on 8/6/2021)      apixaban (ELIQUIS) 5 MG TABS tablet Take 1 tablet by mouth 2 times daily (Patient not taking: Reported on 7/9/2021) 60 tablet 0    nitroGLYCERIN (NITROSTAT) 0.3 MG SL tablet Place 0.3 mg under the tongue every 5 minutes as needed  (Patient not taking: Reported on 7/9/2021)       No facility-administered encounter medications on file as of 8/6/2021.       Current Outpatient Medications on File Prior to Visit   Medication Sig Dispense Refill    metoprolol succinate (TOPROL XL) 25 MG extended release tablet Take 1 tablet by mouth nightly 90 tablet 3    spironolactone (ALDACTONE) 25 MG tablet Take 0.5 tablets by mouth daily 45 tablet 3    ramipril (ALTACE) 5 MG capsule Take 1 capsule by mouth daily 90 capsule 3    clopidogrel (PLAVIX) 75 MG tablet Take 1 tablet by mouth daily 90 tablet 3    amiodarone (CORDARONE) 200 MG tablet Take 1 tablet by mouth daily 90 tablet 3    midodrine (PROAMATINE) 5 MG tablet Take 1 tablet by mouth 3 times daily (with meals) 180 tablet 3    atorvastatin (LIPITOR) 80 MG tablet Take 1 tablet by mouth nightly 90 tablet 3    aspirin 81 MG EC tablet Take 81 mg by mouth daily (Patient not taking: Reported on 2021)      apixaban (ELIQUIS) 5 MG TABS tablet Take 1 tablet by mouth 2 times daily (Patient not taking: Reported on 2021) 60 tablet 0    nitroGLYCERIN (NITROSTAT) 0.3 MG SL tablet Place 0.3 mg under the tongue every 5 minutes as needed  (Patient not taking: Reported on 2021)       No current facility-administered medications on file prior to visit. Patient has no known allergies. Family History   Problem Relation Age of Onset    Other Mother          from [place] age\" 68    Rheum Arthritis Mother     Heart Disease Father 79         from CHF at age 79     Social History     Tobacco Use   Smoking Status Current Every Day Smoker    Packs/day: 0.25    Years: 52.00    Pack years: 13.00    Types: Cigarettes   Smokeless Tobacco Never Used       Social History     Substance and Sexual Activity   Alcohol Use Not Currently       Review of Systems   Constitutional: Negative for appetite change, chills and fever. Eyes: Negative for redness and visual disturbance. Respiratory: Negative for cough, shortness of breath and wheezing. Cardiovascular: Negative for chest pain and leg swelling. Gastrointestinal: Negative for abdominal pain, constipation, nausea and vomiting. Genitourinary: Negative for decreased urine volume, difficulty urinating, discharge, dysuria, enuresis, flank pain, frequency, hematuria, penile pain, scrotal swelling, testicular pain and urgency. Musculoskeletal: Negative for back pain, joint swelling and myalgias. Skin: Negative for color change, rash and wound. Neurological: Negative for dizziness, tremors and numbness. Hematological: Negative for adenopathy. Does not bruise/bleed easily.        /60 (Site: Right Upper Arm, Position: Sitting, Cuff Size: Medium Adult)   Temp 97.5 °F (36.4 °C) (Temporal)   Ht 5' 10\" (1.778 m)   Wt 155 lb (70.3 kg)   BMI 22.24 kg/m²       PHYSICAL EXAM:  Constitutional: Patient in no acute distress; Neuro: alert and oriented to person place and time. Psych: Mood and affect normal.  Skin: Normal  Lungs: Respiratory effort normal  Cardiovascular:  Normal peripheral pulses  Abdomen: Soft, non-tender, non-distended with no CVA, flank pain  Bladder non-tender and not distended. Lab Results   Component Value Date    BUN 24 (H) 07/08/2021     Lab Results   Component Value Date    CREATININE 0.90 07/08/2021     Lab Results   Component Value Date    PSA 1.17 06/08/2021       ASSESSMENT:   Diagnosis Orders   1. Renal stone  XR ABDOMEN (KUB) (SINGLE AP VIEW)   2. Screening PSA (prostate specific antigen)  PSA screening   3. Gross hematuria            PLAN:  To prevent future stone formation:  1) drink around 80 ounces of water per day  2) Avoid/minimize intake of \"bad fluids\" (soda pop, coffee, tea, alcohol, energy drinks)  3) reduce consumption of sodium/salt  4) reduce consumption of fatty animal protein (full-fat cheese/milk/dairy, red meats, pork). Limit protein intake to low-fat/lean options (low-fat dairy, fish, chicken, turkey)    We will plan to see him in June with a KUB and PSA prior. He will also be due for surveillance UA due to history of hematuria. He will call sooner if needed for new or worsening symptoms.

## 2021-08-06 NOTE — PATIENT INSTRUCTIONS
To prevent future stone formation:  1) drink around 64-80 ounces of water per day  2) Avoid/minimize intake of \"bad fluids\" (soda pop, coffee, tea, alcohol, energy drinks)  3) reduce consumption of sodium/salt  4) reduce consumption of fatty animal protein (full-fat cheese/milk/dairy, red meats, pork). Limit protein intake to low-fat/lean options (low-fat dairy, fish, chicken, turkey)      SURVEY:    You may be receiving a survey from [employer] regarding your visit today. Please complete the survey to enable us to provide the highest quality of care to you and your family. If you cannot score us a very good on any question, please call the office to discuss how we could of made your experience a very good one. Thank you.

## 2021-08-09 ENCOUNTER — HOSPITAL ENCOUNTER (OUTPATIENT)
Dept: CARDIAC REHAB | Age: 71
Setting detail: THERAPIES SERIES
Discharge: HOME | End: 2021-08-09
Payer: MEDICARE

## 2021-08-09 ENCOUNTER — OFFICE VISIT (OUTPATIENT)
Dept: CARDIOLOGY | Age: 71
End: 2021-08-09
Payer: MEDICARE

## 2021-08-09 VITALS
OXYGEN SATURATION: 98 % | RESPIRATION RATE: 18 BRPM | SYSTOLIC BLOOD PRESSURE: 113 MMHG | DIASTOLIC BLOOD PRESSURE: 42 MMHG | HEIGHT: 70 IN | BODY MASS INDEX: 22.16 KG/M2 | HEART RATE: 73 BPM | WEIGHT: 154.8 LBS

## 2021-08-09 DIAGNOSIS — I25.5 ISCHEMIC CARDIOMYOPATHY: ICD-10-CM

## 2021-08-09 DIAGNOSIS — Z79.01 ENCOUNTER FOR CURRENT LONG-TERM USE OF ANTICOAGULANTS: ICD-10-CM

## 2021-08-09 DIAGNOSIS — Z95.810 DUAL ICD (IMPLANTABLE CARDIOVERTER-DEFIBRILLATOR) IN PLACE: ICD-10-CM

## 2021-08-09 DIAGNOSIS — Z79.899 ENCOUNTER FOR MONITORING AMIODARONE THERAPY: ICD-10-CM

## 2021-08-09 DIAGNOSIS — Z95.1 S/P CABG X 3: ICD-10-CM

## 2021-08-09 DIAGNOSIS — Z51.81 ENCOUNTER FOR MONITORING AMIODARONE THERAPY: ICD-10-CM

## 2021-08-09 DIAGNOSIS — E78.2 MIXED HYPERLIPIDEMIA: ICD-10-CM

## 2021-08-09 DIAGNOSIS — I48.0 PAROXYSMAL ATRIAL FIBRILLATION (HCC): Primary | ICD-10-CM

## 2021-08-09 DIAGNOSIS — I25.810 CORONARY ARTERY DISEASE INVOLVING CORONARY BYPASS GRAFT OF NATIVE HEART WITHOUT ANGINA PECTORIS: ICD-10-CM

## 2021-08-09 PROCEDURE — 99211 OFF/OP EST MAY X REQ PHY/QHP: CPT | Performed by: FAMILY MEDICINE

## 2021-08-09 PROCEDURE — G8427 DOCREV CUR MEDS BY ELIG CLIN: HCPCS | Performed by: FAMILY MEDICINE

## 2021-08-09 PROCEDURE — 3017F COLORECTAL CA SCREEN DOC REV: CPT | Performed by: FAMILY MEDICINE

## 2021-08-09 PROCEDURE — 1123F ACP DISCUSS/DSCN MKR DOCD: CPT | Performed by: FAMILY MEDICINE

## 2021-08-09 PROCEDURE — G8420 CALC BMI NORM PARAMETERS: HCPCS | Performed by: FAMILY MEDICINE

## 2021-08-09 PROCEDURE — 4004F PT TOBACCO SCREEN RCVD TLK: CPT | Performed by: FAMILY MEDICINE

## 2021-08-09 PROCEDURE — 93798 PHYS/QHP OP CAR RHAB W/ECG: CPT

## 2021-08-09 PROCEDURE — 4040F PNEUMOC VAC/ADMIN/RCVD: CPT | Performed by: FAMILY MEDICINE

## 2021-08-09 PROCEDURE — 99214 OFFICE O/P EST MOD 30 MIN: CPT | Performed by: FAMILY MEDICINE

## 2021-08-09 NOTE — PROGRESS NOTES
Kelvin Topete am scribing for and in the presence of Amado Mcmahan. Martínez BUSTILLOS, MS, F.A.C.C..    Patient: Pascual Peters Jr. : 1950  Date of Visit: 2021    REASON FOR VISIT / CONSULTATION: Follow-up (Hx: PAF, ASHD, s/p CABg x 3, isch cardiomyopathy, dual ICD, HLD. pt is here for 3 month f/u. pt is doing well. Denies: CP, SOB, dizziness, lightheaded, palps)      History of Present Illness:        Dear Fara Alston MD,    I had the pleasure of seeing Gifty DanielMolly in my office today. Mr. Noemi Parra is a 70 y.o. male with a history of atherosclerotic heart disease. He also had a heart attack in . 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 He used to see a cardiologist in the past however he started to feel better and did not think he needed to follow up anymore with any doctor. Most recently lindy came into the ER on 2020 and had a NSTEMI and was transferred to [facility], [facility] in [place]. He did report that he actually started feeling \"weird\", no pain but just feels doom feeling on the Monday before Susan Paulina however he did not want to think this was a heart attack. He did report feeling about the same as he did in . He went home and went to bed than his breathing got worse and worse and that's when he came into the hospital. He then had a heart cath done on 2020 and had two stents placed REJI x 2 to SVG to posterior lateral branch of the RCA. An echocardiogram at that same time showed an EF of 15% and was sent home with a Life Vest. On 21 spironolactone was added and although he says his BP is usually low, less than 978 systolic, denies any known problems with the medication including any lightheadedness or dizziness. He did not remember the last time he was told what his heart strength was he does not remember.   Echocardiogram done on 2021 showed an ejection fraction of 15-20%, The left ventricular cavity size is severely enlarged and the left ventricular wall thickness is within normal limits. Severe global hypokinesis with segmental abnormalities. The left atrium is moderately dilated (34-39) with a left atrial volume index of 34 ml/m2. Mild mitral regurgitation. Moderate tricuspid regurgitation. Mild to moderate pulmonary hypertension with an estimated right ventricular systolic pressure of 38 mmHg. He came to the ER on 3/31/21 due to shortness of breath, he was transferred to [facility] then. Repeat echocardiogram done on 3/31/2021 showed an EF of 10-15% Moderate to severely dilated LV size and severely reduced systolic function. Severe global hypokinesis. Medtronic Dual chamber AICD implantation on 4/23/2021 by Dr Aislinn Spicer. Since I last saw Mr. Sven Thibodeaux he reports doing well. He did have his kidney stones removed and does not think he has had any bleeding issues for the last 6 weeks but remains off of his Eliquis. He did have to stop cardiac rehab due to his kidney stones but did start back at cardiac rehab. He denies any chest pain, pressure or tightness. He denies any increased shortness of breath, lightheaded/dizziness or palpitations. He denies any abdominal pain, bleeding problems, bowel issues, problems with his medications or any other concerns at this time. No cough, fever or chills. No nausea or vomiting. No falls or near falls. Bleeding Risks: Mr. Sven Thibodeaux denies any current or recent bleeding problems including a history of a GI bleed, ulcers, recent or upcoming surgeries, blood in his stool or black tarry stools or blood in his urine     Exercise Tolerance: Mr. Sven Thibodeaux reports that he has a fair exercise tolerance. He says that he could walk about 1 block without developing chest discomfort or significant shortness of breath but says that his hip pain would stop him.       PAST MEDICAL HISTORY:         Past Medical History:   Diagnosis Date    Chronic kidney disease     Coronary artery disease     s/p CABG x3 in 2008 and 2 stents spironolactone (ALDACTONE) 25 MG tablet Take 0.5 tablets by mouth daily 45 tablet 3    ramipril (ALTACE) 5 MG capsule Take 1 capsule by mouth daily 90 capsule 3    clopidogrel (PLAVIX) 75 MG tablet Take 1 tablet by mouth daily 90 tablet 3    amiodarone (CORDARONE) 200 MG tablet Take 1 tablet by mouth daily 90 tablet 3    midodrine (PROAMATINE) 5 MG tablet Take 1 tablet by mouth 3 times daily (with meals) 180 tablet 3    atorvastatin (LIPITOR) 80 MG tablet Take 1 tablet by mouth nightly 90 tablet 3    nitroGLYCERIN (NITROSTAT) 0.3 MG SL tablet Place 0.3 mg under the tongue every 5 minutes as needed          FAMILY HISTORY: family history includes Heart Disease (age of onset: 79) in his father; Other in his mother; Rheum Arthritis in his mother. Physical Examination:     BP (!) 113/42 (Site: Right Upper Arm, Position: Sitting, Cuff Size: Medium Adult)   Pulse 73   Resp 18   Ht 5' 10\" (1.778 m)   Wt 154 lb 12.8 oz (70.2 kg)   SpO2 98%   BMI 22.21 kg/m²  Body mass index is 22.21 kg/m². Constitutional: He appeared oriented to person and place. He appears well-developed and well-nourished. In no acute distress. HEENT: Normocephalic and atraumatic. No JVD present. Carotid bruit is not present. No mass and no thyromegaly present. No lymphadenopathy noted. Cardiovascular: Normal rate, regular rhythm, normal heart sounds. Exam reveals no gallop and no friction rubs. 2/6 systolic murmur, 5th intercostal space on the LEFT in the mid-clavicular line (cardiac apex). Pulmonary/Chest: Effort normal and breath sounds normal. No respiratory distress. He has no wheezes, rhonchi or rales. Abdominal: Soft, non-tender. He exhibits no organomegaly, mass or bruit. Extremities: Trace. No cyanosis or clubbing. 2+ radial and carotid pulses. Distal extremity pulses: 2+ bilaterally. Neurological: Alertness and orientation as per Constitutional exam. No evidence of gross cranial nerve deficit.  Coordination appeared Score last updated 6/78/85 22:22 AM EDT  Click here for a link to the UpToDate guideline \"Atrial Fibrillation: Anticoagulation therapy to prevent embolization  Disclaimer: Risk Score calculation is dependent on accuracy of patient problem list and past encounter diagnosis. Stroke Risk: CHADS2-VASc Score: 3/9 (3.2% stroke risk)  · Anticoagulation: Re- START Apixaban (Eliquis) 5 mg every 12 hours. I also reminded them to watch for signs of bloody or black tarry stools and stop the medication immediately if this develops as this could be life threatening. · Atherosclerotic Heart Disease: History of CABG x3 and most recently NSTEMI with a heart cath that was done on 12/28/2020 and he had two stents placed, REJI x 2 to SVG to posterior lateral branch of the RCA. Antiplatelet Agent: Continue clopidogrel (Plavix): Plavix 75 mg daily.  Beta Blocker: Continue Metoprolol succinate (Toprol XL) 25 mg daily.  Anti-anginal medications: Continue nitroglycerin 0.4 mg tablets as needed for chest pain.  Cholesterol Reduction Therapy: Continue Atorvastatin (Lipitor) 80 mg daily.  Additional counseling: I advised them to call our office or go to the emergency room if they developed worsening or persistent chest pain or increased shortness of breath as this could be life threatening.  Ischemic Cardiomyopathy: EF from his Heart Cath that was done on 12/28/2020 was 15%, EF 10-15% on 3/31/21 echo.  Beta Blocker: Continue Metoprolol succinate (Toprol XL) 25 mg daily. ACE Inibitor/ARB: Continue ramipril 5 mg daily   Diuretics: Continue Spironolactone (Aldactone) 25 mg, 1/2 tab daily. I also discussed the potential side effects of this medication including lightheadedness and dizziness and instructed them to stop the medication of this occurs and call our office if this occurs. · Heart failure counseling: I advised them to try and keep their legs up whenever possible and to limit salt in their diet.    · We will plan to get a repeat echocardiogram in 3 months.  Dual chamber Implantable Cardioverter Defibrillator (AICD): Medtronic implanted on 4/23/2021 by Dr Carlos Bey Indication for Device Placement: Ischemic Cardiomyopathy   Interrogation Findings: We will plan to recheck their device at their next scheduled appointment date. · Hyperlipidemia: Mixed - Last LDL on 7/8/2021 was 57 mg/dL   · Cholesterol Reduction Therapy: Continue Atorvastatin (Lipitor) 80 mg daily. Finally, I recommended that he continue his current medications and follow up with you as previously scheduled. FOLLOW UP:   I told Mr. Forrest Mooney to call my office if he had any problems, but otherwise I asked him to Return in about 4 months (around 12/9/2021). However, I would be happy to see him sooner should the need arise. Sincerely,  Reji Soliz MD, MS, F.A.C.C. [facility] Specialist    [address]  Phone: [phone], Fax: [phone]     I believe that the risk of significant morbidity and mortality related to the patient's current medical conditions are: Intermediate. The documentation recorded by the scribe, accurately and completely reflects the services I personally performed and the decisions made by me. Alicja Reynoso MD, MS, F.A.C.C.  August 9, 2021

## 2021-08-09 NOTE — PATIENT INSTRUCTIONS
SURVEY:    You may be receiving a survey from [employer] regarding your visit today. Please complete the survey to enable us to provide the highest quality of care to you and your family. If you cannot score us a very good on any question, please call the office to discuss how we could have made your experience a very good one. Thank you.

## 2021-08-09 NOTE — PROGRESS NOTES
57  HDL: 44  LDL: 57  []? ? Med Change?      Diabetes:  []? ? Yes  [x]? ? No  Random BS:  HbA1c: 6.2 (7/9/21)  ? Med Change?     Weight Management:  Wt Goal: 1-2 lbs/wk     Intervention:   [x]? ? Dietitian Consult                                [x]? ? Nurse/Patient Discussion                 []? ? Diet Class                                                                             []? ? Referred to Diabetes Education        Education:  []?? S&S hypo/hyperglycemia  [x]? ? Low fat/low cholesterol diet  []? ? Weight loss methods                                               []? ? Relate Diabetes/CAD                        [x]? ? Eating heart healthy handout     Target Goal:  -LDL-C<100 if triglycerides are > 200  -LDL-C < 70 for high risk patients  -HbA1c < 7%  -BMI < 25   Education     Stages of Change:    []? ? pre-contemplation              [x]? ? Action   []? ? Contemplate           []? ? Maintainence   [x]? ? Prep                        []? ? Relapse     Family support: [x]? ? Yes  []? ? No     Tobacco use: []? ? Yes  [x]? ? No     Intervention:  []?? Referred to smoking cessation counselor                           []? ? Individual education and counseling  []? ? Tobacco adjunct  []? ? Informed of education class schedule      Education:   []?? Risk Factors/Modifications             [x]? ? Psychological aspects  [x]? ? Angina                                            []? ? Medications  [x]? ? CHF                                                  []? ? Cardiac A&P     Target Goal:  -Complete cessation of tobacco use (if applicable)  -Continued risk factor modifications  -Recognizing signs/symptoms to report  -Proper use of meds     Psychosocial  Stages of Change:    []? ? pre-contemplation              [x]? ? Action   []? ? Contemplate           []? ? Maintainence   [x]? ? Prep                        []? ? Relapse     Intervention:   []?? Psych Consult/          [x]? ? Uses stress management skills       []? ? Physician Referral                          []? ? Stress management class   []? ? Med Change?      Education:    [x]? ? Coping Techniques   [x]? ? Relaxation techniques   [x]? ? S/S of Depression     Target Goal:  -Assess presence or absence of depression using a valid screening tool. -Maximize coping skills.  -Positive support system.     Preventative Medication:   [x]? ? Aspirin                                   [x]? ? Beta Blockade (toprol xl)                      [x]? ? Statin or other lipid lowering agent (lipitor)                         [x]? ? Clopidogrel   [x]? ? ACE Inhibitor (Altace)   [x]? ? Other anticoagulation medications (Eliquis)     Fall Risk assess: [x]? ? Yes  []? ? No  Assistive Device:   []?? Cane  []? ? Walker []? ? Wheel Chair  []? ? Gait belt     Patient/Program goal:   increased stamina/strength to 30-50 total exercise by increasing 1-2 level/wk and 1-2 min/wk  to achieve THR and RPE 11-13 Patient has progressed from 2.3 to 2.6 mets in the past 30 days.   -introduce weights/ therabands 2-4# for 5-10 reps Staff has attempted teaching x 2. Patient refuses to participate in weight or thera-band exercises. -manage BP better Blood pressure well controlled. -improved cholesterol and  Triglycerides Last results are from 7/8/21 and show improvement in LDL. Patient has been given heart healthy diet info, written dietician recommendations, and has talked 1:1 with the dietician during rehab session on 2/25/21. Patient is also on statin therapy. -develop regular exercise 30 min daily Patient bikes some at home but not routinely.  Encouraged to gradually start a home exercise program of walking with an eventual goal to reach 30 minutes of walking a day.     Physician Changes/Comments:        Cardiac Rehab Staff

## 2021-08-10 ENCOUNTER — NURSE ONLY (OUTPATIENT)
Dept: CARDIOLOGY | Age: 71
End: 2021-08-10
Payer: MEDICARE

## 2021-08-10 DIAGNOSIS — Z95.810 ICD (IMPLANTABLE CARDIOVERTER-DEFIBRILLATOR) IN PLACE: ICD-10-CM

## 2021-08-10 DIAGNOSIS — I25.5 ISCHEMIC CARDIOMYOPATHY: Primary | ICD-10-CM

## 2021-08-10 PROCEDURE — 93295 DEV INTERROG REMOTE 1/2/MLT: CPT | Performed by: FAMILY MEDICINE

## 2021-08-11 ENCOUNTER — HOSPITAL ENCOUNTER (OUTPATIENT)
Dept: CARDIAC REHAB | Age: 71
Setting detail: THERAPIES SERIES
Discharge: HOME | End: 2021-08-11
Payer: MEDICARE

## 2021-08-11 PROCEDURE — 93798 PHYS/QHP OP CAR RHAB W/ECG: CPT

## 2021-08-12 ENCOUNTER — HOSPITAL ENCOUNTER (OUTPATIENT)
Dept: CARDIAC REHAB | Age: 71
Setting detail: THERAPIES SERIES
Discharge: HOME | End: 2021-08-12
Payer: MEDICARE

## 2021-08-12 PROCEDURE — 93798 PHYS/QHP OP CAR RHAB W/ECG: CPT

## 2021-08-16 ENCOUNTER — HOSPITAL ENCOUNTER (OUTPATIENT)
Dept: CARDIAC REHAB | Age: 71
Setting detail: THERAPIES SERIES
Discharge: HOME | End: 2021-08-16
Payer: MEDICARE

## 2021-08-16 PROCEDURE — 93798 PHYS/QHP OP CAR RHAB W/ECG: CPT

## 2021-08-18 ENCOUNTER — HOSPITAL ENCOUNTER (OUTPATIENT)
Dept: CARDIAC REHAB | Age: 71
Setting detail: THERAPIES SERIES
Discharge: HOME | End: 2021-08-18
Payer: MEDICARE

## 2021-08-18 PROCEDURE — 93798 PHYS/QHP OP CAR RHAB W/ECG: CPT

## 2021-08-19 ENCOUNTER — HOSPITAL ENCOUNTER (OUTPATIENT)
Dept: CARDIAC REHAB | Age: 71
Setting detail: THERAPIES SERIES
Discharge: HOME | End: 2021-08-19
Payer: MEDICARE

## 2021-08-19 VITALS — BODY MASS INDEX: 22.36 KG/M2 | HEIGHT: 70 IN | WEIGHT: 156.2 LBS

## 2021-08-19 PROCEDURE — 93798 PHYS/QHP OP CAR RHAB W/ECG: CPT

## 2021-08-19 NOTE — PROGRESS NOTES
Phase II Cardiac Rehab Individualized Treatment Plan-Discharge    Patient Name: Pascual Peters Jr. Date of Initial Assessment: 8/19/2021  ACCOUNT #: [de-identified]  Diagnosis: NSTEMI   Onset Date: 12/24/2020  Referring Physician: Dr Krystle Mcbride:   Session Number: 39   EXERCISE    Stages of Change:   [] pre-contemplation   [] Action   [] Contemplate   [x] Maintainence   [] Prep   [] Relapse          Exercise Prescription:  Mode: [x] TM [x] B [x] STP [] EL [] R  Frequency: 3X/week  Duration: 31-60 minutes  Intensity: METS 2.6  Progression: Increased METS from 2.1 to 2.6 and increased time to 40 total minutes of exercise from start to end of program. Off for several weeks during program for ICD insertion and kidney stone/hematuria therapy that limited progression. .      [x] Angina with Exertion THR:100-120    [] Resistance Training Weight (lbs):   Reps:     Hypertension:  [] Yes  [x] No  Resting BP: 108/54  Peak Exercise BP: 110/58  [] Med Change? Intervention:  Home Exercise:  Type: Walking  Duration: 30 minutes  Frequency: Daily   [] Resistance Training    Depression Screening:  [] Have you been feeling sad. ..down in the dumps? [] Have you lost interest in your job, sports, hobbies, friends? [] Do you often feel tired? [] Do you have trouble sleeping or do you sleep too much? [] Have you been gaining or losing weight? [] Do you often feel down on yourself, that everything is your fault? [] Do you have troubled making decisions or concentrating on your work? [] Do you often feel agitated or like you can barely move? [] Do you ever feel that life isn't worth living?    *If greater than 5 symptoms listed,  notified.     Education:   [x] Education Goals met        Target Goal:   -Individual Exercise Plan  -BP<140/90 or <130/80 if DM   -Aerobic active 30 + minutes 5-7 days per week    Nutrition    Stages of Change:   [] pre-contemplation   [] Action   [] Contemplate   [x] Maintainenc   [] Prep   [] Relapse    Lipids: Done on 7/8/2021  Total Cholesterol: 112  Triglycerides: 57  HDL: 44  LDL: 57  [] Med Change? Diabetes:  [] Yes  [x] No  FBS:   HbA1c:  [] Med Change? Random BS:   [] BS in range    Weight Management:  Weight: 156.2 lbs  Height: 5'10\"  BMI: 22.5  Wt Goal: 1-2 lbs/wk  Special Diet: Low sodium low fat low chol-does not follow       [] Dietitian Consult       [x] Nurse/Patient Discussion     [] Diet Class           [] Referred to Diabetes Education     Education:  [x] Education Goals met    Target Goal:  -LDL-C<100 if triglycerides are > 200  -LDL-C < 70 for high risk patients  -HbA1c < 7%  -BMI < 25   Education    Stages of Change:    [] pre-contemplation   [] Action   [] Contemplate   [x] Maintainence   [] Prep   [] Relapse    Knowledge test score: 100%      Family support: [x] Yes  [] No    Tobacco use: [x] Yes  [] No    Intervention:  [] Referred to smoking cessation counselor     [x] Individual education and counseling  [] Tobacco adjunct  [] Informed of education class schedule     Education:   [x] Education goals met    Target Goal:  -Complete cessation of tobacco use (if applicable)  -Continued risk factor modifications  -Recognizing signs/symptoms to report  -Proper use of meds    Psychosocial  Stages of Change:    [] pre-contemplation   [] Action   [] Contemplate   [x] Maintainence   [] Prep   [] Relapse    Psychosocial Test:  Tool Used: [employer] Quality of Life  Score: 27  Depression screening score: 0/9  []Medication change? Education:    [x] Education Goals met    Target Goal:  -Assess presence or absence of depression using a valid screening tool. -Maximize coping skills.  -Positive support system.     Preventative Medication:   [x] Aspirin       [x] Beta Blockade      [x] Statin or other lipid lowering agent     [x] Clopidogrel   [] ACE Inhibitor   [x] Other anticoagulation medications     Fall Risk assess: [x] Yes  [] No  Assistive Device: [] [employer]  [] Wilma Cruz [] Wheel Chair  [] Gait belt    Patient/Program goal:   increased stamina/strength to 30-50 total exercise by increasing 1-2 level/wk and 1-2 min/wk  to achieve THR and RPE 11-13 Patient increased METS from 2.1 to 2.6 and increased total exercise time to 40 minutes from start to end of program. Progression limited due to extended time off for ICD insertion and kidney stone therapy. Reported RPE 11-12.  -introduce weights/ therabands 2-4# for 5-10 reps Staff attempted to introduce, but patient declined to do.    -manage BP better Blood pressure stable with SBP 's  -improved cholesterol and  Triglycerides Lastresults are from 7/8/21 and show improvement in LDL. Patient has been given heart healthy diet info, written dietician recommendations, and has talked 1:1 with the dietician. Patient is also on statin therapy. -develop regular exercise 30 min daily Patient walks/bikes some at home but not routinely. Encouraged to, and given home exercise guidelines and calendar,  to track daily progress with goal of 30 minutes of exercise daily. Phase III rehab program option discussed, but does not plan to do at this time.  -smoking cessation Patient expresses desire to eventually quit, but has made no attempt to do so yet. Discussed smoking effects on heart and lungs, discussed ways to quit and informed to ask doctor for adjunct to quit if desires. Understanding voiced.        Physician Changes/Comments:      Cardiac Rehab Staff

## 2021-11-09 ENCOUNTER — NURSE ONLY (OUTPATIENT)
Dept: CARDIOLOGY | Age: 71
End: 2021-11-09
Payer: MEDICARE

## 2021-11-09 DIAGNOSIS — I25.5 ISCHEMIC CARDIOMYOPATHY: Primary | ICD-10-CM

## 2021-11-09 DIAGNOSIS — Z95.810 ICD (IMPLANTABLE CARDIOVERTER-DEFIBRILLATOR) IN PLACE: ICD-10-CM

## 2021-11-09 PROCEDURE — 93295 DEV INTERROG REMOTE 1/2/MLT: CPT | Performed by: FAMILY MEDICINE

## 2021-12-06 ENCOUNTER — OFFICE VISIT (OUTPATIENT)
Dept: CARDIOLOGY | Age: 71
End: 2021-12-06
Payer: MEDICARE

## 2021-12-06 VITALS
DIASTOLIC BLOOD PRESSURE: 63 MMHG | OXYGEN SATURATION: 96 % | RESPIRATION RATE: 18 BRPM | WEIGHT: 162 LBS | HEIGHT: 70 IN | BODY MASS INDEX: 23.19 KG/M2 | SYSTOLIC BLOOD PRESSURE: 90 MMHG | HEART RATE: 66 BPM

## 2021-12-06 DIAGNOSIS — I48.0 PAF (PAROXYSMAL ATRIAL FIBRILLATION) (HCC): Primary | ICD-10-CM

## 2021-12-06 DIAGNOSIS — I25.5 ISCHEMIC CARDIOMYOPATHY: ICD-10-CM

## 2021-12-06 DIAGNOSIS — E78.2 MIXED HYPERLIPIDEMIA: ICD-10-CM

## 2021-12-06 DIAGNOSIS — Z71.6 TOBACCO ABUSE COUNSELING: ICD-10-CM

## 2021-12-06 DIAGNOSIS — Z95.810 ICD (IMPLANTABLE CARDIOVERTER-DEFIBRILLATOR) IN PLACE: ICD-10-CM

## 2021-12-06 DIAGNOSIS — I25.10 CORONARY ARTERY DISEASE INVOLVING NATIVE CORONARY ARTERY OF NATIVE HEART WITHOUT ANGINA PECTORIS: ICD-10-CM

## 2021-12-06 PROCEDURE — 4040F PNEUMOC VAC/ADMIN/RCVD: CPT | Performed by: FAMILY MEDICINE

## 2021-12-06 PROCEDURE — G8484 FLU IMMUNIZE NO ADMIN: HCPCS | Performed by: FAMILY MEDICINE

## 2021-12-06 PROCEDURE — 99214 OFFICE O/P EST MOD 30 MIN: CPT | Performed by: FAMILY MEDICINE

## 2021-12-06 PROCEDURE — G8427 DOCREV CUR MEDS BY ELIG CLIN: HCPCS | Performed by: FAMILY MEDICINE

## 2021-12-06 PROCEDURE — 4004F PT TOBACCO SCREEN RCVD TLK: CPT | Performed by: FAMILY MEDICINE

## 2021-12-06 PROCEDURE — 99211 OFF/OP EST MAY X REQ PHY/QHP: CPT | Performed by: FAMILY MEDICINE

## 2021-12-06 PROCEDURE — 1123F ACP DISCUSS/DSCN MKR DOCD: CPT | Performed by: FAMILY MEDICINE

## 2021-12-06 PROCEDURE — 3017F COLORECTAL CA SCREEN DOC REV: CPT | Performed by: FAMILY MEDICINE

## 2021-12-06 PROCEDURE — G8420 CALC BMI NORM PARAMETERS: HCPCS | Performed by: FAMILY MEDICINE

## 2021-12-06 NOTE — PATIENT INSTRUCTIONS
SURVEY:    You may be receiving a survey from [employer] regarding your visit today. Please complete the survey to enable us to provide the highest quality of care to you and your family. If you cannot score us a very good on any question, please call the office to discuss how we could have made your experience a very good one. Thank you.

## 2021-12-06 NOTE — PROGRESS NOTES
Wei Richard am scribing for and in the presence of Safia Gooden. Martínez BUSTILLOS, MS, F.A.C.C. Patient: Pascual Peters Jr. : 1950  Date of Visit: 2021    REASON FOR VISIT / CONSULTATION: Follow-up (HX: PAF, CAD S/p CABG x3, Iscemic Cardio. Pt states he is doing well. Denies: Cp, Palpitations, Lighteaded/dizzienss, SOB.)      History of Present Illness:        Dear Benjamin Hinkle MD,    I had the pleasure of seeing Ritika Vargasfracisco in my office today. Mr. Haroon Ohara is a 70 y.o. male with a history of atherosclerotic heart disease. He also had a heart attack in . 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 He used to see a cardiologist in the past however he started to feel better and did not think he needed to follow up anymore with any doctor. Most recently lindy came into the ER on 2020 and had a NSTEMI and [facility] in [facility]. He did report that he actually started feeling \"weird\", no pain but just feels doom feeling on the Monday before  Paulina however he did not want to think this was a heart attack. He did report feeling about the same as he did in . He went home and went to bed than his breathing got worse and worse and that's when he came into the hospital. He then had a heart cath done on 2020 and had two stents placed REJI x 2 to SVG to posterior lateral branch of the RCA. An echocardiogram at that same time showed an EF of 15% and was sent home with a Life Vest. On 21 spironolactone was added and although he says his BP is usually low, less than 423 systolic, denies any known problems with the medication including any lightheadedness or dizziness. He did not remember the last time he was told what his heart strength was he does not remember.   Echocardiogram done on 2021 showed an ejection fraction of 15-20%, The left ventricular cavity size is severely enlarged and the left ventricular wall thickness is within normal limits. Severe global hypokinesis with segmental abnormalities. The left atrium is moderately dilated (34-39) with a left atrial volume index of 34 ml/m2. Mild mitral regurgitation. Moderate tricuspid regurgitation. Mild to moderate pulmonary hypertension with an estimated right ventricular systolic pressure of 38 mmHg. He came to the ER on 3/31/21 due to shortness of breath, he was transferred to [address] [place] [address]. Repeat echocardiogram done on 3/31/2021 showed an EF of 10-15% Moderate to severely dilated LV size and severely reduced systolic function. Severe global hypokinesis. Medtronic Dual chamber AICD implantation on 4/23/2021 by Dr Shahab Coulter. Since I last saw Mr. Ayush Olmstead he reports doing well. He has not been taking his Eliquis due to the cost of this medication. He denies any chest pain, pressure or tightness. He denies any increased shortness of breath, lightheaded/dizziness or palpitations. He denies any abdominal pain, bleeding problems, bowel issues, problems with his medications or any other concerns at this time. No cough, fever or chills. No nausea or vomiting. No falls or near falls. Bleeding Risks: Mr. Ayush Olmstead denies any current or recent bleeding problems including a history of a GI bleed, ulcers, recent or upcoming surgeries, blood in his stool or black tarry stools or blood in his urine.      PAST MEDICAL HISTORY:         Past Medical History:   Diagnosis Date    Chronic kidney disease     Coronary artery disease     s/p CABG x3 in 2008 and 2 stents on 12/24/2020    H/O echocardiogram 02/08/2021    EF 15-20% LV severly enlarged and LV wall thickness is normal Severe global hypokinesis with segmental abnormalities LA is mod dilated 34-39 with LA volume index of 34 ml/m2 Mild mitral regurg Mod tricupid regurg Mild to mod pulm HTN with est RV systolic pressure of 38 mmhg mod grade II diastolic dysfunciton    H/O echocardiogram 04/01/2021    EF 10-15% LA size was mildly dilated IVC sice is mildly dilated with reduced respiratory phasic changes CVP 5-10 mmHg    Hyperlipidemia     Kidney stone 06/2021    Smoker        CURRENT ALLERGIES: Patient has no known allergies. REVIEW OF SYSTEMS: 14 systems were reviewed. Pertinent positives and negatives as above, all else negative.      Past Surgical History:   Procedure Laterality Date    BLADDER SURGERY Left 6/22/2021    CYSTOSCOPY STENT INSERTION performed by Igor Bruce MD at [facility]  04/23/2021    [address] SURGERY      CORONARY ANGIOPLASTY WITH STENT PLACEMENT  12/24/2020    x 2 at [address]  2008    x 3 vessels    CYSTOSCOPY Left 6/22/2021    CYSTOSCOPY URETEROSCOPY LASER performed by Igor Bruce MD at [facility] CYSTOURETHROSCOPY/STENT REMOVAL Left 06/22/2021    VASCULAR SURGERY      cabg harvests    Social History:  Social History     Tobacco Use    Smoking status: Current Every Day Smoker     Packs/day: 0.25     Years: 52.00     Pack years: 13.00     Types: Cigarettes    Smokeless tobacco: Never Used   Vaping Use    Vaping Use: Never used   Substance Use Topics    Alcohol use: Not Currently    Drug use: Never        CURRENT MEDICATIONS:        Outpatient Medications Marked as Taking for the 12/6/21 encounter (Office Visit) with Antonio Borrero MD   Medication Sig Dispense Refill    metoprolol succinate (TOPROL XL) 25 MG extended release tablet Take 1 tablet by mouth nightly 90 tablet 3    spironolactone (ALDACTONE) 25 MG tablet Take 0.5 tablets by mouth daily 45 tablet 3    aspirin 81 MG EC tablet Take 81 mg by mouth daily       ramipril (ALTACE) 5 MG capsule Take 1 capsule by mouth daily 90 capsule 3    clopidogrel (PLAVIX) 75 MG tablet Take 1 tablet by mouth daily 90 tablet 3    amiodarone (CORDARONE) 200 MG tablet Take 1 tablet by mouth daily 90 tablet 3    midodrine (PROAMATINE) 5 MG tablet Take 1 tablet by mouth 3 times daily (with meals) 180 tablet 3    atorvastatin (LIPITOR) 80 MG tablet Take 1 tablet by mouth nightly 90 tablet 3    nitroGLYCERIN (NITROSTAT) 0.3 MG SL tablet Place 0.3 mg under the tongue every 5 minutes as needed          FAMILY HISTORY: family history includes Heart Disease (age of onset: 79) in his father; Other in his mother; Rheum Arthritis in his mother. Physical Examination:     BP 90/63 (Site: Right Upper Arm, Position: Sitting, Cuff Size: Medium Adult)   Pulse 66   Resp 18   Ht 5' 10\" (1.778 m)   Wt 162 lb (73.5 kg)   SpO2 96%   BMI 23.24 kg/m²  Body mass index is 23.24 kg/m². Constitutional: He appeared oriented to person and place. He appears well-developed and well-nourished. In no acute distress. HEENT: Normocephalic and atraumatic. No JVD present. Carotid bruit is not present. No mass and no thyromegaly present. No lymphadenopathy noted. Cardiovascular: Normal rate, regular rhythm, normal heart sounds. Exam reveals no gallop and no friction rubs. 2/6 systolic murmur, 5th intercostal space on the LEFT in the mid-clavicular line (cardiac apex). Pulmonary/Chest: Effort normal and breath sounds normal. No respiratory distress. He has no wheezes, rhonchi or rales. Abdominal: Soft, non-tender. He exhibits no organomegaly, mass or bruit. Extremities: Trace. No cyanosis or clubbing. 2+ radial and carotid pulses. Distal extremity pulses: 2+ bilaterally. Neurological: Alertness and orientation as per Constitutional exam. No evidence of gross cranial nerve deficit. Coordination appeared normal.   Skin: Skin is warm and dry. There is no rash or diaphoresis. Psychiatric: He has a normal mood and affect.  His speech is normal and behavior is normal.      MOST RECENT LABS ON RECORD:   Lab Results   Component Value Date    WBC 7.9 05/11/2021    HGB 11.6 (L) 05/11/2021    HCT 37.2 (L) 05/11/2021     05/11/2021    CHOL 112 07/08/2021    TRIG 57 07/08/2021    HDL 44 07/08/2021    ALT 22 07/08/2021    AST 25 07/08/2021     07/08/2021    K 4.6 07/08/2021     07/08/2021    CREATININE 0.90 07/08/2021    BUN 24 (H) 07/08/2021    CO2 25 07/08/2021    TSH 1.560 03/31/2021    PSA 1.17 06/08/2021    INR 1.34 (H) 04/23/2021    LABA1C 6.2 (H) 07/08/2021       ASSESSMENT:     1. PAF (paroxysmal atrial fibrillation) ([facility] [address]    2. Coronary artery disease involving native coronary artery of native heart without angina pectoris    3. Ischemic cardiomyopathy    4. ICD (implantable cardioverter-defibrillator) in place    5. Mixed hyperlipidemia    6. Tobacco abuse counseling       PLAN:        Paroxysmal Atrial Fibrillation: Rhythm Control Asymptomatic  · Beta Blocker: Continue Metoprolol succinate (Toprol XL) 25 mg daily. · Anti-Arrhythmic: Continue amiodarone (Pacerone) 200 mg daily. Monitoring: Since they will being maintained on Amiodarone, I told them that we will need to closely monitor them for potential side effects. These include monitoring LFTs and TSH at least every 6 months as well as chest x-rays, pulmonary function tests, and eye exams at least yearly. [id number] Score for Atrial Fibrillation Stroke Risk   Risk   Factors  Component Value   C CHF Yes 1   H HTN No 0   A2 Age >= 76 No,  (75 y.o.) 0   D DM No 0   S2 Prior Stroke/TIA No 0   V Vascular Disease Yes 1   A Age 74-69 Yes,  (75 y.o.) 1   Sc Sex male 0    [id number]  Score  3   Score last updated 5/20/61 75:55 AM EDT  Click here for a link to the UpToDate guideline \"Atrial Fibrillation: Anticoagulation therapy to prevent embolization  Disclaimer: Risk Score calculation is dependent on accuracy of patient problem list and past encounter diagnosis.    Stroke Risk: CHADS2-VASc Score: 3/9 (3.2% stroke risk)  · Anticoagulation:STOP Apixaban (Eliquis) and START warfarin (Coumadin) take as directed (goal INR 2-3) I also reminded them to watch for signs of bloody or black tarry stools and stop the medication immediately if this develops as this could be life threatening. Also referred him to the Warfarin Clinic. · Atherosclerotic Heart Disease: History of CABG x3 and most recently NSTEMI with a heart cath that was done on 12/28/2020 and he had two stents placed, REJI x 2 to SVG to posterior lateral branch of the RCA. Antiplatelet Agent: STOP {clopidogrel (Plavix) and continue Aspirin 81 mg daily. I also reminded them to watch for signs of bloody or black tarry stools and stop the medication immediately if this develops as this could be life threatening.  Beta Blocker: Continue Metoprolol succinate (Toprol XL) 25 mg daily.  Anti-anginal medications: Continue nitroglycerin 0.4 mg tablets as needed for chest pain.  Cholesterol Reduction Therapy: Continue Atorvastatin (Lipitor) 80 mg daily.  Additional counseling: I advised them to call our office or go to the emergency room if they developed worsening or persistent chest pain or increased shortness of breath as this could be life threatening.  Ischemic Cardiomyopathy: EF from his Heart Cath that was done on 12/28/2020 was 15%, EF 10-15% on 3/31/21 echo.  Beta Blocker: Continue Metoprolol succinate (Toprol XL) 25 mg daily. ACE Inibitor/ARB: Continue ramipril 5 mg daily   Diuretics: Continue Spironolactone (Aldactone) 25 mg, 1/2 tab daily. I also discussed the potential side effects of this medication including lightheadedness and dizziness and instructed them to stop the medication of this occurs and call our office if this occurs. · Heart failure counseling: I advised them to try and keep their legs up whenever possible and to limit salt in their diet.  Dual chamber Implantable Cardioverter Defibrillator (AICD): Medtronic implanted on 4/23/2021 by Dr Benny Haile Indication for Device Placement: Ischemic Cardiomyopathy   Interrogation Findings: We will plan to recheck their device at their next scheduled appointment date.  Reviewed his last check from 11/7 with him. · Hyperlipidemia: Mixed - Last LDL on 7/8/2021 was 57 mg/dL   · Cholesterol Reduction Therapy: Continue Atorvastatin (Lipitor) 80 mg daily. Tobacco Abuse Counseling: I spent several minutes discussing the dangers of tobacco abuse as well as multiple methods for trying to quit smoking. In the end, Mr. Vikki Eagle said that he did not want any assistance at this time but would continue to try and quit reduce and eventually quit smoking in the near future. Finally, I recommended that he continue his current medications and follow up with you as previously scheduled. FOLLOW UP:   I told Mr. Vikki Eagle to call my office if he had any problems, but otherwise I asked him to Return in about 6 months (around 6/6/2022). However, I would be happy to see him sooner should the need arise. Sincerely,  Marlo Mancini. Martínez BUSTILLOS, MS, F.A.C.C. [facility] Cardiology Specialist    [facility], [address] [place] [address]  Phone: [phone], Fax: [phone]     I believe that the risk of significant morbidity and mortality related to the patient's current medical conditions are: Intermediate. The documentation recorded by the scribe, accurately and completely reflects the services I personally performed and the decisions made by me. Beverly Segura MD, MS, F.A.C.C.  December 6, 2021

## 2021-12-08 ENCOUNTER — ANTI-COAG VISIT (OUTPATIENT)
Dept: PHARMACY | Age: 71
End: 2021-12-08

## 2021-12-08 NOTE — PROGRESS NOTES
Patient is referred to clinic per Dr. Eri Chin with diagnosis PAF and goal INR 2-3. Patient was previously taking Eliquis but stopped taking it due to cost of medication. Prescription called to [address] [place] [address] ([facility]) for warfarin 5 mg tablets - Take 1 tablet po daily or as instructed per clinic  Qty:  30    Refill:  1     Prescriber:  Dr. Paddy Kauffman    I called patient and scheduled him in clinic on Monday, 12/13/21. I reviewed warfarin instructions with him. Patient voiced understanding. Patient will  prescription on 12/8/21 and will have 5 doses prior to initial visit/INR check.

## 2021-12-10 ENCOUNTER — TELEPHONE (OUTPATIENT)
Dept: PHARMACY | Age: 71
End: 2021-12-10

## 2021-12-10 NOTE — TELEPHONE ENCOUNTER
Recent Travel Screening and Travel History documentation:     Travel Screening     Question   Response    In the last month, have you been in contact with someone who was confirmed or suspected to have Coronavirus / COVID-19? No / Unsure    Have you had a COVID-19 viral test in the last 14 days? No    Do you have any of the following new or worsening symptoms? None of these    Have you traveled internationally or domestically in the last month?   No      Travel History   Travel since 11/10/21    No documented travel since 11/10/21

## 2021-12-13 ENCOUNTER — HOSPITAL ENCOUNTER (OUTPATIENT)
Dept: PHARMACY | Age: 71
Setting detail: THERAPIES SERIES
Discharge: HOME | End: 2021-12-13
Payer: MEDICARE

## 2021-12-13 VITALS
BODY MASS INDEX: 23.1 KG/M2 | WEIGHT: 161 LBS | DIASTOLIC BLOOD PRESSURE: 60 MMHG | HEART RATE: 63 BPM | SYSTOLIC BLOOD PRESSURE: 105 MMHG

## 2021-12-13 DIAGNOSIS — I48.91 ATRIAL FIBRILLATION, UNSPECIFIED TYPE (HCC): Primary | ICD-10-CM

## 2021-12-13 LAB — INR BLD: 3.3

## 2021-12-13 PROCEDURE — 85610 PROTHROMBIN TIME: CPT | Performed by: FAMILY MEDICINE

## 2021-12-13 PROCEDURE — 99202 OFFICE O/P NEW SF 15 MIN: CPT | Performed by: FAMILY MEDICINE

## 2021-12-13 RX ORDER — WARFARIN SODIUM 5 MG/1
5 TABLET ORAL DAILY
COMMUNITY

## 2021-12-13 NOTE — PATIENT INSTRUCTIONS
Please do not take warfarin today then decrease your dose to take 1/2 tablet (2.5mg) on Wednesday and 5mg (1 tablet) on Tuesday and Thursday. Continue to watch for bleeding.  Return to coumadin clinic on Friday afternoon

## 2021-12-13 NOTE — PROGRESS NOTES
[facility]/[place] A  Medication Management  ANTICOAGULATION    Referring Provider: Dr Amy Iraheta INR: 2.0-3.0    TODAY'S INR: 3.3    WARFARIN Dosage: hold x1 then decrease to 2.5mg MWF, 5mg all other days    INR (no units)   Date Value   12/13/2021 3.3   04/23/2021 1.34 (H)       Medication changes:  No changes  Notes:    Fingerstick INR drawn per clinic protocol. Patient states no visible blood in urine and no black tarry stool. Denies any missed doses of warfarin. No change in other maintenance medications or in diet. Will recheck INR in 4 days. Patient is new to our clinic and has a history of anticoagulation. Patient believes he took warfarin in 2001 after his first heart attack. Most recently he has taken Eliquis but discontinued it about 3 months ago due to cost and not realizing he \"needed to stay on it\". Patient states he takes \"Plavix and aspirin every day\". Patient has a history of epistaxis, and has had 3 episodes in the last 2 weeks but not since starting warfarin. Patient does not drink alcohol but does like to drink Cranberry juice in \"waves\". Patient is retired Dean. All educational material discussed and questions answered. Patient acknowledges working in consult agreement with pharmacist as referred by his/her physician. Warfarin Patient Education: The following education has been conducted during the clinic appointment as indicated. Дмитрий Troncoso, [address] [place] [address] 12/13/2021, 9:34 AM  1. Patient has been educated that warfarin is a blood thinner. 2. Patient has been educated on why he or she has been prescribed warfarin. 3. Patient has been educated that warfarin can cause bleeding. 4. Patient has been educated on the necessity of regular INR monitoring. 5. Patient has been educated to take the exact amount of warfarin prescribed each day and that the dosing regimen could possibly vary from day to day.     6. Patient has been educated that medication aids such as pill boxes, calendars, etc., are a good idea to aid with warfarin therapy. 7. Patient has been educated on his or her target INR range. 8. Patient has been educated on the signs of bleeding problems. 9. Patient has been educated to seek immediate medical attention for severe bleeding issues. 10. Patient has been educated that warfarin has many drug interactions. 11. Patient has been educated to notify their provider managing warfarin prior to taking any new medications, including over-the-counter products and herbal products. 12. Patient has been educated to always keep a current medications list.    13. Patient has been educated on the effect of vitamin K on warfarin. 14. Patient has been educated on what foods contain high amounts of vitamin K.    15. Patient has been encouraged to eat a diet with a stable amount of vitamin K intake. 16. Patient has been educated on the effects of alcohol (ethanol) on warfarin. 16. Patient has been educated that warfarin is not intended for pregnant patients or those who plan to become pregnant. 18. Patient has been educated to take warfarin at the same time every day, preferably in the evening. 19. Patient has been educated on what to do if he or she misses a dose of warfarin. 20. Patient has been educated to call the healthcare provider managing warfarin is he or she gets diarrhea, has an infection, or has a fever, as this can affect warfarin responsiveness. 21. Patient has been educated to contact the healthcare provider managing warfarin if he or she will have any planned surgeries or other medical and dental procedures. 22. Patient has been educated to seek medical attention for serious falls, particularly if the fall injures his or her head. 23. Patient has been educated to avoid contact sports and activities that may cause serious injury.     24. Patient has been educated that he or she should either carry an ID badge or bracelet saying he or she is on warfarin. 25. Patient has been instructed that special arrangements may need to be made for monitoring during extended travel and that all extended travel plans should be shared early with his or her anticoagulation provider. 26. Patient has been given written warfarin education materials to supplement verbal education.           For Pharmacy Admin Tracking Only     Intervention Detail: Dose Adjustment: 3, reason: Therapy Optimization   Total # of Interventions Recommended: 4   Total # of Interventions Accepted: 4   Time Spent (min): 60      MARGARITA Zambrano.Ph., 12/13/2021,9:34 AM

## 2021-12-17 ENCOUNTER — HOSPITAL ENCOUNTER (OUTPATIENT)
Dept: PHARMACY | Age: 71
Setting detail: THERAPIES SERIES
Discharge: HOME | End: 2021-12-17
Payer: MEDICARE

## 2021-12-17 VITALS — HEART RATE: 64 BPM | DIASTOLIC BLOOD PRESSURE: 59 MMHG | SYSTOLIC BLOOD PRESSURE: 95 MMHG

## 2021-12-17 LAB — INR BLD: 3.7

## 2021-12-17 PROCEDURE — 99212 OFFICE O/P EST SF 10 MIN: CPT

## 2021-12-17 PROCEDURE — 85610 PROTHROMBIN TIME: CPT

## 2021-12-17 NOTE — PATIENT INSTRUCTIONS
Please hold warfarin today. Decrease current dose of warfarin as instructed on dosing calendar provided - 5 mg every Monday, Wednesday and Friday and 2.5 mg all other days. Return to clinic in 5 days. Continue to monitor urine and stool for signs and symptoms of bleeding. Please notify the clinic of any medication changes.

## 2021-12-17 NOTE — PROGRESS NOTES
[facility]/[place]  Medication Management  ANTICOAGULATION    Referring Provider: Barbara Blanca INR: 2-3    TODAY'S INR: 3.7    WARFARIN Dosage: Decrease warfarin to 5 mg po every MWF: 2.5 mg po all other days  Patient will hold warfarin dose today    INR (no units)   Date Value   12/17/2021 3.7   12/13/2021 3.3   04/23/2021 1.34 (H)       Medication changes:  No changes  Notes:    Fingerstick INR drawn per clinic protocol. Patient states no visible blood in urine and no black tarry stool. Denies any missed doses of warfarin. No change in other maintenance medications or in diet. Will recheck INR in 5 days. Patient acknowledges working in consult agreement with pharmacist as referred by his/her physician. For Pharmacy Admin Tracking Only     Intervention Detail: Dose Adjustment: 2, reason: Therapy Optimization   Total # of Interventions Recommended: 2   Total # of Interventions Accepted: 2   Time Spent (min): [address] [facility].  [place], [address] [facility]

## 2021-12-22 ENCOUNTER — HOSPITAL ENCOUNTER (OUTPATIENT)
Dept: PHARMACY | Age: 71
Setting detail: THERAPIES SERIES
Discharge: HOME | End: 2021-12-22
Payer: MEDICARE

## 2021-12-22 VITALS
SYSTOLIC BLOOD PRESSURE: 96 MMHG | WEIGHT: 162 LBS | HEART RATE: 60 BPM | DIASTOLIC BLOOD PRESSURE: 55 MMHG | BODY MASS INDEX: 23.24 KG/M2

## 2021-12-22 LAB — INR BLD: 4.8

## 2021-12-22 PROCEDURE — 85610 PROTHROMBIN TIME: CPT

## 2021-12-22 PROCEDURE — 99212 OFFICE O/P EST SF 10 MIN: CPT

## 2021-12-22 NOTE — PROGRESS NOTES
[facility] [address] [place] [address]/[place]  Medication Management  ANTICOAGULATION    Referring Provider: Libby Vo INR: 2-3    TODAY'S INR: 4.8    WARFARIN Dosage: Decrease warfarin to 5 mg po every Monday and 2.5 mg po all other days. Patient will hold warfarin today and tomorrow. INR (no units)   Date Value   12/22/2021 4.8   12/17/2021 3.7   12/13/2021 3.3   04/23/2021 1.34 (H)       Medication changes:  No changes  Notes:    Fingerstick INR drawn per clinic protocol. Patient states no visible blood in urine and no black tarry stool. Patient does report blood on tissue when blowing his nose x 2. No change in other maintenance medications or in diet. Will decrease weekly regimen (20% decrease) as above. Will recheck INR in 1 week. Patient acknowledges working in consult agreement with pharmacist as referred by his/her physician. Patient denies diarrhea, vomiting, alcohol consumption. For Pharmacy Admin Tracking Only     Intervention Detail: Dose Adjustment: 3, reason: Therapy Optimization   Total # of Interventions Recommended: 3   Total # of Interventions Accepted: 3   Time Spent (min): [address] [facility].  Vinicio Walton, [facility]

## 2021-12-22 NOTE — PATIENT INSTRUCTIONS
Please hold warfarin today and tomorrow. Decrease current dose of warfarin as instructed on dosing calendar provided - 5 mg every Monday and 2.5 mg all other days. Return to clinic in 1 week. Continue to monitor urine and stool for signs and symptoms of bleeding. Please notify the clinic of any medication changes.

## 2021-12-30 ENCOUNTER — HOSPITAL ENCOUNTER (OUTPATIENT)
Dept: PHARMACY | Age: 71
Setting detail: THERAPIES SERIES
Discharge: HOME | End: 2021-12-30
Payer: MEDICARE

## 2021-12-30 VITALS
WEIGHT: 163.2 LBS | SYSTOLIC BLOOD PRESSURE: 107 MMHG | BODY MASS INDEX: 23.42 KG/M2 | HEART RATE: 65 BPM | DIASTOLIC BLOOD PRESSURE: 63 MMHG

## 2021-12-30 DIAGNOSIS — I48.91 ATRIAL FIBRILLATION, UNSPECIFIED TYPE (HCC): Primary | ICD-10-CM

## 2021-12-30 LAB — INR BLD: 1.9

## 2021-12-30 PROCEDURE — 99212 OFFICE O/P EST SF 10 MIN: CPT | Performed by: FAMILY MEDICINE

## 2021-12-30 PROCEDURE — 85610 PROTHROMBIN TIME: CPT | Performed by: FAMILY MEDICINE

## 2021-12-30 NOTE — PATIENT INSTRUCTIONS
Please take 5mg (1 tablet) on Thursdays and Mondays and 2.5mg (1/2 tablet) all other days. Continue to monitor for signs of bleeding. Return to coumadin clinic in 1 week.

## 2021-12-30 NOTE — PROGRESS NOTES
[facility] [address] [place] [address]/[place]  Medication Management  ANTICOAGULATION    Referring Provider: Dr Samara Guerrero INR: 2.0-3.0    TODAY'S INR: 1.9    WARFARIN Dosage: increase to 5mg MF, 2.5mg all other days    INR (no units)   Date Value   2021 1.9   2021 4.8   2021 3.7   2021 3.3   2021 1.34 (H)       Medication changes:  No changes  Notes:    Fingerstick INR drawn per clinic protocol. Patient states no visible blood in urine and no black tarry stool. Denies any missed doses of warfarin. No change in other maintenance medications or in diet. Will recheck INR in 1 week. Patient acknowledges working in consult agreement with pharmacist as referred by his/her physician.                   For Pharmacy Admin Tracking Only     Intervention Detail: Adherence Monitorin and Dose Adjustment: 1, reason: Therapy Optimization   Total # of Interventions Recommended: 3   Total # of Interventions Accepted: 3   Time Spent (min): 30    MARGARITA Perez.Ph., 2021,3:32 PM

## 2022-01-07 ENCOUNTER — HOSPITAL ENCOUNTER (OUTPATIENT)
Dept: PHARMACY | Age: 72
Setting detail: THERAPIES SERIES
Discharge: HOME | End: 2022-01-07
Payer: MEDICARE

## 2022-01-07 VITALS
SYSTOLIC BLOOD PRESSURE: 120 MMHG | WEIGHT: 163 LBS | HEART RATE: 66 BPM | BODY MASS INDEX: 23.39 KG/M2 | DIASTOLIC BLOOD PRESSURE: 61 MMHG

## 2022-01-07 DIAGNOSIS — I48.20 CHRONIC ATRIAL FIBRILLATION (HCC): Primary | ICD-10-CM

## 2022-01-07 PROBLEM — I48.91 A-FIB (HCC): Status: ACTIVE | Noted: 2022-01-07

## 2022-01-07 LAB — INR BLD: 1.9

## 2022-01-07 PROCEDURE — 99211 OFF/OP EST MAY X REQ PHY/QHP: CPT

## 2022-01-07 PROCEDURE — 85610 PROTHROMBIN TIME: CPT

## 2022-01-07 NOTE — PROGRESS NOTES
[facility]  Medication Management  ANTICOAGULATION    Referring Provider: Dr Phill Bonilla INR: 2.0-3.0     TODAY'S INR: 1.9     WARFARIN Dosage: Continue warfarin whole 5 mg tablet MTh and half tablet for 2.5 mg all other days. INR (no units)   Date Value   2021 1.9   2021 4.8   2021 3.7   2021 3.3   2021 1.34 (H)     Medication changes:  none    Notes:    Fingerstick INR drawn per clinic protocol. Patient states no visible blood in urine and no black tarry stool. Denies any missed doses of warfarin. No change in other maintenance medications or in diet. Will recheck INR in 2 weeks. Patient acknowledges working in consult agreement with pharmacist as referred by his/her physician.                 For Pharmacy Admin Tracking Only     Intervention Detail: Adherence Monitorin   Total # of Interventions Recommended: 0   Total # of Interventions Accepted: 0   Time Spent (min):  Pradip Allen [facility] [place], PharmD

## 2022-01-07 NOTE — PATIENT INSTRUCTIONS
Continue to monitor urine and stool. Continue to monitor for signs of bleeding. Return to clinic in 1.5 weeks. Continue warfarin whole 5 mg tablet MTh and half tablet for 2.5 mg all other days.

## 2022-01-19 ENCOUNTER — HOSPITAL ENCOUNTER (OUTPATIENT)
Dept: PHARMACY | Age: 72
Setting detail: THERAPIES SERIES
Discharge: HOME | End: 2022-01-19
Payer: MEDICARE

## 2022-01-19 VITALS
WEIGHT: 163 LBS | SYSTOLIC BLOOD PRESSURE: 103 MMHG | HEART RATE: 70 BPM | BODY MASS INDEX: 23.39 KG/M2 | DIASTOLIC BLOOD PRESSURE: 53 MMHG

## 2022-01-19 DIAGNOSIS — I48.91 ATRIAL FIBRILLATION, UNSPECIFIED TYPE (HCC): Primary | ICD-10-CM

## 2022-01-19 LAB — INR BLD: 3.9

## 2022-01-19 PROCEDURE — 85610 PROTHROMBIN TIME: CPT | Performed by: FAMILY MEDICINE

## 2022-01-19 PROCEDURE — 99212 OFFICE O/P EST SF 10 MIN: CPT | Performed by: FAMILY MEDICINE

## 2022-01-19 NOTE — PATIENT INSTRUCTIONS
Please do not take warfarin today then decrease your dosing to take 1 tablet (5mg) on Thursdays only and 1/2 tablet (2.5mg) all other days. Continue to monitor for signs of bleeding. Return to coumadin clinic in 9 days.

## 2022-01-19 NOTE — PROGRESS NOTES
[facility]  ANTICOAGULATION    Referring Provider: Dr Jamal Tang INR: 2.0-3.0    TODAY'S INR: 3.9    WARFARIN Dosage: hold x1 then decrease to 5mg , 2.5mg all other days    INR (no units)   Date Value   2022 3.9   2022 1.9   2021 1.9   2021 4.8   2021 3.7   2021 3.3   2021 1.34 (H)       Medication changes:  No changes  Notes:    Fingerstick INR drawn per clinic protocol. Patient states no visible blood in urine and no black tarry stool. Denies any missed doses of warfarin. No change in other maintenance medications or in diet. Will recheck INR in 9 days. Patient acknowledges working in consult agreement with pharmacist as referred by his/her physician.                   For Pharmacy Admin Tracking Only     Intervention Detail: Adherence Monitorin and Dose Adjustment: 2, reason: Therapy Optimization   Total # of Interventions Recommended: 3   Total # of Interventions Accepted: 4   Time Spent (min): 20        Lucretia Han R.Ph., 2022,7:48 AM

## 2022-01-28 ENCOUNTER — HOSPITAL ENCOUNTER (OUTPATIENT)
Dept: PHARMACY | Age: 72
Setting detail: THERAPIES SERIES
Discharge: HOME | End: 2022-01-28
Payer: MEDICARE

## 2022-01-28 VITALS
WEIGHT: 163 LBS | SYSTOLIC BLOOD PRESSURE: 107 MMHG | BODY MASS INDEX: 23.39 KG/M2 | DIASTOLIC BLOOD PRESSURE: 64 MMHG | HEART RATE: 68 BPM

## 2022-01-28 LAB — INR BLD: 2.5

## 2022-01-28 PROCEDURE — 99211 OFF/OP EST MAY X REQ PHY/QHP: CPT

## 2022-01-28 PROCEDURE — 85610 PROTHROMBIN TIME: CPT

## 2022-01-28 NOTE — PATIENT INSTRUCTIONS
Continue to monitor urine and stool. Continue to monitor for signs of bleeding. Return to clinic in 2 weeks.   Continue warfarin whole 5mg tablet on Thursdays and half tablet for 2.5mg all other days

## 2022-01-28 NOTE — PROGRESS NOTES
[facility]  ANTICOAGULATION    Referring Provider: Dr Katey Osullivan INR: 2.0-3.0     TODAY'S INR: 2.5     WARFARIN Dosage: Continue warfarin whole 5mg tablet on  and half tablet for 2.5mg all other days    INR (no units)   Date Value   2022 3.9   2022 1.9   2021 1.9   2021 4.8   2021 3.7   2021 3.3   2021 1.34 (H)     Medication changes:  None    Notes:    Fingerstick INR drawn per clinic protocol. Patient states no visible blood in urine and no black tarry stool. Denies any missed doses of warfarin. No change in other maintenance medications or in diet. Will recheck INR in 2 weeks. Patient acknowledges working in consult agreement with pharmacist as referred by his/her physician.                   For Pharmacy Admin Tracking Only     Intervention Detail: Adherence Monitorin   Total # of Interventions Recommended: 0   Total # of Interventions Accepted: 0   Time Spent (min):  Pradip Allen [facility] [place], PharmD

## 2022-02-11 ENCOUNTER — HOSPITAL ENCOUNTER (OUTPATIENT)
Dept: PHARMACY | Age: 72
Setting detail: THERAPIES SERIES
Discharge: HOME | End: 2022-02-11
Payer: MEDICARE

## 2022-02-11 VITALS
DIASTOLIC BLOOD PRESSURE: 52 MMHG | SYSTOLIC BLOOD PRESSURE: 114 MMHG | WEIGHT: 161 LBS | BODY MASS INDEX: 23.1 KG/M2 | HEART RATE: 65 BPM

## 2022-02-11 LAB — INR BLD: 1.3

## 2022-02-11 PROCEDURE — 85610 PROTHROMBIN TIME: CPT

## 2022-02-11 PROCEDURE — 99212 OFFICE O/P EST SF 10 MIN: CPT

## 2022-02-11 NOTE — PATIENT INSTRUCTIONS
Please take warfarin 5 mg today. Increase current dose of warfarin as instructed on dosing calendar provided - 5 mg every Monday and Thursday and 2.5 mg all other days. Return to clinic in Continue to monitor urine and stool for signs and symptoms of bleeding. Please notify the clinic of any medication changes. Please remember to bring all medications (both prescription and OTC) to your next visit. Kindly notify the clinic if you are unable to make to your next appointment.

## 2022-02-11 NOTE — PROGRESS NOTES
[facility]/[place]  Medication Management  ANTICOAGULATION    Referring Provider: Larry Ortega INR: 2-3    TODAY'S INR: 1.3    WARFARIN Dosage: Increase warfarin to 5 mg every Monday and Thursday; 2.5 mg po all other days  Patient will take warfarin 5 mg today. INR (no units)   Date Value   2022 1.3   2022 2.5   2022 3.9   2022 1.9   2021 1.9   2021 4.8   2021 3.7       Medication changes:  No changes  Notes:    Fingerstick INR drawn per clinic protocol. Patient states no visible blood in urine and no black tarry stool. Denies any missed doses of warfarin. No change in other maintenance medications or in diet. Will recheck INR in 1 week. Patient acknowledges working in consult agreement with pharmacist as referred by his/her physician. For Pharmacy Admin Tracking Only     Intervention Detail: Adherence Monitorin and Dose Adjustment: 3, reason: Therapy Optimization   Total # of Interventions Recommended: 2   Total # of Interventions Accepted: 2   Time Spent (min): [address] [facility] [address].  María Taylor, [facility]

## 2022-02-15 ENCOUNTER — NURSE ONLY (OUTPATIENT)
Dept: CARDIOLOGY | Age: 72
End: 2022-02-15
Payer: MEDICARE

## 2022-02-15 DIAGNOSIS — Z95.810 ICD (IMPLANTABLE CARDIOVERTER-DEFIBRILLATOR) IN PLACE: ICD-10-CM

## 2022-02-15 DIAGNOSIS — I25.5 ISCHEMIC CARDIOMYOPATHY: Primary | ICD-10-CM

## 2022-02-15 PROCEDURE — 93295 DEV INTERROG REMOTE 1/2/MLT: CPT | Performed by: FAMILY MEDICINE

## 2022-02-17 ENCOUNTER — HOSPITAL ENCOUNTER (OUTPATIENT)
Dept: PHARMACY | Age: 72
Setting detail: THERAPIES SERIES
Discharge: HOME | End: 2022-02-17
Payer: MEDICARE

## 2022-02-17 VITALS — BODY MASS INDEX: 23.39 KG/M2 | WEIGHT: 163 LBS

## 2022-02-17 LAB — INR BLD: 1.3

## 2022-02-17 PROCEDURE — 85610 PROTHROMBIN TIME: CPT

## 2022-02-17 PROCEDURE — 99211 OFF/OP EST MAY X REQ PHY/QHP: CPT

## 2022-02-17 NOTE — PROGRESS NOTES
[facility] [address] [facility]/[place]  Medication Management  ANTICOAGULATION    Referring Provider: Jaxson Sidhu INR: 2-3    TODAY'S INR: 1.3    WARFARIN Dosage: Warfarin 5 mg po every Monday and Thursday; 2.5 mg po all other days  Patient will take warfarin 5 mg po today and tomorrow    INR (no units)   Date Value   2022 1.3   2022 1.3   2022 2.5   2022 3.9   2022 1.9   2021 1.9   2021 4.8       Medication changes:  Increased smoking - which may decrease INR  Notes:    Fingerstick INR drawn per clinic protocol. Patient states no visible blood in urine and no black tarry stool. Denies any missed doses of warfarin. No change in other maintenance medications or in diet. Will recheck INR in 1 week. Patient acknowledges working in consult agreement with pharmacist as referred by his/her physician. Patient stated that his sister recently passed away and he has been smoking more due to his increased stress level. For Pharmacy Admin Tracking Only     Intervention Detail: Adherence Monitorin and Dose Adjustment: 1, reason: Therapy Optimization   Total # of Interventions Recommended: 1   Total # of Interventions Accepted: 1   Time Spent (min): [facility].  Jefe Trejo, [address] [facility]

## 2022-02-17 NOTE — PATIENT INSTRUCTIONS
Please take warfarin 5 mg today. Continue current dose of warfarin as instructed on dosing calendar provided - 5 mg every Monday and Thursday and 2.5 mg all other days. Return to clinic in 1 week. Continue to monitor urine and stool for signs and symptoms of bleeding. Please notify the clinic of any medication changes.

## 2022-02-24 ENCOUNTER — HOSPITAL ENCOUNTER (OUTPATIENT)
Dept: PHARMACY | Age: 72
Setting detail: THERAPIES SERIES
Discharge: HOME | End: 2022-02-24
Payer: MEDICARE

## 2022-02-24 VITALS
HEART RATE: 69 BPM | WEIGHT: 161.6 LBS | DIASTOLIC BLOOD PRESSURE: 59 MMHG | SYSTOLIC BLOOD PRESSURE: 100 MMHG | BODY MASS INDEX: 23.19 KG/M2

## 2022-02-24 DIAGNOSIS — I48.91 ATRIAL FIBRILLATION, UNSPECIFIED TYPE (HCC): Primary | ICD-10-CM

## 2022-02-24 LAB — INR BLD: 1.9

## 2022-02-24 PROCEDURE — 85610 PROTHROMBIN TIME: CPT | Performed by: FAMILY MEDICINE

## 2022-02-24 PROCEDURE — 99211 OFF/OP EST MAY X REQ PHY/QHP: CPT | Performed by: FAMILY MEDICINE

## 2022-02-24 NOTE — PATIENT INSTRUCTIONS
Please take 5mg (whole tablet) today then increase your dosing to take warfarin 5mg (1 tablet) on Mondays, Wednesdays and Fridays and 2.5mg (1/2 tablet) all other days. Continue to monitor for signs of bleeding. Return to coumadin clinic in 10 days.

## 2022-02-24 NOTE — PROGRESS NOTES
[facility] [address] [place] [address]/[place]  Medication Management  ANTICOAGULATION    Referring Provider: Dr Zion Cueva INR: 2.0-3.0    TODAY'S INR: 1.9    WARFARIN Dosage: 5mg today then increase to 5mg MWF, 2.5mg all other days    INR (no units)   Date Value   2022 1.9   2022 1.3   2022 1.3   2022 2.5   2022 3.9   2022 1.9   2021 1.9       Medication changes:  No changes  Feels he is smoking \"a little less\"  Notes:    Fingerstick INR drawn per clinic protocol. Patient states no visible blood in urine and no black tarry stool. Denies any missed doses of warfarin. No change in other maintenance medications or in diet. Will recheck INR in 10 days. Patient acknowledges working in consult agreement with pharmacist as referred by his/her physician.                   For Pharmacy Admin Tracking Only     Intervention Detail: Adherence Monitorin and Dose Adjustment: 1, reason: Therapy Optimization   Total # of Interventions Recommended: 3   Total # of Interventions Accepted: 3   Time Spent (min): 25        Aelah Gomez R.Ph., 2022,8:01 AM

## 2022-03-07 ENCOUNTER — HOSPITAL ENCOUNTER (OUTPATIENT)
Dept: PHARMACY | Age: 72
Setting detail: THERAPIES SERIES
Discharge: HOME | End: 2022-03-07
Payer: MEDICARE

## 2022-03-07 VITALS
SYSTOLIC BLOOD PRESSURE: 109 MMHG | BODY MASS INDEX: 23.56 KG/M2 | DIASTOLIC BLOOD PRESSURE: 78 MMHG | WEIGHT: 164.2 LBS | HEART RATE: 61 BPM

## 2022-03-07 DIAGNOSIS — I48.91 ATRIAL FIBRILLATION, UNSPECIFIED TYPE (HCC): Primary | ICD-10-CM

## 2022-03-07 LAB — INR BLD: 2.7

## 2022-03-07 PROCEDURE — 85610 PROTHROMBIN TIME: CPT | Performed by: FAMILY MEDICINE

## 2022-03-07 PROCEDURE — 99211 OFF/OP EST MAY X REQ PHY/QHP: CPT | Performed by: FAMILY MEDICINE

## 2022-03-07 NOTE — PATIENT INSTRUCTIONS
Continue taking warfarin 5mg (1 tablet) on Mondays, Wednesdays and Fridays and 2.5mg (1/2 tablet) all other days. Continue to monitor for signs of bleeding. Return to coumadin clinic in 4 weeks.

## 2022-03-07 NOTE — PROGRESS NOTES
[facility]/[place] [facility]  Medication Management  ANTICOAGULATION    Referring Provider: Dr Betsy Young INR: 2.0-3.0    TODAY'S INR: 2.7    WARFARIN Dosage: continue 5mg MWF, 2.5mg all other days    INR (no units)   Date Value   2022 2.7   2022 1.9   2022 1.3   2022 1.3   2022 2.5   2022 3.9   2022 1.9       Medication changes:  No changes  Notes:    Fingerstick INR drawn per clinic protocol. Patient states no visible blood in urine and no black tarry stool. Denies any missed doses of warfarin. No change in other maintenance medications or in diet. Will recheck INR in 3 weeks. CalledRx to [employer] IVR line for warfarin 5mg tablets Take one tablet by mouth MWF, 1/2 tablet all other days or as instructed by [facility] 3 refills. Patient acknowledges working in consult agreement with pharmacist as referred by his/her physician.                   For Pharmacy Admin Tracking Only     Intervention Detail: Adherence Monitorin and Refill(s) Provided   Total # of Interventions Recommended: 3   Total # of Interventions Accepted: 3   Time Spent (min): 25      Josh Amanda R.Ph., 3/7/2022,7:48 AM

## 2022-03-09 DIAGNOSIS — I25.5 ISCHEMIC CARDIOMYOPATHY: ICD-10-CM

## 2022-03-09 RX ORDER — RAMIPRIL 5 MG/1
5 CAPSULE ORAL DAILY
Qty: 90 CAPSULE | Refills: 3 | Status: SHIPPED | OUTPATIENT
Start: 2022-03-09

## 2022-03-21 RX ORDER — MIDODRINE HYDROCHLORIDE 5 MG/1
5 TABLET ORAL
Qty: 180 TABLET | Refills: 3 | Status: SHIPPED | OUTPATIENT
Start: 2022-03-21

## 2022-03-28 ENCOUNTER — HOSPITAL ENCOUNTER (OUTPATIENT)
Dept: PHARMACY | Age: 72
Setting detail: THERAPIES SERIES
Discharge: HOME | End: 2022-03-28
Payer: MEDICARE

## 2022-03-28 VITALS
BODY MASS INDEX: 23.53 KG/M2 | SYSTOLIC BLOOD PRESSURE: 104 MMHG | HEART RATE: 63 BPM | WEIGHT: 164 LBS | DIASTOLIC BLOOD PRESSURE: 57 MMHG

## 2022-03-28 LAB — INR BLD: 3

## 2022-03-28 PROCEDURE — 99211 OFF/OP EST MAY X REQ PHY/QHP: CPT

## 2022-03-28 PROCEDURE — 85610 PROTHROMBIN TIME: CPT

## 2022-03-28 NOTE — PATIENT INSTRUCTIONS
Continue current dose of warfarin as instructed on dosing calendar provided. Return to clinic in 3 weeks. Continue to monitor urine and stool for signs and symptoms of bleeding. Please notify the clinic of any medication changes.

## 2022-03-28 NOTE — PROGRESS NOTES
[facility] [address] [facility]/[place]  Medication Management  ANTICOAGULATION    Referring Provider: Yohannes Green INR: 2-3    TODAY'S INR: 3    WARFARIN Dosage: Continue warfarin 5 mg po every MWF; 2.5 mg po all other days    INR (no units)   Date Value   2022 3   2022 2.7   2022 1.9   2022 1.3   2022 1.3   2022 2.5   2022 3.9       Medication changes:  No changes  Notes:    Fingerstick INR drawn per clinic protocol. Patient states no visible blood in urine and no black tarry stool. Denies any missed doses of warfarin. No change in other maintenance medications or in diet. Will recheck INR in 3 weeks. Patient acknowledges working in consult agreement with pharmacist as referred by his/her physician. For Pharmacy Admin Tracking Only     Intervention Detail: Adherence Monitorin   Total # of Interventions Recommended: 0   Total # of Interventions Accepted: 0   Time Spent (min): ANDREW/ [place].  Andrew Henry, [address] [facility]

## 2022-04-15 ENCOUNTER — HOSPITAL ENCOUNTER (OUTPATIENT)
Age: 72
Discharge: HOME | End: 2022-04-15
Payer: MEDICARE

## 2022-04-15 DIAGNOSIS — I25.810 CORONARY ARTERY DISEASE INVOLVING CORONARY BYPASS GRAFT OF NATIVE HEART WITHOUT ANGINA PECTORIS: ICD-10-CM

## 2022-04-15 DIAGNOSIS — I50.22 CHRONIC SYSTOLIC CONGESTIVE HEART FAILURE (HCC): ICD-10-CM

## 2022-04-15 LAB
ALT SERPL-CCNC: 27 U/L (ref 5–41)
ANION GAP SERPL CALCULATED.3IONS-SCNC: 9 MMOL/L (ref 9–17)
AST SERPL-CCNC: 30 U/L
BUN BLDV-MCNC: 19 MG/DL (ref 8–23)
BUN/CREAT BLD: 20 (ref 9–20)
CALCIUM SERPL-MCNC: 9.6 MG/DL (ref 8.6–10.4)
CHLORIDE BLD-SCNC: 101 MMOL/L (ref 98–107)
CHOLESTEROL/HDL RATIO: 2.7
CHOLESTEROL: 102 MG/DL
CO2: 26 MMOL/L (ref 20–31)
CREAT SERPL-MCNC: 0.94 MG/DL (ref 0.7–1.2)
GFR AFRICAN AMERICAN: >60 ML/MIN
GFR NON-AFRICAN AMERICAN: >60 ML/MIN
GFR SERPL CREATININE-BSD FRML MDRD: NORMAL ML/MIN/{1.73_M2}
GFR SERPL CREATININE-BSD FRML MDRD: NORMAL ML/MIN/{1.73_M2}
GLUCOSE BLD-MCNC: 87 MG/DL (ref 70–99)
HCT VFR BLD CALC: 40.2 % (ref 40.7–50.3)
HDLC SERPL-MCNC: 38 MG/DL
HEMOGLOBIN: 13.1 G/DL (ref 13–17)
LDL CHOLESTEROL: 51 MG/DL (ref 0–130)
MCH RBC QN AUTO: 32.8 PG (ref 25.2–33.5)
MCHC RBC AUTO-ENTMCNC: 32.6 G/DL (ref 28.4–34.8)
MCV RBC AUTO: 100.8 FL (ref 82.6–102.9)
NRBC AUTOMATED: 0 PER 100 WBC
PDW BLD-RTO: 13.7 % (ref 11.8–14.4)
PLATELET # BLD: 191 K/UL (ref 138–453)
PMV BLD AUTO: 10.2 FL (ref 8.1–13.5)
POTASSIUM SERPL-SCNC: 4.2 MMOL/L (ref 3.7–5.3)
RBC # BLD: 3.99 M/UL (ref 4.21–5.77)
SODIUM BLD-SCNC: 136 MMOL/L (ref 135–144)
TRIGL SERPL-MCNC: 67 MG/DL
WBC # BLD: 6.5 K/UL (ref 3.5–11.3)

## 2022-04-15 PROCEDURE — 84450 TRANSFERASE (AST) (SGOT): CPT

## 2022-04-15 PROCEDURE — 80061 LIPID PANEL: CPT

## 2022-04-15 PROCEDURE — 85027 COMPLETE CBC AUTOMATED: CPT

## 2022-04-15 PROCEDURE — 80048 BASIC METABOLIC PNL TOTAL CA: CPT

## 2022-04-15 PROCEDURE — 84460 ALANINE AMINO (ALT) (SGPT): CPT

## 2022-04-15 PROCEDURE — 36415 COLL VENOUS BLD VENIPUNCTURE: CPT

## 2022-04-18 ENCOUNTER — HOSPITAL ENCOUNTER (OUTPATIENT)
Dept: PHARMACY | Age: 72
Setting detail: THERAPIES SERIES
Discharge: HOME | End: 2022-04-18
Payer: MEDICARE

## 2022-04-18 VITALS
SYSTOLIC BLOOD PRESSURE: 112 MMHG | DIASTOLIC BLOOD PRESSURE: 66 MMHG | WEIGHT: 161 LBS | HEART RATE: 68 BPM | BODY MASS INDEX: 23.1 KG/M2

## 2022-04-18 LAB — INR BLD: 3.3

## 2022-04-18 PROCEDURE — 85610 PROTHROMBIN TIME: CPT

## 2022-04-18 PROCEDURE — 99212 OFFICE O/P EST SF 10 MIN: CPT

## 2022-04-18 NOTE — PATIENT INSTRUCTIONS
Continue to monitor urine and stool. Continue to monitor for signs of bleeding. Return to clinic in 3 weeks. Hold warfarin today then decrease warfarin to whole 5 mg tablet Tue,Fri and half tablet for 2.5 mg all other days.

## 2022-04-18 NOTE — PROGRESS NOTES
[facility] [address] [facility]/[place]  Medication Management  ANTICOAGULATION    Referring Provider: Annelle Baumgarten     GOAL INR: 2-3     TODAY'S INR: 3.3     WARFARIN Dosage: Hold warfarin today then decrease warfarin to whole 5 mg tablet Tue,Fri and half tablet for 2.5 mg all other days. INR (no units)   Date Value   2022 3   2022 2.7   2022 1.9   2022 1.3   2022 1.3   2022 2.5   2022 3.9       Medication changes:  None    Notes:    Fingerstick INR drawn per clinic protocol. Patient states no visible blood in urine and no black tarry stool. Denies any missed doses of warfarin. No change in other maintenance medications or in diet. Will recheck INR in 3 weeks. Patient acknowledges working in consult agreement with pharmacist as referred by his/her physician.                   For Pharmacy Admin Tracking Only     Intervention Detail: Adherence Monitorin and Dose Adjustment: 2, reason: Therapy Optimization   Total # of Interventions Recommended: 2   Total # of Interventions Accepted: 2   Time Spent (min): 9[address] Pradip Allen, [address] [facility], PharmD

## 2022-04-25 RX ORDER — AMIODARONE HYDROCHLORIDE 200 MG/1
TABLET ORAL
Qty: 90 TABLET | Refills: 3 | Status: SHIPPED | OUTPATIENT
Start: 2022-04-25

## 2022-05-09 ENCOUNTER — HOSPITAL ENCOUNTER (OUTPATIENT)
Dept: PHARMACY | Age: 72
Setting detail: THERAPIES SERIES
Discharge: HOME | End: 2022-05-09
Payer: MEDICARE

## 2022-05-09 VITALS
SYSTOLIC BLOOD PRESSURE: 119 MMHG | WEIGHT: 163 LBS | DIASTOLIC BLOOD PRESSURE: 60 MMHG | HEART RATE: 65 BPM | BODY MASS INDEX: 23.39 KG/M2

## 2022-05-09 LAB — INR BLD: 2

## 2022-05-09 PROCEDURE — 99211 OFF/OP EST MAY X REQ PHY/QHP: CPT

## 2022-05-09 PROCEDURE — 85610 PROTHROMBIN TIME: CPT

## 2022-05-09 NOTE — PATIENT INSTRUCTIONS
Continue to monitor urine and stool. Continue to monitor for signs of bleeding. Return to clinic in 4 weeks. Continue warfarin whole 5 mg tablet Tue,Fri and half tablet for 2.5 mg all other days.

## 2022-05-09 NOTE — PROGRESS NOTES
[facility]/[place]  Medication Management  ANTICOAGULATION    Referring Provider: Martínez     GOAL INR: 2-3     TODAY'S INR: 2.0     WARFARIN Dosage: Continue warfarin whole 5 mg tablet Tue,Fri and half tablet for 2.5 mg all other days. INR (no units)   Date Value   2022 3.3   2022 3   2022 2.7   2022 1.9   2022 1.3   2022 1.3   2022 2.5     Medication changes:  None    Notes:    Fingerstick INR drawn per clinic protocol. Patient states no visible blood in urine and no black tarry stool. Denies any missed doses of warfarin. No change in other maintenance medications or in diet. Will recheck INR in 4 weeks. Patient acknowledges working in consult agreement with pharmacist as referred by his/her physician.                   For Pharmacy Admin Tracking Only     Intervention Detail: Adherence Monitorin   Total # of Interventions Recommended: 0   Total # of Interventions Accepted: 0   Time Spent (min):  Pradip Allen [facility] [place], PharmD

## 2022-05-23 ENCOUNTER — OFFICE VISIT (OUTPATIENT)
Dept: CARDIOLOGY | Age: 72
End: 2022-05-23
Payer: MEDICARE

## 2022-05-23 ENCOUNTER — HOSPITAL ENCOUNTER (OUTPATIENT)
Age: 72
Discharge: HOME | End: 2022-05-25
Payer: MEDICARE

## 2022-05-23 ENCOUNTER — HOSPITAL ENCOUNTER (OUTPATIENT)
Dept: GENERAL RADIOLOGY | Age: 72
Discharge: HOME | End: 2022-05-25
Payer: MEDICARE

## 2022-05-23 VITALS
RESPIRATION RATE: 18 BRPM | OXYGEN SATURATION: 96 % | DIASTOLIC BLOOD PRESSURE: 56 MMHG | SYSTOLIC BLOOD PRESSURE: 108 MMHG | WEIGHT: 163 LBS | HEIGHT: 70 IN | BODY MASS INDEX: 23.34 KG/M2 | HEART RATE: 62 BPM

## 2022-05-23 DIAGNOSIS — I48.0 PAF (PAROXYSMAL ATRIAL FIBRILLATION) (HCC): ICD-10-CM

## 2022-05-23 DIAGNOSIS — I25.5 ISCHEMIC CARDIOMYOPATHY: ICD-10-CM

## 2022-05-23 DIAGNOSIS — Z71.6 TOBACCO ABUSE COUNSELING: ICD-10-CM

## 2022-05-23 DIAGNOSIS — Z95.1 S/P CABG X 3: ICD-10-CM

## 2022-05-23 DIAGNOSIS — Z95.810 PRESENCE OF AUTOMATIC CARDIOVERTER/DEFIBRILLATOR (AICD): ICD-10-CM

## 2022-05-23 DIAGNOSIS — I48.0 PAF (PAROXYSMAL ATRIAL FIBRILLATION) (HCC): Primary | ICD-10-CM

## 2022-05-23 DIAGNOSIS — E78.2 MIXED HYPERLIPIDEMIA: ICD-10-CM

## 2022-05-23 DIAGNOSIS — I25.10 CORONARY ARTERY DISEASE INVOLVING NATIVE CORONARY ARTERY OF NATIVE HEART WITHOUT ANGINA PECTORIS: ICD-10-CM

## 2022-05-23 PROCEDURE — 4004F PT TOBACCO SCREEN RCVD TLK: CPT | Performed by: FAMILY MEDICINE

## 2022-05-23 PROCEDURE — G8427 DOCREV CUR MEDS BY ELIG CLIN: HCPCS | Performed by: FAMILY MEDICINE

## 2022-05-23 PROCEDURE — 3017F COLORECTAL CA SCREEN DOC REV: CPT | Performed by: FAMILY MEDICINE

## 2022-05-23 PROCEDURE — 1123F ACP DISCUSS/DSCN MKR DOCD: CPT | Performed by: FAMILY MEDICINE

## 2022-05-23 PROCEDURE — 99211 OFF/OP EST MAY X REQ PHY/QHP: CPT | Performed by: FAMILY MEDICINE

## 2022-05-23 PROCEDURE — G8420 CALC BMI NORM PARAMETERS: HCPCS | Performed by: FAMILY MEDICINE

## 2022-05-23 PROCEDURE — 99214 OFFICE O/P EST MOD 30 MIN: CPT | Performed by: FAMILY MEDICINE

## 2022-05-23 PROCEDURE — 71046 X-RAY EXAM CHEST 2 VIEWS: CPT

## 2022-05-23 PROCEDURE — 93005 ELECTROCARDIOGRAM TRACING: CPT | Performed by: FAMILY MEDICINE

## 2022-05-23 PROCEDURE — 93283 PRGRMG EVAL IMPLANTABLE DFB: CPT | Performed by: FAMILY MEDICINE

## 2022-05-23 NOTE — PATIENT INSTRUCTIONS
SURVEY:    You may be receiving a survey from [employer] regarding your visit today. Please complete the survey to enable us to provide the highest quality of care to you and your family. If you cannot score us a very good on any question, please call the office to discuss how we could have made your experience a very good one. Thank you.

## 2022-05-23 NOTE — PROGRESS NOTES
Anthony Huertas am scribing for and in the presence of Joselin Chappell. Martínez BUSTILLOS, MS, F.A.C.C. Patient: Pascual Peters Jr. : 1950  Date of Visit: May 23, 2022    REASON FOR VISIT / CONSULTATION: Follow-up (FX: PAF, ASHD, ICP, HLD. pt is here for a 1 year f/u pt states he is feeling fine. pt denies CP, SOB, light headed/dizziness, palpitations. )      History of Present Illness:        Dear Leisa Luis MD,    I had the pleasure of seeing Gerald BrookeMolly in my office today. Mr. Antony Zee is a 67 y.o. male with a history of atherosclerotic heart disease. He also had a heart attack in . 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 He used to see a cardiologist in the past however he started to feel better and did not think he needed to follow up anymore with any doctor. Most recently lindy came into the ER on 2020 and had a NSTEMI and was transferred to [facility] at [facility] in [place]. He did report that he actually started feeling \"weird\", no pain but just feels doom feeling on the Monday before [place] however he did not want to think this was a heart attack. He did report feeling about the same as he did in . He went home and went to bed than his breathing got worse and worse and that's when he came into the hospital. He then had a heart cath done on 2020 and had two stents placed REJI x 2 to SVG to posterior lateral branch of the RCA. An echocardiogram at that same time showed an EF of 15% and was sent home with a Life Vest. On 21 spironolactone was added and although he says his BP is usually low, less than 184 systolic, denies any known problems with the medication including any lightheadedness or dizziness. He did not remember the last time he was told what his heart strength was he does not remember.   Echocardiogram done on 2021 showed an ejection fraction of 15-20%, The left ventricular cavity size is severely enlarged and the left ventricular wall thickness is within normal limits. Severe global hypokinesis with segmental abnormalities. The left atrium is moderately dilated (34-39) with a left atrial volume index of 34 ml/m2. Mild mitral regurgitation. Moderate tricuspid regurgitation. Mild to moderate pulmonary hypertension with an estimated right ventricular systolic pressure of 38 mmHg. He came to the ER on 3/31/21 due to shortness of breath, he was transferred to [facility]. Repeat echocardiogram done on 3/31/2021 showed an EF of 10-15% Moderate to severely dilated LV size and severely reduced systolic function. Severe global hypokinesis. Medtronic Dual chamber AICD implantation on 4/23/2021 by Dr Pastora Mcneal. Mr. Kathya Mcmanus is here for six month follow up. He states he is doing well since last visit. He also mentions having right hand numbness since waking up this morning. He also mentions having some leg and calf discomfort with exertion although can go up and down steps without any significant problems. He denies any chest pain, pressure or tightness. He denies any increased shortness of breath, lightheaded/dizziness or palpitations. He denies any abdominal pain, bleeding problems, bowel issues, problems with his medications or any other concerns at this time. No cough, fever or chills. No nausea or vomiting. No falls or near falls. Bleeding Risks: Mr. Kathya Mcmanus denies any current or recent bleeding problems including a history of a GI bleed, ulcers, recent or upcoming surgeries, blood in his stool or black tarry stools or blood in his urine. Exercise Tolerance: Mr. Kathya Mcmanus reports that he has a fairly good exercise tolerance. His says that he can walk at least a couple of blocks before having to stop due to leg and back pain and hip pain.      PAST MEDICAL HISTORY:         Past Medical History:   Diagnosis Date    Chronic kidney disease     Coronary artery disease     s/p CABG x3 in 2008 and 2 stents on 12/24/2020    H/O echocardiogram 02/08/2021    EF 15-20% LV severly enlarged and LV wall thickness is normal Severe global hypokinesis with segmental abnormalities LA is mod dilated 34-39 with LA volume index of 34 ml/m2 Mild mitral regurg Mod tricupid regurg Mild to mod pulm HTN with est RV systolic pressure of 38 mmhg mod grade II diastolic dysfunciton    H/O echocardiogram 04/01/2021    EF 10-15% LA size was mildly dilated IVC sice is mildly dilated with reduced respiratory phasic changes CVP 5-10 mmHg    Hyperlipidemia     Kidney stone 06/2021    Smoker        CURRENT ALLERGIES: Patient has no known allergies. REVIEW OF SYSTEMS: 14 systems were reviewed. Pertinent positives and negatives as above, all else negative.      Past Surgical History:   Procedure Laterality Date    BLADDER SURGERY Left 6/22/2021    CYSTOSCOPY STENT INSERTION performed by Abdulkadir Robles MD at [facility]  04/23/2021    [address]      CORONARY ANGIOPLASTY WITH STENT PLACEMENT  12/24/2020    x 2 at [facility] [address]  2008    x 3 vessels    CYSTOSCOPY Left 6/22/2021    CYSTOSCOPY URETEROSCOPY LASER performed by Abdulkadir Robles MD at [address] [place] [address] CYSTOURETHROSCOPY/STENT REMOVAL Left 06/22/2021    VASCULAR SURGERY      cabg harvests    Social History:  Social History     Tobacco Use    Smoking status: Current Every Day Smoker     Packs/day: 0.25     Years: 52.00     Pack years: 13.00     Types: Cigarettes    Smokeless tobacco: Never Used   Vaping Use    Vaping Use: Never used   Substance Use Topics    Alcohol use: Not Currently    Drug use: Never        CURRENT MEDICATIONS:        Outpatient Medications Marked as Taking for the 5/23/22 encounter (Office Visit) with Reji Tavarez MD   Medication Sig Dispense Refill    amiodarone (CORDARONE) 200 MG tablet Take 1 tablet by mouth once daily 90 tablet 3    midodrine (PROAMATINE) 5 MG tablet Take 1 tablet by mouth 3 times daily (with meals) 180 tablet 3    ramipril (ALTACE) 5 MG capsule Take 1 capsule by mouth daily 90 capsule 3    warfarin (COUMADIN) 5 MG tablet Take 5 mg by mouth daily COUMADIN CLINIC - 5 mg po every Tue,Fri and half tablet for 2.5 mg po all other days      metoprolol succinate (TOPROL XL) 25 MG extended release tablet Take 1 tablet by mouth nightly 90 tablet 3    spironolactone (ALDACTONE) 25 MG tablet Take 0.5 tablets by mouth daily 45 tablet 3    aspirin 81 MG EC tablet Take 81 mg by mouth daily       clopidogrel (PLAVIX) 75 MG tablet Take 1 tablet by mouth daily 90 tablet 3    atorvastatin (LIPITOR) 80 MG tablet Take 1 tablet by mouth nightly 90 tablet 3    nitroGLYCERIN (NITROSTAT) 0.3 MG SL tablet Place 0.3 mg under the tongue every 5 minutes as needed          FAMILY HISTORY: family history includes Heart Disease (age of onset: 79) in his father; Other in his mother; Rheum Arthritis in his mother. Physical Examination:     BP (!) 108/56 (Site: Right Upper Arm, Position: Sitting, Cuff Size: Medium Adult)   Pulse 62   Resp 18   Ht 5' 10\" (1.778 m)   Wt 163 lb (73.9 kg)   SpO2 96%   BMI 23.39 kg/m²  Body mass index is 23.39 kg/m². Constitutional: He appeared oriented to person and place. He appears well-developed and well-nourished. In no acute distress. HEENT: Normocephalic and atraumatic. No JVD present. Carotid bruit is not present. No mass and no thyromegaly present. No lymphadenopathy noted. Cardiovascular: Normal rate, regular rhythm, normal heart sounds. Exam reveals no gallop and no friction rubs. 1/6 systolic murmur, 5th intercostal space on the LEFT in the mid-clavicular line (cardiac apex). Pulmonary/Chest: Effort normal and breath sounds normal. No respiratory distress. He has no wheezes, rhonchi or rales. Abdominal: Soft, non-tender. He exhibits no organomegaly, mass or bruit. Extremities: Trace. No cyanosis or clubbing. 2+ radial and carotid pulses.  Distal extremity pulses: 2+ bilaterally. Neurological: Alertness and orientation as per Constitutional exam. No evidence of gross cranial nerve deficit. Coordination appeared normal.   Skin: Skin is warm and dry. There is no rash or diaphoresis. Psychiatric: He has a normal mood and affect. His speech is normal and behavior is normal.      MOST RECENT LABS ON RECORD:   Lab Results   Component Value Date    WBC 6.5 04/15/2022    HGB 13.1 04/15/2022    HCT 40.2 (L) 04/15/2022     04/15/2022    CHOL 102 04/15/2022    TRIG 67 04/15/2022    HDL 38 (L) 04/15/2022    ALT 27 04/15/2022    AST 30 04/15/2022     04/15/2022    K 4.2 04/15/2022     04/15/2022    CREATININE 0.94 04/15/2022    BUN 19 04/15/2022    CO2 26 04/15/2022    TSH 1.560 03/31/2021    PSA 1.17 06/08/2021    INR 2 05/09/2022    LABA1C 6.2 (H) 07/08/2021       ASSESSMENT:     1. PAF (paroxysmal atrial fibrillation) ([facility] [address]    2. Coronary artery disease involving native coronary artery of native heart without angina pectoris    3. S/P CABG x 3    4. Ischemic cardiomyopathy    5. Mixed hyperlipidemia    6. Tobacco abuse counseling    7. Presence of automatic cardioverter/defibrillator (AICD)       PLAN:        Paroxysmal Atrial Fibrillation: Rhythm Control Asymptomatic  · Beta Blocker: Continue Metoprolol succinate (Toprol XL) 25 mg daily. · Anti-Arrhythmic: Continue amiodarone (Pacerone) 200 mg daily. Monitoring: Since they will being maintained on Amiodarone, I told them that we will need to closely monitor them for potential side effects. These include monitoring LFTs and TSH at least every 6 months as well as chest x-rays, pulmonary function tests, and eye exams at least yearly. He was due for a chest X-ray so I ordered this today.    [id number] Score for Atrial Fibrillation Stroke Risk   Risk   Factors  Component Value   C CHF Yes 1   H HTN No 0   A2 Age >= 75 No,  (73 y.o.) 0   D DM No 0   S2 Prior Stroke/TIA No 0   V Vascular Disease Yes 1   A Age 74-69 Yes,  (73 y.o.) 1   Sc Sex male 0    [id number]  Score  3   Score last updated 5/61/85 32:34 AM EDT  Click here for a link to the UpToDate guideline \"Atrial Fibrillation: Anticoagulation therapy to prevent embolization  Disclaimer: Risk Score calculation is dependent on accuracy of patient problem list and past encounter diagnosis. Stroke Risk: CHADS2-VASc Score: 3/9 (3.2% stroke risk)  · Anticoagulation:Continue warfarin (Coumadin) take as directed (goal INR 2-3). Because of his atrial fibrillation, I also would also recommend that he continue with anticoagulation to decrease his risk of stoke but also reminded him to watch for signs of blood in his stool or black tarry stools and stop the medication immediately if this develops as this could be life threatening. · Atherosclerotic Heart Disease: History of CABG x3 and most recently NSTEMI with a heart cath that was done on 12/28/2020 and he had two stents placed, REJI x 2 to SVG to posterior lateral branch of the RCA. Antiplatelet Agent: Continue aspirin 81 mg daily and Plavix 75 mg daily.  Beta Blocker: Continue Metoprolol succinate (Toprol XL) 25 mg daily.  Anti-anginal medications: Continue nitroglycerin 0.4 mg tablets as needed for chest pain.  Cholesterol Reduction Therapy: Continue Atorvastatin (Lipitor) 80 mg daily.  Additional counseling: I advised them to call our office or go to the emergency room if they developed worsening or persistent chest pain or increased shortness of breath as this could be life threatening.  Ischemic Cardiomyopathy: EF from his Heart Cath that was done on 12/28/2020 was 15%, EF 10-15% on 3/31/21 echo.  Beta Blocker: Continue Metoprolol succinate (Toprol XL) 25 mg daily. ACE Inibitor/ARB: Continue ramipril 5 mg daily   Diuretics: Continue Spironolactone (Aldactone) 25 mg, 1/2 tab daily.  I also discussed the potential side effects of this medication including lightheadedness and dizziness and instructed them to stop the medication of this occurs and call our office if this occurs. · Heart failure counseling: I advised them to try and keep their legs up whenever possible and to limit salt in their diet. · Additional Testing: I Ordered an Echocardiogram to assess Mr. Peters's ejection fraction and to look for significant valvular heart disease as a source of Mr. Lambert Leonardo symptoms     Dual chamber Implantable Cardioverter Defibrillator (AICD): Medtronic implanted on 4/23/2021 by Dr Dakota Baez Indication for Device Placement: Ischemic Cardiomyopathy   Interrogation Findings: Within normal limits and therefore no changes were made. · Hyperlipidemia: Mixed - Last LDL on 4/15/2022 was 51 mg/dL   · Cholesterol Reduction Therapy: Continue Atorvastatin (Lipitor) 80 mg daily. Tobacco Abuse Counseling: I spent several minutes discussing the dangers of tobacco abuse as well as multiple methods for trying to quit smoking. In the end, Mr. Lambert Leonardo said that he did not want any assistance at this time but would continue to try and quit reduce and eventually quit smoking in the near future. Finally, I recommended that he continue his current medications and follow up with you as previously scheduled. FOLLOW UP:   I told Mr. Lambert Leonardo to call my office if he had any problems, but otherwise I asked him to Return in about 6 months (around 11/23/2022). However, I would be happy to see him sooner should the need arise. Sincerely,  Peyton Soliz MD, MS, F.A.C.C. [facility] Specialist     [facility], [address] [place] [address]  Phone: [phone], Fax: [phone]     I believe that the risk of significant morbidity and mortality related to the patient's current medical conditions are: Intermediate. The documentation recorded by the scribe, accurately and completely reflects the services I personally performed and the decisions made by me.  Mook Pinto MD, MS, KASANDRA CHEUNG  May 23, 2022

## 2022-05-25 ENCOUNTER — TELEPHONE (OUTPATIENT)
Dept: CARDIOLOGY | Age: 72
End: 2022-05-25

## 2022-05-25 NOTE — TELEPHONE ENCOUNTER
----- Message from Carmita Oakes MD sent at 5/25/2022 12:32 AM EDT -----  Let Mr. Altaf Pan know their test result was ok. Will discuss at next visit. Thanks.

## 2022-06-06 ENCOUNTER — HOSPITAL ENCOUNTER (OUTPATIENT)
Dept: PHARMACY | Age: 72
Setting detail: THERAPIES SERIES
Discharge: HOME | End: 2022-06-06
Payer: MEDICARE

## 2022-06-06 VITALS
BODY MASS INDEX: 23.24 KG/M2 | DIASTOLIC BLOOD PRESSURE: 62 MMHG | WEIGHT: 162 LBS | SYSTOLIC BLOOD PRESSURE: 106 MMHG | HEART RATE: 68 BPM

## 2022-06-06 LAB — INR BLD: 2.4

## 2022-06-06 PROCEDURE — 85610 PROTHROMBIN TIME: CPT

## 2022-06-06 PROCEDURE — 99211 OFF/OP EST MAY X REQ PHY/QHP: CPT

## 2022-06-06 NOTE — PATIENT INSTRUCTIONS
Continue to monitor urine and stool. Continue to monitor for signs of bleeding. Return to clinic in 5 weeks. Continue warfarin whole 5 mg tablet Tue,Fri and half tablet for 2.5 mg all other days.

## 2022-06-06 NOTE — PROGRESS NOTES
[facility] [address] [place] [address]/Marco A  Medication Management  ANTICOAGULATION    Referring Provider: Martínez     GOAL INR: 2-3     TODAY'S INR: 2.4     WARFARIN Dosage: Continue warfarin whole 5 mg tablet Tue,Fri and half tablet for 2.5 mg all other days. INR (no units)   Date Value   2022 2   2022 3.3   2022 3   2022 2.7   2022 1.9   2022 1.3   2022 1.3       Medication changes:  none    Notes:    Fingerstick INR drawn per clinic protocol. Patient states no visible blood in urine and no black tarry stool. Denies any missed doses of warfarin. No change in other maintenance medications or in diet. Will recheck INR in 5 weeks. Patient acknowledges working in consult agreement with pharmacist as referred by his/her physician.                   For Pharmacy Admin Tracking Only     Intervention Detail: Adherence Monitorin   Total # of Interventions Recommended: 0   Total # of Interventions Accepted: 0   Time Spent (min): [facility], [facility], PharmD

## 2022-06-21 ENCOUNTER — HOSPITAL ENCOUNTER (OUTPATIENT)
Dept: GENERAL RADIOLOGY | Age: 72
Discharge: HOME | End: 2022-06-23
Payer: MEDICARE

## 2022-06-21 ENCOUNTER — HOSPITAL ENCOUNTER (OUTPATIENT)
Age: 72
Discharge: HOME | End: 2022-06-23
Payer: MEDICARE

## 2022-06-21 ENCOUNTER — HOSPITAL ENCOUNTER (OUTPATIENT)
Age: 72
Discharge: HOME | End: 2022-06-21
Payer: MEDICARE

## 2022-06-21 DIAGNOSIS — Z12.5 SCREENING PSA (PROSTATE SPECIFIC ANTIGEN): ICD-10-CM

## 2022-06-21 DIAGNOSIS — N20.0 RENAL STONE: ICD-10-CM

## 2022-06-21 LAB — PROSTATE SPECIFIC ANTIGEN: 1.03 NG/ML

## 2022-06-21 PROCEDURE — 74018 RADEX ABDOMEN 1 VIEW: CPT

## 2022-06-21 PROCEDURE — G0103 PSA SCREENING: HCPCS

## 2022-06-21 PROCEDURE — 36415 COLL VENOUS BLD VENIPUNCTURE: CPT

## 2022-06-23 ENCOUNTER — OFFICE VISIT (OUTPATIENT)
Dept: UROLOGY | Age: 72
End: 2022-06-23
Payer: MEDICARE

## 2022-06-23 VITALS
DIASTOLIC BLOOD PRESSURE: 65 MMHG | SYSTOLIC BLOOD PRESSURE: 110 MMHG | BODY MASS INDEX: 27.06 KG/M2 | RESPIRATION RATE: 18 BRPM | HEIGHT: 70 IN | TEMPERATURE: 96.9 F | HEART RATE: 66 BPM | WEIGHT: 189 LBS

## 2022-06-23 DIAGNOSIS — N20.0 RENAL STONE: Primary | ICD-10-CM

## 2022-06-23 DIAGNOSIS — Z12.5 SCREENING PSA (PROSTATE SPECIFIC ANTIGEN): ICD-10-CM

## 2022-06-23 DIAGNOSIS — R31.0 GROSS HEMATURIA: ICD-10-CM

## 2022-06-23 PROCEDURE — 99211 OFF/OP EST MAY X REQ PHY/QHP: CPT

## 2022-06-23 PROCEDURE — 1123F ACP DISCUSS/DSCN MKR DOCD: CPT | Performed by: NURSE PRACTITIONER

## 2022-06-23 PROCEDURE — 4004F PT TOBACCO SCREEN RCVD TLK: CPT | Performed by: NURSE PRACTITIONER

## 2022-06-23 PROCEDURE — G8417 CALC BMI ABV UP PARAM F/U: HCPCS | Performed by: NURSE PRACTITIONER

## 2022-06-23 PROCEDURE — G8427 DOCREV CUR MEDS BY ELIG CLIN: HCPCS | Performed by: NURSE PRACTITIONER

## 2022-06-23 PROCEDURE — 3017F COLORECTAL CA SCREEN DOC REV: CPT | Performed by: NURSE PRACTITIONER

## 2022-06-23 PROCEDURE — 99213 OFFICE O/P EST LOW 20 MIN: CPT | Performed by: NURSE PRACTITIONER

## 2022-06-23 ASSESSMENT — ENCOUNTER SYMPTOMS
BACK PAIN: 0
CONSTIPATION: 0
NAUSEA: 0
ABDOMINAL PAIN: 0
COUGH: 0
COLOR CHANGE: 0
EYE REDNESS: 0
WHEEZING: 0
SHORTNESS OF BREATH: 0
VOMITING: 0

## 2022-06-23 NOTE — PROGRESS NOTES
12/24/2020    x 2 at [address]  2008    x 3 vessels    CYSTOSCOPY Left 6/22/2021    CYSTOSCOPY URETEROSCOPY LASER performed by Kiko Ivory MD at [facility] CYSTOURETHROSCOPY/STENT REMOVAL Left 06/22/2021    VASCULAR SURGERY      cabg harvests     Outpatient Encounter Medications as of 6/23/2022   Medication Sig Dispense Refill    amiodarone (CORDARONE) 200 MG tablet Take 1 tablet by mouth once daily 90 tablet 3    midodrine (PROAMATINE) 5 MG tablet Take 1 tablet by mouth 3 times daily (with meals) 180 tablet 3    ramipril (ALTACE) 5 MG capsule Take 1 capsule by mouth daily 90 capsule 3    warfarin (COUMADIN) 5 MG tablet Take 5 mg by mouth daily COUMADIN CLINIC - 5 mg po every Tue,Fri and half tablet for 2.5 mg po all other days      metoprolol succinate (TOPROL XL) 25 MG extended release tablet Take 1 tablet by mouth nightly 90 tablet 3    spironolactone (ALDACTONE) 25 MG tablet Take 0.5 tablets by mouth daily 45 tablet 3    aspirin 81 MG EC tablet Take 81 mg by mouth daily       clopidogrel (PLAVIX) 75 MG tablet Take 1 tablet by mouth daily 90 tablet 3    atorvastatin (LIPITOR) 80 MG tablet Take 1 tablet by mouth nightly 90 tablet 3    nitroGLYCERIN (NITROSTAT) 0.3 MG SL tablet Place 0.3 mg under the tongue every 5 minutes as needed        No facility-administered encounter medications on file as of 6/23/2022.       Current Outpatient Medications on File Prior to Visit   Medication Sig Dispense Refill    amiodarone (CORDARONE) 200 MG tablet Take 1 tablet by mouth once daily 90 tablet 3    midodrine (PROAMATINE) 5 MG tablet Take 1 tablet by mouth 3 times daily (with meals) 180 tablet 3    ramipril (ALTACE) 5 MG capsule Take 1 capsule by mouth daily 90 capsule 3    warfarin (COUMADIN) 5 MG tablet Take 5 mg by mouth daily COUMADIN CLINIC - 5 mg po every Tue,Fri and half tablet for 2.5 mg po all other days      metoprolol succinate (TOPROL XL) 25 MG extended release tablet Take 1 tablet by mouth nightly 90 tablet 3    spironolactone (ALDACTONE) 25 MG tablet Take 0.5 tablets by mouth daily 45 tablet 3    aspirin 81 MG EC tablet Take 81 mg by mouth daily       clopidogrel (PLAVIX) 75 MG tablet Take 1 tablet by mouth daily 90 tablet 3    atorvastatin (LIPITOR) 80 MG tablet Take 1 tablet by mouth nightly 90 tablet 3    nitroGLYCERIN (NITROSTAT) 0.3 MG SL tablet Place 0.3 mg under the tongue every 5 minutes as needed        No current facility-administered medications on file prior to visit. Patient has no known allergies. Family History   Problem Relation Age of Onset    Other Mother          from [place] age\" 68    Rheum Arthritis Mother     Heart Disease Father 79         from CHF at age 79     Social History     Tobacco Use   Smoking Status Current Every Day Smoker    Packs/day: 0.25    Years: 52.00    Pack years: 13.00    Types: Cigarettes   Smokeless Tobacco Never Used       Social History     Substance and Sexual Activity   Alcohol Use Not Currently       Review of Systems   Constitutional: Negative for appetite change, chills and fever. Eyes: Negative for redness and visual disturbance. Respiratory: Negative for cough, shortness of breath and wheezing. Cardiovascular: Negative for chest pain and leg swelling. Gastrointestinal: Negative for abdominal pain, constipation, nausea and vomiting. Genitourinary: Negative for decreased urine volume, difficulty urinating, dysuria, enuresis, flank pain, frequency, hematuria, penile discharge, penile pain, scrotal swelling, testicular pain and urgency. Musculoskeletal: Negative for back pain, joint swelling and myalgias. Skin: Negative for color change, rash and wound. Neurological: Negative for dizziness, tremors and numbness. Hematological: Negative for adenopathy. Does not bruise/bleed easily.        /65 (Site: Right Upper Arm, Position: Sitting, Cuff Size: Medium Adult) Pulse 66   Temp 96.9 °F (36.1 °C)   Resp 18   Ht 5' 10\" (1.778 m)   Wt 189 lb (85.7 kg)   BMI 27.12 kg/m²       PHYSICAL EXAM:  Constitutional: Patient in no acute distress; Neuro: alert and oriented to person place and time. Psych: Mood and affect normal.  Skin: Normal  Lungs: Respiratory effort normal  Cardiovascular:  Normal peripheral pulses  Abdomen: Soft, non-tender, non-distended with no CVA, flank pain  Bladder non-tender and not distended. Lab Results   Component Value Date    BUN 19 04/15/2022     Lab Results   Component Value Date    CREATININE 0.94 04/15/2022     Lab Results   Component Value Date    PSA 1.03 06/21/2022    PSA 1.17 06/08/2021       ASSESSMENT:   Diagnosis Orders   1. Renal stone  XR ABDOMEN (KUB) (SINGLE AP VIEW)   2. Screening PSA (prostate specific antigen)  PSA Screening   3. Gross hematuria           PLAN:  To prevent future stone formation:  1) drink around 80 ounces of water per day  2) Avoid/minimize intake of \"bad fluids\" (soda pop, coffee, tea, alcohol, energy drinks)  3) reduce consumption of sodium/salt  4) reduce consumption of fatty animal protein (full-fat cheese/milk/dairy, red meats, pork). Limit protein intake to low-fat/lean options (low-fat dairy, fish, chicken, turkey)    F/U in one year with a KUB and PSA prior.  He will be due for a surveillance UA at this time

## 2022-06-23 NOTE — PATIENT INSTRUCTIONS
To prevent future stone formation:  1) drink around 80 ounces of water per day  2) Avoid/minimize intake of \"bad fluids\" (soda pop, coffee, tea, alcohol, energy drinks)  3) reduce consumption of sodium/salt  4) reduce consumption of fatty animal protein (full-fat cheese/milk/dairy, red meats, pork). Limit protein intake to low-fat/lean options (low-fat dairy, fish, chicken, turkey)      SURVEY:    You may be receiving a survey from [email or web address] regarding your visit today. Please complete the survey to enable us to provide the highest quality of care to you and your family. If you cannot score us a very good on any question, please call the office to discuss how we could of made your experience a very good one. Thank you.

## 2022-06-27 ENCOUNTER — HOSPITAL ENCOUNTER (OUTPATIENT)
Dept: NON INVASIVE DIAGNOSTICS | Age: 72
Discharge: HOME | End: 2022-06-27
Payer: MEDICARE

## 2022-06-27 DIAGNOSIS — Z95.1 S/P CABG X 3: ICD-10-CM

## 2022-06-27 DIAGNOSIS — E78.2 MIXED HYPERLIPIDEMIA: ICD-10-CM

## 2022-06-27 DIAGNOSIS — Z71.6 TOBACCO ABUSE COUNSELING: ICD-10-CM

## 2022-06-27 DIAGNOSIS — Z95.810 PRESENCE OF AUTOMATIC CARDIOVERTER/DEFIBRILLATOR (AICD): ICD-10-CM

## 2022-06-27 DIAGNOSIS — I25.10 CORONARY ARTERY DISEASE INVOLVING NATIVE CORONARY ARTERY OF NATIVE HEART WITHOUT ANGINA PECTORIS: ICD-10-CM

## 2022-06-27 DIAGNOSIS — I48.0 PAF (PAROXYSMAL ATRIAL FIBRILLATION) (HCC): ICD-10-CM

## 2022-06-27 DIAGNOSIS — I25.5 ISCHEMIC CARDIOMYOPATHY: ICD-10-CM

## 2022-06-27 LAB
LV EF: 18 %
LVEF MODALITY: NORMAL

## 2022-06-27 PROCEDURE — 93306 TTE W/DOPPLER COMPLETE: CPT

## 2022-06-28 ENCOUNTER — TELEPHONE (OUTPATIENT)
Dept: CARDIOLOGY | Age: 72
End: 2022-06-28

## 2022-06-28 NOTE — TELEPHONE ENCOUNTER
----- Message from Vidhya López MD sent at 6/28/2022 12:00 AM EDT -----  Let Patient know test result was ok. Heart strength similar to last time. Will discuss at next visit. Thanks.

## 2022-07-06 DIAGNOSIS — I25.5 ISCHEMIC CARDIOMYOPATHY: ICD-10-CM

## 2022-07-06 DIAGNOSIS — I48.0 PAF (PAROXYSMAL ATRIAL FIBRILLATION) (HCC): ICD-10-CM

## 2022-07-06 DIAGNOSIS — I25.810 CORONARY ARTERY DISEASE INVOLVING CORONARY BYPASS GRAFT OF NATIVE HEART WITHOUT ANGINA PECTORIS: ICD-10-CM

## 2022-07-06 DIAGNOSIS — I25.10 ASHD (ARTERIOSCLEROTIC HEART DISEASE): ICD-10-CM

## 2022-07-06 DIAGNOSIS — Z95.810 DUAL ICD (IMPLANTABLE CARDIOVERTER-DEFIBRILLATOR) IN PLACE: ICD-10-CM

## 2022-07-06 DIAGNOSIS — E78.2 MIXED HYPERLIPIDEMIA: ICD-10-CM

## 2022-07-06 DIAGNOSIS — Z95.1 S/P CABG X 3: ICD-10-CM

## 2022-07-06 DIAGNOSIS — I95.9 HYPOTENSION, UNSPECIFIED HYPOTENSION TYPE: ICD-10-CM

## 2022-07-07 RX ORDER — CLOPIDOGREL BISULFATE 75 MG/1
75 TABLET ORAL DAILY
Qty: 90 TABLET | Refills: 3 | Status: SHIPPED | OUTPATIENT
Start: 2022-07-07 | End: 2022-10-19

## 2022-07-07 RX ORDER — SPIRONOLACTONE 25 MG/1
12.5 TABLET ORAL DAILY
Qty: 45 TABLET | Refills: 3 | Status: SHIPPED | OUTPATIENT
Start: 2022-07-07

## 2022-07-11 ENCOUNTER — HOSPITAL ENCOUNTER (OUTPATIENT)
Dept: PHARMACY | Age: 72
Setting detail: THERAPIES SERIES
Discharge: HOME | End: 2022-07-11
Payer: MEDICARE

## 2022-07-11 VITALS
DIASTOLIC BLOOD PRESSURE: 54 MMHG | HEART RATE: 60 BPM | SYSTOLIC BLOOD PRESSURE: 102 MMHG | WEIGHT: 157 LBS | BODY MASS INDEX: 22.53 KG/M2

## 2022-07-11 LAB — INR BLD: 2.9

## 2022-07-11 PROCEDURE — 85610 PROTHROMBIN TIME: CPT

## 2022-07-11 PROCEDURE — 99211 OFF/OP EST MAY X REQ PHY/QHP: CPT

## 2022-07-11 NOTE — PATIENT INSTRUCTIONS
Continue current dose of warfarin as instructed on dosing calendar provided. Return to clinic in 5 weeks. Continue to monitor urine and stool for signs and symptoms of bleeding. Please notify the clinic of any medication changes. Please remember to bring all medications (both prescription and OTC) to your next visit. Kindly notify the clinic if you are unable to make to your next appointment.

## 2022-07-11 NOTE — PROGRESS NOTES
[facility] [address] [facility]/Marco A  Medication Management  ANTICOAGULATION    Referring Provider: Tayo Chirinos INR: 2-3    TODAY'S INR: 2.9    WARFARIN Dosage: Continue warfarin 5 mg po every Tuesday and Friday and 2.5 mg po all other days    INR (no units)   Date Value   2022 2.9   2022 2.4   2022 2   2022 3.3   2022 3   2022 2.7   2022 1.9       Medication changes:  No changes  Notes:    Fingerstick INR drawn per clinic protocol. Patient states no visible blood in urine and no black tarry stool. Denies any missed doses of warfarin. No change in other maintenance medications. Patient reports he is eating less/smaller portions as he is happier at current weight. Will recheck INR in 5 weeks. Patient acknowledges working in consult agreement with pharmacist as referred by his/her physician. For Pharmacy Admin Tracking Only     Intervention Detail: Adherence Monitorin   Total # of Interventions Recommended: 1   Total # of Interventions Accepted: 1   Time Spent (min): [facility].  Nicole Garza, [address]

## 2022-07-19 ENCOUNTER — NURSE ONLY (OUTPATIENT)
Dept: CARDIOLOGY | Age: 72
End: 2022-07-19
Payer: MEDICARE

## 2022-07-19 DIAGNOSIS — I25.5 ISCHEMIC CARDIOMYOPATHY: Primary | ICD-10-CM

## 2022-07-19 DIAGNOSIS — Z95.810 DUAL ICD (IMPLANTABLE CARDIOVERTER-DEFIBRILLATOR) IN PLACE: ICD-10-CM

## 2022-07-19 PROCEDURE — 93295 DEV INTERROG REMOTE 1/2/MLT: CPT | Performed by: FAMILY MEDICINE

## 2022-08-01 DIAGNOSIS — I48.0 PAROXYSMAL ATRIAL FIBRILLATION (HCC): ICD-10-CM

## 2022-08-01 RX ORDER — METOPROLOL SUCCINATE 25 MG/1
25 TABLET, EXTENDED RELEASE ORAL NIGHTLY
Qty: 90 TABLET | Refills: 3 | Status: SHIPPED | OUTPATIENT
Start: 2022-08-01

## 2022-08-17 ENCOUNTER — HOSPITAL ENCOUNTER (OUTPATIENT)
Dept: PHARMACY | Age: 72
Setting detail: THERAPIES SERIES
Discharge: HOME | End: 2022-08-17
Payer: MEDICARE

## 2022-08-17 VITALS
WEIGHT: 156 LBS | SYSTOLIC BLOOD PRESSURE: 98 MMHG | DIASTOLIC BLOOD PRESSURE: 60 MMHG | HEART RATE: 69 BPM | BODY MASS INDEX: 22.38 KG/M2

## 2022-08-17 LAB — INR BLD: 2.4

## 2022-08-17 PROCEDURE — 85610 PROTHROMBIN TIME: CPT

## 2022-08-17 PROCEDURE — 99211 OFF/OP EST MAY X REQ PHY/QHP: CPT

## 2022-08-17 NOTE — PATIENT INSTRUCTIONS
Continue to monitor urine and stool. Continue to monitor for signs of bleeding. Return to clinic in 6 weeks. Continue warfarin whole 5 mg tablet Tuesday, Friday and half tablet for 2.5 mg all other days.

## 2022-08-17 NOTE — PROGRESS NOTES
[address] [place] [address]/[place]  Medication Management  ANTICOAGULATION    Referring Provider: Jame Alfonso INR: 2-3     TODAY'S INR: 2.4     WARFARIN Dosage: Continue warfarin whole 5 mg tablet Tuesday, Friday and half tablet for 2.5 mg all other days. INR (no units)   Date Value   2022 2.9   2022 2.4   2022 2   2022 3.3   2022 3   2022 2.7   2022 1.9       Medication changes:  none    Notes:    Fingerstick INR drawn per clinic protocol. Patient states no visible blood in urine and no black tarry stool. Denies any missed doses of warfarin. No change in other maintenance medications or in diet. Will recheck INR in 6 weeks. Patient acknowledges working in consult agreement with pharmacist as referred by his/her physician. Patient is trying to lose weight due to, \"lab work that said I'm prediabetic. \"             For Pharmacy Admin Tracking Only    Intervention Detail: Adherence Monitorin  Total # of Interventions Recommended: 0  Total # of Interventions Accepted: 0  Time Spent (min): Stefano Olsen 00, [facility], PharmD

## 2022-08-18 RX ORDER — ATORVASTATIN CALCIUM 80 MG/1
80 TABLET, FILM COATED ORAL NIGHTLY
Qty: 90 TABLET | Refills: 3 | Status: SHIPPED | OUTPATIENT
Start: 2022-08-18

## 2022-09-28 ENCOUNTER — HOSPITAL ENCOUNTER (OUTPATIENT)
Dept: PHARMACY | Age: 72
Setting detail: THERAPIES SERIES
Discharge: HOME | End: 2022-09-28
Payer: MEDICARE

## 2022-09-28 VITALS
HEART RATE: 66 BPM | BODY MASS INDEX: 22.81 KG/M2 | WEIGHT: 159 LBS | SYSTOLIC BLOOD PRESSURE: 109 MMHG | DIASTOLIC BLOOD PRESSURE: 64 MMHG

## 2022-09-28 LAB — INR BLD: 2.3

## 2022-09-28 PROCEDURE — 99211 OFF/OP EST MAY X REQ PHY/QHP: CPT

## 2022-09-28 PROCEDURE — 85610 PROTHROMBIN TIME: CPT

## 2022-09-28 NOTE — PROGRESS NOTES
[address] [place] [address]/[place]  Medication Management  ANTICOAGULATION    Referring Provider: Maksim Finger INR: 2-3     TODAY'S INR: 2.3     WARFARIN Dosage: Continue warfarin whole 5 mg tablet Tuesday, Friday and half tablet for 2.5 mg all other days. INR (no units)   Date Value   2022 2.4   2022 2.9   2022 2.4   2022 2   2022 3.3   2022 3   2022 2.7       Medication changes:  None    Notes:    Fingerstick INR drawn per clinic protocol. Patient states no visible blood in urine and no black tarry stool. Denies any missed doses of warfarin. No change in other maintenance medications or in diet. Will recheck INR in 6 weeks. Patient acknowledges working in consult agreement with pharmacist as referred by his/her physician.                   For Pharmacy Admin Tracking Only    Intervention Detail: Adherence Monitorin  Total # of Interventions Recommended: 0  Total # of Interventions Accepted: 0  Time Spent (min): Stefano Olsen 11, [facility], PharmD

## 2022-10-18 ENCOUNTER — HOSPITAL ENCOUNTER (OUTPATIENT)
Age: 72
Discharge: HOME | End: 2022-10-18
Payer: MEDICARE

## 2022-10-18 DIAGNOSIS — R73.01 IMPAIRED FASTING GLUCOSE: ICD-10-CM

## 2022-10-18 DIAGNOSIS — I48.91 ATRIAL FIBRILLATION WITH RVR (HCC): ICD-10-CM

## 2022-10-18 DIAGNOSIS — I50.22 CHRONIC SYSTOLIC CONGESTIVE HEART FAILURE (HCC): ICD-10-CM

## 2022-10-18 LAB
ALT SERPL-CCNC: 26 U/L (ref 5–41)
ANION GAP SERPL CALCULATED.3IONS-SCNC: 10 MMOL/L (ref 9–17)
AST SERPL-CCNC: 29 U/L
BUN BLDV-MCNC: 20 MG/DL (ref 8–23)
BUN/CREAT BLD: 18 (ref 9–20)
CALCIUM SERPL-MCNC: 9.8 MG/DL (ref 8.6–10.4)
CHLORIDE BLD-SCNC: 105 MMOL/L (ref 98–107)
CHOLESTEROL/HDL RATIO: 2.6
CHOLESTEROL: 103 MG/DL
CO2: 24 MMOL/L (ref 20–31)
CREAT SERPL-MCNC: 1.09 MG/DL (ref 0.7–1.2)
GFR SERPL CREATININE-BSD FRML MDRD: >60 ML/MIN/1.73M2
GLUCOSE BLD-MCNC: 130 MG/DL (ref 70–99)
HCT VFR BLD CALC: 41.2 % (ref 40.7–50.3)
HDLC SERPL-MCNC: 40 MG/DL
HEMOGLOBIN: 13.7 G/DL (ref 13–17)
LDL CHOLESTEROL: 51 MG/DL (ref 0–130)
MCH RBC QN AUTO: 33.5 PG (ref 25.2–33.5)
MCHC RBC AUTO-ENTMCNC: 33.3 G/DL (ref 28.4–34.8)
MCV RBC AUTO: 100.7 FL (ref 82.6–102.9)
NRBC AUTOMATED: 0 PER 100 WBC
PDW BLD-RTO: 14.1 % (ref 11.8–14.4)
PLATELET # BLD: 203 K/UL (ref 138–453)
PMV BLD AUTO: 9.6 FL (ref 8.1–13.5)
POTASSIUM SERPL-SCNC: 4.2 MMOL/L (ref 3.7–5.3)
RBC # BLD: 4.09 M/UL (ref 4.21–5.77)
SODIUM BLD-SCNC: 139 MMOL/L (ref 135–144)
TRIGL SERPL-MCNC: 60 MG/DL
TSH SERPL DL<=0.05 MIU/L-ACNC: 6.38 UIU/ML (ref 0.3–5)
WBC # BLD: 6.4 K/UL (ref 3.5–11.3)

## 2022-10-18 PROCEDURE — 84443 ASSAY THYROID STIM HORMONE: CPT

## 2022-10-18 PROCEDURE — 84460 ALANINE AMINO (ALT) (SGPT): CPT

## 2022-10-18 PROCEDURE — 36415 COLL VENOUS BLD VENIPUNCTURE: CPT

## 2022-10-18 PROCEDURE — 80048 BASIC METABOLIC PNL TOTAL CA: CPT

## 2022-10-18 PROCEDURE — 85027 COMPLETE CBC AUTOMATED: CPT

## 2022-10-18 PROCEDURE — 80061 LIPID PANEL: CPT

## 2022-10-18 PROCEDURE — 84450 TRANSFERASE (AST) (SGOT): CPT

## 2022-10-25 ENCOUNTER — NURSE ONLY (OUTPATIENT)
Dept: CARDIOLOGY | Age: 72
End: 2022-10-25
Payer: MEDICARE

## 2022-10-25 DIAGNOSIS — Z95.810 DUAL ICD (IMPLANTABLE CARDIOVERTER-DEFIBRILLATOR) IN PLACE: ICD-10-CM

## 2022-10-25 DIAGNOSIS — I25.5 ISCHEMIC CARDIOMYOPATHY: Primary | ICD-10-CM

## 2022-10-25 PROCEDURE — 93295 DEV INTERROG REMOTE 1/2/MLT: CPT | Performed by: FAMILY MEDICINE

## 2022-11-09 ENCOUNTER — HOSPITAL ENCOUNTER (OUTPATIENT)
Dept: PHARMACY | Age: 72
Setting detail: THERAPIES SERIES
Discharge: HOME | End: 2022-11-09
Payer: MEDICARE

## 2022-11-09 VITALS
WEIGHT: 160 LBS | HEART RATE: 67 BPM | DIASTOLIC BLOOD PRESSURE: 56 MMHG | SYSTOLIC BLOOD PRESSURE: 92 MMHG | BODY MASS INDEX: 22.96 KG/M2

## 2022-11-09 LAB — INR BLD: 2.1

## 2022-11-09 PROCEDURE — 99211 OFF/OP EST MAY X REQ PHY/QHP: CPT

## 2022-11-09 PROCEDURE — 85610 PROTHROMBIN TIME: CPT

## 2022-11-09 NOTE — PROGRESS NOTES
[facility]/[place]  Medication Management  ANTICOAGULATION    Referring Provider: Dr. Shaunna Orellana INR: 2-3    TODAY'S INR: 2.1    WARFARIN Dosage: Continue warfarin 5 mg po every Tuesday and Friday; 2.5 mg po all other days    INR (no units)   Date Value   2022 2.1   2022 2.3   2022 2.4   2022 2.9   2022 2.4   2022 2   2022 3.3       Medication changes:  No changes  Notes:    Fingerstick INR drawn per clinic protocol. Patient states no visible blood in urine and no black tarry stool. Denies any missed doses of warfarin. No change in other maintenance medications or in diet. Will recheck INR in 6 weeks. Patient acknowledges working in consult agreement with pharmacist as referred by his/her physician. For Pharmacy Admin Tracking Only    Intervention Detail: Adherence Monitorin  Total # of Interventions Recommended: 1  Total # of Interventions Accepted: 1  Time Spent (min): JOHN Menard 66.  Cyndy Cohen [facility]

## 2022-11-29 DIAGNOSIS — E78.2 MIXED HYPERLIPIDEMIA: Primary | ICD-10-CM

## 2022-11-29 RX ORDER — ATORVASTATIN CALCIUM 80 MG/1
80 TABLET, FILM COATED ORAL NIGHTLY
Qty: 90 TABLET | Refills: 3 | Status: SHIPPED | OUTPATIENT
Start: 2022-11-29

## 2022-12-21 ENCOUNTER — HOSPITAL ENCOUNTER (OUTPATIENT)
Dept: PHARMACY | Age: 72
Setting detail: THERAPIES SERIES
Discharge: HOME | End: 2022-12-21
Payer: MEDICARE

## 2022-12-21 VITALS
BODY MASS INDEX: 23.1 KG/M2 | SYSTOLIC BLOOD PRESSURE: 89 MMHG | WEIGHT: 161 LBS | HEART RATE: 70 BPM | DIASTOLIC BLOOD PRESSURE: 54 MMHG

## 2022-12-21 LAB — INR BLD: 2.3

## 2022-12-21 PROCEDURE — 85610 PROTHROMBIN TIME: CPT

## 2022-12-21 PROCEDURE — 99211 OFF/OP EST MAY X REQ PHY/QHP: CPT

## 2022-12-21 NOTE — PROGRESS NOTES
[address]/[place]  Medication Management  ANTICOAGULATION    Referring Provider: Dr. Mark Frances INR: 2 - 3     TODAY'S INR: 2.3     WARFARIN Dosage: Continue warfarin whole 5 mg tablet Tuesday, Friday and half tablet for 2.5 mg all other days. INR (no units)   Date Value   2022 2.1   2022 2.3   2022 2.4   2022 2.9   2022 2.4   2022 2   2022 3.3       Medication changes:  None    Notes:    Fingerstick INR drawn per clinic protocol. Patient states no visible blood in urine and no black tarry stool. Denies any missed doses of warfarin. No change in other maintenance medications or in diet. Will recheck INR in 6 weeks. Patient acknowledges working in consult agreement with pharmacist as referred by his/her physician.                   For Pharmacy Admin Tracking Only    Intervention Detail: Adherence Monitorin  Total # of Interventions Recommended: 1  Total # of Interventions Accepted: 1  Time Spent (min): Stefano Olsen 11, [facility], PharmD

## 2023-01-31 ENCOUNTER — NURSE ONLY (OUTPATIENT)
Dept: CARDIOLOGY | Age: 73
End: 2023-01-31
Payer: MEDICARE

## 2023-01-31 DIAGNOSIS — Z95.810 DUAL ICD (IMPLANTABLE CARDIOVERTER-DEFIBRILLATOR) IN PLACE: ICD-10-CM

## 2023-01-31 DIAGNOSIS — I25.5 ISCHEMIC CARDIOMYOPATHY: Primary | ICD-10-CM

## 2023-01-31 PROCEDURE — 93295 DEV INTERROG REMOTE 1/2/MLT: CPT | Performed by: FAMILY MEDICINE

## 2023-02-01 ENCOUNTER — HOSPITAL ENCOUNTER (OUTPATIENT)
Dept: PHARMACY | Age: 73
Setting detail: THERAPIES SERIES
Discharge: HOME | End: 2023-02-01
Payer: MEDICARE

## 2023-02-01 VITALS — WEIGHT: 162 LBS | BODY MASS INDEX: 23.24 KG/M2

## 2023-02-01 LAB — INR BLD: 2.2

## 2023-02-01 PROCEDURE — 85610 PROTHROMBIN TIME: CPT

## 2023-02-01 PROCEDURE — 99211 OFF/OP EST MAY X REQ PHY/QHP: CPT

## 2023-02-01 NOTE — PROGRESS NOTES
[address] [place] [address]/[place]  Medication Management  ANTICOAGULATION    Referring Provider: Dr. Debby Saucedo INR: 2 - 3     TODAY'S INR: 2.2     WARFARIN Dosage: Continue warfarin whole 5 mg tablet Tuesday, Friday and half tablet for 2.5 mg all other days. INR (no units)   Date Value   2023 2.2   2022 2.3   2022 2.1   2022 2.3   2022 2.4   2022 2.9   2022 2.4       Medication changes:  None    Notes:    Fingerstick INR drawn per clinic protocol. Patient states no visible blood in urine and no black tarry stool. Denies any missed doses of warfarin. No change in other maintenance medications or in diet. Will recheck INR in 6 weeks. Patient acknowledges working in consult agreement with pharmacist as referred by his/her physician. For Pharmacy Admin Tracking Only    Intervention Detail: Adherence Monitorin  Total # of Interventions Recommended: 1  Total # of Interventions Accepted: 1  Time Spent (min): 618 [facility]  Jihan Silva

## 2023-03-03 DIAGNOSIS — I25.5 ISCHEMIC CARDIOMYOPATHY: ICD-10-CM

## 2023-03-05 RX ORDER — RAMIPRIL 5 MG/1
5 CAPSULE ORAL DAILY
Qty: 90 CAPSULE | Refills: 3 | Status: SHIPPED | OUTPATIENT
Start: 2023-03-05

## 2023-03-14 ENCOUNTER — HOSPITAL ENCOUNTER (OUTPATIENT)
Dept: PHARMACY | Age: 73
Setting detail: THERAPIES SERIES
Discharge: HOME | End: 2023-03-14
Payer: MEDICARE

## 2023-03-14 VITALS
WEIGHT: 161 LBS | DIASTOLIC BLOOD PRESSURE: 59 MMHG | HEART RATE: 77 BPM | SYSTOLIC BLOOD PRESSURE: 95 MMHG | BODY MASS INDEX: 23.1 KG/M2

## 2023-03-14 DIAGNOSIS — I48.91 ATRIAL FIBRILLATION, UNSPECIFIED TYPE (HCC): Primary | ICD-10-CM

## 2023-03-14 LAB — INR BLD: 1.9

## 2023-03-14 PROCEDURE — 85610 PROTHROMBIN TIME: CPT | Performed by: FAMILY MEDICINE

## 2023-03-14 PROCEDURE — 99211 OFF/OP EST MAY X REQ PHY/QHP: CPT | Performed by: FAMILY MEDICINE

## 2023-03-14 NOTE — PROGRESS NOTES
[address] [place] [address]/[place]  Medication Management  ANTICOAGULATION    Referring Provider: Dr Juan Pablo Moffett INR: 2.0-3.0    TODAY'S INR: 1.9    WARFARIN Dosage: 5mg T, 2.5mg all other days    INR (no units)   Date Value   2023 1.9   2023 2.2   2022 2.3   2022 2.1   2022 2.3   2022 2.4   2022 2.9       Medication changes:  No changes  Notes:    Fingerstick INR drawn per clinic protocol. Patient states no visible blood in urine and no black tarry stool. Denies any missed doses of warfarin. No change in other maintenance medications or in diet. Will recheck INR in 6 weeks. Patient states he has taken only 2.5mg daily for the last 6-10 weeks because he was getting some bruises. States his last appt had a \"good number so I didn't tell her I cut my dose\". We will modify dose to 5mg once weekly and continue 2.5mg all other days. Patient acknowledges working in consult agreement with pharmacist as referred by his/her physician.                   For Pharmacy Admin Tracking Only    Intervention Detail: Adherence Monitorin and Dose Adjustment: 1, reason: Therapy Optimization  Total # of Interventions Recommended: 3  Total # of Interventions Accepted: 3  Time Spent (min): 20    Reyna Austin R.Ph., 3/14/2023,7:37 AM

## 2023-03-14 NOTE — PATIENT INSTRUCTIONS
Please take 5mg (1 tablet) warfarin on Tuesdays and 2.5mg (1/2 tablet) all other days. Continue to monitor for signs of bleeding. Return to coumadin clinic in 6 weeks.

## 2023-03-22 DIAGNOSIS — R42 DIZZINESS AND GIDDINESS: ICD-10-CM

## 2023-03-22 DIAGNOSIS — I25.5 ISCHEMIC CARDIOMYOPATHY: Primary | ICD-10-CM

## 2023-03-22 RX ORDER — MIDODRINE HYDROCHLORIDE 5 MG/1
5 TABLET ORAL
Qty: 240 TABLET | Refills: 3 | Status: SHIPPED | OUTPATIENT
Start: 2023-03-22

## 2023-04-03 RX ORDER — WARFARIN SODIUM 5 MG/1
5 TABLET ORAL DAILY
Qty: 90 TABLET | Refills: 3 | Status: SHIPPED | OUTPATIENT
Start: 2023-04-03

## 2023-04-25 ENCOUNTER — HOSPITAL ENCOUNTER (OUTPATIENT)
Dept: PHARMACY | Age: 73
Setting detail: THERAPIES SERIES
Discharge: HOME | End: 2023-04-25
Payer: MEDICARE

## 2023-04-25 VITALS
DIASTOLIC BLOOD PRESSURE: 56 MMHG | BODY MASS INDEX: 23.53 KG/M2 | WEIGHT: 164 LBS | SYSTOLIC BLOOD PRESSURE: 98 MMHG | HEART RATE: 62 BPM

## 2023-04-25 LAB — INR BLD: 2.5

## 2023-04-25 PROCEDURE — 99211 OFF/OP EST MAY X REQ PHY/QHP: CPT

## 2023-04-25 PROCEDURE — 85610 PROTHROMBIN TIME: CPT

## 2023-04-25 NOTE — PROGRESS NOTES
[address] [place] [address]/[place]  Medication Management  ANTICOAGULATION    Referring Provider: Dr. Elizabeth Herbert INR: 2-3    TODAY'S INR: 2.5    WARFARIN Dosage: Continue warfarin 5 mg po every Wednesday; 2.5 mg po all other days    INR (no units)   Date Value   2023 2.5   2023 1.9   2023 2.2   2022 2.3   2022 2.1   2022 2.3   2022 2.4       Hemoglobin   Date Value Ref Range Status   10/18/2022 13.7 13.0 - 17.0 g/dL Final     Hematocrit   Date Value Ref Range Status   10/18/2022 41.2 40.7 - 50.3 % Final     ALT   Date Value Ref Range Status   10/18/2022 26 5 - 41 U/L Final     AST   Date Value Ref Range Status   10/18/2022 29 <40 U/L Final       Medication changes:  No changes    Notes:    Fingerstick INR drawn per clinic protocol. Patient states no visible blood in urine and no black tarry stool. Denies any missed doses of warfarin. No change in other maintenance medications or in diet. Will recheck INR in 6 weeks. Patient acknowledges working in consult agreement with pharmacist as referred by his/her physician. For Pharmacy Admin Tracking Only    Intervention Detail: Adherence Monitorin  Total # of Interventions Recommended: 1  Total # of Interventions Accepted: 1  Time Spent (min): ANDREW/ [address].  [place], [facility]

## 2023-05-02 ENCOUNTER — NURSE ONLY (OUTPATIENT)
Dept: CARDIOLOGY | Age: 73
End: 2023-05-02

## 2023-05-02 ENCOUNTER — HOSPITAL ENCOUNTER (OUTPATIENT)
Age: 73
Discharge: HOME | End: 2023-05-02
Payer: MEDICARE

## 2023-05-02 DIAGNOSIS — I48.91 ATRIAL FIBRILLATION WITH RVR (HCC): ICD-10-CM

## 2023-05-02 DIAGNOSIS — I25.5 ISCHEMIC CARDIOMYOPATHY: Primary | ICD-10-CM

## 2023-05-02 DIAGNOSIS — R73.01 IMPAIRED FASTING GLUCOSE: ICD-10-CM

## 2023-05-02 DIAGNOSIS — I50.22 CHRONIC SYSTOLIC CONGESTIVE HEART FAILURE (HCC): ICD-10-CM

## 2023-05-02 DIAGNOSIS — Z95.810 DUAL ICD (IMPLANTABLE CARDIOVERTER-DEFIBRILLATOR) IN PLACE: ICD-10-CM

## 2023-05-02 LAB
ABSOLUTE EOS #: 0.22 K/UL (ref 0–0.44)
ABSOLUTE IMMATURE GRANULOCYTE: <0.03 K/UL (ref 0–0.3)
ABSOLUTE LYMPH #: 2.24 K/UL (ref 1.1–3.7)
ABSOLUTE MONO #: 0.78 K/UL (ref 0.1–1.2)
ALT SERPL-CCNC: 28 U/L (ref 5–41)
ANION GAP SERPL CALCULATED.3IONS-SCNC: 7 MMOL/L (ref 9–17)
AST SERPL-CCNC: 30 U/L
BASOPHILS # BLD: 1 % (ref 0–2)
BASOPHILS ABSOLUTE: 0.04 K/UL (ref 0–0.2)
BUN SERPL-MCNC: 24 MG/DL (ref 8–23)
BUN/CREAT BLD: 23 (ref 9–20)
CALCIUM SERPL-MCNC: 9.8 MG/DL (ref 8.6–10.4)
CHLORIDE SERPL-SCNC: 104 MMOL/L (ref 98–107)
CHOLEST SERPL-MCNC: 103 MG/DL
CHOLESTEROL/HDL RATIO: 2.7
CO2 SERPL-SCNC: 26 MMOL/L (ref 20–31)
CREAT SERPL-MCNC: 1.04 MG/DL (ref 0.7–1.2)
EOSINOPHILS RELATIVE PERCENT: 3 % (ref 1–4)
EST. AVERAGE GLUCOSE BLD GHB EST-MCNC: 131 MG/DL
GFR SERPL CREATININE-BSD FRML MDRD: >60 ML/MIN/1.73M2
GLUCOSE SERPL-MCNC: 120 MG/DL (ref 70–99)
HBA1C MFR BLD: 6.2 % (ref 4–6)
HCT VFR BLD AUTO: 42.1 % (ref 40.7–50.3)
HDLC SERPL-MCNC: 38 MG/DL
HGB BLD-MCNC: 13.6 G/DL (ref 13–17)
IMMATURE GRANULOCYTES: 0 %
LDLC SERPL CALC-MCNC: 55 MG/DL (ref 0–130)
LYMPHOCYTES # BLD: 30 % (ref 24–43)
MCH RBC QN AUTO: 33.3 PG (ref 25.2–33.5)
MCHC RBC AUTO-ENTMCNC: 32.3 G/DL (ref 28.4–34.8)
MCV RBC AUTO: 102.9 FL (ref 82.6–102.9)
MONOCYTES # BLD: 10 % (ref 3–12)
NRBC AUTOMATED: 0 PER 100 WBC
PDW BLD-RTO: 14.5 % (ref 11.8–14.4)
PLATELET # BLD AUTO: 204 K/UL (ref 138–453)
PMV BLD AUTO: 10 FL (ref 8.1–13.5)
POTASSIUM SERPL-SCNC: 4.3 MMOL/L (ref 3.7–5.3)
RBC # BLD: 4.09 M/UL (ref 4.21–5.77)
SEG NEUTROPHILS: 56 % (ref 36–65)
SEGMENTED NEUTROPHILS ABSOLUTE COUNT: 4.3 K/UL (ref 1.5–8.1)
SODIUM SERPL-SCNC: 137 MMOL/L (ref 135–144)
TRIGL SERPL-MCNC: 49 MG/DL
WBC # BLD AUTO: 7.6 K/UL (ref 3.5–11.3)

## 2023-05-02 PROCEDURE — 80061 LIPID PANEL: CPT

## 2023-05-02 PROCEDURE — 84460 ALANINE AMINO (ALT) (SGPT): CPT

## 2023-05-02 PROCEDURE — 84450 TRANSFERASE (AST) (SGOT): CPT

## 2023-05-02 PROCEDURE — 85025 COMPLETE CBC W/AUTO DIFF WBC: CPT

## 2023-05-02 PROCEDURE — 36415 COLL VENOUS BLD VENIPUNCTURE: CPT

## 2023-05-02 PROCEDURE — 80048 BASIC METABOLIC PNL TOTAL CA: CPT

## 2023-05-02 PROCEDURE — 83036 HEMOGLOBIN GLYCOSYLATED A1C: CPT

## 2023-05-03 RX ORDER — AMIODARONE HYDROCHLORIDE 200 MG/1
TABLET ORAL
Qty: 90 TABLET | Refills: 3 | Status: SHIPPED | OUTPATIENT
Start: 2023-05-03

## 2023-05-03 RX ORDER — AMIODARONE HYDROCHLORIDE 200 MG/1
200 TABLET ORAL DAILY
Qty: 90 TABLET | Refills: 3 | Status: SHIPPED | OUTPATIENT
Start: 2023-05-03

## 2023-05-10 ENCOUNTER — OFFICE VISIT (OUTPATIENT)
Dept: CARDIOLOGY | Age: 73
End: 2023-05-10
Payer: MEDICARE

## 2023-05-10 VITALS
OXYGEN SATURATION: 94 % | BODY MASS INDEX: 23.34 KG/M2 | DIASTOLIC BLOOD PRESSURE: 56 MMHG | SYSTOLIC BLOOD PRESSURE: 96 MMHG | HEIGHT: 70 IN | RESPIRATION RATE: 16 BRPM | HEART RATE: 66 BPM | WEIGHT: 163 LBS

## 2023-05-10 DIAGNOSIS — Z71.6 TOBACCO ABUSE COUNSELING: ICD-10-CM

## 2023-05-10 DIAGNOSIS — Z95.810 ICD (IMPLANTABLE CARDIOVERTER-DEFIBRILLATOR) IN PLACE: ICD-10-CM

## 2023-05-10 DIAGNOSIS — E78.2 MIXED HYPERLIPIDEMIA: ICD-10-CM

## 2023-05-10 DIAGNOSIS — I25.5 ISCHEMIC CARDIOMYOPATHY: ICD-10-CM

## 2023-05-10 DIAGNOSIS — I25.10 ASHD (ARTERIOSCLEROTIC HEART DISEASE): ICD-10-CM

## 2023-05-10 DIAGNOSIS — Z95.1 HX OF CABG: ICD-10-CM

## 2023-05-10 DIAGNOSIS — R42 DIZZINESS AND GIDDINESS: ICD-10-CM

## 2023-05-10 DIAGNOSIS — I48.0 PAF (PAROXYSMAL ATRIAL FIBRILLATION) (HCC): Primary | ICD-10-CM

## 2023-05-10 PROBLEM — D68.69 SECONDARY HYPERCOAGULABLE STATE (HCC): Status: ACTIVE | Noted: 2023-05-10

## 2023-05-10 PROCEDURE — 4004F PT TOBACCO SCREEN RCVD TLK: CPT | Performed by: PHYSICIAN ASSISTANT

## 2023-05-10 PROCEDURE — 93283 PRGRMG EVAL IMPLANTABLE DFB: CPT | Performed by: FAMILY MEDICINE

## 2023-05-10 PROCEDURE — 3017F COLORECTAL CA SCREEN DOC REV: CPT | Performed by: PHYSICIAN ASSISTANT

## 2023-05-10 PROCEDURE — 93283 PRGRMG EVAL IMPLANTABLE DFB: CPT | Performed by: PHYSICIAN ASSISTANT

## 2023-05-10 PROCEDURE — G8420 CALC BMI NORM PARAMETERS: HCPCS | Performed by: PHYSICIAN ASSISTANT

## 2023-05-10 PROCEDURE — 1123F ACP DISCUSS/DSCN MKR DOCD: CPT | Performed by: PHYSICIAN ASSISTANT

## 2023-05-10 PROCEDURE — G8427 DOCREV CUR MEDS BY ELIG CLIN: HCPCS | Performed by: PHYSICIAN ASSISTANT

## 2023-05-10 PROCEDURE — 93005 ELECTROCARDIOGRAM TRACING: CPT | Performed by: PHYSICIAN ASSISTANT

## 2023-05-10 PROCEDURE — 99214 OFFICE O/P EST MOD 30 MIN: CPT | Performed by: PHYSICIAN ASSISTANT

## 2023-05-10 RX ORDER — CLOPIDOGREL BISULFATE 75 MG/1
75 TABLET ORAL DAILY
COMMUNITY

## 2023-05-10 RX ORDER — MIDODRINE HYDROCHLORIDE 10 MG/1
10 TABLET ORAL
Qty: 270 TABLET | Refills: 3 | Status: SHIPPED | OUTPATIENT
Start: 2023-05-10 | End: 2023-08-08

## 2023-05-10 NOTE — PROGRESS NOTES
problems, but otherwise I asked him to Return in about 1 year (around 5/10/2024) for with Dr Sandra Galdamez and home device check in 6 months. However, I would be happy to see him sooner should the need arise. Sincerely,  Yen Singh PA-C  [facility]    [address], [address] [place] [address]  Phone: [phone], Fax: [phone]     I believe that the risk of significant morbidity and mortality related to the patient's current medical conditions are: Intermediate.    30 minutes      May 10, 2023

## 2023-05-22 ENCOUNTER — HOSPITAL ENCOUNTER (OUTPATIENT)
Dept: GENERAL RADIOLOGY | Age: 73
Discharge: HOME | End: 2023-05-24
Payer: MEDICARE

## 2023-05-22 ENCOUNTER — HOSPITAL ENCOUNTER (OUTPATIENT)
Age: 73
Discharge: HOME | End: 2023-05-24
Payer: MEDICARE

## 2023-05-22 ENCOUNTER — TELEPHONE (OUTPATIENT)
Dept: CARDIOLOGY | Age: 73
End: 2023-05-22

## 2023-05-22 ENCOUNTER — HOSPITAL ENCOUNTER (OUTPATIENT)
Age: 73
Discharge: HOME | End: 2023-05-22
Payer: MEDICARE

## 2023-05-22 DIAGNOSIS — I25.5 ISCHEMIC CARDIOMYOPATHY: ICD-10-CM

## 2023-05-22 DIAGNOSIS — Z71.6 TOBACCO ABUSE COUNSELING: ICD-10-CM

## 2023-05-22 DIAGNOSIS — R42 DIZZINESS AND GIDDINESS: ICD-10-CM

## 2023-05-22 DIAGNOSIS — I48.0 PAF (PAROXYSMAL ATRIAL FIBRILLATION) (HCC): ICD-10-CM

## 2023-05-22 DIAGNOSIS — I25.10 ASHD (ARTERIOSCLEROTIC HEART DISEASE): ICD-10-CM

## 2023-05-22 DIAGNOSIS — Z95.1 HX OF CABG: ICD-10-CM

## 2023-05-22 DIAGNOSIS — Z95.810 ICD (IMPLANTABLE CARDIOVERTER-DEFIBRILLATOR) IN PLACE: ICD-10-CM

## 2023-05-22 DIAGNOSIS — E78.2 MIXED HYPERLIPIDEMIA: ICD-10-CM

## 2023-05-22 LAB
T4 FREE SERPL-MCNC: 1.5 NG/DL (ref 0.9–1.7)
TSH SERPL-MCNC: 6.98 UIU/ML (ref 0.3–5)

## 2023-05-22 PROCEDURE — 84439 ASSAY OF FREE THYROXINE: CPT

## 2023-05-22 PROCEDURE — 36415 COLL VENOUS BLD VENIPUNCTURE: CPT

## 2023-05-22 PROCEDURE — 84443 ASSAY THYROID STIM HORMONE: CPT

## 2023-05-22 PROCEDURE — 71046 X-RAY EXAM CHEST 2 VIEWS: CPT

## 2023-05-22 NOTE — RESULT ENCOUNTER NOTE
Please notify patient that their lab results are okay. TSH was elevated, but T4 was normal.   Please continue current treatment and follow up.

## 2023-05-22 NOTE — RESULT ENCOUNTER NOTE
Please notify patient that their chest XR was normal.   Please continue current treatment and follow up.

## 2023-05-22 NOTE — TELEPHONE ENCOUNTER
----- Message from Keenan Wasserman PA-C sent at 5/22/2023  3:50 PM EDT -----  Please notify patient that their chest XR was normal.   Please continue current treatment and follow up.

## 2023-06-06 ENCOUNTER — HOSPITAL ENCOUNTER (OUTPATIENT)
Dept: PHARMACY | Age: 73
Setting detail: THERAPIES SERIES
Discharge: HOME | End: 2023-06-06
Payer: MEDICARE

## 2023-06-06 VITALS
HEART RATE: 71 BPM | WEIGHT: 159 LBS | BODY MASS INDEX: 22.81 KG/M2 | SYSTOLIC BLOOD PRESSURE: 99 MMHG | DIASTOLIC BLOOD PRESSURE: 59 MMHG

## 2023-06-06 LAB — INR BLD: 2.2

## 2023-06-06 PROCEDURE — 99211 OFF/OP EST MAY X REQ PHY/QHP: CPT

## 2023-06-06 PROCEDURE — 85610 PROTHROMBIN TIME: CPT

## 2023-06-06 NOTE — PROGRESS NOTES
[address]/[place]  Medication Management  ANTICOAGULATION    Referring Provider: Dr. Alex Panchal INR: 2 -3    TODAY'S INR: 2.2    WARFARIN Dosage: Continue warfarin 5 mg po every Wednesday; 2.5 mg po all other days    INR (no units)   Date Value   2023 2.5   2023 1.9   2023 2.2   2022 2.3   2022 2.1   2022 2.3   2022 2.4       Hemoglobin   Date Value Ref Range Status   2023 13.6 13.0 - 17.0 g/dL Final     Hematocrit   Date Value Ref Range Status   2023 42.1 40.7 - 50.3 % Final     ALT   Date Value Ref Range Status   2023 28 5 - 41 U/L Final     AST   Date Value Ref Range Status   2023 30 <40 U/L Final       Medication changes:  No changes    Notes:    Fingerstick INR drawn per clinic protocol. Patient states no visible blood in urine and no black tarry stool. Missed one dose of warfarin 3 days ago. No change in other maintenance medications or in diet. Will recheck INR in 6 weeks. Patient acknowledges working in consult agreement with pharmacist as referred by his/her physician. For Pharmacy Admin Tracking Only    Intervention Detail: Adherence Monitorin  Total # of Interventions Recommended: 1  Total # of Interventions Accepted: 1  Time Spent (min): ANDREW/ [address].  [place], [facility]

## 2023-06-19 DIAGNOSIS — Z95.1 S/P CABG X 3: ICD-10-CM

## 2023-06-19 DIAGNOSIS — I95.9 HYPOTENSION, UNSPECIFIED HYPOTENSION TYPE: ICD-10-CM

## 2023-06-19 DIAGNOSIS — I25.810 CORONARY ARTERY DISEASE INVOLVING CORONARY BYPASS GRAFT OF NATIVE HEART WITHOUT ANGINA PECTORIS: ICD-10-CM

## 2023-06-19 DIAGNOSIS — E78.2 MIXED HYPERLIPIDEMIA: ICD-10-CM

## 2023-06-19 DIAGNOSIS — I25.5 ISCHEMIC CARDIOMYOPATHY: ICD-10-CM

## 2023-06-19 RX ORDER — SPIRONOLACTONE 25 MG/1
12.5 TABLET ORAL DAILY
Qty: 45 TABLET | Refills: 3 | Status: SHIPPED | OUTPATIENT
Start: 2023-06-19

## 2023-06-22 ENCOUNTER — HOSPITAL ENCOUNTER (OUTPATIENT)
Dept: GENERAL RADIOLOGY | Age: 73
Discharge: HOME | End: 2023-06-24
Payer: MEDICARE

## 2023-06-22 ENCOUNTER — HOSPITAL ENCOUNTER (OUTPATIENT)
Age: 73
Discharge: HOME | End: 2023-06-24
Payer: MEDICARE

## 2023-06-22 ENCOUNTER — HOSPITAL ENCOUNTER (OUTPATIENT)
Age: 73
Discharge: HOME | End: 2023-06-22
Payer: MEDICARE

## 2023-06-22 DIAGNOSIS — N20.0 RENAL STONE: ICD-10-CM

## 2023-06-22 DIAGNOSIS — Z12.5 SCREENING PSA (PROSTATE SPECIFIC ANTIGEN): ICD-10-CM

## 2023-06-22 LAB — PSA SERPL-MCNC: 0.84 NG/ML

## 2023-06-22 PROCEDURE — 74018 RADEX ABDOMEN 1 VIEW: CPT

## 2023-06-22 PROCEDURE — G0103 PSA SCREENING: HCPCS

## 2023-06-22 PROCEDURE — 36415 COLL VENOUS BLD VENIPUNCTURE: CPT

## 2023-06-23 ENCOUNTER — OFFICE VISIT (OUTPATIENT)
Dept: UROLOGY | Age: 73
End: 2023-06-23

## 2023-06-23 VITALS
TEMPERATURE: 97.2 F | WEIGHT: 161 LBS | HEART RATE: 63 BPM | HEIGHT: 70 IN | DIASTOLIC BLOOD PRESSURE: 61 MMHG | BODY MASS INDEX: 23.05 KG/M2 | SYSTOLIC BLOOD PRESSURE: 102 MMHG

## 2023-06-23 DIAGNOSIS — N20.0 RENAL STONE: ICD-10-CM

## 2023-06-23 DIAGNOSIS — R31.0 GROSS HEMATURIA: ICD-10-CM

## 2023-06-23 DIAGNOSIS — Z12.5 SCREENING PSA (PROSTATE SPECIFIC ANTIGEN): Primary | ICD-10-CM

## 2023-06-23 ASSESSMENT — ENCOUNTER SYMPTOMS
WHEEZING: 0
BACK PAIN: 0
CONSTIPATION: 0
ABDOMINAL PAIN: 0
COLOR CHANGE: 0
VOMITING: 0
EYE REDNESS: 0
COUGH: 0
SHORTNESS OF BREATH: 0
NAUSEA: 0

## 2023-06-23 NOTE — PROGRESS NOTES
HPI:          Patient is a 68 y.o. male in no acute distress. He is alert and oriented to person, place, and time. History  1988 ESWL and spontaneous stone passage     5/2021 referral from Dr. Za Medrano for gross hematuria. He is a current smoker. He does take Eliquis and Plavix. 6/2021 CT urogram showed a 5 mm proximal left ureteral stone with minimal hydronephrosis. There is also an 11 mm left renal stone and punctate right renal stones. No suspicious renal mass or filling defect. Screening PSA 1.17.     6/22/2021 left HLL. Currently  Patient is here today for 1 year follow-up. We do see the patient for renal calculus. We also see him for gross hematuria. Patient to get a screening PSA. Current value is 0.84. Patient to get a KUB today. Patient's film was independently reviewed. This does show stones. This is more stone burden in the left kidney. I do feel the stone in the left kidney is stable in size.   Past Medical History:   Diagnosis Date    Chronic kidney disease     Coronary artery disease     s/p CABG x3 in 2008 and 2 stents on 12/24/2020    H/O echocardiogram 02/08/2021    EF 15-20% LV severly enlarged and LV wall thickness is normal Severe global hypokinesis with segmental abnormalities LA is mod dilated 34-39 with LA volume index of 34 ml/m2 Mild mitral regurg Mod tricupid regurg Mild to mod pulm HTN with est RV systolic pressure of 38 mmhg mod grade II diastolic dysfunciton    H/O echocardiogram 04/01/2021    EF 10-15% LA size was mildly dilated IVC sice is mildly dilated with reduced respiratory phasic changes CVP 5-10 mmHg    Hyperlipidemia     Kidney stone 06/2021    Smoker      Past Surgical History:   Procedure Laterality Date    BLADDER SURGERY Left 6/22/2021    CYSTOSCOPY STENT INSERTION performed by José Payan MD at [address]  04/23/2021    [address] [facility]

## 2023-06-23 NOTE — PATIENT INSTRUCTIONS
SURVEY:    You may be receiving a survey from [employer] regarding your visit today. Please complete the survey to enable us to provide the highest quality of care to you and your family. If you cannot score us a very good on any question, please call the office to discuss how we could of made your experience a very good one. Thank you.

## 2023-07-11 RX ORDER — CLOPIDOGREL BISULFATE 75 MG/1
75 TABLET ORAL DAILY
Qty: 90 TABLET | Refills: 3 | Status: SHIPPED | OUTPATIENT
Start: 2023-07-11

## 2023-07-18 ENCOUNTER — HOSPITAL ENCOUNTER (OUTPATIENT)
Dept: PHARMACY | Age: 73
Setting detail: THERAPIES SERIES
Discharge: HOME | End: 2023-07-18
Payer: MEDICARE

## 2023-07-18 VITALS
DIASTOLIC BLOOD PRESSURE: 56 MMHG | HEART RATE: 61 BPM | BODY MASS INDEX: 23.02 KG/M2 | WEIGHT: 160.4 LBS | SYSTOLIC BLOOD PRESSURE: 96 MMHG

## 2023-07-18 DIAGNOSIS — I48.91 ATRIAL FIBRILLATION, UNSPECIFIED TYPE (HCC): Primary | ICD-10-CM

## 2023-07-18 LAB — INR BLD: 2.9

## 2023-07-18 PROCEDURE — 85610 PROTHROMBIN TIME: CPT | Performed by: FAMILY MEDICINE

## 2023-07-18 PROCEDURE — 99211 OFF/OP EST MAY X REQ PHY/QHP: CPT | Performed by: FAMILY MEDICINE

## 2023-07-18 NOTE — PROGRESS NOTES
[facility]  Medication Management  ANTICOAGULATION    Referring Provider: Dr Leigha Clifton INR: 2.0-3.0    TODAY'S INR: 2.9    WARFARIN Dosage: continue 5mg W, 2.4mg all other days    INR (no units)   Date Value   2023 2.9   2023 2.2   2023 2.5   2023 1.9   2023 2.2   2022 2.3   2022 2.1       Hemoglobin   Date Value Ref Range Status   2023 13.6 13.0 - 17.0 g/dL Final     Hematocrit   Date Value Ref Range Status   2023 42.1 40.7 - 50.3 % Final     ALT   Date Value Ref Range Status   2023 28 5 - 41 U/L Final     AST   Date Value Ref Range Status   2023 30 <40 U/L Final       Medication changes:  No changes    Notes:    Fingerstick INR drawn per clinic protocol. Patient states no visible blood in urine and no black tarry stool. Denies any missed doses of warfarin. No change in other maintenance medications or in diet. Will recheck INR in 6 weeks. Patient acknowledges working in consult agreement with pharmacist as referred by his/her physician.                   For Pharmacy Admin Tracking Only    Intervention Detail: Adherence Monitorin  Total # of Interventions Recommended: 2  Total # of Interventions Accepted: 2  Time Spent (min): 20    Agustin Jacobs R.Ph., 2023,7:35 AM

## 2023-07-18 NOTE — PATIENT INSTRUCTIONS
Continue to take warfarin 5mg (1 tablet) Wednesday and 2.5mg (1/2 tablet) all other days. Continue to monitor urine and stool for signs and symptoms of bleeding. Please notify the clinic of any medication changes. Please remember to bring all medications (both prescription and OTC) to your next visit. Kindly notify the clinic if you are unable to make to your next appointment. Continue current dose of warfarin as instructed on dosing calendar provided.

## 2023-07-24 DIAGNOSIS — I48.0 PAROXYSMAL ATRIAL FIBRILLATION (HCC): ICD-10-CM

## 2023-07-24 DIAGNOSIS — R42 DIZZINESS AND GIDDINESS: ICD-10-CM

## 2023-07-24 DIAGNOSIS — I25.5 ISCHEMIC CARDIOMYOPATHY: ICD-10-CM

## 2023-07-25 RX ORDER — MIDODRINE HYDROCHLORIDE 10 MG/1
10 TABLET ORAL
Qty: 270 TABLET | Refills: 3 | Status: SHIPPED | OUTPATIENT
Start: 2023-07-25 | End: 2024-07-19

## 2023-07-25 RX ORDER — METOPROLOL SUCCINATE 25 MG/1
25 TABLET, EXTENDED RELEASE ORAL NIGHTLY
Qty: 90 TABLET | Refills: 3 | Status: SHIPPED | OUTPATIENT
Start: 2023-07-25

## 2023-08-15 ENCOUNTER — NURSE ONLY (OUTPATIENT)
Dept: CARDIOLOGY | Age: 73
End: 2023-08-15
Payer: MEDICARE

## 2023-08-15 DIAGNOSIS — I25.5 ISCHEMIC CARDIOMYOPATHY: Primary | ICD-10-CM

## 2023-08-15 DIAGNOSIS — Z95.810 ICD (IMPLANTABLE CARDIOVERTER-DEFIBRILLATOR) IN PLACE: ICD-10-CM

## 2023-08-15 PROCEDURE — 93295 DEV INTERROG REMOTE 1/2/MLT: CPT | Performed by: FAMILY MEDICINE

## 2023-08-29 ENCOUNTER — HOSPITAL ENCOUNTER (OUTPATIENT)
Dept: PHARMACY | Age: 73
Setting detail: THERAPIES SERIES
Discharge: HOME | End: 2023-08-29
Payer: MEDICARE

## 2023-08-29 VITALS
HEART RATE: 65 BPM | BODY MASS INDEX: 22.53 KG/M2 | WEIGHT: 157 LBS | SYSTOLIC BLOOD PRESSURE: 115 MMHG | DIASTOLIC BLOOD PRESSURE: 55 MMHG

## 2023-08-29 LAB — INR BLD: 2.1

## 2023-08-29 PROCEDURE — 85610 PROTHROMBIN TIME: CPT

## 2023-08-29 PROCEDURE — 99211 OFF/OP EST MAY X REQ PHY/QHP: CPT

## 2023-08-29 NOTE — PROGRESS NOTES
[facility] [facility]  Medication Management  ANTICOAGULATION    Referring Provider: Dr. Ashlee Loza INR: 2-3    TODAY'S INR: 2.1    WARFARIN Dosage: Continue 5 mg po every Wednesday, 2.5 mg po all other days    INR (no units)   Date Value   2023 2.1   2023 2.9   2023 2.2   2023 2.5   2023 1.9   2023 2.2   2022 2.3       Hemoglobin   Date Value Ref Range Status   2023 13.6 13.0 - 17.0 g/dL Final     Hematocrit   Date Value Ref Range Status   2023 42.1 40.7 - 50.3 % Final     ALT   Date Value Ref Range Status   2023 28 5 - 41 U/L Final     AST   Date Value Ref Range Status   2023 30 <40 U/L Final       Medication changes:  No changes    Notes:    Fingerstick INR drawn per clinic protocol. Patient states no visible blood in urine and no black tarry stool. Denies any missed doses of warfarin. No change in other maintenance medications or in diet. Will recheck INR in 6 weeks. Patient acknowledges working in consult agreement with pharmacist as referred by his/her physician. For Pharmacy Admin Tracking Only    Intervention Detail: Adherence Monitorin  Total # of Interventions Recommended: 1  Total # of Interventions Accepted: 1  Time Spent (min): [address]

## 2023-09-19 ENCOUNTER — NURSE ONLY (OUTPATIENT)
Dept: CARDIOLOGY | Age: 73
End: 2023-09-19
Payer: MEDICARE

## 2023-09-19 DIAGNOSIS — I50.9 CONGESTIVE HEART FAILURE, UNSPECIFIED HF CHRONICITY, UNSPECIFIED HEART FAILURE TYPE (HCC): Primary | ICD-10-CM

## 2023-09-19 PROCEDURE — 93297 REM INTERROG DEV EVAL ICPMS: CPT | Performed by: FAMILY MEDICINE

## 2023-10-10 ENCOUNTER — HOSPITAL ENCOUNTER (OUTPATIENT)
Dept: PHARMACY | Age: 73
Setting detail: THERAPIES SERIES
Discharge: HOME | End: 2023-10-10
Payer: MEDICARE

## 2023-10-10 VITALS
SYSTOLIC BLOOD PRESSURE: 99 MMHG | HEART RATE: 66 BPM | DIASTOLIC BLOOD PRESSURE: 58 MMHG | WEIGHT: 160 LBS | BODY MASS INDEX: 22.96 KG/M2

## 2023-10-10 LAB — INR BLD: 1.4

## 2023-10-10 PROCEDURE — 99212 OFFICE O/P EST SF 10 MIN: CPT

## 2023-10-10 PROCEDURE — 85610 PROTHROMBIN TIME: CPT

## 2023-10-10 NOTE — PROGRESS NOTES
711 [facility]  Medication Management  ANTICOAGULATION    Referring Provider: Dr. Zahra Wilson INR: 2-3    TODAY'S INR: 1.4    WARFARIN Dosage: 7.5 mg x 1, then continue 5 mg po every Wednesday, 2.5 mg po all other days    INR (no units)   Date Value   10/10/2023 1.4   2023 2.1   2023 2.9   2023 2.2   2023 2.5   2023 1.9   2023 2.2       Hemoglobin   Date Value Ref Range Status   2023 13.6 13.0 - 17.0 g/dL Final     Hematocrit   Date Value Ref Range Status   2023 42.1 40.7 - 50.3 % Final     ALT   Date Value Ref Range Status   2023 28 5 - 41 U/L Final     AST   Date Value Ref Range Status   2023 30 <40 U/L Final       Medication changes:  No changes    Notes:    Fingerstick INR drawn per clinic protocol. Patient states no visible blood in urine and no black tarry stool. No change in other maintenance medications or in diet. Will recheck INR in 3 weeks. Patient acknowledges working in consult agreement with pharmacist as referred by his/her physician. Patient has not been taking warfarin as prescribed. He has been taking less warfarin - 2.5 mg po daily instead of 5 mg po every Wednesday and 2.5 mg po all other days. He will resume previous stable regimen. For Pharmacy Admin Tracking Only    Intervention Detail: Adherence Monitorin and Dose Adjustment: 2, reason: Improve Adherence, Therapy Optimization  Total # of Interventions Recommended: 3  Total # of Interventions Accepted: 3  Time Spent (min): 111  [facility].  [employer]

## 2023-10-10 NOTE — PATIENT INSTRUCTIONS
Please take 1.5 tablets (=7.5 mg) today. Continue current dose of warfarin as instructed on dosing calendar provided. Continue to monitor urine and stool for signs and symptoms of bleeding. Please notify the clinic of any medication changes.

## 2023-10-24 ENCOUNTER — NURSE ONLY (OUTPATIENT)
Dept: CARDIOLOGY | Age: 73
End: 2023-10-24
Payer: MEDICARE

## 2023-10-24 DIAGNOSIS — Z95.810 ICD (IMPLANTABLE CARDIOVERTER-DEFIBRILLATOR) IN PLACE: ICD-10-CM

## 2023-10-24 DIAGNOSIS — I25.5 ISCHEMIC CARDIOMYOPATHY: ICD-10-CM

## 2023-10-24 DIAGNOSIS — I50.9 CONGESTIVE HEART FAILURE, UNSPECIFIED HF CHRONICITY, UNSPECIFIED HEART FAILURE TYPE (HCC): Primary | ICD-10-CM

## 2023-10-24 PROCEDURE — 93297 REM INTERROG DEV EVAL ICPMS: CPT | Performed by: FAMILY MEDICINE

## 2023-10-31 ENCOUNTER — APPOINTMENT (OUTPATIENT)
Dept: PHARMACY | Age: 73
End: 2023-10-31
Payer: MEDICARE

## 2023-10-31 VITALS
HEART RATE: 76 BPM | DIASTOLIC BLOOD PRESSURE: 57 MMHG | SYSTOLIC BLOOD PRESSURE: 93 MMHG | WEIGHT: 159 LBS | BODY MASS INDEX: 22.81 KG/M2

## 2023-10-31 LAB — INR BLD: 3

## 2023-10-31 PROCEDURE — 85610 PROTHROMBIN TIME: CPT

## 2023-10-31 PROCEDURE — 99211 OFF/OP EST MAY X REQ PHY/QHP: CPT

## 2023-10-31 NOTE — PROGRESS NOTES
[facility]  Medication Management  ANTICOAGULATION    Referring Provider: Dr. Rohini Maradiaga INR: 2-3    TODAY'S INR: 3    WARFARIN Dosage: Continue 5 mg po every Wednesday, 2.5 mg po all other days    INR (no units)   Date Value   10/31/2023 3   10/10/2023 1.4   2023 2.1   2023 2.9   2023 2.2   2023 2.5   2023 1.9       Hemoglobin   Date Value Ref Range Status   2023 13.6 13.0 - 17.0 g/dL Final     Hematocrit   Date Value Ref Range Status   2023 42.1 40.7 - 50.3 % Final     ALT   Date Value Ref Range Status   2023 28 5 - 41 U/L Final     AST   Date Value Ref Range Status   2023 30 <40 U/L Final       Medication changes:  No changes    Notes:    Fingerstick INR drawn per clinic protocol. Patient states no visible blood in urine and no black tarry stool. No change in other maintenance medications or in diet. Will recheck INR in 4 weeks. Patient acknowledges working in consult agreement with pharmacist as referred by his/her physician. Patient is going to work harder at decreasing his smoking with goal to quit. For Pharmacy Admin Tracking Only    Intervention Detail: Adherence Monitorin  Total # of Interventions Recommended: 1  Total # of Interventions Accepted: 1  Time Spent (min): 111  [facility].  [facility]

## 2023-11-27 ENCOUNTER — HOSPITAL ENCOUNTER (OUTPATIENT)
Age: 73
Discharge: HOME | End: 2023-11-27
Payer: MEDICARE

## 2023-11-27 DIAGNOSIS — R73.01 IMPAIRED FASTING GLUCOSE: ICD-10-CM

## 2023-11-27 DIAGNOSIS — I25.810 CORONARY ARTERY DISEASE INVOLVING CORONARY BYPASS GRAFT OF NATIVE HEART WITHOUT ANGINA PECTORIS: ICD-10-CM

## 2023-11-27 DIAGNOSIS — E78.6 LOW HDL (UNDER 40): ICD-10-CM

## 2023-11-27 DIAGNOSIS — Z12.5 SCREENING PSA (PROSTATE SPECIFIC ANTIGEN): ICD-10-CM

## 2023-11-27 LAB
ALT SERPL-CCNC: 26 U/L (ref 5–41)
ANION GAP SERPL CALCULATED.3IONS-SCNC: 10 MMOL/L (ref 9–17)
AST SERPL-CCNC: 31 U/L
BASOPHILS # BLD: 0.04 K/UL (ref 0–0.2)
BASOPHILS NFR BLD: 1 % (ref 0–2)
BUN SERPL-MCNC: 21 MG/DL (ref 8–23)
BUN/CREAT SERPL: 19 (ref 9–20)
CALCIUM SERPL-MCNC: 9.8 MG/DL (ref 8.6–10.4)
CHLORIDE SERPL-SCNC: 107 MMOL/L (ref 98–107)
CHOLEST SERPL-MCNC: 105 MG/DL
CHOLESTEROL/HDL RATIO: 3
CO2 SERPL-SCNC: 23 MMOL/L (ref 20–31)
CREAT SERPL-MCNC: 1.1 MG/DL (ref 0.7–1.2)
EOSINOPHIL # BLD: 0.17 K/UL (ref 0–0.44)
EOSINOPHILS RELATIVE PERCENT: 2 % (ref 1–4)
ERYTHROCYTE [DISTWIDTH] IN BLOOD BY AUTOMATED COUNT: 14.1 % (ref 11.8–14.4)
EST. AVERAGE GLUCOSE BLD GHB EST-MCNC: 128 MG/DL
GFR SERPL CREATININE-BSD FRML MDRD: >60 ML/MIN/1.73M2
GLUCOSE SERPL-MCNC: 133 MG/DL (ref 70–99)
HBA1C MFR BLD: 6.1 % (ref 4–6)
HCT VFR BLD AUTO: 39.9 % (ref 40.7–50.3)
HDLC SERPL-MCNC: 35 MG/DL
HGB BLD-MCNC: 12.7 G/DL (ref 13–17)
IMM GRANULOCYTES # BLD AUTO: <0.03 K/UL (ref 0–0.3)
IMM GRANULOCYTES NFR BLD: 0 %
LDLC SERPL CALC-MCNC: 59 MG/DL (ref 0–130)
LYMPHOCYTES NFR BLD: 1.65 K/UL (ref 1.1–3.7)
LYMPHOCYTES RELATIVE PERCENT: 23 % (ref 24–43)
MCH RBC QN AUTO: 32.4 PG (ref 25.2–33.5)
MCHC RBC AUTO-ENTMCNC: 31.8 G/DL (ref 28.4–34.8)
MCV RBC AUTO: 101.8 FL (ref 82.6–102.9)
MONOCYTES NFR BLD: 0.94 K/UL (ref 0.1–1.2)
MONOCYTES NFR BLD: 13 % (ref 3–12)
NEUTROPHILS NFR BLD: 61 % (ref 36–65)
NEUTS SEG NFR BLD: 4.46 K/UL (ref 1.5–8.1)
NRBC BLD-RTO: 0 PER 100 WBC
PLATELET # BLD AUTO: 192 K/UL (ref 138–453)
PMV BLD AUTO: 10.4 FL (ref 8.1–13.5)
POTASSIUM SERPL-SCNC: 4.6 MMOL/L (ref 3.7–5.3)
PSA SERPL-MCNC: 0.7 NG/ML (ref 0–4)
RBC # BLD AUTO: 3.92 M/UL (ref 4.21–5.77)
SODIUM SERPL-SCNC: 140 MMOL/L (ref 135–144)
TRIGL SERPL-MCNC: 56 MG/DL
WBC OTHER # BLD: 7.3 K/UL (ref 3.5–11.3)

## 2023-11-27 PROCEDURE — 83036 HEMOGLOBIN GLYCOSYLATED A1C: CPT

## 2023-11-27 PROCEDURE — 80048 BASIC METABOLIC PNL TOTAL CA: CPT

## 2023-11-27 PROCEDURE — 36415 COLL VENOUS BLD VENIPUNCTURE: CPT

## 2023-11-27 PROCEDURE — 85025 COMPLETE CBC W/AUTO DIFF WBC: CPT

## 2023-11-27 PROCEDURE — 80061 LIPID PANEL: CPT

## 2023-11-27 PROCEDURE — 84460 ALANINE AMINO (ALT) (SGPT): CPT

## 2023-11-27 PROCEDURE — G0103 PSA SCREENING: HCPCS

## 2023-11-27 PROCEDURE — 84450 TRANSFERASE (AST) (SGOT): CPT

## 2023-11-28 ENCOUNTER — NURSE ONLY (OUTPATIENT)
Dept: CARDIOLOGY | Age: 73
End: 2023-11-28
Payer: MEDICARE

## 2023-11-28 ENCOUNTER — HOSPITAL ENCOUNTER (OUTPATIENT)
Dept: PHARMACY | Age: 73
Setting detail: THERAPIES SERIES
Discharge: HOME | End: 2023-11-28
Payer: MEDICARE

## 2023-11-28 VITALS
WEIGHT: 157.4 LBS | SYSTOLIC BLOOD PRESSURE: 106 MMHG | BODY MASS INDEX: 22.58 KG/M2 | HEART RATE: 64 BPM | DIASTOLIC BLOOD PRESSURE: 64 MMHG

## 2023-11-28 DIAGNOSIS — I48.91 ATRIAL FIBRILLATION, UNSPECIFIED TYPE (HCC): Primary | ICD-10-CM

## 2023-11-28 DIAGNOSIS — I50.9 CONGESTIVE HEART FAILURE, UNSPECIFIED HF CHRONICITY, UNSPECIFIED HEART FAILURE TYPE (HCC): ICD-10-CM

## 2023-11-28 DIAGNOSIS — I25.5 ISCHEMIC CARDIOMYOPATHY: Primary | ICD-10-CM

## 2023-11-28 DIAGNOSIS — Z95.810 ICD (IMPLANTABLE CARDIOVERTER-DEFIBRILLATOR) IN PLACE: ICD-10-CM

## 2023-11-28 LAB — INR BLD: 2.5

## 2023-11-28 PROCEDURE — 85610 PROTHROMBIN TIME: CPT | Performed by: FAMILY MEDICINE

## 2023-11-28 PROCEDURE — 99211 OFF/OP EST MAY X REQ PHY/QHP: CPT | Performed by: FAMILY MEDICINE

## 2023-11-28 PROCEDURE — 93295 DEV INTERROG REMOTE 1/2/MLT: CPT | Performed by: FAMILY MEDICINE

## 2023-11-28 NOTE — PROGRESS NOTES
[facility] [facility]  Medication Management  ANTICOAGULATION    Referring Provider: Dr Vivien Chan INR: 2.0-3.0    TODAY'S INR: 2.5    WARFARIN Dosage: continue 5mg W, 2.5mg all other days    INR (no units)   Date Value   2023 2.5   10/31/2023 3   10/10/2023 1.4   2023 2.1   2023 2.9   2023 2.2   2023 2.5       Hemoglobin   Date Value Ref Range Status   2023 12.7 (L) 13.0 - 17.0 g/dL Final     Hematocrit   Date Value Ref Range Status   2023 39.9 (L) 40.7 - 50.3 % Final     ALT   Date Value Ref Range Status   2023 26 5 - 41 U/L Final     AST   Date Value Ref Range Status   2023 31 <40 U/L Final       Medication changes:  No change    Notes:    Fingerstick INR drawn per clinic protocol. Patient states no visible blood in urine and no black tarry stool. Denies any missed doses of warfarin. No change in other maintenance medications or in diet. Will recheck INR in 6 weeks. Patient acknowledges working in consult agreement with pharmacist as referred by his/her physician.                   For Pharmacy Admin Tracking Only    Intervention Detail: Adherence Monitorin  Total # of Interventions Recommended: 2  Total # of Interventions Accepted: 2  Time Spent (min): 20    Anastasiia Lu R.Ph., 2023,7:43 AM

## 2023-11-28 NOTE — PATIENT INSTRUCTIONS
Continue to take warfarin 5mg (1 tablet) Wednesday and 2.5mg (1/2 tablet) all other days. Continue to monitor urine and stool for signs and symptoms of bleeding. Please notify the clinic of any medication changes. Please remember to bring all medications (both prescription and OTC) to your next visit. Kindly notify the clinic if you are unable to make to your next appointment. Follow warfarin dosing instructions on dosing calendar provided.

## 2024-01-09 ENCOUNTER — HOSPITAL ENCOUNTER (OUTPATIENT)
Dept: PHARMACY | Age: 74
Setting detail: THERAPIES SERIES
Discharge: HOME | End: 2024-01-09
Payer: MEDICARE

## 2024-01-09 VITALS
WEIGHT: 156.8 LBS | DIASTOLIC BLOOD PRESSURE: 44 MMHG | HEART RATE: 75 BPM | SYSTOLIC BLOOD PRESSURE: 112 MMHG | BODY MASS INDEX: 22.5 KG/M2

## 2024-01-09 DIAGNOSIS — I48.91 ATRIAL FIBRILLATION, UNSPECIFIED TYPE (HCC): Primary | ICD-10-CM

## 2024-01-09 LAB — INR BLD: 3

## 2024-01-09 PROCEDURE — 99211 OFF/OP EST MAY X REQ PHY/QHP: CPT | Performed by: FAMILY MEDICINE

## 2024-01-09 PROCEDURE — 85610 PROTHROMBIN TIME: CPT | Performed by: FAMILY MEDICINE

## 2024-01-09 NOTE — PROGRESS NOTES
[facility]  Medication Management  ANTICOAGULATION    Referring Provider: Dr Soliz    GOAL INR: 2.0-3.0    TODAY'S INR: 3.0    WARFARIN Dosage: continue 5mg W, 2.5mg all other days    INR (no units)   Date Value   2024 3.0   2023 2.5   10/31/2023 3   10/10/2023 1.4   2023 2.1   2023 2.9   2023 2.2       Hemoglobin   Date Value Ref Range Status   2023 12.7 (L) 13.0 - 17.0 g/dL Final     Hematocrit   Date Value Ref Range Status   2023 39.9 (L) 40.7 - 50.3 % Final     ALT   Date Value Ref Range Status   2023 26 5 - 41 U/L Final     AST   Date Value Ref Range Status   2023 31 <40 U/L Final       Medication changes:  No change    Notes:    Fingerstick INR drawn per clinic protocol. Patient states no visible blood in urine and no black tarry stool. Denies any missed doses of warfarin. No change in other maintenance medications or in diet. Will recheck INR in 6 weeks. Patient acknowledges working in consult agreement with pharmacist as referred by his/her physician.                  For Pharmacy Admin Tracking Only    Intervention Detail: Adherence Monitorin  Total # of Interventions Recommended: 2  Total # of Interventions Accepted: 2  Time Spent (min): 20      Linda Fritz R.Ph., 2024,7:37 AM

## 2024-02-20 ENCOUNTER — HOSPITAL ENCOUNTER (OUTPATIENT)
Dept: PHARMACY | Age: 74
Setting detail: THERAPIES SERIES
Discharge: HOME | End: 2024-02-20
Payer: MEDICARE

## 2024-02-20 VITALS
DIASTOLIC BLOOD PRESSURE: 52 MMHG | HEART RATE: 60 BPM | BODY MASS INDEX: 22.1 KG/M2 | SYSTOLIC BLOOD PRESSURE: 94 MMHG | WEIGHT: 154 LBS

## 2024-02-20 LAB — INR BLD: 2

## 2024-02-20 PROCEDURE — 85610 PROTHROMBIN TIME: CPT

## 2024-02-20 PROCEDURE — 99211 OFF/OP EST MAY X REQ PHY/QHP: CPT

## 2024-02-20 NOTE — PROGRESS NOTES
[facility]  Medication Management  ANTICOAGULATION    Referring Provider: Dr Soliz    GOAL INR: 2-3    TODAY'S INR: 2    WARFARIN Dosage: Continue 5 mg po every Wednesday;  2.5 mg po all other days    INR (no units)   Date Value   2024 2   2024 3.0   2023 2.5   10/31/2023 3   10/10/2023 1.4   2023 2.1   2023 2.9       Hemoglobin   Date Value Ref Range Status   2023 12.7 (L) 13.0 - 17.0 g/dL Final     Hematocrit   Date Value Ref Range Status   2023 39.9 (L) 40.7 - 50.3 % Final     ALT   Date Value Ref Range Status   2023 26 5 - 41 U/L Final     AST   Date Value Ref Range Status   2023 31 <40 U/L Final       Medication changes:  No change    Notes:    Fingerstick INR drawn per clinic protocol. Patient states no visible blood in urine and no black tarry stool. Denies any missed doses of warfarin. No change in other maintenance medications or in diet. Will recheck INR in 6 weeks. Patient acknowledges working in consult agreement with pharmacist as referred by his/her physician.                  For Pharmacy Admin Tracking Only    Intervention Detail: Adherence Monitorin  Total # of Interventions Recommended: 1  Total # of Interventions Accepted: 1  Time Spent (min): 20    Cristina Quarles RPh

## 2024-02-23 PROBLEM — I25.2 HISTORY OF ACUTE MYOCARDIAL INFARCTION: Status: ACTIVE | Noted: 2020-12-24

## 2024-03-05 ENCOUNTER — NURSE ONLY (OUTPATIENT)
Dept: CARDIOLOGY | Age: 74
End: 2024-03-05

## 2024-03-05 DIAGNOSIS — Z95.810 ICD (IMPLANTABLE CARDIOVERTER-DEFIBRILLATOR) IN PLACE: ICD-10-CM

## 2024-03-05 DIAGNOSIS — I25.5 ISCHEMIC CARDIOMYOPATHY: Primary | ICD-10-CM

## 2024-03-06 DIAGNOSIS — I25.5 ISCHEMIC CARDIOMYOPATHY: ICD-10-CM

## 2024-03-07 RX ORDER — RAMIPRIL 5 MG/1
5 CAPSULE ORAL DAILY
Qty: 90 CAPSULE | Refills: 3 | Status: SHIPPED | OUTPATIENT
Start: 2024-03-07

## 2024-03-26 ENCOUNTER — HOSPITAL ENCOUNTER (OUTPATIENT)
Dept: GENERAL RADIOLOGY | Age: 74
Discharge: HOME | End: 2024-03-28
Payer: MEDICARE

## 2024-03-26 ENCOUNTER — HOSPITAL ENCOUNTER (OUTPATIENT)
Age: 74
Discharge: HOME | End: 2024-03-28
Payer: MEDICARE

## 2024-03-26 ENCOUNTER — OFFICE VISIT (OUTPATIENT)
Dept: GASTROENTEROLOGY | Age: 74
End: 2024-03-26

## 2024-03-26 ENCOUNTER — HOSPITAL ENCOUNTER (OUTPATIENT)
Age: 74
Discharge: HOME | End: 2024-03-26
Payer: MEDICARE

## 2024-03-26 ENCOUNTER — OFFICE VISIT (OUTPATIENT)
Dept: CARDIOLOGY | Age: 74
End: 2024-03-26
Payer: MEDICARE

## 2024-03-26 VITALS
DIASTOLIC BLOOD PRESSURE: 61 MMHG | RESPIRATION RATE: 16 BRPM | SYSTOLIC BLOOD PRESSURE: 94 MMHG | WEIGHT: 158 LBS | HEART RATE: 72 BPM | OXYGEN SATURATION: 94 % | BODY MASS INDEX: 22.62 KG/M2 | HEIGHT: 70 IN

## 2024-03-26 VITALS
OXYGEN SATURATION: 98 % | SYSTOLIC BLOOD PRESSURE: 94 MMHG | DIASTOLIC BLOOD PRESSURE: 61 MMHG | HEART RATE: 72 BPM | BODY MASS INDEX: 22.62 KG/M2 | RESPIRATION RATE: 18 BRPM | WEIGHT: 158 LBS | HEIGHT: 70 IN

## 2024-03-26 DIAGNOSIS — I25.5 ISCHEMIC CARDIOMYOPATHY: ICD-10-CM

## 2024-03-26 DIAGNOSIS — I50.43 ACUTE ON CHRONIC COMBINED SYSTOLIC (CONGESTIVE) AND DIASTOLIC (CONGESTIVE) HEART FAILURE (HCC): Primary | ICD-10-CM

## 2024-03-26 DIAGNOSIS — D64.9 ANEMIA, UNSPECIFIED TYPE: ICD-10-CM

## 2024-03-26 DIAGNOSIS — I48.0 PAF (PAROXYSMAL ATRIAL FIBRILLATION) (HCC): ICD-10-CM

## 2024-03-26 DIAGNOSIS — Z12.11 COLON CANCER SCREENING: ICD-10-CM

## 2024-03-26 DIAGNOSIS — D64.9 MILD ANEMIA: ICD-10-CM

## 2024-03-26 DIAGNOSIS — Z71.6 TOBACCO ABUSE COUNSELING: ICD-10-CM

## 2024-03-26 DIAGNOSIS — Z95.810 ICD (IMPLANTABLE CARDIOVERTER-DEFIBRILLATOR) IN PLACE: ICD-10-CM

## 2024-03-26 DIAGNOSIS — R19.5 POSITIVE FIT (FECAL IMMUNOCHEMICAL TEST): ICD-10-CM

## 2024-03-26 DIAGNOSIS — K92.1 MELENA: Primary | ICD-10-CM

## 2024-03-26 DIAGNOSIS — E78.2 MIXED HYPERLIPIDEMIA: ICD-10-CM

## 2024-03-26 DIAGNOSIS — I50.43 ACUTE ON CHRONIC COMBINED SYSTOLIC (CONGESTIVE) AND DIASTOLIC (CONGESTIVE) HEART FAILURE (HCC): ICD-10-CM

## 2024-03-26 DIAGNOSIS — R06.02 SHORTNESS OF BREATH: ICD-10-CM

## 2024-03-26 DIAGNOSIS — R06.02 SHORTNESS OF BREATH: Primary | ICD-10-CM

## 2024-03-26 LAB
ANION GAP SERPL CALCULATED.3IONS-SCNC: 12 MMOL/L (ref 9–17)
BNP SERPL-MCNC: 8999 PG/ML
BUN SERPL-MCNC: 20 MG/DL (ref 8–23)
BUN/CREAT SERPL: 22 (ref 9–20)
CALCIUM SERPL-MCNC: 9 MG/DL (ref 8.6–10.4)
CHLORIDE SERPL-SCNC: 101 MMOL/L (ref 98–107)
CO2 SERPL-SCNC: 23 MMOL/L (ref 20–31)
CREAT SERPL-MCNC: 0.9 MG/DL (ref 0.7–1.2)
ERYTHROCYTE [DISTWIDTH] IN BLOOD BY AUTOMATED COUNT: 14.3 % (ref 11.8–14.4)
GFR SERPL CREATININE-BSD FRML MDRD: 90 ML/MIN/1.73M2
GLUCOSE SERPL-MCNC: 152 MG/DL (ref 70–99)
HCT VFR BLD AUTO: 33.2 % (ref 40.7–50.3)
HGB BLD-MCNC: 10.3 G/DL (ref 13–17)
MCH RBC QN AUTO: 31.7 PG (ref 25.2–33.5)
MCHC RBC AUTO-ENTMCNC: 31 G/DL (ref 28.4–34.8)
MCV RBC AUTO: 102.2 FL (ref 82.6–102.9)
NRBC BLD-RTO: 0 PER 100 WBC
PLATELET # BLD AUTO: 237 K/UL (ref 138–453)
PMV BLD AUTO: 9.6 FL (ref 8.1–13.5)
POTASSIUM SERPL-SCNC: 4 MMOL/L (ref 3.7–5.3)
RBC # BLD AUTO: 3.25 M/UL (ref 4.21–5.77)
SODIUM SERPL-SCNC: 136 MMOL/L (ref 135–144)
WBC OTHER # BLD: 7 K/UL (ref 3.5–11.3)

## 2024-03-26 PROCEDURE — 4004F PT TOBACCO SCREEN RCVD TLK: CPT | Performed by: FAMILY MEDICINE

## 2024-03-26 PROCEDURE — G8427 DOCREV CUR MEDS BY ELIG CLIN: HCPCS | Performed by: FAMILY MEDICINE

## 2024-03-26 PROCEDURE — 71046 X-RAY EXAM CHEST 2 VIEWS: CPT

## 2024-03-26 PROCEDURE — 80048 BASIC METABOLIC PNL TOTAL CA: CPT

## 2024-03-26 PROCEDURE — 99215 OFFICE O/P EST HI 40 MIN: CPT | Performed by: FAMILY MEDICINE

## 2024-03-26 PROCEDURE — 36415 COLL VENOUS BLD VENIPUNCTURE: CPT

## 2024-03-26 PROCEDURE — 85027 COMPLETE CBC AUTOMATED: CPT

## 2024-03-26 PROCEDURE — 3017F COLORECTAL CA SCREEN DOC REV: CPT | Performed by: FAMILY MEDICINE

## 2024-03-26 PROCEDURE — 1123F ACP DISCUSS/DSCN MKR DOCD: CPT | Performed by: FAMILY MEDICINE

## 2024-03-26 PROCEDURE — 83880 ASSAY OF NATRIURETIC PEPTIDE: CPT

## 2024-03-26 PROCEDURE — G8420 CALC BMI NORM PARAMETERS: HCPCS | Performed by: FAMILY MEDICINE

## 2024-03-26 PROCEDURE — G8484 FLU IMMUNIZE NO ADMIN: HCPCS | Performed by: FAMILY MEDICINE

## 2024-03-26 PROCEDURE — 93005 ELECTROCARDIOGRAM TRACING: CPT | Performed by: FAMILY MEDICINE

## 2024-03-26 RX ORDER — PANTOPRAZOLE SODIUM 40 MG/1
40 TABLET, DELAYED RELEASE ORAL 2 TIMES DAILY
Qty: 60 TABLET | Refills: 0 | Status: SHIPPED | OUTPATIENT
Start: 2024-03-26 | End: 2024-04-25

## 2024-03-26 RX ORDER — SUCRALFATE ORAL 1 G/10ML
1 SUSPENSION ORAL 4 TIMES DAILY
Qty: 1200 ML | Refills: 0 | Status: SHIPPED | OUTPATIENT
Start: 2024-03-26 | End: 2024-03-27

## 2024-03-26 ASSESSMENT — ENCOUNTER SYMPTOMS
ANAL BLEEDING: 1
COUGH: 1
SHORTNESS OF BREATH: 1
ALLERGIC/IMMUNOLOGIC NEGATIVE: 1

## 2024-03-26 NOTE — PROGRESS NOTES
03/31/2021    BILITOT 0.6 03/31/2021    ALKPHOS 78 03/31/2021    AST 31 11/27/2023    ALT 26 11/27/2023    LABGLOM 90 03/26/2024    GFRAA >60 04/15/2022    AGRATIO 1.1 03/31/2021          Lab Results   Component Value Date    INR 2 02/20/2024    INR 3.0 01/09/2024    INR 2.5 11/28/2023       CT Result (most recent):  CT UROGRAM 06/08/2021    Narrative  EXAMINATION:  CT UROGRAM    6/8/2021 12:02 pm    TECHNIQUE:  CT of the abdomen and pelvis was performed before and after the  administration of intravenous contrast as per CT urogram protocol.  Multiplanar reformatted images as well as MIP urogram images are provided for  review.  Curved reformats and 3D volume rendered reformats of the urinary  tract also acquired.  Dose modulation, iterative reconstruction, and/or  weight based adjustment of the mA/kV was utilized to reduce the radiation  dose to as low as reasonably achievable.    COMPARISON:  None.    HISTORY:  ORDERING SYSTEM PROVIDED HISTORY: Gross hematuria    FINDINGS:  Lower Chest: Pacemaker/ICD noted in place.  The visualized heart and lungs  show no acute abnormalities.    Kidneys and Urinary Tract: Bilateral nonobstructing renal calculi.  There is  2 mm and 3 mm calculus in the midpole right kidney.  Punctate calculus upper  pole left kidney along with 11 x 8 mm calculus in the lower pole.  There are  bilateral renal vascular calcifications.  No right ureteric calculus.  There  is 5 mm proximal right ureter calculus causing only a minimal left pelvic  calyceal dilatation.  No bladder calculus.    Organs: Limited evaluation the absence of IV contrast.  The liver, spleen,  pancreas, adrenal glands and gallbladder show no significant abnormalities.    GI/Bowel: There is limited evaluation due to absence of oral contrast.  Stomach grossly normal.  Normal caliber small bowel loops show no focal  lesions.  Sigmoid diverticulosis.    Pelvis: No suspicious masses.  There is some prostate gland

## 2024-03-26 NOTE — PROGRESS NOTES
elevated-BNP: 8,9999.-increased shortness of breath over the past few weeks. Wheezes present (left base worse than right)  Wt Readings from Last 3 Encounters:   03/26/24 71.7 kg (158 lb)   02/20/24 69.9 kg (154 lb)   01/09/24 71.1 kg (156 lb 12.8 oz)         PAST MEDICAL HISTORY:         Past Medical History:   Diagnosis Date    Acute non-ST elevation myocardial infarction (NSTEMI) ([facility]) 12/24/2020    Chronic kidney disease     Coronary artery disease     s/p CABG x3 in 2008 and 2 stents on 12/24/2020    H/O echocardiogram 02/08/2021    EF 15-20% LV severly enlarged and LV wall thickness is normal Severe global hypokinesis with segmental abnormalities LA is mod dilated 34-39 with LA volume index of 34 ml/m2 Mild mitral regurg Mod tricupid regurg Mild to mod pulm HTN with est RV systolic pressure of 38 mmhg mod grade II diastolic dysfunciton    H/O echocardiogram 04/01/2021    EF 10-15% LA size was mildly dilated IVC sice is mildly dilated with reduced respiratory phasic changes CVP 5-10 mmHg    Hyperlipidemia     Kidney stone 06/2021    Smoker        CURRENT ALLERGIES: Patient has no known allergies. REVIEW OF SYSTEMS: 14 systems were reviewed. Pertinent positives and negatives as above, all else negative.     Past Surgical History:   Procedure Laterality Date    BLADDER SURGERY Left 6/22/2021    CYSTOSCOPY STENT INSERTION performed by Sigifredo Ledezma MD at [facility]    CARDIAC DEFIBRILLATOR PLACEMENT  04/23/2021    [facility]    CARDIAC SURGERY      CORONARY ANGIOPLASTY WITH STENT PLACEMENT  12/24/2020    x 2 at [facility]    CORONARY ARTERY BYPASS GRAFT  2008    x 3 vessels    CYSTOSCOPY Left 6/22/2021    CYSTOSCOPY URETEROSCOPY LASER performed by Sigifredo Ledezma MD at [facility] OR    CYSTOURETHROSCOPY/STENT REMOVAL Left 06/22/2021    VASCULAR SURGERY      cabg harvests    Social History:  Social History     Tobacco Use    Smoking status: Every Day     Current packs/day: 0.25     Average packs/day: 0.3

## 2024-03-27 ENCOUNTER — TELEPHONE (OUTPATIENT)
Dept: SURGERY | Age: 74
End: 2024-03-27

## 2024-03-27 DIAGNOSIS — I50.43 ACUTE ON CHRONIC COMBINED SYSTOLIC (CONGESTIVE) AND DIASTOLIC (CONGESTIVE) HEART FAILURE (HCC): Primary | ICD-10-CM

## 2024-03-27 DIAGNOSIS — Z12.11 COLON CANCER SCREENING: ICD-10-CM

## 2024-03-27 DIAGNOSIS — I25.5 ISCHEMIC CARDIOMYOPATHY: ICD-10-CM

## 2024-03-27 DIAGNOSIS — K92.1 MELENA: Primary | ICD-10-CM

## 2024-03-27 RX ORDER — SUCRALFATE 1 G/1
1 TABLET ORAL 4 TIMES DAILY
Qty: 120 TABLET | Refills: 1 | Status: SHIPPED | OUTPATIENT
Start: 2024-03-27

## 2024-03-27 NOTE — TELEPHONE ENCOUNTER
Pt returned call and verbalized understanding. Apt made to follow up with Yenifer and Too pended for Dr to sign.     Thanks!

## 2024-03-27 NOTE — TELEPHONE ENCOUNTER
----- Message from Figueroa Soliz MD sent at 3/27/2024  1:14 AM EDT -----  Let Patient know he did have some fluid in his lungs which is probably related to fluid overload related to his anemia. Lets start him on Lasix 20 mg daily, BMP in 5 days, Repeat CXR in 1 week and have him follow up with Brit next week.    Thanks.

## 2024-03-27 NOTE — TELEPHONE ENCOUNTER
[facility] pharmacy called and advised that the liquid carafate after all discounts applied is $175.00 for a 30 days supply. The pills can be dissolved and are $87.00 before insurance or discounts applied. Can this be change to pill form?

## 2024-03-28 ENCOUNTER — ANTI-COAG VISIT (OUTPATIENT)
Dept: PHARMACY | Age: 74
End: 2024-03-28

## 2024-03-28 RX ORDER — FUROSEMIDE 20 MG/1
20 TABLET ORAL DAILY
Qty: 90 TABLET | Refills: 3 | Status: SHIPPED | OUTPATIENT
Start: 2024-03-28

## 2024-03-28 NOTE — PROGRESS NOTES
Patient saw Dr Soliz 3/26 and note reads:  Mild Anemia-Hemoglobin on 3/26/2024 10.3: Black tarry stools reported/Occult stool: positive-Seeing Proctologist today   Anticoagulation: Hold warfarin for 1 week then can restart.   Antiplatelet: Hold Clopidogrel (Plavix) for 1 week then can restart    Follow up with Dr Soliz 4/2 with labs before.    Plan for stress 4/17 and 4/18 and Echo 4/29    Plan to restart warfarin 4/4/24    Rescheduled for 4/22/24 for INR check.  Patient will keep us updated on warfarin plan.    Nahomy Mace, PharmD 3/28/2024 9:00 AM

## 2024-03-29 ENCOUNTER — TELEPHONE (OUTPATIENT)
Dept: GASTROENTEROLOGY | Age: 74
End: 2024-03-29

## 2024-03-29 NOTE — TELEPHONE ENCOUNTER
Patient calling to clarify if  recently prescribed medications are ok to take with his other medications that he is taking. This nurse reviewed  and educated patient on medications with patient and advised that patient can take all prescribed medications. Patient voiced knowledge and understanding.

## 2024-04-02 ENCOUNTER — TELEPHONE (OUTPATIENT)
Dept: GASTROENTEROLOGY | Age: 74
End: 2024-04-02

## 2024-04-02 ENCOUNTER — HOSPITAL ENCOUNTER (OUTPATIENT)
Dept: CT IMAGING | Age: 74
Discharge: HOME | End: 2024-04-04
Payer: MEDICARE

## 2024-04-02 ENCOUNTER — HOSPITAL ENCOUNTER (OUTPATIENT)
Age: 74
Discharge: HOME | End: 2024-04-02
Payer: MEDICARE

## 2024-04-02 ENCOUNTER — HOSPITAL ENCOUNTER (OUTPATIENT)
Age: 74
Discharge: HOME | End: 2024-04-04
Payer: MEDICARE

## 2024-04-02 ENCOUNTER — HOSPITAL ENCOUNTER (OUTPATIENT)
Dept: GENERAL RADIOLOGY | Age: 74
Discharge: HOME | End: 2024-04-04
Payer: MEDICARE

## 2024-04-02 ENCOUNTER — OFFICE VISIT (OUTPATIENT)
Dept: CARDIOLOGY | Age: 74
End: 2024-04-02
Payer: MEDICARE

## 2024-04-02 VITALS
SYSTOLIC BLOOD PRESSURE: 100 MMHG | HEIGHT: 70 IN | BODY MASS INDEX: 22.62 KG/M2 | WEIGHT: 158 LBS | DIASTOLIC BLOOD PRESSURE: 58 MMHG | HEART RATE: 67 BPM | OXYGEN SATURATION: 94 % | RESPIRATION RATE: 16 BRPM

## 2024-04-02 DIAGNOSIS — I25.5 ISCHEMIC CARDIOMYOPATHY: ICD-10-CM

## 2024-04-02 DIAGNOSIS — I50.23 ACUTE ON CHRONIC SYSTOLIC HEART FAILURE (HCC): ICD-10-CM

## 2024-04-02 DIAGNOSIS — I50.43 ACUTE ON CHRONIC COMBINED SYSTOLIC (CONGESTIVE) AND DIASTOLIC (CONGESTIVE) HEART FAILURE (HCC): Primary | ICD-10-CM

## 2024-04-02 DIAGNOSIS — I25.10 ASHD (ARTERIOSCLEROTIC HEART DISEASE): ICD-10-CM

## 2024-04-02 DIAGNOSIS — D64.9 MILD ANEMIA: ICD-10-CM

## 2024-04-02 DIAGNOSIS — I50.43 ACUTE ON CHRONIC COMBINED SYSTOLIC (CONGESTIVE) AND DIASTOLIC (CONGESTIVE) HEART FAILURE (HCC): ICD-10-CM

## 2024-04-02 DIAGNOSIS — J81.0 ACUTE PULMONARY EDEMA (HCC): ICD-10-CM

## 2024-04-02 DIAGNOSIS — R06.02 SHORTNESS OF BREATH: ICD-10-CM

## 2024-04-02 DIAGNOSIS — E78.2 MIXED HYPERLIPIDEMIA: ICD-10-CM

## 2024-04-02 DIAGNOSIS — R06.02 SHORTNESS OF BREATH: Primary | ICD-10-CM

## 2024-04-02 DIAGNOSIS — Z95.810 ICD (IMPLANTABLE CARDIOVERTER-DEFIBRILLATOR) IN PLACE: ICD-10-CM

## 2024-04-02 DIAGNOSIS — Z71.6 TOBACCO ABUSE COUNSELING: ICD-10-CM

## 2024-04-02 DIAGNOSIS — I48.0 PAF (PAROXYSMAL ATRIAL FIBRILLATION) (HCC): ICD-10-CM

## 2024-04-02 DIAGNOSIS — I95.9 HYPOTENSION, UNSPECIFIED HYPOTENSION TYPE: ICD-10-CM

## 2024-04-02 LAB
ANION GAP SERPL CALCULATED.3IONS-SCNC: 11 MMOL/L (ref 9–17)
BASOPHILS # BLD: 0.04 K/UL (ref 0–0.2)
BASOPHILS NFR BLD: 1 % (ref 0–2)
BUN SERPL-MCNC: 23 MG/DL (ref 8–23)
BUN/CREAT SERPL: 23 (ref 9–20)
CALCIUM SERPL-MCNC: 9.5 MG/DL (ref 8.6–10.4)
CHLORIDE SERPL-SCNC: 102 MMOL/L (ref 98–107)
CO2 SERPL-SCNC: 26 MMOL/L (ref 20–31)
CREAT SERPL-MCNC: 1 MG/DL (ref 0.7–1.2)
EOSINOPHIL # BLD: 0.09 K/UL (ref 0–0.44)
EOSINOPHILS RELATIVE PERCENT: 1 % (ref 1–4)
ERYTHROCYTE [DISTWIDTH] IN BLOOD BY AUTOMATED COUNT: 14.6 % (ref 11.8–14.4)
GFR SERPL CREATININE-BSD FRML MDRD: 79 ML/MIN/1.73M2
GLUCOSE SERPL-MCNC: 137 MG/DL (ref 70–99)
HCT VFR BLD AUTO: 32.5 % (ref 40.7–50.3)
HGB BLD-MCNC: 10.3 G/DL (ref 13–17)
IMM GRANULOCYTES # BLD AUTO: <0.03 K/UL (ref 0–0.3)
IMM GRANULOCYTES NFR BLD: 0 %
LYMPHOCYTES NFR BLD: 1.42 K/UL (ref 1.1–3.7)
LYMPHOCYTES RELATIVE PERCENT: 18 % (ref 24–43)
MCH RBC QN AUTO: 32.1 PG (ref 25.2–33.5)
MCHC RBC AUTO-ENTMCNC: 31.7 G/DL (ref 28.4–34.8)
MCV RBC AUTO: 101.2 FL (ref 82.6–102.9)
MONOCYTES NFR BLD: 0.83 K/UL (ref 0.1–1.2)
MONOCYTES NFR BLD: 11 % (ref 3–12)
NEUTROPHILS NFR BLD: 69 % (ref 36–65)
NEUTS SEG NFR BLD: 5.43 K/UL (ref 1.5–8.1)
NRBC BLD-RTO: 0 PER 100 WBC
PLATELET # BLD AUTO: 240 K/UL (ref 138–453)
PMV BLD AUTO: 9.5 FL (ref 8.1–13.5)
POTASSIUM SERPL-SCNC: 4.4 MMOL/L (ref 3.7–5.3)
RBC # BLD AUTO: 3.21 M/UL (ref 4.21–5.77)
SODIUM SERPL-SCNC: 139 MMOL/L (ref 135–144)
WBC OTHER # BLD: 7.8 K/UL (ref 3.5–11.3)

## 2024-04-02 PROCEDURE — 36415 COLL VENOUS BLD VENIPUNCTURE: CPT

## 2024-04-02 PROCEDURE — 1123F ACP DISCUSS/DSCN MKR DOCD: CPT | Performed by: FAMILY MEDICINE

## 2024-04-02 PROCEDURE — 71250 CT THORAX DX C-: CPT

## 2024-04-02 PROCEDURE — 71046 X-RAY EXAM CHEST 2 VIEWS: CPT

## 2024-04-02 PROCEDURE — 3017F COLORECTAL CA SCREEN DOC REV: CPT | Performed by: FAMILY MEDICINE

## 2024-04-02 PROCEDURE — 80048 BASIC METABOLIC PNL TOTAL CA: CPT

## 2024-04-02 PROCEDURE — 4004F PT TOBACCO SCREEN RCVD TLK: CPT | Performed by: FAMILY MEDICINE

## 2024-04-02 PROCEDURE — G8428 CUR MEDS NOT DOCUMENT: HCPCS | Performed by: FAMILY MEDICINE

## 2024-04-02 PROCEDURE — 99214 OFFICE O/P EST MOD 30 MIN: CPT | Performed by: FAMILY MEDICINE

## 2024-04-02 PROCEDURE — 85025 COMPLETE CBC W/AUTO DIFF WBC: CPT

## 2024-04-02 PROCEDURE — G8420 CALC BMI NORM PARAMETERS: HCPCS | Performed by: FAMILY MEDICINE

## 2024-04-02 NOTE — TELEPHONE ENCOUNTER
Patient states that Dr Soliz is putting him back of Coumadin. He states that the package insert for Carafate advised against taking with Warfarin. He is wondering if he should still take the Carafate or not.    Please advise

## 2024-04-02 NOTE — PROGRESS NOTES
I, Shana Howell RN am scribing for and in the presence of Figueroa Soliz MD, MS, F.A.C.C..    Patient: Pascual Peters Jr.  : 1950  Date of Visit: 2024    REASON FOR VISIT / CONSULTATION: Shortness of Breath (Shortness of breath is about the same since last visit. He is out of breath from the parking lot to the office. He says that the proctologist did start him on protonix and carafate for blood in his stool but says that he stopped taking this yesterday because he was causing abdominal pain. He is not taking his blood thinner currently as this was put on hold last . He does get a pressure where sternum area is when he develops sob. Stress scheduled for  and echo scheduled for . )    History of Present Illness:        Dear Jermaine Esparza MD,     I had the pleasure of seeing Pascual Peters Jr. in my office today. Mr. Peters is a 74 y.o. male with a history of atherosclerotic heart disease. He also had a heart attack in . 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 He used to see a cardiologist in the past however he started to feel better and did not think he needed to follow up anymore with any doctor. Most recently lindy came into the ER on 2020 and had a NSTEMI and was transferred to [facility] in [place]. He did report that he actually started feeling \"weird\", no pain but just feels doom feeling on the Monday before [place] however he did not want to think this was a heart attack. He did report feeling about the same as he did in . He went home and went to bed than his breathing got worse and worse and that's when he came into the hospital. He then had a heart cath done on 2020 and had two stents placed REJI x 2 to SVG to posterior lateral branch of the RCA. An echocardiogram at that same time showed an EF of 15% and was sent home with a Life Vest. On 21 spironolactone was added and although he says his BP is

## 2024-04-03 ENCOUNTER — TELEPHONE (OUTPATIENT)
Dept: CARDIOLOGY | Age: 74
End: 2024-04-03

## 2024-04-03 ENCOUNTER — APPOINTMENT (OUTPATIENT)
Dept: PHARMACY | Age: 74
End: 2024-04-03
Payer: MEDICARE

## 2024-04-03 NOTE — TELEPHONE ENCOUNTER
----- Message from Figueroa Soliz MD sent at 4/2/2024  9:58 PM EDT -----  Let Mr. Peters know their test result was ok. Will discuss at next visit. Thanks.

## 2024-04-09 ENCOUNTER — TELEPHONE (OUTPATIENT)
Dept: CARDIOLOGY | Age: 74
End: 2024-04-09

## 2024-04-09 DIAGNOSIS — R91.8 PULMONARY NODULES: ICD-10-CM

## 2024-04-09 DIAGNOSIS — R06.02 SHORTNESS OF BREATH: Primary | ICD-10-CM

## 2024-04-09 NOTE — TELEPHONE ENCOUNTER
Patient continues to have shortness of breath. CT was ordered last visit, and patient is wanting to know the results of this. Please advise. Thanks so much.    Discussed with Dr Soliz verbally. He looked over CT scan and would like patient to be seen by pulmonology. Referral put in. Called patient and patient verbalized understanding. Called pulmonology to schedule this for him.

## 2024-04-15 ENCOUNTER — OFFICE VISIT (OUTPATIENT)
Dept: PULMONOLOGY | Age: 74
End: 2024-04-15
Payer: MEDICARE

## 2024-04-15 VITALS
HEART RATE: 77 BPM | DIASTOLIC BLOOD PRESSURE: 56 MMHG | TEMPERATURE: 96.4 F | OXYGEN SATURATION: 95 % | BODY MASS INDEX: 22.02 KG/M2 | RESPIRATION RATE: 16 BRPM | WEIGHT: 153.8 LBS | SYSTOLIC BLOOD PRESSURE: 97 MMHG | HEIGHT: 70 IN

## 2024-04-15 DIAGNOSIS — R06.09 DYSPNEA ON EXERTION: Primary | ICD-10-CM

## 2024-04-15 DIAGNOSIS — D64.9 ANEMIA, UNSPECIFIED TYPE: ICD-10-CM

## 2024-04-15 DIAGNOSIS — J44.9 CHRONIC OBSTRUCTIVE PULMONARY DISEASE, UNSPECIFIED COPD TYPE (HCC): ICD-10-CM

## 2024-04-15 DIAGNOSIS — F17.200 SMOKING: ICD-10-CM

## 2024-04-15 DIAGNOSIS — I50.42 CHRONIC COMBINED SYSTOLIC (CONGESTIVE) AND DIASTOLIC (CONGESTIVE) HEART FAILURE (HCC): ICD-10-CM

## 2024-04-15 DIAGNOSIS — I48.0 PAROXYSMAL ATRIAL FIBRILLATION (HCC): ICD-10-CM

## 2024-04-15 DIAGNOSIS — Z95.1 S/P CABG X 3: ICD-10-CM

## 2024-04-15 PROCEDURE — 1123F ACP DISCUSS/DSCN MKR DOCD: CPT | Performed by: INTERNAL MEDICINE

## 2024-04-15 PROCEDURE — G8420 CALC BMI NORM PARAMETERS: HCPCS | Performed by: INTERNAL MEDICINE

## 2024-04-15 PROCEDURE — 4004F PT TOBACCO SCREEN RCVD TLK: CPT | Performed by: INTERNAL MEDICINE

## 2024-04-15 PROCEDURE — 99204 OFFICE O/P NEW MOD 45 MIN: CPT | Performed by: INTERNAL MEDICINE

## 2024-04-15 PROCEDURE — 3017F COLORECTAL CA SCREEN DOC REV: CPT | Performed by: INTERNAL MEDICINE

## 2024-04-15 PROCEDURE — G8427 DOCREV CUR MEDS BY ELIG CLIN: HCPCS | Performed by: INTERNAL MEDICINE

## 2024-04-15 PROCEDURE — 99214 OFFICE O/P EST MOD 30 MIN: CPT | Performed by: INTERNAL MEDICINE

## 2024-04-15 PROCEDURE — 3023F SPIROM DOC REV: CPT | Performed by: INTERNAL MEDICINE

## 2024-04-15 NOTE — PROGRESS NOTES
[facility] PART OF [address] [address]  Dept: [phone]  Dept Fax: [phone]      4/15/24    Patient: Pascual Peters Jr.  YOB: 1950    Dear Jermaine Esparza MD,    I had the pleasure of seeing one of your patients, PASCUAL PETERS JR. today in the office today.  Please find attached my note with the assessment and plan of care.  Thank you for allowing me to participate in the care of this patient.  I will keep you updated on this patient's follow up and I look forward to serving you and your patients again in the future.    Young Mccloud MD  4/15/2024 4:24 PM

## 2024-04-15 NOTE — PROGRESS NOTES
PULMONARY  CONSULTATION        REFERRED BY: Jermaine Esparza MD    REASON FOR CONSULTATION: Shortness of breath    HISTORY OF PRESENT ILLNESS:    Pascual Peters Jr. is a 74 y.o. year old male here for evaluation of above problem  Patient has class II-III shortness of breath associated with wheezing at bedtime  Decreases with drinking of water  There is also associated cough with mucoid sputum  No orthopnea, PND or increasing pedal edema  No chest pain or pressure  Patient is known to have anemia, review of records showing worsening anemia since November 2023  Basic metabolic panel without any kidney dysfunction or acidosis  Patient known to have severe chronic systolic congestive heart failure with an ejection fraction of 15 to 20% along with diastolic dysfunction also      PAST MEDICAL HISTORY:       Diagnosis Date    Acute non-ST elevation myocardial infarction (NSTEMI) ([facility]) 12/24/2020    Chronic kidney disease     Coronary artery disease     s/p CABG x3 in 2008 and 2 stents on 12/24/2020    H/O echocardiogram 02/08/2021    EF 15-20% LV severly enlarged and LV wall thickness is normal Severe global hypokinesis with segmental abnormalities LA is mod dilated 34-39 with LA volume index of 34 ml/m2 Mild mitral regurg Mod tricupid regurg Mild to mod pulm HTN with est RV systolic pressure of 38 mmhg mod grade II diastolic dysfunciton    H/O echocardiogram 04/01/2021    EF 10-15% LA size was mildly dilated IVC sice is mildly dilated with reduced respiratory phasic changes CVP 5-10 mmHg    Hyperlipidemia     Kidney stone 06/2021    Smoker        SURGICAL HISTORY:    Past Surgical History:   Procedure Laterality Date    BLADDER SURGERY Left 6/22/2021    CYSTOSCOPY STENT INSERTION performed by Sigifredo Ledezma MD at [facility] OR    CARDIAC DEFIBRILLATOR PLACEMENT  04/23/2021    [facility]    CARDIAC SURGERY      CORONARY ANGIOPLASTY WITH STENT PLACEMENT  12/24/2020    x 2 at [facility]    CORONARY

## 2024-04-17 ENCOUNTER — HOSPITAL ENCOUNTER (OUTPATIENT)
Dept: NUCLEAR MEDICINE | Age: 74
Discharge: HOME | End: 2024-04-19
Attending: FAMILY MEDICINE
Payer: MEDICARE

## 2024-04-17 ENCOUNTER — HOSPITAL ENCOUNTER (OUTPATIENT)
Age: 74
Discharge: HOME | End: 2024-04-19
Attending: FAMILY MEDICINE
Payer: MEDICARE

## 2024-04-17 DIAGNOSIS — R06.02 SHORTNESS OF BREATH: ICD-10-CM

## 2024-04-17 DIAGNOSIS — I50.43 ACUTE ON CHRONIC COMBINED SYSTOLIC (CONGESTIVE) AND DIASTOLIC (CONGESTIVE) HEART FAILURE (HCC): ICD-10-CM

## 2024-04-17 DIAGNOSIS — Z95.810 ICD (IMPLANTABLE CARDIOVERTER-DEFIBRILLATOR) IN PLACE: ICD-10-CM

## 2024-04-17 DIAGNOSIS — I25.5 ISCHEMIC CARDIOMYOPATHY: ICD-10-CM

## 2024-04-17 DIAGNOSIS — I48.0 PAF (PAROXYSMAL ATRIAL FIBRILLATION) (HCC): ICD-10-CM

## 2024-04-17 DIAGNOSIS — Z71.6 TOBACCO ABUSE COUNSELING: ICD-10-CM

## 2024-04-17 DIAGNOSIS — E78.2 MIXED HYPERLIPIDEMIA: ICD-10-CM

## 2024-04-17 PROCEDURE — 6360000002 HC RX W HCPCS: Performed by: FAMILY MEDICINE

## 2024-04-17 PROCEDURE — 93017 CV STRESS TEST TRACING ONLY: CPT

## 2024-04-17 PROCEDURE — 3430000000 HC RX DIAGNOSTIC RADIOPHARMACEUTICAL: Performed by: FAMILY MEDICINE

## 2024-04-17 PROCEDURE — A9500 TC99M SESTAMIBI: HCPCS | Performed by: FAMILY MEDICINE

## 2024-04-17 RX ORDER — TETRAKIS(2-METHOXYISOBUTYLISOCYANIDE)COPPER(I) TETRAFLUOROBORATE 1 MG/ML
30 INJECTION, POWDER, LYOPHILIZED, FOR SOLUTION INTRAVENOUS
Status: COMPLETED | OUTPATIENT
Start: 2024-04-17 | End: 2024-04-17

## 2024-04-17 RX ORDER — REGADENOSON 0.08 MG/ML
0.4 INJECTION, SOLUTION INTRAVENOUS
Status: COMPLETED | OUTPATIENT
Start: 2024-04-17 | End: 2024-04-17

## 2024-04-17 RX ADMIN — Medication 30 MILLICURIE: at 09:30

## 2024-04-17 RX ADMIN — REGADENOSON 0.4 MG: 0.08 INJECTION, SOLUTION INTRAVENOUS at 09:53

## 2024-04-18 ENCOUNTER — HOSPITAL ENCOUNTER (OUTPATIENT)
Dept: NUCLEAR MEDICINE | Age: 74
Discharge: HOME | End: 2024-04-20
Attending: FAMILY MEDICINE
Payer: MEDICARE

## 2024-04-18 LAB
NUC STRESS EJECTION FRACTION: 19 %
STRESS BASELINE DIAS BP: 52 MMHG
STRESS BASELINE HR: 78 BPM
STRESS BASELINE ST DEPRESSION: 0 MM
STRESS BASELINE SYS BP: 90 MMHG
STRESS PEAK DIAS BP: 52 MMHG
STRESS PEAK SYS BP: 90 MMHG
STRESS PERCENT HR ACHIEVED: 53 %
STRESS POST PEAK HR: 78 BPM
STRESS RATE PRESSURE PRODUCT: 7020 BPM*MMHG
STRESS STAGE RECOVERY 1 BP: NORMAL MMHG
STRESS STAGE RECOVERY 1 DURATION: 0 MIN:SEC
STRESS STAGE RECOVERY 1 HR: 78 BPM
STRESS STAGE RECOVERY 2 DURATION: 1 MIN:SEC
STRESS STAGE RECOVERY 2 HR: 74 BPM
STRESS STAGE RECOVERY 3 BP: NORMAL MMHG
STRESS STAGE RECOVERY 3 DURATION: 3 MIN:SEC
STRESS STAGE RECOVERY 3 HR: 67 BPM
STRESS STAGE RECOVERY 4 BP: NORMAL MMHG
STRESS STAGE RECOVERY 4 DURATION: 5 MIN:SEC
STRESS STAGE RECOVERY 4 HR: 67 BPM
STRESS TARGET HR: 146 BPM
TID: 1.16

## 2024-04-18 PROCEDURE — 3430000000 HC RX DIAGNOSTIC RADIOPHARMACEUTICAL: Performed by: FAMILY MEDICINE

## 2024-04-18 PROCEDURE — A9500 TC99M SESTAMIBI: HCPCS | Performed by: FAMILY MEDICINE

## 2024-04-18 RX ORDER — TETRAKIS(2-METHOXYISOBUTYLISOCYANIDE)COPPER(I) TETRAFLUOROBORATE 1 MG/ML
30 INJECTION, POWDER, LYOPHILIZED, FOR SOLUTION INTRAVENOUS
Status: COMPLETED | OUTPATIENT
Start: 2024-04-18 | End: 2024-04-18

## 2024-04-18 RX ADMIN — Medication 30 MILLICURIE: at 13:33

## 2024-04-19 ENCOUNTER — TELEPHONE (OUTPATIENT)
Dept: CARDIOLOGY | Age: 74
End: 2024-04-19

## 2024-04-19 NOTE — RESULT ENCOUNTER NOTE
Please let Mr. Peters know that his test was abnormal and please make them an appointment in 2 WEEKS if not already done. Thanks.    Martínez Patient attends pre-op testing today following consult c Dr. Navas due to chronic pain to left knee. Elective L TKA is now scheduled in this facility for 2/24/2020.

## 2024-04-19 NOTE — TELEPHONE ENCOUNTER
----- Message from Figueroa Soliz MD sent at 4/19/2024 12:32 AM EDT -----  Please let Mr. Peters know that his test was abnormal and please make them an appointment in 2 WEEKS if not already done. Thanks.    Martínez

## 2024-04-22 ENCOUNTER — HOSPITAL ENCOUNTER (OUTPATIENT)
Dept: PHARMACY | Age: 74
Setting detail: THERAPIES SERIES
Discharge: HOME | End: 2024-04-22
Payer: MEDICARE

## 2024-04-22 VITALS
SYSTOLIC BLOOD PRESSURE: 108 MMHG | WEIGHT: 163 LBS | DIASTOLIC BLOOD PRESSURE: 68 MMHG | BODY MASS INDEX: 23.39 KG/M2 | HEART RATE: 79 BPM

## 2024-04-22 LAB — INR BLD: 3.1

## 2024-04-22 PROCEDURE — 99212 OFFICE O/P EST SF 10 MIN: CPT

## 2024-04-22 PROCEDURE — 85610 PROTHROMBIN TIME: CPT

## 2024-04-24 ENCOUNTER — HOSPITAL ENCOUNTER (OUTPATIENT)
Dept: PULMONOLOGY | Age: 74
Discharge: HOME | End: 2024-04-24
Payer: MEDICARE

## 2024-04-24 ENCOUNTER — HOSPITAL ENCOUNTER (OUTPATIENT)
Age: 74
Discharge: HOME | End: 2024-04-26
Attending: FAMILY MEDICINE
Payer: MEDICARE

## 2024-04-24 VITALS
WEIGHT: 162.92 LBS | DIASTOLIC BLOOD PRESSURE: 68 MMHG | HEIGHT: 70 IN | BODY MASS INDEX: 23.32 KG/M2 | SYSTOLIC BLOOD PRESSURE: 108 MMHG

## 2024-04-24 VITALS — OXYGEN SATURATION: 98 % | HEART RATE: 78 BPM

## 2024-04-24 DIAGNOSIS — Z95.810 ICD (IMPLANTABLE CARDIOVERTER-DEFIBRILLATOR) IN PLACE: ICD-10-CM

## 2024-04-24 DIAGNOSIS — J44.9 CHRONIC OBSTRUCTIVE PULMONARY DISEASE, UNSPECIFIED COPD TYPE (HCC): ICD-10-CM

## 2024-04-24 DIAGNOSIS — I48.0 PAF (PAROXYSMAL ATRIAL FIBRILLATION) (HCC): ICD-10-CM

## 2024-04-24 DIAGNOSIS — I25.5 ISCHEMIC CARDIOMYOPATHY: ICD-10-CM

## 2024-04-24 DIAGNOSIS — Z71.6 TOBACCO ABUSE COUNSELING: ICD-10-CM

## 2024-04-24 DIAGNOSIS — E78.2 MIXED HYPERLIPIDEMIA: ICD-10-CM

## 2024-04-24 DIAGNOSIS — I50.43 ACUTE ON CHRONIC COMBINED SYSTOLIC (CONGESTIVE) AND DIASTOLIC (CONGESTIVE) HEART FAILURE (HCC): ICD-10-CM

## 2024-04-24 DIAGNOSIS — R06.02 SHORTNESS OF BREATH: ICD-10-CM

## 2024-04-24 LAB
DISTANCE WALKED: NORMAL FT
ECHO AV CUSP MM: 1.7 CM
ECHO AV MEAN GRADIENT: 3 MMHG
ECHO AV MEAN VELOCITY: 0.8 M/S
ECHO AV PEAK GRADIENT: 6 MMHG
ECHO AV PEAK VELOCITY: 1.2 M/S
ECHO AV VELOCITY RATIO: 0.58
ECHO AV VTI: 25.1 CM
ECHO BSA: 1.91 M2
ECHO EST RA PRESSURE: 8 MMHG
ECHO LA AREA 2C: 19.4 CM2
ECHO LA AREA 4C: 21 CM2
ECHO LA MAJOR AXIS: 7.1 CM
ECHO LA MINOR AXIS: 6.5 CM
ECHO LA VOL BP: 51 ML (ref 18–58)
ECHO LA VOL MOD A2C: 47 ML (ref 18–58)
ECHO LA VOL MOD A4C: 51 ML (ref 18–58)
ECHO LA VOL/BSA BIPLANE: 27 ML/M2 (ref 16–34)
ECHO LA VOLUME INDEX MOD A2C: 25 ML/M2 (ref 16–34)
ECHO LA VOLUME INDEX MOD A4C: 27 ML/M2 (ref 16–34)
ECHO LV E' LATERAL VELOCITY: 10 CM/S
ECHO LV EDV A2C: 96 ML
ECHO LV EDV A4C: 152 ML
ECHO LV EDV INDEX A4C: 80 ML/M2
ECHO LV EDV NDEX A2C: 50 ML/M2
ECHO LV EJECTION FRACTION A2C: 22 %
ECHO LV EJECTION FRACTION A4C: 13 %
ECHO LV EJECTION FRACTION BIPLANE: 14 % (ref 55–100)
ECHO LV ESV A2C: 75 ML
ECHO LV ESV A4C: 132 ML
ECHO LV ESV INDEX A2C: 39 ML/M2
ECHO LV ESV INDEX A4C: 69 ML/M2
ECHO LV FRACTIONAL SHORTENING: 7 % (ref 28–44)
ECHO LV INTERNAL DIMENSION DIASTOLE INDEX: 3.56 CM/M2
ECHO LV INTERNAL DIMENSION DIASTOLIC: 6.8 CM (ref 4.2–5.9)
ECHO LV INTERNAL DIMENSION SYSTOLIC INDEX: 3.3 CM/M2
ECHO LV INTERNAL DIMENSION SYSTOLIC: 6.3 CM
ECHO LV IVSD: 0.8 CM (ref 0.6–1)
ECHO LV MASS 2D: 249.9 G (ref 88–224)
ECHO LV MASS INDEX 2D: 130.9 G/M2 (ref 49–115)
ECHO LV POSTERIOR WALL DIASTOLIC: 0.9 CM (ref 0.6–1)
ECHO LV RELATIVE WALL THICKNESS RATIO: 0.26
ECHO LVOT AV VTI INDEX: 0.55
ECHO LVOT MEAN GRADIENT: 1 MMHG
ECHO LVOT PEAK GRADIENT: 2 MMHG
ECHO LVOT PEAK VELOCITY: 0.7 M/S
ECHO LVOT VTI: 13.9 CM
ECHO MV A VELOCITY: 0.44 M/S
ECHO MV E DECELERATION TIME (DT): 144 MS
ECHO MV E VELOCITY: 0.87 M/S
ECHO MV E/A RATIO: 1.98
ECHO MV E/E' LATERAL: 8.7
ECHO PV MAX VELOCITY: 0.9 M/S
ECHO PV PEAK GRADIENT: 3 MMHG
ECHO RIGHT VENTRICULAR SYSTOLIC PRESSURE (RVSP): 44 MMHG
ECHO TV REGURGITANT MAX VELOCITY: 3.01 M/S
ECHO TV REGURGITANT PEAK GRADIENT: 36 MMHG
SPO2: NORMAL %

## 2024-04-24 PROCEDURE — 6370000000 HC RX 637 (ALT 250 FOR IP): Performed by: INTERNAL MEDICINE

## 2024-04-24 PROCEDURE — 93306 TTE W/DOPPLER COMPLETE: CPT | Performed by: FAMILY MEDICINE

## 2024-04-24 PROCEDURE — 94060 EVALUATION OF WHEEZING: CPT

## 2024-04-24 PROCEDURE — 94664 DEMO&/EVAL PT USE INHALER: CPT

## 2024-04-24 PROCEDURE — 94726 PLETHYSMOGRAPHY LUNG VOLUMES: CPT

## 2024-04-24 PROCEDURE — 93306 TTE W/DOPPLER COMPLETE: CPT

## 2024-04-24 PROCEDURE — 94729 DIFFUSING CAPACITY: CPT

## 2024-04-24 RX ORDER — ALBUTEROL SULFATE 90 UG/1
4 AEROSOL, METERED RESPIRATORY (INHALATION) ONCE
Status: COMPLETED | OUTPATIENT
Start: 2024-04-24 | End: 2024-04-24

## 2024-04-24 RX ADMIN — ALBUTEROL SULFATE 4 PUFF: 90 AEROSOL, METERED RESPIRATORY (INHALATION) at 13:50

## 2024-04-24 NOTE — RT PROTOCOL NOTE
[address] [address]        Patient Name: Pascual Peters Jr. 1950         A home oxygen evaluation has been completed.       Patient arrived on room air with  SpO2 was 98 % on room air at rest. Patient was walked for 6 minutes. SpO2 was 98 % on room air during walking. Patient did not qualifies for home oxygen at this time.

## 2024-04-24 NOTE — RESULT ENCOUNTER NOTE
Please inform the pt that the test is ok and he does not qualify for home oxygen and f/u as scheduled. Ty

## 2024-04-25 ENCOUNTER — TELEPHONE (OUTPATIENT)
Dept: CARDIOLOGY | Age: 74
End: 2024-04-25

## 2024-04-25 NOTE — PROCEDURES
[address] [address]                           PULMONARY FUNCTION      PATIENT NAME: LATRICIA DENNIS                : 1950  MED REC NO: [id number]                          ROOM:   ACCOUNT NO: [id number]                       ADMIT DATE: 2024  PROVIDER: Nisa Lopez MD      DATE OF PROCEDURE:  2024    Spirometry shows FVC is 2.72, 72% of predicted.  FEV1 is 1.54, 53% of predicted consistent with moderate obstructive ventilatory impairment.  FEV1/FVC ratio is consistent with moderate obstructive ventilatory impairment.  Post bronchodilator, there is no significant response to bronchodilator to suggest bronchospasticity.  Lung volume shows residual volume is 3.72, 146% of predicted consistent with mild-to-moderate airway trapping.  Diffusion capacity is 6.14, 87% of predicted which is within normal limits.  Diffusion capacity is 13.23, 52% of predicted consistent with moderate reduction in diffusion capacity which is likely secondary to emphysema, pulmonary vascular disease, anemia, or intraparenchymal lung disease.    IMPRESSION:  This pulmonary function test is consistent with moderate obstructive ventilatory impairment without significant response to bronchodilator but clinical response is possible.  Lung volume is consistent with mild-to-moderate airway trapping.  There is moderate reduction in diffusion capacity which is likely secondary to emphysema.  Clinical correlation is recommended.          NISA LOPEZ MD      D:  2024 15:04:03     T:  2024 04:24:01     SHARIFA/GRISELDA  Job #:  [id number]     Doc#:  [id number]

## 2024-04-25 NOTE — TELEPHONE ENCOUNTER
----- Message from Figueroa Soliz MD sent at 4/24/2024 11:57 PM EDT -----  Let Mr. Peters know their test result was ok. Will discuss at next visit. Thanks.

## 2024-04-26 RX ORDER — AMIODARONE HYDROCHLORIDE 200 MG/1
200 TABLET ORAL DAILY
Qty: 90 TABLET | Refills: 3 | Status: SHIPPED | OUTPATIENT
Start: 2024-04-26

## 2024-04-30 ENCOUNTER — OFFICE VISIT (OUTPATIENT)
Dept: CARDIOLOGY | Age: 74
End: 2024-04-30
Payer: MEDICARE

## 2024-04-30 ENCOUNTER — HOSPITAL ENCOUNTER (OUTPATIENT)
Age: 74
Discharge: HOME | End: 2024-04-30
Payer: MEDICARE

## 2024-04-30 VITALS
BODY MASS INDEX: 22.48 KG/M2 | RESPIRATION RATE: 16 BRPM | SYSTOLIC BLOOD PRESSURE: 95 MMHG | OXYGEN SATURATION: 96 % | HEART RATE: 69 BPM | DIASTOLIC BLOOD PRESSURE: 58 MMHG | HEIGHT: 70 IN | WEIGHT: 157 LBS

## 2024-04-30 DIAGNOSIS — Z95.810 ICD (IMPLANTABLE CARDIOVERTER-DEFIBRILLATOR) IN PLACE: ICD-10-CM

## 2024-04-30 DIAGNOSIS — I50.22 CHRONIC SYSTOLIC HEART FAILURE (HCC): ICD-10-CM

## 2024-04-30 DIAGNOSIS — Z95.1 HISTORY OF CORONARY ARTERY BYPASS GRAFT X 3: ICD-10-CM

## 2024-04-30 DIAGNOSIS — I25.10 ASHD (ARTERIOSCLEROTIC HEART DISEASE): ICD-10-CM

## 2024-04-30 DIAGNOSIS — I25.5 ISCHEMIC CARDIOMYOPATHY: ICD-10-CM

## 2024-04-30 DIAGNOSIS — I95.9 HYPOTENSION, UNSPECIFIED HYPOTENSION TYPE: ICD-10-CM

## 2024-04-30 DIAGNOSIS — Z71.6 TOBACCO ABUSE COUNSELING: ICD-10-CM

## 2024-04-30 DIAGNOSIS — R94.39 ABNORMAL STRESS TEST: Primary | ICD-10-CM

## 2024-04-30 DIAGNOSIS — I48.0 PAF (PAROXYSMAL ATRIAL FIBRILLATION) (HCC): ICD-10-CM

## 2024-04-30 DIAGNOSIS — E78.2 MIXED HYPERLIPIDEMIA: ICD-10-CM

## 2024-04-30 LAB
ANION GAP SERPL CALCULATED.3IONS-SCNC: 11 MMOL/L (ref 9–17)
BUN SERPL-MCNC: 23 MG/DL (ref 8–23)
BUN/CREAT SERPL: 21 (ref 9–20)
CALCIUM SERPL-MCNC: 9.4 MG/DL (ref 8.6–10.4)
CHLORIDE SERPL-SCNC: 99 MMOL/L (ref 98–107)
CO2 SERPL-SCNC: 26 MMOL/L (ref 20–31)
CREAT SERPL-MCNC: 1.1 MG/DL (ref 0.7–1.2)
ERYTHROCYTE [DISTWIDTH] IN BLOOD BY AUTOMATED COUNT: 14.6 % (ref 11.8–14.4)
GFR SERPL CREATININE-BSD FRML MDRD: 70 ML/MIN/1.73M2
GLUCOSE SERPL-MCNC: 114 MG/DL (ref 70–99)
HCT VFR BLD AUTO: 33.6 % (ref 40.7–50.3)
HGB BLD-MCNC: 10.5 G/DL (ref 13–17)
MCH RBC QN AUTO: 30.9 PG (ref 25.2–33.5)
MCHC RBC AUTO-ENTMCNC: 31.3 G/DL (ref 28.4–34.8)
MCV RBC AUTO: 98.8 FL (ref 82.6–102.9)
NRBC BLD-RTO: 0 PER 100 WBC
PLATELET # BLD AUTO: 226 K/UL (ref 138–453)
PMV BLD AUTO: 9.4 FL (ref 8.1–13.5)
POTASSIUM SERPL-SCNC: 3.7 MMOL/L (ref 3.7–5.3)
RBC # BLD AUTO: 3.4 M/UL (ref 4.21–5.77)
SODIUM SERPL-SCNC: 136 MMOL/L (ref 135–144)
WBC OTHER # BLD: 8.1 K/UL (ref 3.5–11.3)

## 2024-04-30 PROCEDURE — G8420 CALC BMI NORM PARAMETERS: HCPCS | Performed by: FAMILY MEDICINE

## 2024-04-30 PROCEDURE — 4004F PT TOBACCO SCREEN RCVD TLK: CPT | Performed by: FAMILY MEDICINE

## 2024-04-30 PROCEDURE — 99215 OFFICE O/P EST HI 40 MIN: CPT | Performed by: FAMILY MEDICINE

## 2024-04-30 PROCEDURE — 3017F COLORECTAL CA SCREEN DOC REV: CPT | Performed by: FAMILY MEDICINE

## 2024-04-30 PROCEDURE — G8427 DOCREV CUR MEDS BY ELIG CLIN: HCPCS | Performed by: FAMILY MEDICINE

## 2024-04-30 PROCEDURE — 80048 BASIC METABOLIC PNL TOTAL CA: CPT

## 2024-04-30 PROCEDURE — 1123F ACP DISCUSS/DSCN MKR DOCD: CPT | Performed by: FAMILY MEDICINE

## 2024-04-30 PROCEDURE — 85027 COMPLETE CBC AUTOMATED: CPT

## 2024-04-30 PROCEDURE — 36415 COLL VENOUS BLD VENIPUNCTURE: CPT

## 2024-04-30 RX ORDER — FUROSEMIDE 20 MG/1
TABLET ORAL
Qty: 90 TABLET | Refills: 3 | Status: SHIPPED | OUTPATIENT
Start: 2024-04-30

## 2024-04-30 NOTE — PROGRESS NOTES
Size: Medium Adult)   Pulse 69   Resp 16   Ht 1.778 m (5' 10\")   Wt 71.2 kg (157 lb)   SpO2 96%   BMI 22.53 kg/m²  Body mass index is 22.53 kg/m².    Constitutional: He appeared oriented to person and place. He appears well-developed and well-nourished. In no acute distress.   HEENT: Normocephalic and atraumatic. No JVD present. Carotid bruit is not present. No mass and no thyromegaly present. No lymphadenopathy noted.  Cardiovascular: Normal rate, regular rhythm, normal heart sounds. Exam reveals no gallop and no friction rubs. 1/6 systolic murmur, 5th intercostal space on the LEFT in the mid-clavicular line (cardiac apex).  Pulmonary/Chest: Effort normal and breath sounds normal. No respiratory distress. He has no rales. Mild diffuse wheezes present with decreased lung sounds throughout lung fields.  Abdominal: Soft, non-tender. He exhibits no organomegaly, mass or bruit.   Extremities: Trace. No cyanosis or clubbing. 2+ radial and carotid pulses. Distal extremity pulses: 2+ bilaterally.   Neurological: Alertness and orientation as per Constitutional exam. No evidence of gross cranial nerve deficit. Coordination appeared normal.   Skin: Skin is warm and dry. There is no rash or diaphoresis.   Psychiatric: He has a normal mood and affect. His speech is normal and behavior is normal.      MOST RECENT LABS ON RECORD:   Lab Results   Component Value Date    WBC 7.8 04/02/2024    HGB 10.3 (L) 04/02/2024    HCT 32.5 (L) 04/02/2024     04/02/2024    CHOL 105 11/27/2023    TRIG 56 11/27/2023    HDL 35 (L) 11/27/2023    ALT 26 11/27/2023    AST 31 11/27/2023     04/02/2024    K 4.4 04/02/2024     04/02/2024    CREATININE 1.0 04/02/2024    BUN 23 04/02/2024    CO2 26 04/02/2024    TSH 6.98 (H) 05/22/2023    PSA 0.70 11/27/2023    INR 3.1 04/22/2024    LABA1C 6.1 (H) 11/27/2023       ASSESSMENT:     1. Abnormal stress test    2. ASHD (arteriosclerotic heart disease)    3. History of coronary artery

## 2024-05-09 ENCOUNTER — HOSPITAL ENCOUNTER (OUTPATIENT)
Age: 74
Discharge: HOME | End: 2024-05-09
Payer: MEDICARE

## 2024-05-09 DIAGNOSIS — I25.5 ISCHEMIC CARDIOMYOPATHY: ICD-10-CM

## 2024-05-09 LAB
ERYTHROCYTE [DISTWIDTH] IN BLOOD BY AUTOMATED COUNT: 14.5 % (ref 11.8–14.4)
HCT VFR BLD AUTO: 34 % (ref 40.7–50.3)
HGB BLD-MCNC: 10.6 G/DL (ref 13–17)
MCH RBC QN AUTO: 30.5 PG (ref 25.2–33.5)
MCHC RBC AUTO-ENTMCNC: 31.2 G/DL (ref 28.4–34.8)
MCV RBC AUTO: 97.7 FL (ref 82.6–102.9)
NRBC BLD-RTO: 0 PER 100 WBC
PLATELET # BLD AUTO: 245 K/UL (ref 138–453)
PMV BLD AUTO: 10.1 FL (ref 8.1–13.5)
RBC # BLD AUTO: 3.48 M/UL (ref 4.21–5.77)
WBC OTHER # BLD: 7.1 K/UL (ref 3.5–11.3)

## 2024-05-09 PROCEDURE — 85027 COMPLETE CBC AUTOMATED: CPT

## 2024-05-09 PROCEDURE — 36415 COLL VENOUS BLD VENIPUNCTURE: CPT

## 2024-05-10 ENCOUNTER — HOSPITAL ENCOUNTER (OUTPATIENT)
Age: 74
Setting detail: OUTPATIENT SURGERY
Discharge: HOME | End: 2024-05-10
Attending: FAMILY MEDICINE | Admitting: FAMILY MEDICINE
Payer: MEDICARE

## 2024-05-10 ENCOUNTER — TELEPHONE (OUTPATIENT)
Dept: CARDIOLOGY | Age: 74
End: 2024-05-10

## 2024-05-10 VITALS
RESPIRATION RATE: 16 BRPM | DIASTOLIC BLOOD PRESSURE: 63 MMHG | SYSTOLIC BLOOD PRESSURE: 108 MMHG | HEART RATE: 105 BPM | TEMPERATURE: 96.5 F | OXYGEN SATURATION: 97 %

## 2024-05-10 DIAGNOSIS — Z95.1 HISTORY OF CORONARY ARTERY BYPASS GRAFT X 3: ICD-10-CM

## 2024-05-10 DIAGNOSIS — I25.10 ASHD (ARTERIOSCLEROTIC HEART DISEASE): ICD-10-CM

## 2024-05-10 DIAGNOSIS — I50.22 CHRONIC SYSTOLIC HEART FAILURE (HCC): ICD-10-CM

## 2024-05-10 DIAGNOSIS — R94.39 ABNORMAL STRESS TEST: Primary | ICD-10-CM

## 2024-05-10 DIAGNOSIS — R94.39 ABNORMAL STRESS TEST: ICD-10-CM

## 2024-05-10 LAB
INR PPP: 3
PROTHROMBIN TIME: 29.5 SEC (ref 11.7–14.1)

## 2024-05-10 PROCEDURE — 85610 PROTHROMBIN TIME: CPT

## 2024-05-10 PROCEDURE — 2709999900 HC NON-CHARGEABLE SUPPLY: Performed by: FAMILY MEDICINE

## 2024-05-10 PROCEDURE — C1769 GUIDE WIRE: HCPCS | Performed by: FAMILY MEDICINE

## 2024-05-10 PROCEDURE — 2580000003 HC RX 258: Performed by: FAMILY MEDICINE

## 2024-05-10 PROCEDURE — 7100000010 HC PHASE II RECOVERY - FIRST 15 MIN: Performed by: FAMILY MEDICINE

## 2024-05-10 PROCEDURE — C1894 INTRO/SHEATH, NON-LASER: HCPCS | Performed by: FAMILY MEDICINE

## 2024-05-10 RX ORDER — SODIUM CHLORIDE 0.9 % (FLUSH) 0.9 %
5-40 SYRINGE (ML) INJECTION EVERY 12 HOURS SCHEDULED
Status: CANCELLED | OUTPATIENT
Start: 2024-05-10

## 2024-05-10 RX ORDER — NITROGLYCERIN 0.4 MG/1
0.4 TABLET SUBLINGUAL EVERY 5 MIN PRN
Status: DISCONTINUED | OUTPATIENT
Start: 2024-05-10 | End: 2024-05-10 | Stop reason: HOSPADM

## 2024-05-10 RX ORDER — SODIUM CHLORIDE 0.9 % (FLUSH) 0.9 %
5-40 SYRINGE (ML) INJECTION PRN
Status: DISCONTINUED | OUTPATIENT
Start: 2024-05-10 | End: 2024-05-10 | Stop reason: HOSPADM

## 2024-05-10 RX ORDER — SODIUM CHLORIDE 9 MG/ML
INJECTION, SOLUTION INTRAVENOUS PRN
Status: CANCELLED | OUTPATIENT
Start: 2024-05-10

## 2024-05-10 RX ORDER — SODIUM CHLORIDE 9 MG/ML
INJECTION, SOLUTION INTRAVENOUS PRN
Status: DISCONTINUED | OUTPATIENT
Start: 2024-05-10 | End: 2024-05-10 | Stop reason: HOSPADM

## 2024-05-10 RX ORDER — ACETAMINOPHEN 325 MG/1
650 TABLET ORAL EVERY 4 HOURS PRN
Status: CANCELLED | OUTPATIENT
Start: 2024-05-10

## 2024-05-10 RX ORDER — DIPHENHYDRAMINE HCL 25 MG
50 CAPSULE ORAL ONCE
Status: DISCONTINUED | OUTPATIENT
Start: 2024-05-10 | End: 2024-05-10 | Stop reason: HOSPADM

## 2024-05-10 RX ORDER — SODIUM CHLORIDE 0.9 % (FLUSH) 0.9 %
5-40 SYRINGE (ML) INJECTION PRN
Status: CANCELLED | OUTPATIENT
Start: 2024-05-10

## 2024-05-10 RX ORDER — SODIUM CHLORIDE 0.9 % (FLUSH) 0.9 %
5-40 SYRINGE (ML) INJECTION EVERY 12 HOURS SCHEDULED
Status: DISCONTINUED | OUTPATIENT
Start: 2024-05-10 | End: 2024-05-10 | Stop reason: HOSPADM

## 2024-05-10 RX ORDER — SODIUM CHLORIDE 9 MG/ML
INJECTION, SOLUTION INTRAVENOUS CONTINUOUS
Status: CANCELLED | OUTPATIENT
Start: 2024-05-10

## 2024-05-10 RX ADMIN — SODIUM CHLORIDE: 9 INJECTION, SOLUTION INTRAVENOUS at 10:41

## 2024-05-10 NOTE — DISCHARGE INSTRUCTIONS
Discharge Instructions for Cardiac Catheterization    A cardiac catheterization is a diagnostic test used to evaluate the health of the heart and its blood vessels. The test is done with a thin catheter carefully threaded into your heart from a leg or arm artery. Most likely, you will be allowed to go home the same day as the procedure.   Steps to Take at Home:   Pain- apply ice to site 15-20 minutes every hour for the first 2 days.   Showering is okay 24 hours after procedure.    No soaking in a pool, hot tub, bath tub, or standing water for one week.   Bleeding (outward or under the skin-hematoma)- apply firm pressure for 10-15 minutes or until the bleeding stops, then call your doctor. If unable to get bleeding stopped, call 911.    Kidney damage- Call if you urinate less than normal, have swelling or feel puffy, and/or gain 2 or more pounds over night in the first week.   If procedure was in ARM:  You were instructed to keep wrist straight and still for two hours after the procedure. The arm and hand may now be used for normal daily activities except, avoid using the heal of hand while getting up and down from furniture for the first few days.  Keep affected arm elevated, hand higher than elbow, while pressure dressing in place to decrease swelling.  1)  Gauze and Elastoplast   Remove in 4 hours as follows:     TIME_______________________  Remove 1 piece of tape at a time, waiting 15 -20 minutes between layers to monitor for bleeding.   If dressing sticks, place wrist under cool running water to help loosen gauze from site then pat site dry.  *** If hand feels numb, tingly, and/or cold- loosen first 1-2 layers of tape if dressing still in place.  If no relief noticed, remove pressure dressing as per above instructions. Seek medical help if no relief or if dressing already off.   2)  TR Band- After first 30 minutes, pressure will be released from    device every 15 until completely decompressed.  Inflation  tube    may be cut prior to discharge but band left in place.   **Band can be removed after 4 hours:    TIME ______________________________  *** If hand of procedure site becomes numb, tingly, and/or cold, seek medical help.  Wash area with soap and water daily while leaving it open to air, no bandaids or ointment.  If procedure was in the GROIN:  You will need to lie flat for a few hours after the procedure to prevent bleeding.  If site has a lump or visible bleeding or if you have new back or groin pain, apply pressure to groin and call 911.  Remove bandaid in AM.  Wash area with soap and water daily.  *** If leg of procedure site becomes numb, tingly, and/or cold, seek medical help.  Diet   Drink plenty of fluids after the test to flush the x-ray dye from your system.   Return to your normal diet.   No alcoholic beverages for 24 hours after the procedure.  Physical Activity   The sedative will make you sleepy. Rest until the effects have worn off.   Nausea and vomiting from the sedative is normal and usually does not last long.  Ask your doctor when you will be able to return to work.   Do not drive, operate machinery, do anything that requires attention to detail, or sign important papers for at least 24 hours or until your doctor says it is safe.   Avoid heavy lifting, physically demanding activities, and sexual activity for 2-3 days. Lift nothing over 10 pounds (a gallon of milk is 8 pounds).  Do not sit for long periods of time. Try to change positions frequently.   Medications   Resume taking your normal medicines as advised.   Use acetaminophen (Tylenol) for pain relief.  (Avoid anti-inflammatory drugs such as- ibuprofen (Advil, Motrin), naproxen sodium (Aleve), Excedrin for a few days)  If you had to stop taking these medications before the procedure, ask your doctor when you can resume taking them:   Anti-inflammatory drugs   Blood thinners, such as warfarin (Coumadin)   If you are taking medicines,

## 2024-05-10 NOTE — PROGRESS NOTES
Procedure cancelled due to INR result. Rescheduled for Next week, patient aware and instructions given to hold coumadin per Dr. Soliz.

## 2024-05-10 NOTE — TELEPHONE ENCOUNTER
----- Message from Figueroa Soliz MD sent at 5/10/2024 12:49 AM EDT -----  Let Mr. Peters know their test result was ok. Will discuss at next visit. Thanks.

## 2024-05-13 RX ORDER — CLOPIDOGREL BISULFATE 75 MG/1
75 TABLET ORAL DAILY
Qty: 90 TABLET | Refills: 0 | Status: SHIPPED | OUTPATIENT
Start: 2024-05-13

## 2024-05-14 ENCOUNTER — ANTI-COAG VISIT (OUTPATIENT)
Dept: PHARMACY | Age: 74
End: 2024-05-14

## 2024-05-14 NOTE — PROGRESS NOTES
Billie Stratton RN contacted clinic with notification that patient was scheduled for heart cath today, 5/14/24, but cancelled by patient stating that he was not feeling well today.  Patient is re-scheduled again for 5/23/24.  INR goal prior to procedure is 1.5 per Billie SAGASTUME.     Patient was previously scheduled for procedure on 5/10/24 - but it was cancelled due to INR of 3 (despite 3 day warfarin hold starting 5/7/24).  I called and spoke to patient with request to bring him into clinic for INR check and to provide instructions for holding warfarin prior to re-scheduled heart cath on 5/23/24.  Patient states he isnt feeling well, but was finally agreeable to coming into clinic on Friday, 5/17/24, for INR check.

## 2024-05-16 ENCOUNTER — TELEPHONE (OUTPATIENT)
Dept: PHARMACY | Age: 74
End: 2024-05-16

## 2024-05-16 ENCOUNTER — ANTI-COAG VISIT (OUTPATIENT)
Dept: PHARMACY | Age: 74
End: 2024-05-16

## 2024-05-16 NOTE — PROGRESS NOTES
Patient called clinic today and he did not take any warfarin since Monday 5/6/24.      Patient is coming in tomorrow to get lab work done (AST/ALT).  Liver function can effect INR.    Billie Stratton RN contacted clinic that patient was scheduled for heart cath today, 5/14/24, but cancelled by patient stating that he was not feeling well today.  Patient is re-scheduled again for 5/23/24.  INR goal prior to procedure is 1.5 per Billie SAGASTUME.      Patient was previously scheduled for procedure on 5/10/24 - but it was cancelled due to INR of 3 (despite 3 day warfarin hold starting 5/7/24).  I called and spoke to patient with unnecessary to come into clinic if he is not taking warfarin and he is planning to hold warfarin until after his heart cath on 5/23/24 regardless of instructions.      [id number] Score for Atrial Fibrillation Stroke Risk   Risk   Factors  Component Value   C CHF Yes 1   H HTN No 0   A2 Age >= 75 No,  (74 y.o.) 0   D DM No 0   S2 Prior Stroke/TIA No 0   V Vascular Disease No 0   A Age 65-74 Yes,  (74 y.o.) 1   Sc Sex male 0    [id number]  Score  2   Score last updated 5/16/24 4:18 PM EDT  Click here for a link to the UpToDate guideline \"Atrial Fibrillation: Anticoagulation therapy to prevent embolization  Disclaimer: Risk Score calculation is dependent on accuracy of patient problem list and past encounter diagnosis.    Nahomy Mace, PharmD 5/16/2024 4:20 PM

## 2024-05-17 ENCOUNTER — HOSPITAL ENCOUNTER (OUTPATIENT)
Dept: PHARMACY | Age: 74
Setting detail: THERAPIES SERIES
Discharge: HOME | End: 2024-05-17
Payer: MEDICARE

## 2024-05-17 ENCOUNTER — HOSPITAL ENCOUNTER (EMERGENCY)
Age: 74
Discharge: HOME | End: 2024-05-17
Payer: MEDICARE

## 2024-05-17 ENCOUNTER — HOSPITAL ENCOUNTER (OUTPATIENT)
Age: 74
Discharge: HOME | End: 2024-05-17
Payer: MEDICARE

## 2024-05-17 ENCOUNTER — HOSPITAL ENCOUNTER (OUTPATIENT)
Dept: CT IMAGING | Age: 74
End: 2024-05-17
Payer: MEDICARE

## 2024-05-17 VITALS
OXYGEN SATURATION: 98 % | TEMPERATURE: 97.7 F | RESPIRATION RATE: 18 BRPM | HEART RATE: 88 BPM | SYSTOLIC BLOOD PRESSURE: 91 MMHG | DIASTOLIC BLOOD PRESSURE: 63 MMHG

## 2024-05-17 VITALS — WEIGHT: 156 LBS | BODY MASS INDEX: 22.38 KG/M2

## 2024-05-17 DIAGNOSIS — E78.6 LOW HDL (UNDER 40): ICD-10-CM

## 2024-05-17 DIAGNOSIS — I25.810 CORONARY ARTERY DISEASE INVOLVING CORONARY BYPASS GRAFT OF NATIVE HEART WITHOUT ANGINA PECTORIS: ICD-10-CM

## 2024-05-17 DIAGNOSIS — R18.8 OTHER ASCITES: ICD-10-CM

## 2024-05-17 DIAGNOSIS — R74.8 ELEVATED LIVER ENZYMES: Primary | ICD-10-CM

## 2024-05-17 DIAGNOSIS — R74.8 ELEVATED LIVER ENZYMES: ICD-10-CM

## 2024-05-17 DIAGNOSIS — I48.91 ATRIAL FIBRILLATION WITH RVR (HCC): ICD-10-CM

## 2024-05-17 DIAGNOSIS — R73.01 IMPAIRED FASTING GLUCOSE: ICD-10-CM

## 2024-05-17 DIAGNOSIS — E78.2 MIXED HYPERLIPIDEMIA: ICD-10-CM

## 2024-05-17 DIAGNOSIS — I50.42 CHRONIC COMBINED SYSTOLIC AND DIASTOLIC CONGESTIVE HEART FAILURE (HCC): ICD-10-CM

## 2024-05-17 LAB
ALBUMIN SERPL-MCNC: 3.4 G/DL (ref 3.5–5.2)
ALBUMIN/GLOB SERPL: 1 {RATIO} (ref 1–2.5)
ALP SERPL-CCNC: 129 U/L (ref 40–129)
ALT SERPL-CCNC: 199 U/L (ref 5–41)
ALT SERPL-CCNC: 213 U/L (ref 5–41)
ANION GAP SERPL CALCULATED.3IONS-SCNC: 15 MMOL/L (ref 9–17)
AST SERPL-CCNC: 125 U/L
AST SERPL-CCNC: 141 U/L
BILIRUB DIRECT SERPL-MCNC: 0.5 MG/DL
BILIRUB INDIRECT SERPL-MCNC: 0.7 MG/DL (ref 0–1)
BILIRUB SERPL-MCNC: 1.2 MG/DL (ref 0.3–1.2)
BUN SERPL-MCNC: 28 MG/DL (ref 8–23)
BUN/CREAT SERPL: 22 (ref 9–20)
CALCIUM SERPL-MCNC: 9.7 MG/DL (ref 8.6–10.4)
CHLORIDE SERPL-SCNC: 98 MMOL/L (ref 98–107)
CHOLEST SERPL-MCNC: 62 MG/DL (ref 0–199)
CHOLESTEROL/HDL RATIO: 4
CO2 SERPL-SCNC: 21 MMOL/L (ref 20–31)
CREAT SERPL-MCNC: 1.3 MG/DL (ref 0.7–1.2)
ERYTHROCYTE [DISTWIDTH] IN BLOOD BY AUTOMATED COUNT: 14.9 % (ref 11.8–14.4)
EST. AVERAGE GLUCOSE BLD GHB EST-MCNC: 148 MG/DL
FERRITIN SERPL-MCNC: 48 NG/ML (ref 30–400)
GFR, ESTIMATED: 58 ML/MIN/1.73M2
GGT SERPL-CCNC: 40 U/L (ref 8–61)
GLUCOSE SERPL-MCNC: 159 MG/DL (ref 70–99)
HAV IGM SERPL QL IA: NONREACTIVE
HBA1C MFR BLD: 6.8 % (ref 4–6)
HBV CORE IGM SERPL QL IA: NONREACTIVE
HBV SURFACE AG SERPL QL IA: NONREACTIVE
HCT VFR BLD AUTO: 36 % (ref 40.7–50.3)
HCV AB SERPL QL IA: NONREACTIVE
HDLC SERPL-MCNC: 18 MG/DL
HGB BLD-MCNC: 11 G/DL (ref 13–17)
INR BLD: 1.9
LDLC SERPL CALC-MCNC: 33 MG/DL (ref 0–100)
MCH RBC QN AUTO: 28.9 PG (ref 25.2–33.5)
MCHC RBC AUTO-ENTMCNC: 30.6 G/DL (ref 28.4–34.8)
MCV RBC AUTO: 94.7 FL (ref 82.6–102.9)
NRBC BLD-RTO: 0 PER 100 WBC
PLATELET # BLD AUTO: 300 K/UL (ref 138–453)
PMV BLD AUTO: 9.8 FL (ref 8.1–13.5)
POTASSIUM SERPL-SCNC: 4.2 MMOL/L (ref 3.7–5.3)
PROT SERPL-MCNC: 6.7 G/DL (ref 6.4–8.3)
RBC # BLD AUTO: 3.8 M/UL (ref 4.21–5.77)
SODIUM SERPL-SCNC: 134 MMOL/L (ref 135–144)
TRANSFERRIN SERPL-MCNC: 287 MG/DL (ref 200–360)
TRIGL SERPL-MCNC: 58 MG/DL
VLDLC SERPL CALC-MCNC: 12 MG/DL
WBC OTHER # BLD: 8.6 K/UL (ref 3.5–11.3)

## 2024-05-17 PROCEDURE — 99212 OFFICE O/P EST SF 10 MIN: CPT

## 2024-05-17 PROCEDURE — 36415 COLL VENOUS BLD VENIPUNCTURE: CPT

## 2024-05-17 PROCEDURE — 6360000004 HC RX CONTRAST MEDICATION: Performed by: NURSE PRACTITIONER

## 2024-05-17 PROCEDURE — 80074 ACUTE HEPATITIS PANEL: CPT

## 2024-05-17 PROCEDURE — 83036 HEMOGLOBIN GLYCOSYLATED A1C: CPT

## 2024-05-17 PROCEDURE — 84466 ASSAY OF TRANSFERRIN: CPT

## 2024-05-17 PROCEDURE — 80061 LIPID PANEL: CPT

## 2024-05-17 PROCEDURE — 84460 ALANINE AMINO (ALT) (SGPT): CPT

## 2024-05-17 PROCEDURE — 85610 PROTHROMBIN TIME: CPT

## 2024-05-17 PROCEDURE — 84450 TRANSFERASE (AST) (SGOT): CPT

## 2024-05-17 PROCEDURE — 80048 BASIC METABOLIC PNL TOTAL CA: CPT

## 2024-05-17 PROCEDURE — 74160 CT ABDOMEN W/CONTRAST: CPT

## 2024-05-17 PROCEDURE — 80076 HEPATIC FUNCTION PANEL: CPT

## 2024-05-17 PROCEDURE — 99283 EMERGENCY DEPT VISIT LOW MDM: CPT

## 2024-05-17 PROCEDURE — 82977 ASSAY OF GGT: CPT

## 2024-05-17 PROCEDURE — 85027 COMPLETE CBC AUTOMATED: CPT

## 2024-05-17 PROCEDURE — 82728 ASSAY OF FERRITIN: CPT

## 2024-05-17 RX ORDER — WARFARIN SODIUM 1 MG/1
0.5 TABLET ORAL DAILY
Qty: 45 TABLET | Refills: 1 | Status: SHIPPED | OUTPATIENT
Start: 2024-05-17

## 2024-05-17 RX ORDER — WARFARIN SODIUM 1 MG/1
0.5 TABLET ORAL DAILY
COMMUNITY
End: 2024-05-17 | Stop reason: SDUPTHER

## 2024-05-17 RX ADMIN — IOPAMIDOL 75 ML: 755 INJECTION, SOLUTION INTRAVENOUS at 10:42

## 2024-05-17 ASSESSMENT — PAIN - FUNCTIONAL ASSESSMENT: PAIN_FUNCTIONAL_ASSESSMENT: NONE - DENIES PAIN

## 2024-05-17 NOTE — PROGRESS NOTES
[facility]  Medication Management  ANTICOAGULATION    Referring Provider: Dr Soliz     GOAL INR: 2 - 3     TODAY'S INR: 1.9     WARFARIN Dosage: Hold warfarin until after heart cath. Decrease warfarin to 0.5 mg daily.    INR (no units)   Date Value   05/17/2024 1.9   05/10/2024 3.0   04/22/2024 3.1   02/20/2024 2   01/09/2024 3.0   11/28/2023 2.5   10/31/2023 3       Hemoglobin   Date Value Ref Range Status   05/17/2024 11.0 (L) 13.0 - 17.0 g/dL Final     Hematocrit   Date Value Ref Range Status   05/17/2024 36.0 (L) 40.7 - 50.3 % Final     ALT   Date Value Ref Range Status   05/17/2024 199 (H) 5 - 41 U/L Final     AST   Date Value Ref Range Status   05/17/2024 125 (H) <40 U/L Final       Medication changes:  Stopped Carafate patient preference  Stopped Protonix patient preference  STOP amiodarone today due to liver function per verbal order Dr Soliz    Notes:    Fingerstick INR drawn per clinic protocol. Patient states no visible blood in urine and no black tarry stool. Denies any missed doses of warfarin. No change in other maintenance medications or in diet. Will recheck INR in 1.5 weeks. Patient acknowledges working in consult agreement with pharmacist as referred by his/her physician.       Patient has not had any warfarin since Monday 5/6/24.       Patient had AST/ALT today and they are elevated.  Liver function can effect INR.  I called Dr Esparza office and they are going to follow up with patient today.  He will go directly to their office.     Patient had heart cath scheduled for  5/14/24, but cancelled by patient stating that he was not feeling well today.  Patient is re-scheduled again for 5/23/24.  INR goal prior to procedure is 1.5 per Billie Stratton RN.      Patient was previously scheduled for procedure on 5/10/24 - but it was cancelled due to INR of 3 (despite 3 day warfarin hold starting 5/7/24).      Hold warfarin until after heart cath on 5/23/24.           Sent New

## 2024-05-17 NOTE — ED PROVIDER NOTES
[facility] ED  EMERGENCY DEPARTMENT ENCOUNTER      Pt Name: Pascual Peters Jr.  MRN: [id number]  Birthdate 1950  Date of evaluation: 5/17/2024  Provider: LOCO Redding CNP  5:04 PM    CHIEF COMPLAINT       Chief Complaint   Patient presents with    OTHER     Patient sent over from Dr. Esparza office for jaundice, elevated labs (ALT/AST). Per Isaias office patient to be admitted/transferred for further evaluation.         HISTORY OF PRESENT ILLNESS    Pascual Peters Jr. is a 74 y.o. male who presents to the emergency department ***     [facility] 75 [facility] with report of elevated liver enzymes.     The history is provided by the patient and medical records. No  was used.       Nursing Notes were reviewed.    REVIEW OF SYSTEMS       Review of Systems    Except as noted above the remainder of the review of systems was reviewed and negative.       PAST MEDICAL HISTORY     Past Medical History:   Diagnosis Date    Acute non-ST elevation myocardial infarction (NSTEMI) (HCC) 12/24/2020    Chronic kidney disease     Coronary artery disease     s/p CABG x3 in 2008 and 2 stents on 12/24/2020    H/O echocardiogram 02/08/2021    EF 15-20% LV severly enlarged and LV wall thickness is normal Severe global hypokinesis with segmental abnormalities LA is mod dilated 34-39 with LA volume index of 34 ml/m2 Mild mitral regurg Mod tricupid regurg Mild to mod pulm HTN with est RV systolic pressure of 38 mmhg mod grade II diastolic dysfunciton    H/O echocardiogram 04/01/2021    EF 10-15% LA size was mildly dilated IVC sice is mildly dilated with reduced respiratory phasic changes CVP 5-10 mmHg    Hyperlipidemia     Kidney stone 06/2021    Smoker          SURGICAL HISTORY       Past Surgical History:   Procedure Laterality Date    BLADDER SURGERY Left 6/22/2021    CYSTOSCOPY STENT INSERTION performed by Sigifredo Ledezma MD at [facility] OR              from \"old age\" 73    Rheum Arthritis Mother     Heart Disease Father 67         from CHF at age 67          SOCIAL HISTORY       Social History     Socioeconomic History    Marital status:      Spouse name: None    Number of children: 2    Years of education: None    Highest education level: None   Tobacco Use    Smoking status: Every Day     Current packs/day: 0.30     Average packs/day: 1 pack/day for 56.4 years (56.3 ttl pk-yrs)     Types: Cigarettes     Start date: 1968    Smokeless tobacco: Never   Vaping Use    Vaping Use: Never used   Substance and Sexual Activity    Alcohol use: Not Currently    Drug use: Never    Sexual activity: Not Currently     Social Determinants of Health     Financial Resource Strain: Low Risk  (2024)    Overall Financial Resource Strain (CARDIA)     Difficulty of Paying Living Expenses: Not hard at all   Food Insecurity: No Food Insecurity (2024)    Hunger Vital Sign     Worried About Running Out of Food in the Last Year: Never true     Ran Out of Food in the Last Year: Never true   Transportation Needs: Unknown (2024)    PRAPARE - Transportation     Lack of Transportation (Non-Medical): No   Physical Activity: Insufficiently Active (2023)    Exercise Vital Sign     Days of Exercise per Week: 3 days     Minutes of Exercise per Session: 40 min   Housing Stability: Unknown (2024)    Housing Stability Vital Sign     Unstable Housing in the Last Year: No       SCREENINGS         Hosmer Coma Scale  Eye Opening: Spontaneous  Best Verbal Response: Oriented  Best Motor Response: Obeys commands  Arlene Coma Scale Score: 15                     CIWA Assessment  BP: 91/63  Pulse: 88                 PHYSICAL EXAM       ED Triage Vitals [24 1652]   BP Temp Temp Source Pulse Respirations SpO2 Height Weight   91/63 97.7 °F (36.5 °C) Oral 88 18 98 % -- --       Physical Exam  Vitals and nursing note reviewed.   Constitutional:

## 2024-05-18 NOTE — DISCHARGE INSTRUCTIONS
Continue diuretics daily  Continue home medication    Return here for worsening edema, shortness of breath or abdominal pain

## 2024-05-20 ENCOUNTER — OFFICE VISIT (OUTPATIENT)
Dept: PULMONOLOGY | Age: 74
End: 2024-05-20
Payer: MEDICARE

## 2024-05-20 VITALS
HEART RATE: 81 BPM | SYSTOLIC BLOOD PRESSURE: 88 MMHG | OXYGEN SATURATION: 95 % | DIASTOLIC BLOOD PRESSURE: 58 MMHG | BODY MASS INDEX: 22.94 KG/M2 | RESPIRATION RATE: 16 BRPM | WEIGHT: 160.2 LBS | HEIGHT: 70 IN | TEMPERATURE: 96 F

## 2024-05-20 DIAGNOSIS — R06.09 DYSPNEA ON EXERTION: Primary | ICD-10-CM

## 2024-05-20 DIAGNOSIS — F17.200 SMOKING: ICD-10-CM

## 2024-05-20 DIAGNOSIS — I50.42 CHRONIC COMBINED SYSTOLIC (CONGESTIVE) AND DIASTOLIC (CONGESTIVE) HEART FAILURE (HCC): ICD-10-CM

## 2024-05-20 DIAGNOSIS — R91.8 MULTIPLE NODULES OF LUNG: ICD-10-CM

## 2024-05-20 DIAGNOSIS — I48.0 PAROXYSMAL ATRIAL FIBRILLATION (HCC): ICD-10-CM

## 2024-05-20 DIAGNOSIS — D64.9 ANEMIA, UNSPECIFIED TYPE: ICD-10-CM

## 2024-05-20 DIAGNOSIS — J44.9 CHRONIC OBSTRUCTIVE PULMONARY DISEASE, UNSPECIFIED COPD TYPE (HCC): ICD-10-CM

## 2024-05-20 DIAGNOSIS — Z95.1 S/P CABG X 3: ICD-10-CM

## 2024-05-20 PROCEDURE — 99213 OFFICE O/P EST LOW 20 MIN: CPT | Performed by: INTERNAL MEDICINE

## 2024-05-20 PROCEDURE — G8420 CALC BMI NORM PARAMETERS: HCPCS | Performed by: INTERNAL MEDICINE

## 2024-05-20 PROCEDURE — 1123F ACP DISCUSS/DSCN MKR DOCD: CPT | Performed by: INTERNAL MEDICINE

## 2024-05-20 PROCEDURE — 99214 OFFICE O/P EST MOD 30 MIN: CPT | Performed by: INTERNAL MEDICINE

## 2024-05-20 PROCEDURE — G8427 DOCREV CUR MEDS BY ELIG CLIN: HCPCS | Performed by: INTERNAL MEDICINE

## 2024-05-20 PROCEDURE — 3023F SPIROM DOC REV: CPT | Performed by: INTERNAL MEDICINE

## 2024-05-20 PROCEDURE — 3017F COLORECTAL CA SCREEN DOC REV: CPT | Performed by: INTERNAL MEDICINE

## 2024-05-20 PROCEDURE — 4004F PT TOBACCO SCREEN RCVD TLK: CPT | Performed by: INTERNAL MEDICINE

## 2024-05-20 RX ORDER — ALBUTEROL SULFATE 90 UG/1
2 AEROSOL, METERED RESPIRATORY (INHALATION) 4 TIMES DAILY PRN
Qty: 18 G | Refills: 5 | Status: SHIPPED | OUTPATIENT
Start: 2024-05-20

## 2024-05-20 NOTE — PROGRESS NOTES
consciousness, thought content and emotional status is normal.          DATA:     Pulmonary function tests: none    Echocardiogram on 6/27/2022 showed-  Global left ventricular systolic function appears severely reduced with an  estimated ejection fraction of 15-20%.  The left ventricular cavity size is severely enlarged and the left  ventricular wall thickness is within normal limits.  The left atrium is mildly dilated (29-33) with a left atrial volume index of  32 ml/m2.  What appears to be a pacing lead seen in the right atrium and right  ventricle.  Normal tricuspid valve structure with mild tricuspid regurgitation.  Evidence of mild (grade I) diastolic dysfunction is seen.    CT CHEST WO CONTRAST    Result Date: 4/4/2024  1. Nonspecific mediastinal lymphadenopathy. 2. Bilateral pulmonary nodules measuring up to 9 mm. RECOMMENDATIONS: Multiple pulmonary nodules. Most significant: Right solid pulmonary nodule within the upper lobe measuring 9 mm. Per Fleischner Society Guidelines, consider a non-contrast Chest CT at 3 months, a PET/CT, or tissue sampling. These guidelines do not apply to immunocompromised patients and patients with cancer. Follow up in patients with significant comorbidities as clinically warranted. For lung cancer screening, adhere to Lung-RADS guidelines. Reference: Radiology. 2017; 284(1):228-43.     XR CHEST (2 VW)    Result Date: 4/3/2024  Cardiomegaly without overt pulmonary edema. Mild chronic interstitial pulmonary fibrosis, stable.     XR CHEST (2 VW)    Result Date: 3/26/2024  Cardiomegaly with pulmonary vascular congestion    Chest x-ray on 5/22/2023 without any acute process    Chest x-ray was done on 4/3/2024 and it showed-  Cardiomegaly without overt pulmonary edema.     Mild chronic interstitial pulmonary fibrosis, stable.     CT scan of the chest without contrast was done on 4/4/2024 and it showed-  1. Nonspecific mediastinal lymphadenopathy.  2. Bilateral pulmonary nodules

## 2024-05-21 ENCOUNTER — HOSPITAL ENCOUNTER (OUTPATIENT)
Age: 74
Discharge: HOME | End: 2024-05-21
Payer: MEDICARE

## 2024-05-21 DIAGNOSIS — R18.8 OTHER ASCITES: ICD-10-CM

## 2024-05-21 DIAGNOSIS — R74.8 ELEVATED LIVER ENZYMES: ICD-10-CM

## 2024-05-21 DIAGNOSIS — R17 JAUNDICE: ICD-10-CM

## 2024-05-21 LAB
ALBUMIN SERPL-MCNC: 3.5 G/DL (ref 3.5–5.2)
ALBUMIN/GLOB SERPL: 1 {RATIO} (ref 1–2.5)
ALP SERPL-CCNC: 129 U/L (ref 40–129)
ALT SERPL-CCNC: 118 U/L (ref 5–41)
ANION GAP SERPL CALCULATED.3IONS-SCNC: 14 MMOL/L (ref 9–17)
AST SERPL-CCNC: 74 U/L
BILIRUB SERPL-MCNC: 1.2 MG/DL (ref 0.3–1.2)
BUN SERPL-MCNC: 28 MG/DL (ref 8–23)
BUN/CREAT SERPL: 22 (ref 9–20)
CALCIUM SERPL-MCNC: 9.5 MG/DL (ref 8.6–10.4)
CHLORIDE SERPL-SCNC: 97 MMOL/L (ref 98–107)
CO2 SERPL-SCNC: 23 MMOL/L (ref 20–31)
CREAT SERPL-MCNC: 1.3 MG/DL (ref 0.7–1.2)
GFR, ESTIMATED: 58 ML/MIN/1.73M2
GLUCOSE SERPL-MCNC: 108 MG/DL (ref 70–99)
HAV AB SERPL IA-ACNC: REACTIVE
HAV IGM SERPL QL IA: NONREACTIVE
HBV CORE IGM SERPL QL IA: NONREACTIVE
HBV SURFACE AG SERPL QL IA: NONREACTIVE
HCV AB SERPL QL IA: NONREACTIVE
POTASSIUM SERPL-SCNC: 4.2 MMOL/L (ref 3.7–5.3)
PROT SERPL-MCNC: 6.9 G/DL (ref 6.4–8.3)
SODIUM SERPL-SCNC: 134 MMOL/L (ref 135–144)

## 2024-05-21 PROCEDURE — 80053 COMPREHEN METABOLIC PANEL: CPT

## 2024-05-21 PROCEDURE — 36415 COLL VENOUS BLD VENIPUNCTURE: CPT

## 2024-05-21 PROCEDURE — 86708 HEPATITIS A ANTIBODY: CPT

## 2024-05-21 PROCEDURE — 80074 ACUTE HEPATITIS PANEL: CPT

## 2024-05-28 ENCOUNTER — TELEPHONE (OUTPATIENT)
Dept: PULMONOLOGY | Age: 74
End: 2024-05-28

## 2024-05-28 ENCOUNTER — APPOINTMENT (OUTPATIENT)
Dept: GENERAL RADIOLOGY | Age: 74
DRG: 292 | End: 2024-05-28
Payer: MEDICARE

## 2024-05-28 ENCOUNTER — HOSPITAL ENCOUNTER (INPATIENT)
Age: 74
LOS: 2 days | Discharge: HOME | DRG: 292 | End: 2024-05-30
Attending: EMERGENCY MEDICINE | Admitting: INTERNAL MEDICINE
Payer: MEDICARE

## 2024-05-28 ENCOUNTER — APPOINTMENT (OUTPATIENT)
Dept: PHARMACY | Age: 74
End: 2024-05-28
Payer: MEDICARE

## 2024-05-28 VITALS
SYSTOLIC BLOOD PRESSURE: 99 MMHG | DIASTOLIC BLOOD PRESSURE: 63 MMHG | HEART RATE: 79 BPM | BODY MASS INDEX: 24.36 KG/M2 | WEIGHT: 162.6 LBS

## 2024-05-28 DIAGNOSIS — Z95.1 S/P CABG X 3: ICD-10-CM

## 2024-05-28 DIAGNOSIS — I25.5 ISCHEMIC CARDIOMYOPATHY: ICD-10-CM

## 2024-05-28 DIAGNOSIS — I25.810 CORONARY ARTERY DISEASE INVOLVING CORONARY BYPASS GRAFT OF NATIVE HEART WITHOUT ANGINA PECTORIS: ICD-10-CM

## 2024-05-28 DIAGNOSIS — I50.9 ACUTE ON CHRONIC CONGESTIVE HEART FAILURE, UNSPECIFIED HEART FAILURE TYPE (HCC): Primary | ICD-10-CM

## 2024-05-28 DIAGNOSIS — I95.9 HYPOTENSION, UNSPECIFIED HYPOTENSION TYPE: ICD-10-CM

## 2024-05-28 DIAGNOSIS — I48.91 ATRIAL FIBRILLATION, UNSPECIFIED TYPE (HCC): Primary | ICD-10-CM

## 2024-05-28 DIAGNOSIS — E78.2 MIXED HYPERLIPIDEMIA: ICD-10-CM

## 2024-05-28 PROBLEM — I50.31 ACUTE DIASTOLIC CHF (CONGESTIVE HEART FAILURE) (HCC): Status: ACTIVE | Noted: 2024-05-28

## 2024-05-28 LAB
ALBUMIN SERPL-MCNC: 3.8 G/DL (ref 3.5–5.2)
ALBUMIN/GLOB SERPL: 1.1 {RATIO} (ref 1–2.5)
ALP SERPL-CCNC: 133 U/L (ref 40–129)
ALT SERPL-CCNC: 62 U/L (ref 5–41)
ANION GAP SERPL CALCULATED.3IONS-SCNC: 16 MMOL/L (ref 9–17)
AST SERPL-CCNC: 58 U/L
BASOPHILS # BLD: 0.03 K/UL (ref 0–0.2)
BASOPHILS NFR BLD: 0 % (ref 0–2)
BILIRUB SERPL-MCNC: 1.6 MG/DL (ref 0.3–1.2)
BNP SERPL-MCNC: ABNORMAL PG/ML
BUN SERPL-MCNC: 28 MG/DL (ref 8–23)
BUN/CREAT SERPL: 22 (ref 9–20)
CALCIUM SERPL-MCNC: 9.6 MG/DL (ref 8.6–10.4)
CHLORIDE SERPL-SCNC: 94 MMOL/L (ref 98–107)
CO2 SERPL-SCNC: 19 MMOL/L (ref 20–31)
CREAT SERPL-MCNC: 1.3 MG/DL (ref 0.7–1.2)
EKG ATRIAL RATE: 84 BPM
EKG P AXIS: 36 DEGREES
EKG P-R INTERVAL: 224 MS
EKG Q-T INTERVAL: 468 MS
EKG QRS DURATION: 162 MS
EKG QTC CALCULATION (BAZETT): 553 MS
EKG R AXIS: -154 DEGREES
EKG T AXIS: 76 DEGREES
EKG VENTRICULAR RATE: 84 BPM
EOSINOPHIL # BLD: <0.03 K/UL (ref 0–0.44)
EOSINOPHILS RELATIVE PERCENT: 0 % (ref 1–4)
ERYTHROCYTE [DISTWIDTH] IN BLOOD BY AUTOMATED COUNT: 15.9 % (ref 11.8–14.4)
GFR, ESTIMATED: 58 ML/MIN/1.73M2
GLUCOSE SERPL-MCNC: 107 MG/DL (ref 70–99)
HCT VFR BLD AUTO: 35.3 % (ref 40.7–50.3)
HGB BLD-MCNC: 10.6 G/DL (ref 13–17)
IMM GRANULOCYTES # BLD AUTO: 0.03 K/UL (ref 0–0.3)
IMM GRANULOCYTES NFR BLD: 0 %
INR BLD: 1.7
LYMPHOCYTES NFR BLD: 1.01 K/UL (ref 1.1–3.7)
LYMPHOCYTES RELATIVE PERCENT: 13 % (ref 24–43)
MAGNESIUM SERPL-MCNC: 2 MG/DL (ref 1.6–2.6)
MCH RBC QN AUTO: 28.4 PG (ref 25.2–33.5)
MCHC RBC AUTO-ENTMCNC: 30 G/DL (ref 28.4–34.8)
MCV RBC AUTO: 94.6 FL (ref 82.6–102.9)
MONOCYTES NFR BLD: 0.71 K/UL (ref 0.1–1.2)
MONOCYTES NFR BLD: 9 % (ref 3–12)
NEUTROPHILS NFR BLD: 78 % (ref 36–65)
NEUTS SEG NFR BLD: 6.26 K/UL (ref 1.5–8.1)
NRBC BLD-RTO: 0 PER 100 WBC
PARTIAL THROMBOPLASTIN TIME: 30.8 SEC (ref 26.8–34.8)
PLATELET # BLD AUTO: 231 K/UL (ref 138–453)
PMV BLD AUTO: 9.9 FL (ref 8.1–13.5)
POTASSIUM SERPL-SCNC: 4.4 MMOL/L (ref 3.7–5.3)
PROT SERPL-MCNC: 7.4 G/DL (ref 6.4–8.3)
RBC # BLD AUTO: 3.73 M/UL (ref 4.21–5.77)
SODIUM SERPL-SCNC: 129 MMOL/L (ref 135–144)
TROPONIN I SERPL HS-MCNC: 31 NG/L (ref 0–22)
TROPONIN I SERPL HS-MCNC: 33 NG/L (ref 0–22)
TROPONIN I SERPL HS-MCNC: 34 NG/L (ref 0–22)
WBC OTHER # BLD: 8 K/UL (ref 3.5–11.3)

## 2024-05-28 PROCEDURE — 83880 ASSAY OF NATRIURETIC PEPTIDE: CPT

## 2024-05-28 PROCEDURE — 85610 PROTHROMBIN TIME: CPT | Performed by: FAMILY MEDICINE

## 2024-05-28 PROCEDURE — 80053 COMPREHEN METABOLIC PANEL: CPT

## 2024-05-28 PROCEDURE — 99212 OFFICE O/P EST SF 10 MIN: CPT | Performed by: FAMILY MEDICINE

## 2024-05-28 PROCEDURE — 93005 ELECTROCARDIOGRAM TRACING: CPT | Performed by: EMERGENCY MEDICINE

## 2024-05-28 PROCEDURE — 85025 COMPLETE CBC W/AUTO DIFF WBC: CPT

## 2024-05-28 PROCEDURE — 94640 AIRWAY INHALATION TREATMENT: CPT

## 2024-05-28 PROCEDURE — 96374 THER/PROPH/DIAG INJ IV PUSH: CPT

## 2024-05-28 PROCEDURE — 85730 THROMBOPLASTIN TIME PARTIAL: CPT

## 2024-05-28 PROCEDURE — 94761 N-INVAS EAR/PLS OXIMETRY MLT: CPT

## 2024-05-28 PROCEDURE — 84484 ASSAY OF TROPONIN QUANT: CPT

## 2024-05-28 PROCEDURE — 94664 DEMO&/EVAL PT USE INHALER: CPT

## 2024-05-28 PROCEDURE — 83735 ASSAY OF MAGNESIUM: CPT

## 2024-05-28 PROCEDURE — 36415 COLL VENOUS BLD VENIPUNCTURE: CPT

## 2024-05-28 PROCEDURE — 94669 MECHANICAL CHEST WALL OSCILL: CPT

## 2024-05-28 PROCEDURE — 99285 EMERGENCY DEPT VISIT HI MDM: CPT

## 2024-05-28 PROCEDURE — 6370000000 HC RX 637 (ALT 250 FOR IP): Performed by: INTERNAL MEDICINE

## 2024-05-28 PROCEDURE — 71046 X-RAY EXAM CHEST 2 VIEWS: CPT

## 2024-05-28 PROCEDURE — 93010 ELECTROCARDIOGRAM REPORT: CPT | Performed by: INTERNAL MEDICINE

## 2024-05-28 PROCEDURE — 6360000002 HC RX W HCPCS: Performed by: EMERGENCY MEDICINE

## 2024-05-28 PROCEDURE — 1200000000 HC SEMI PRIVATE

## 2024-05-28 PROCEDURE — 2580000003 HC RX 258: Performed by: INTERNAL MEDICINE

## 2024-05-28 RX ORDER — LISINOPRIL 10 MG/1
10 TABLET ORAL DAILY
Status: DISCONTINUED | OUTPATIENT
Start: 2024-05-29 | End: 2024-05-30 | Stop reason: HOSPADM

## 2024-05-28 RX ORDER — SPIRONOLACTONE 25 MG/1
12.5 TABLET ORAL DAILY
Status: DISCONTINUED | OUTPATIENT
Start: 2024-05-28 | End: 2024-05-30 | Stop reason: HOSPADM

## 2024-05-28 RX ORDER — ALBUTEROL SULFATE 90 UG/1
2 AEROSOL, METERED RESPIRATORY (INHALATION) EVERY 4 HOURS PRN
Status: DISCONTINUED | OUTPATIENT
Start: 2024-05-28 | End: 2024-05-30 | Stop reason: HOSPADM

## 2024-05-28 RX ORDER — MAGNESIUM SULFATE IN WATER 40 MG/ML
2000 INJECTION, SOLUTION INTRAVENOUS PRN
Status: DISCONTINUED | OUTPATIENT
Start: 2024-05-28 | End: 2024-05-30 | Stop reason: HOSPADM

## 2024-05-28 RX ORDER — SODIUM CHLORIDE 0.9 % (FLUSH) 0.9 %
5-40 SYRINGE (ML) INJECTION EVERY 12 HOURS SCHEDULED
Status: DISCONTINUED | OUTPATIENT
Start: 2024-05-28 | End: 2024-05-30 | Stop reason: HOSPADM

## 2024-05-28 RX ORDER — CLOPIDOGREL BISULFATE 75 MG/1
75 TABLET ORAL DAILY
Status: DISCONTINUED | OUTPATIENT
Start: 2024-05-28 | End: 2024-05-30 | Stop reason: HOSPADM

## 2024-05-28 RX ORDER — SODIUM CHLORIDE 9 MG/ML
INJECTION, SOLUTION INTRAVENOUS PRN
Status: DISCONTINUED | OUTPATIENT
Start: 2024-05-28 | End: 2024-05-30 | Stop reason: HOSPADM

## 2024-05-28 RX ORDER — ACETAMINOPHEN 650 MG/1
650 SUPPOSITORY RECTAL EVERY 6 HOURS PRN
Status: DISCONTINUED | OUTPATIENT
Start: 2024-05-28 | End: 2024-05-30 | Stop reason: HOSPADM

## 2024-05-28 RX ORDER — FAMOTIDINE 20 MG/1
20 TABLET, FILM COATED ORAL 2 TIMES DAILY
Status: DISCONTINUED | OUTPATIENT
Start: 2024-05-28 | End: 2024-05-29

## 2024-05-28 RX ORDER — WARFARIN SODIUM 1 MG/1
0.5 TABLET ORAL DAILY
Status: DISCONTINUED | OUTPATIENT
Start: 2024-05-28 | End: 2024-05-28

## 2024-05-28 RX ORDER — POTASSIUM CHLORIDE 7.45 MG/ML
10 INJECTION INTRAVENOUS PRN
Status: DISCONTINUED | OUTPATIENT
Start: 2024-05-28 | End: 2024-05-30 | Stop reason: HOSPADM

## 2024-05-28 RX ORDER — ONDANSETRON 4 MG/1
4 TABLET, ORALLY DISINTEGRATING ORAL EVERY 8 HOURS PRN
Status: DISCONTINUED | OUTPATIENT
Start: 2024-05-28 | End: 2024-05-30 | Stop reason: HOSPADM

## 2024-05-28 RX ORDER — ACETAMINOPHEN 325 MG/1
650 TABLET ORAL EVERY 6 HOURS PRN
Status: DISCONTINUED | OUTPATIENT
Start: 2024-05-28 | End: 2024-05-30 | Stop reason: HOSPADM

## 2024-05-28 RX ORDER — METOPROLOL SUCCINATE 25 MG/1
25 TABLET, EXTENDED RELEASE ORAL NIGHTLY
Status: DISCONTINUED | OUTPATIENT
Start: 2024-05-28 | End: 2024-05-30 | Stop reason: HOSPADM

## 2024-05-28 RX ORDER — ONDANSETRON 2 MG/ML
4 INJECTION INTRAMUSCULAR; INTRAVENOUS EVERY 6 HOURS PRN
Status: DISCONTINUED | OUTPATIENT
Start: 2024-05-28 | End: 2024-05-30 | Stop reason: HOSPADM

## 2024-05-28 RX ORDER — ALBUTEROL SULFATE 90 UG/1
2 AEROSOL, METERED RESPIRATORY (INHALATION) 4 TIMES DAILY PRN
Status: DISCONTINUED | OUTPATIENT
Start: 2024-05-28 | End: 2024-05-28

## 2024-05-28 RX ORDER — SPIRONOLACTONE 25 MG/1
12.5 TABLET ORAL DAILY
Qty: 45 TABLET | Refills: 3 | Status: SHIPPED | OUTPATIENT
Start: 2024-05-28

## 2024-05-28 RX ORDER — SODIUM CHLORIDE 0.9 % (FLUSH) 0.9 %
5-40 SYRINGE (ML) INJECTION PRN
Status: DISCONTINUED | OUTPATIENT
Start: 2024-05-28 | End: 2024-05-30 | Stop reason: HOSPADM

## 2024-05-28 RX ORDER — POTASSIUM CHLORIDE 20 MEQ/1
40 TABLET, EXTENDED RELEASE ORAL PRN
Status: DISCONTINUED | OUTPATIENT
Start: 2024-05-28 | End: 2024-05-30 | Stop reason: HOSPADM

## 2024-05-28 RX ORDER — FUROSEMIDE 10 MG/ML
40 INJECTION INTRAMUSCULAR; INTRAVENOUS ONCE
Status: COMPLETED | OUTPATIENT
Start: 2024-05-28 | End: 2024-05-28

## 2024-05-28 RX ORDER — ATORVASTATIN CALCIUM 40 MG/1
80 TABLET, FILM COATED ORAL NIGHTLY
Status: DISCONTINUED | OUTPATIENT
Start: 2024-05-28 | End: 2024-05-30 | Stop reason: HOSPADM

## 2024-05-28 RX ORDER — MIDODRINE HYDROCHLORIDE 5 MG/1
10 TABLET ORAL
Status: DISCONTINUED | OUTPATIENT
Start: 2024-05-28 | End: 2024-05-30 | Stop reason: HOSPADM

## 2024-05-28 RX ORDER — ALBUTEROL SULFATE 90 UG/1
2 AEROSOL, METERED RESPIRATORY (INHALATION)
Status: DISCONTINUED | OUTPATIENT
Start: 2024-05-28 | End: 2024-05-30 | Stop reason: HOSPADM

## 2024-05-28 RX ORDER — FUROSEMIDE 10 MG/ML
40 INJECTION INTRAMUSCULAR; INTRAVENOUS 2 TIMES DAILY
Status: DISCONTINUED | OUTPATIENT
Start: 2024-05-28 | End: 2024-05-30

## 2024-05-28 RX ADMIN — SODIUM CHLORIDE, PRESERVATIVE FREE 10 ML: 5 INJECTION INTRAVENOUS at 22:34

## 2024-05-28 RX ADMIN — FUROSEMIDE 40 MG: 10 INJECTION, SOLUTION INTRAMUSCULAR; INTRAVENOUS at 16:31

## 2024-05-28 RX ADMIN — ALBUTEROL SULFATE 2 PUFF: 90 AEROSOL, METERED RESPIRATORY (INHALATION) at 20:41

## 2024-05-28 ASSESSMENT — LIFESTYLE VARIABLES
HOW OFTEN DO YOU HAVE A DRINK CONTAINING ALCOHOL: NEVER
HOW MANY STANDARD DRINKS CONTAINING ALCOHOL DO YOU HAVE ON A TYPICAL DAY: PATIENT DOES NOT DRINK

## 2024-05-28 ASSESSMENT — PAIN - FUNCTIONAL ASSESSMENT: PAIN_FUNCTIONAL_ASSESSMENT: NONE - DENIES PAIN

## 2024-05-28 NOTE — TELEPHONE ENCOUNTER
----- Message from Young Mccloud MD sent at 5/26/2024  6:27 PM EDT -----   Patient had a CT scan of the chest done by cardiology in April 2024 that showed multiple lung nodules.  I will order a follow-up CT scan to be done in July 2024.  Please have the patient get it done and see me in the office after it is done, rather than at 6 months from now.  Thank you

## 2024-05-28 NOTE — PROGRESS NOTES
[facility]  Medication Management  ANTICOAGULATION    Referring Provider: Dr Soliz    GOAL INR: 2.0-3.0    TODAY'S INR: 1.7    WARFARIN Dosage: increase to 1mg MWF, 0.5mg all other days    INR (no units)   Date Value   05/28/2024 1.7   05/17/2024 1.9   05/10/2024 3.0   04/22/2024 3.1   02/20/2024 2   01/09/2024 3.0   11/28/2023 2.5       Hemoglobin   Date Value Ref Range Status   05/28/2024 10.6 (L) 13.0 - 17.0 g/dL Final     Hematocrit   Date Value Ref Range Status   05/28/2024 35.3 (L) 40.7 - 50.3 % Final     ALT   Date Value Ref Range Status   05/28/2024 62 (H) 5 - 41 U/L Final     AST   Date Value Ref Range Status   05/28/2024 58 (H) <40 U/L Final       Medication changes:  Albuterol inhaler    Notes:    Fingerstick INR drawn per clinic protocol. Patient states no visible blood in urine and no black tarry stool. Denies any missed doses of warfarin. No change in other maintenance medications or in diet. Will recheck INR in 1 week. Patient is very SOB upon arrival and states he had to stop and sit in foyer prior to coming into the building. Patient is very winded upon arrival to office (approximately 50-75 ft). Patient's pallor is very pale. Patient does have a new albuterol inhaler with him but had not used it as he had questions about it interfering with medications and use.  I explain and demonstrate to patient how to use and patient does take 2 puffs about 1-2 minutes apart. SOB improves. Patient had significant swelling in both feet and states he has been keeping his legs elevated and does keep feet elevated on pillows when lying in bed.  Nahomy [facility] is very familiar with patient and assesses patient as well.  Nahomy discusses patient with Carlie Liriano PA-C and Dr Soliz who recommend patient go to ER for assessment including labs. Patient states he restarted warfarin on 5/22, taking 1mg for 3 doses, then 0.5mg for 3 doses after heart cath was canceled.  Patient will begin warfarin 1mg

## 2024-05-28 NOTE — PATIENT INSTRUCTIONS
Please take 1mg (1 tablet) today then begin taking warfarin 1mg (1 tablet) on Monday Wednesday and Friday and 0.5mg (1/2 tablet) Tuesday Thursday Saturday and Sunday. Return to coumadin clinic on Thursday 6/6/24  Continue to monitor urine and stool for signs and symptoms of bleeding.   Please notify the clinic of any medication changes.   Please remember to bring all medications (both prescription and OTC) to your next visit.   Kindly notify the clinic if you are unable to make to your next appointment.   Follow warfarin dosing instructions on dosing calendar provided.

## 2024-05-28 NOTE — TELEPHONE ENCOUNTER
Called and spoke with the patient regarding his results and the new order for ct scan of the chest and f/u appt. .  He verbalized understanding. I informed him I will take an envelope to INR clinic were he has an appt. Today with the new order and f/u appt.  Patient verbaliz understanding.

## 2024-05-28 NOTE — ED PROVIDER NOTES
[facility]  EMERGENCY DEPARTMENT ENCOUNTER      Pt Name: Pascual Peters Jr.  MRN: [id number]  Birthdate 1950  Date of evaluation: 5/28/2024  Provider: Georges Morales MD    CHIEF COMPLAINT       Chief Complaint   Patient presents with    Shortness of Breath     Pt c/o SOB and edema. Pt sent from coumadin clinic. Pt noted pale skin tone at this time.         HISTORY OF PRESENT ILLNESS      Pascual Peters Jr. is a 74 y.o. male who presents to the emergency department from [facility] for evaluation of shortness of breath and lower extremity swelling.  The Coumadin clinic pharmacist advises that they have made some changes to account for liver function recently and that his INR is slightly subtherapeutic today.  She notices that he seems quite pale in clinic, compared to jaundice which he had recently.  He also was noted to be visibly short of breath.    Patient tells me that he has felt short of breath for \"weeks.\"  No acute worsening, but no improvement either.  Denies any chest pain.  No nausea or vomiting.  No fever.  He does have some lower extremity swelling bilaterally without calf/leg pain.    Denies any blood in his stool or dark stools.  No other bleeding complaints.    No other complaints at this time.        PAST MEDICAL HISTORY     Past Medical History:   Diagnosis Date    Acute non-ST elevation myocardial infarction (NSTEMI) ([facility]) 12/24/2020    Chronic kidney disease     Coronary artery disease     s/p CABG x3 in 2008 and 2 stents on 12/24/2020    H/O echocardiogram 02/08/2021    EF 15-20% LV severly enlarged and LV wall thickness is normal Severe global hypokinesis with segmental abnormalities LA is mod dilated 34-39 with LA volume index of 34 ml/m2 Mild mitral regurg Mod tricupid regurg Mild to mod pulm HTN with est RV systolic pressure of 38 mmhg mod grade II diastolic dysfunciton    H/O echocardiogram 04/01/2021    EF 10-15% LA size was mildly dilated IVC sice is mildly dilated with reduced

## 2024-05-29 PROBLEM — B15.9 HEPATITIS A: Status: ACTIVE | Noted: 2024-05-29

## 2024-05-29 PROBLEM — I50.43 ACUTE ON CHRONIC COMBINED SYSTOLIC AND DIASTOLIC CHF (CONGESTIVE HEART FAILURE) (HCC): Status: ACTIVE | Noted: 2024-05-29

## 2024-05-29 LAB
ANION GAP SERPL CALCULATED.3IONS-SCNC: 10 MMOL/L (ref 9–17)
BASOPHILS # BLD: 0.04 K/UL (ref 0–0.2)
BASOPHILS NFR BLD: 1 % (ref 0–2)
BUN SERPL-MCNC: 37 MG/DL (ref 8–23)
BUN/CREAT SERPL: 21 (ref 9–20)
CALCIUM SERPL-MCNC: 9.3 MG/DL (ref 8.6–10.4)
CHLORIDE SERPL-SCNC: 100 MMOL/L (ref 98–107)
CHOLEST SERPL-MCNC: 86 MG/DL (ref 0–199)
CHOLESTEROL/HDL RATIO: 4
CO2 SERPL-SCNC: 24 MMOL/L (ref 20–31)
CREAT SERPL-MCNC: 1.8 MG/DL (ref 0.7–1.2)
EKG ATRIAL RATE: 75 BPM
EKG P AXIS: 67 DEGREES
EKG P-R INTERVAL: 246 MS
EKG Q-T INTERVAL: 480 MS
EKG QRS DURATION: 150 MS
EKG QTC CALCULATION (BAZETT): 536 MS
EKG R AXIS: 121 DEGREES
EKG T AXIS: 81 DEGREES
EKG VENTRICULAR RATE: 75 BPM
EOSINOPHIL # BLD: <0.03 K/UL (ref 0–0.44)
EOSINOPHILS RELATIVE PERCENT: 0 % (ref 1–4)
ERYTHROCYTE [DISTWIDTH] IN BLOOD BY AUTOMATED COUNT: 15.9 % (ref 11.8–14.4)
GFR, ESTIMATED: 39 ML/MIN/1.73M2
GLUCOSE SERPL-MCNC: 119 MG/DL (ref 70–99)
HCT VFR BLD AUTO: 34 % (ref 40.7–50.3)
HDLC SERPL-MCNC: 20 MG/DL
HGB BLD-MCNC: 10.5 G/DL (ref 13–17)
IMM GRANULOCYTES # BLD AUTO: 0.03 K/UL (ref 0–0.3)
IMM GRANULOCYTES NFR BLD: 0 %
INR PPP: 1.6
LDLC SERPL CALC-MCNC: 53 MG/DL (ref 0–100)
LYMPHOCYTES NFR BLD: 0.89 K/UL (ref 1.1–3.7)
LYMPHOCYTES RELATIVE PERCENT: 11 % (ref 24–43)
MAGNESIUM SERPL-MCNC: 2 MG/DL (ref 1.6–2.6)
MCH RBC QN AUTO: 28.5 PG (ref 25.2–33.5)
MCHC RBC AUTO-ENTMCNC: 30.9 G/DL (ref 28.4–34.8)
MCV RBC AUTO: 92.4 FL (ref 82.6–102.9)
MONOCYTES NFR BLD: 0.87 K/UL (ref 0.1–1.2)
MONOCYTES NFR BLD: 11 % (ref 3–12)
NEUTROPHILS NFR BLD: 77 % (ref 36–65)
NEUTS SEG NFR BLD: 6.04 K/UL (ref 1.5–8.1)
NRBC BLD-RTO: 0 PER 100 WBC
PLATELET # BLD AUTO: 234 K/UL (ref 138–453)
PMV BLD AUTO: 9.2 FL (ref 8.1–13.5)
POTASSIUM SERPL-SCNC: 4.3 MMOL/L (ref 3.7–5.3)
PROTHROMBIN TIME: 18.7 SEC (ref 11.7–14.1)
RBC # BLD AUTO: 3.68 M/UL (ref 4.21–5.77)
SODIUM SERPL-SCNC: 134 MMOL/L (ref 135–144)
T4 FREE SERPL-MCNC: 3.8 NG/DL (ref 0.92–1.68)
TRIGL SERPL-MCNC: 64 MG/DL
TROPONIN I SERPL HS-MCNC: 34 NG/L (ref 0–22)
TSH SERPL DL<=0.05 MIU/L-ACNC: 0.48 UIU/ML (ref 0.3–5)
VLDLC SERPL CALC-MCNC: 13 MG/DL
WBC OTHER # BLD: 7.9 K/UL (ref 3.5–11.3)

## 2024-05-29 PROCEDURE — 94669 MECHANICAL CHEST WALL OSCILL: CPT

## 2024-05-29 PROCEDURE — 2580000003 HC RX 258: Performed by: INTERNAL MEDICINE

## 2024-05-29 PROCEDURE — 94664 DEMO&/EVAL PT USE INHALER: CPT

## 2024-05-29 PROCEDURE — 84443 ASSAY THYROID STIM HORMONE: CPT

## 2024-05-29 PROCEDURE — 6360000002 HC RX W HCPCS: Performed by: INTERNAL MEDICINE

## 2024-05-29 PROCEDURE — 83735 ASSAY OF MAGNESIUM: CPT

## 2024-05-29 PROCEDURE — 6370000000 HC RX 637 (ALT 250 FOR IP): Performed by: INTERNAL MEDICINE

## 2024-05-29 PROCEDURE — 97162 PT EVAL MOD COMPLEX 30 MIN: CPT

## 2024-05-29 PROCEDURE — 97110 THERAPEUTIC EXERCISES: CPT

## 2024-05-29 PROCEDURE — 93005 ELECTROCARDIOGRAM TRACING: CPT | Performed by: INTERNAL MEDICINE

## 2024-05-29 PROCEDURE — 80061 LIPID PANEL: CPT

## 2024-05-29 PROCEDURE — 94761 N-INVAS EAR/PLS OXIMETRY MLT: CPT

## 2024-05-29 PROCEDURE — 94640 AIRWAY INHALATION TREATMENT: CPT

## 2024-05-29 PROCEDURE — 36415 COLL VENOUS BLD VENIPUNCTURE: CPT

## 2024-05-29 PROCEDURE — 84484 ASSAY OF TROPONIN QUANT: CPT

## 2024-05-29 PROCEDURE — 1200000000 HC SEMI PRIVATE

## 2024-05-29 PROCEDURE — 85025 COMPLETE CBC W/AUTO DIFF WBC: CPT

## 2024-05-29 PROCEDURE — 85610 PROTHROMBIN TIME: CPT

## 2024-05-29 PROCEDURE — 97166 OT EVAL MOD COMPLEX 45 MIN: CPT

## 2024-05-29 PROCEDURE — 84439 ASSAY OF FREE THYROXINE: CPT

## 2024-05-29 PROCEDURE — 80048 BASIC METABOLIC PNL TOTAL CA: CPT

## 2024-05-29 RX ORDER — FAMOTIDINE 20 MG/1
20 TABLET, FILM COATED ORAL DAILY
Status: DISCONTINUED | OUTPATIENT
Start: 2024-05-30 | End: 2024-05-30 | Stop reason: HOSPADM

## 2024-05-29 RX ORDER — WARFARIN SODIUM 4 MG/1
2 TABLET ORAL
Status: COMPLETED | OUTPATIENT
Start: 2024-05-29 | End: 2024-05-29

## 2024-05-29 RX ADMIN — FAMOTIDINE 20 MG: 20 TABLET, FILM COATED ORAL at 08:21

## 2024-05-29 RX ADMIN — FUROSEMIDE 40 MG: 10 INJECTION, SOLUTION INTRAMUSCULAR; INTRAVENOUS at 08:23

## 2024-05-29 RX ADMIN — SODIUM CHLORIDE, PRESERVATIVE FREE 10 ML: 5 INJECTION INTRAVENOUS at 08:23

## 2024-05-29 RX ADMIN — LISINOPRIL 10 MG: 10 TABLET ORAL at 08:20

## 2024-05-29 RX ADMIN — MIDODRINE HYDROCHLORIDE 10 MG: 5 TABLET ORAL at 08:21

## 2024-05-29 RX ADMIN — WARFARIN SODIUM 2 MG: 4 TABLET ORAL at 16:54

## 2024-05-29 RX ADMIN — ALBUTEROL SULFATE 2 PUFF: 90 AEROSOL, METERED RESPIRATORY (INHALATION) at 15:51

## 2024-05-29 RX ADMIN — MIDODRINE HYDROCHLORIDE 10 MG: 5 TABLET ORAL at 16:54

## 2024-05-29 RX ADMIN — SPIRONOLACTONE 12.5 MG: 25 TABLET ORAL at 08:20

## 2024-05-29 RX ADMIN — CLOPIDOGREL BISULFATE 75 MG: 75 TABLET ORAL at 08:20

## 2024-05-29 RX ADMIN — SODIUM CHLORIDE, PRESERVATIVE FREE 10 ML: 5 INJECTION INTRAVENOUS at 20:49

## 2024-05-29 RX ADMIN — ALBUTEROL SULFATE 2 PUFF: 90 AEROSOL, METERED RESPIRATORY (INHALATION) at 09:54

## 2024-05-29 RX ADMIN — ALBUTEROL SULFATE 2 PUFF: 90 AEROSOL, METERED RESPIRATORY (INHALATION) at 20:27

## 2024-05-29 NOTE — CARE COORDINATION
Case Management Assessment  Initial Evaluation    Date/Time of Evaluation: 5/29/2024 11:29 AM  Assessment Completed by: GINGER Duran    If patient is discharged prior to next notation, then this note serves as note for discharge by case management.    Patient Name: Pascual Peters Jr.                   YOB: 1950  Diagnosis: Acute diastolic CHF (congestive heart failure) (HCC) [I50.31]  Acute on chronic congestive heart failure, unspecified heart failure type (HCC) [I50.9]                   Date / Time: 5/28/2024  2:12 PM    Patient Admission Status: Inpatient   Readmission Risk (Low < 19, Mod (19-27), High > 27): Readmission Risk Score: 16.4    Current PCP: Jermaine Esparza MD  PCP verified by CM? Yes    Chart Reviewed: Yes      History Provided by: Patient  Patient Orientation: Alert and Oriented, Person, Place, Situation, Self    Patient Cognition: Alert    Hospitalization in the last 30 days (Readmission):  No    If yes, Readmission Assessment in CM Navigator will be completed.    Advance Directives:      Code Status: Full Code   Patient's Primary Decision Maker is: Legal Next of Kin      Discharge Planning:    Patient lives with: Alone Type of Home: Apartment  Primary Care Giver: Self  Patient Support Systems include: Family Members   Current Financial resources: Medicaid,  ([place])  Current community resources: None  Current services prior to admission: None            Current DME:              Type of Home Care services:  None    ADLS  Prior functional level: Independent in ADLs/IADLs  Current functional level: Independent in ADLs/IADLs    PT AM-PAC:   /24  OT AM-PAC:   /24    Family can provide assistance at DC: Yes  Would you like Case Management to discuss the discharge plan with any other family members/significant others, and if so, who? No  Plans to Return to Present Housing: Yes  Other Identified Issues/Barriers to RETURNING to current housing: none  Potential Assistance

## 2024-05-29 NOTE — CONSULTS
[facility]  Pharmacy Department    Clinical Pharmacy Note-Warfarin Follow-up       Patient:  Pascual Peters Jr.  MRN: [id number]    Warfarin consult follow-up:    INR (no units)   Date Value   05/29/2024 1.6   05/28/2024 1.7   05/17/2024 1.9   05/10/2024 3.0   04/22/2024 3.1   02/20/2024 2   01/09/2024 3.0       Hemoglobin (g/dL)   Date Value   05/29/2024 10.5 (L)   05/28/2024 10.6 (L)   05/17/2024 11.0 (L)     Hematocrit (%)   Date Value   05/29/2024 34.0 (L)   05/28/2024 35.3 (L)   05/17/2024 36.0 (L)     Platelets (k/uL)   Date Value   05/29/2024 234   05/28/2024 231   05/17/2024 300       Target INR Range: 2.0-3.0    Significant Drug-Drug Interactions:  New warfarin drug-drug interactions: no change  Discontinued drug-drug interactions: no change      Notes:   Patient to take warfarin 2mg po today. Patient did not take warfarin dose yesterday prior to coumadin clinic appt and admission via ER. Patient's home dose of weekly warfarin has recently been significantly decreased. Pharmacy will monitor closely.                  Daily PT/INR until stable within therapeutic range.     Thank you,  Linda Fritz RP,   5/29/2024, 10:24 AM    
   Renal stone 06/10/2021    Coronary artery disease involving coronary bypass graft of native heart without angina pectoris     S/P CABG x 3     Chronic systolic congestive heart failure (HCC)     Hypotension 03/31/2021    Atrial fibrillation (HCC) 03/31/2021    History of non-ST elevation myocardial infarction (NSTEMI) 02/19/2021    History of acute myocardial infarction 12/24/2020    Idiopathic hypotension 12/24/2020    Tachycardia 12/24/2020    New onset left bundle branch block (LBBB) 12/24/2020     PLAN:  Acute on chronic combined heart failure: New York Heart Association Class: III (Asymptomatic only at rest) with a history of ischemic cardiomyopathy. Echo done on 4/24/2024 showed an EF of 10-15%.   Beta Blocker: Continue Metoprolol succinate (Toprol XL) 25 mg daily.   ACE Inibitor/ARB: Continue lisinopril 10 mg daily.  Diuretics: DECREASE to furosemide (Lasix) 40 mg every morning. Due to worsening kidney function. However, does appear to be improving.  Will likely need to consider a higher dose of home lasix, perhaps 40 mg daily.  Heart failure counseling: I told them to continue wearing lower extremity compression stockings and I advised them to try and keep their legs up whenever possible and to limit salt in their diet.    Atherosclerotic Heart Disease: History of CABG x3 and most recently NSTEMI with a heart cath that was done on 12/28/2020 and he had two stents placed, REJI x 2 to SVG to posterior lateral branch of the RCA.   Beta Blocker: Continue Metoprolol succinate (Toprol XL) 25 mg daily.   Antiplatelet Agent: Continue clopidogrel (Plavix): Plavix 75 mg daily.  Anti-anginal medications: Continue nitroglycerin 0.4 mg tablets as needed for chest pain.  Cholesterol Reduction Therapy: Continue Atorvastatin (Lipitor) 80 mg daily.      Paroxysmal Atrial Fibrillation: Rate Control Asymptomatic  Beta Blocker: Continue Metoprolol succinate (Toprol XL) 25 mg daily.   [id number] Score for Atrial Fibrillation

## 2024-05-29 NOTE — H&P
History and Physical    Patient:  Pascula Peters Jr.  MRN: [id number]    Chief Complaint: Shortness of breath    History Obtained From:  patient, electronic medical record    PCP: Jermaine Esparza MD    History of Present Illness:   The patient is a 74 y.o. male who presents with shortness of breath.  Patient states he has had ongoing issues with shortness of breath since January.  It has been progressively getting worse.  He is dyspneic with any exertion.  Patient was at the Coumadin clinic today for an appointment and was advised to come to the ER for evaluation of his symptoms.  Patient was also noted to be jaundiced.  Patient has been worked up recently by his PCP for elevated liver enzymes.  He was positive for hepatitis A.  He denies any abdominal pain.  He states he has had dark urine.  He has early satiety and occasional nausea.  He denies any chest pain, palpitations, vomiting or diarrhea.  He does have lower extremity swelling.  He denies any blood in his stool currently but has had melena in the past several months.  Past medical history includes MI, CABG x 3, stent placement, CKD, hyperlipidemia, and CHF.  He is a smoker.  Workup today revealed a proBNP of 19,457, troponin 33 and 36, creatinine 1.3.  Alk phos 133, ALT 62, AST 58, total bilirubin 1.6.  ALT and AST improved from lab work 1 week ago.  Hemoglobin 10.6, hematocrit 35.3.  Chest x-ray showed cardiomegaly with pulmonary vascular congestion and a small left pleural effusion.  EKG showed sinus rhythm with a first-degree block and intraventricular conduction block.    Past Medical History:        Diagnosis Date    Acute non-ST elevation myocardial infarction (NSTEMI) ([facility]) 12/24/2020    Chronic kidney disease     Coronary artery disease     s/p CABG x3 in 2008 and 2 stents on 12/24/2020    H/O echocardiogram 02/08/2021    EF 15-20% LV severly enlarged and LV wall thickness is normal Severe global hypokinesis with segmental abnormalities LA is mod

## 2024-05-29 NOTE — RT PROTOCOL NOTE
RESPIRATORY ASSESSMENT PROTOCOL                                                                                              Patient Name: Pascual Peters Jr. Room#: 0329/0329-01 : 1950     Admitting diagnosis: Acute diastolic CHF (congestive heart failure) ([facility]) [I50.31]  Acute on chronic congestive heart failure, unspecified heart failure type ([facility]) [I50.9]       Medical History:   Past Medical History:   Diagnosis Date    Acute non-ST elevation myocardial infarction (NSTEMI) ([facility]) 2020    Chronic kidney disease     Coronary artery disease     s/p CABG x3 in  and 2 stents on 2020    H/O echocardiogram 2021    EF 15-20% LV severly enlarged and LV wall thickness is normal Severe global hypokinesis with segmental abnormalities LA is mod dilated 34-39 with LA volume index of 34 ml/m2 Mild mitral regurg Mod tricupid regurg Mild to mod pulm HTN with est RV systolic pressure of 38 mmhg mod grade II diastolic dysfunciton    H/O echocardiogram 2021    EF 10-15% LA size was mildly dilated IVC sice is mildly dilated with reduced respiratory phasic changes CVP 5-10 mmHg    Hyperlipidemia     Kidney stone 2021    Smoker        PATIENT ASSESSMENT    LABORATORY DATA  Hematology:   Lab Results   Component Value Date/Time    WBC 8.0 2024 02:25 PM    RBC 3.73 2024 02:25 PM    HGB 10.6 2024 02:25 PM    HCT 35.3 2024 02:25 PM     2024 02:25 PM     Chemistry:  No results found for: \"PHART\", \"LFH9UVU\", \"PO2ART\", \"J8ONNUOT\", \"QFI4ILU\", \"PBEA\", \"NBEA\"    VITALS  Pulse: 80   Respirations: 20  BP: 106/74  SpO2: 91 % O2 Device: None (Room air)  Temp: (!) 96.6 °F (35.9 °C)    SKIN COLOR  [] Normal  [x] Pale  [] Dusky  [] Cyanotic    RESPIRATORY PATTERN  [x] Normal  [] Dyspnea  [] Cheyne-Canela  [] Kussmaul  [] Biots    AMBULATORY  [x] Yes  [] No  [] With Assistance          Patient Acuity 0 1 2 3 4 Score   Level of Consciousness (LOC) [x]  Alert & Oriented or Pt normal

## 2024-05-30 VITALS
HEIGHT: 68 IN | SYSTOLIC BLOOD PRESSURE: 91 MMHG | RESPIRATION RATE: 18 BRPM | OXYGEN SATURATION: 94 % | BODY MASS INDEX: 23.72 KG/M2 | WEIGHT: 156.5 LBS | HEART RATE: 75 BPM | TEMPERATURE: 97.7 F | DIASTOLIC BLOOD PRESSURE: 65 MMHG

## 2024-05-30 PROBLEM — I50.9 ACUTE ON CHRONIC CONGESTIVE HEART FAILURE (HCC): Status: ACTIVE | Noted: 2024-05-30

## 2024-05-30 LAB
BASOPHILS # BLD: 0.03 K/UL (ref 0–0.2)
BASOPHILS NFR BLD: 0 % (ref 0–2)
BUN SERPL-MCNC: 33 MG/DL (ref 8–23)
BUN/CREAT SERPL: 25 (ref 9–20)
CALCIUM SERPL-MCNC: 9 MG/DL (ref 8.6–10.4)
CHLORIDE SERPL-SCNC: 101 MMOL/L (ref 98–107)
CO2 SERPL-SCNC: 25 MMOL/L (ref 20–31)
CREAT SERPL-MCNC: 1.3 MG/DL (ref 0.7–1.2)
ECHO BSA: 1.86 M2
EOSINOPHIL # BLD: <0.03 K/UL (ref 0–0.44)
EOSINOPHILS RELATIVE PERCENT: 0 % (ref 1–4)
ERYTHROCYTE [DISTWIDTH] IN BLOOD BY AUTOMATED COUNT: 16 % (ref 11.8–14.4)
GFR, ESTIMATED: 58 ML/MIN/1.73M2
GLUCOSE SERPL-MCNC: 112 MG/DL (ref 70–99)
HCT VFR BLD AUTO: 32.2 % (ref 40.7–50.3)
HGB BLD-MCNC: 9.9 G/DL (ref 13–17)
IMM GRANULOCYTES # BLD AUTO: 0.03 K/UL (ref 0–0.3)
IMM GRANULOCYTES NFR BLD: 0 %
INR PPP: 1.5
LYMPHOCYTES NFR BLD: 0.93 K/UL (ref 1.1–3.7)
LYMPHOCYTES RELATIVE PERCENT: 11 % (ref 24–43)
MAGNESIUM SERPL-MCNC: 1.9 MG/DL (ref 1.6–2.6)
MCH RBC QN AUTO: 28.4 PG (ref 25.2–33.5)
MCHC RBC AUTO-ENTMCNC: 30.7 G/DL (ref 28.4–34.8)
MCV RBC AUTO: 92.5 FL (ref 82.6–102.9)
MONOCYTES NFR BLD: 1.06 K/UL (ref 0.1–1.2)
MONOCYTES NFR BLD: 13 % (ref 3–12)
NEUTROPHILS NFR BLD: 76 % (ref 36–65)
NEUTS SEG NFR BLD: 6.38 K/UL (ref 1.5–8.1)
NRBC BLD-RTO: 0 PER 100 WBC
PLATELET # BLD AUTO: 198 K/UL (ref 138–453)
PMV BLD AUTO: 9.3 FL (ref 8.1–13.5)
POTASSIUM SERPL-SCNC: 5.1 MMOL/L (ref 3.7–5.3)
PROTHROMBIN TIME: 17.6 SEC (ref 11.7–14.1)
RBC # BLD AUTO: 3.48 M/UL (ref 4.21–5.77)
SODIUM SERPL-SCNC: 135 MMOL/L (ref 135–144)
WBC OTHER # BLD: 8.4 K/UL (ref 3.5–11.3)

## 2024-05-30 PROCEDURE — 83735 ASSAY OF MAGNESIUM: CPT

## 2024-05-30 PROCEDURE — 80048 BASIC METABOLIC PNL TOTAL CA: CPT

## 2024-05-30 PROCEDURE — 94761 N-INVAS EAR/PLS OXIMETRY MLT: CPT

## 2024-05-30 PROCEDURE — 2580000003 HC RX 258: Performed by: INTERNAL MEDICINE

## 2024-05-30 PROCEDURE — 99222 1ST HOSP IP/OBS MODERATE 55: CPT | Performed by: FAMILY MEDICINE

## 2024-05-30 PROCEDURE — 94669 MECHANICAL CHEST WALL OSCILL: CPT

## 2024-05-30 PROCEDURE — 6370000000 HC RX 637 (ALT 250 FOR IP): Performed by: NURSE PRACTITIONER

## 2024-05-30 PROCEDURE — 36415 COLL VENOUS BLD VENIPUNCTURE: CPT

## 2024-05-30 PROCEDURE — 94640 AIRWAY INHALATION TREATMENT: CPT

## 2024-05-30 PROCEDURE — 97116 GAIT TRAINING THERAPY: CPT

## 2024-05-30 PROCEDURE — 97110 THERAPEUTIC EXERCISES: CPT

## 2024-05-30 PROCEDURE — 85610 PROTHROMBIN TIME: CPT

## 2024-05-30 PROCEDURE — 6370000000 HC RX 637 (ALT 250 FOR IP): Performed by: INTERNAL MEDICINE

## 2024-05-30 PROCEDURE — 85025 COMPLETE CBC W/AUTO DIFF WBC: CPT

## 2024-05-30 RX ORDER — FUROSEMIDE 40 MG/1
40 TABLET ORAL DAILY
Qty: 60 TABLET | Refills: 3 | Status: SHIPPED | OUTPATIENT
Start: 2024-05-31

## 2024-05-30 RX ORDER — FUROSEMIDE 40 MG/1
40 TABLET ORAL DAILY
Status: DISCONTINUED | OUTPATIENT
Start: 2024-05-30 | End: 2024-05-30 | Stop reason: HOSPADM

## 2024-05-30 RX ORDER — WARFARIN SODIUM 1 MG/1
0.5 TABLET ORAL DAILY
Qty: 30 TABLET | Refills: 0
Start: 2024-05-30

## 2024-05-30 RX ORDER — WARFARIN SODIUM 4 MG/1
2 TABLET ORAL
Status: DISCONTINUED | OUTPATIENT
Start: 2024-05-30 | End: 2024-05-30 | Stop reason: HOSPADM

## 2024-05-30 RX ADMIN — CLOPIDOGREL BISULFATE 75 MG: 75 TABLET ORAL at 08:17

## 2024-05-30 RX ADMIN — MIDODRINE HYDROCHLORIDE 10 MG: 5 TABLET ORAL at 12:03

## 2024-05-30 RX ADMIN — LISINOPRIL 10 MG: 10 TABLET ORAL at 08:17

## 2024-05-30 RX ADMIN — SPIRONOLACTONE 12.5 MG: 25 TABLET ORAL at 08:17

## 2024-05-30 RX ADMIN — FUROSEMIDE 40 MG: 40 TABLET ORAL at 08:17

## 2024-05-30 RX ADMIN — SODIUM CHLORIDE, PRESERVATIVE FREE 10 ML: 5 INJECTION INTRAVENOUS at 08:18

## 2024-05-30 RX ADMIN — MIDODRINE HYDROCHLORIDE 10 MG: 5 TABLET ORAL at 08:17

## 2024-05-30 RX ADMIN — ALBUTEROL SULFATE 2 PUFF: 90 AEROSOL, METERED RESPIRATORY (INHALATION) at 09:55

## 2024-05-30 NOTE — CARE COORDINATION
IMM letter provided to patient.  Patient offered four hours to make informed decision regarding appeal process; patient agreeable to discharge.             05/30/24 1035   IMM Letter   IMM Letter given to Patient/Family/Significant other/Guardian/POA/by: second -patient / ANDREW MILES   IMM Letter date given: 05/30/24   IMM Letter time given: 1006       GINGER Duran

## 2024-05-30 NOTE — DISCHARGE INSTR - PHARMACY
Please take warfarin 2mg (2 tablets) today if you have not taken prior to discharge.  Begin taking warfarin 1mg (1 tablet) Monday Wednesday and Friday and warfarin 0.5mg (1/2 tablet) all other days.  Please return to coumadin clinic on Thursday June 6 at 11:40    Please contact the coumadin clinic at [phone] with any questions.

## 2024-05-30 NOTE — PLAN OF CARE
Problem: Discharge Planning  Goal: Discharge to home or other facility with appropriate resources  Outcome: Progressing  Flowsheets (Taken 5/30/2024 0041)  Discharge to home or other facility with appropriate resources:   Identify barriers to discharge with patient and caregiver   Arrange for needed discharge resources and transportation as appropriate   Identify discharge learning needs (meds, wound care, etc)     Problem: Safety - Adult  Goal: Free from fall injury  Outcome: Progressing  Flowsheets (Taken 5/30/2024 0041)  Free From Fall Injury: Instruct family/caregiver on patient safety     Problem: Nutrition Deficit:  Goal: Optimize nutritional status  Outcome: Progressing     Problem: Cardiovascular - Adult  Goal: Maintains optimal cardiac output and hemodynamic stability  Outcome: Progressing  Flowsheets (Taken 5/30/2024 0041)  Maintains optimal cardiac output and hemodynamic stability:   Monitor blood pressure and heart rate   Monitor urine output and notify Licensed Independent Practitioner for values outside of normal range

## 2024-05-30 NOTE — DISCHARGE INSTR - DIET
Good nutrition is important when healing from an illness, injury, or surgery.  Follow any nutrition recommendations given to you during your hospital stay.   If you were given an oral nutrition supplement while in the hospital, continue to take this supplement at home.  You can take it with meals, in-between meals, and/or before bedtime. These supplements can be purchased at most local grocery stores, pharmacies, and chain [employer]-stores.   If you have any questions about your diet or nutrition, call the hospital and ask for the dietitian.  Cardiac:  low sodium.  Do not exceed 2,000 mg of sodium in a day

## 2024-05-30 NOTE — PROGRESS NOTES
[facility]          Department of Pharmacy   Pharmacy Renal Adjustment Note    Pascual Peters Jr. is a 74 y.o. male. Pharmacist assessment of renally cleared medications.    Recent Labs     05/28/24  1425 05/29/24  0610   CREATININE 1.3* 1.8*     Estimated Creatinine Clearance: 35 mL/min (A) (based on SCr of 1.8 mg/dL (H)).    Height:   Ht Readings from Last 1 Encounters:   05/29/24 1.727 m (5' 8\")     Weight:  Wt Readings from Last 1 Encounters:   05/29/24 70.9 kg (156 lb 4.8 oz)       The following medication(s) have been adjusted based upon renal function:             Famotidine 20mg po BID decreased to famotidine 20mg po daily for CrCl <60mL/min      Thank you,  Linda Fritz, [facility],5/29/2024,1:18 PM    
    [facility]  Pharmacy Department    Clinical Pharmacy Note-Warfarin Follow-up       Patient:  Pascual Peters Jr.  MRN: [id number]    Warfarin consult follow-up:    INR (no units)   Date Value   05/30/2024 1.5   05/29/2024 1.6   05/28/2024 1.7   05/17/2024 1.9   05/10/2024 3.0   04/22/2024 3.1   02/20/2024 2       Hemoglobin (g/dL)   Date Value   05/30/2024 9.9 (L)   05/29/2024 10.5 (L)   05/28/2024 10.6 (L)     Hematocrit (%)   Date Value   05/30/2024 32.2 (L)   05/29/2024 34.0 (L)   05/28/2024 35.3 (L)     Platelets (k/uL)   Date Value   05/30/2024 198   05/29/2024 234   05/28/2024 231       Target INR Range: 2.0-3.0    Significant Drug-Drug Interactions:  New warfarin drug-drug interactions: clopidogrel  Discontinued drug-drug interactions: no change      Notes:     Patient to take warfarin 2mg po today. Pharmacy will continue to monitor closely                Daily PT/INR until stable within therapeutic range.     Thank you,  Linda Fritz RP,   5/30/2024, 6:58 AM    
Comprehensive Nutrition Assessment    Type and Reason for Visit:  Initial, Consult, Patient Education    Nutrition Recommendations/Plan:   Reinforce low sodium in diet and consistent vitamin K  Monitor glucose.     Malnutrition Assessment:  Malnutrition Status:  At risk for malnutrition (Comment) (05/29/24 0800)    Context:  Acute Illness     Findings of the 6 clinical characteristics of malnutrition:  Energy Intake:  Mild decrease in energy intake (Comment) (with breating)  Weight Loss:  No significant weight loss     Body Fat Loss:  No significant body fat loss     Muscle Mass Loss:  No significant muscle mass loss    Fluid Accumulation:  Mild Extremities   Strength:  Not Performed    Nutrition Assessment:    Limited adherence to nutrition related recommendations r/t altered cardiac function, AEB excess sodium in home diet via eating out. Familiar with lower sodium in diet, not adding salt to foods but excess in \"periodic\" eating out at [employer], [employer] and ribs from [facility]. Familiar as well with vitamin K and warfarin with verbal refresher provided for \"consistent\" intakes (inr 1.6). Breathing better this AM. IFG history with A1C 6.8 without hypoglycemic agent use.    Nutrition Related Findings:    thinner appearing. +1 BLE edema. Wound Type: None       Current Nutrition Intake & Therapies:    Average Meal Intake: Unable to assess (no PO records)  Average Supplements Intake: None Ordered  ADULT DIET; Regular; Low Sodium (2 gm)    Anthropometric Measures:  Height: 172.7 cm (5' 8\")  Ideal Body Weight (IBW): 154 lbs (70 kg)    Admission Body Weight: 73.8 kg (162 lb 9.6 oz)  Current Body Weight: 70.9 kg (156 lb 4.8 oz), 101.5 % IBW. Weight Source: Bed Scale  Current BMI (kg/m2): 23.8  Usual Body Weight: 69.8 kg (153 lb 12.8 oz) (low weight in April)  % Weight Change (Calculated): 1.6  Weight Adjustment For: No Adjustment                 BMI Categories: Normal Weight (BMI 18.5-24.9)    Estimated Daily Nutrient 
Occupational Therapy  Facility/Department: [facility] MED SURG  Occupational Therapy Initial Assessment    Name: Pascual Peters Jr.  : 1950  MRN: [id number]  Date of Service: 2024    Discharge Recommendations:  Home with assist PRN          Patient Diagnosis(es): The encounter diagnosis was Acute on chronic congestive heart failure, unspecified heart failure type (HCC).  Past Medical History:  has a past medical history of Acute non-ST elevation myocardial infarction (NSTEMI) (HCC), Chronic kidney disease, Coronary artery disease, H/O echocardiogram, H/O echocardiogram, Hyperlipidemia, Kidney stone, and Smoker.  Past Surgical History:  has a past surgical history that includes Coronary artery bypass graft (); Coronary angioplasty with stent (2020); Cardiac surgery; vascular surgery; Cardiac defibrillator placement (2021); cystourethroscopy/stent removal (Left, 2021); Cystoscopy (Left, 2021); and Bladder surgery (Left, 2021).           Assessment   Assessment: 73 y/o M admitted to [facility] for CHF. Patient presents with dyspnea with mobility, however, able to complete self care with mod I post set up. Educated on EC/WS techniques to ensure safe and indep return home. Patient with no further OT needs at this time.  Prognosis: Good  Decision Making: Medium Complexity  REQUIRES OT FOLLOW-UP: No        Plan   Occupational Therapy Plan  Additional Comments: eval only     Restrictions  Restrictions/Precautions  Restrictions/Precautions: General Precautions, Up as Tolerated (SOB)  Required Braces or Orthoses?: No    Subjective   General  Patient assessed for rehabilitation services?: Yes  Subjective  Subjective: Patient denies pain, agreeable to OT evaluation.     Social/Functional History  Social/Functional History  Lives With: Alone  Type of Home: Apartment  Home Layout: One level (2nd floor apt)  Home Access: Stairs to enter with rails  Entrance Stairs - Number of Steps: 12  Entrance 
Physical Therapy  Facility/Department: [facility] MED SURG  Physical Therapy Initial Assessment    Name: Pascual Peters Jr.  : 1950  MRN: [id number]  Date of Service: 2024    Discharge Recommendations:  Home independently, No therapy recommended at discharge   PT Equipment Recommendations  Equipment Needed: No      Patient Diagnosis(es): The encounter diagnosis was Acute on chronic congestive heart failure, unspecified heart failure type (HCC).  Past Medical History:  has a past medical history of Acute non-ST elevation myocardial infarction (NSTEMI) (HCC), Chronic kidney disease, Coronary artery disease, H/O echocardiogram, H/O echocardiogram, Hyperlipidemia, Kidney stone, and Smoker.  Past Surgical History:  has a past surgical history that includes Coronary artery bypass graft (); Coronary angioplasty with stent (2020); Cardiac surgery; vascular surgery; Cardiac defibrillator placement (2021); cystourethroscopy/stent removal (Left, 2021); Cystoscopy (Left, 2021); and Bladder surgery (Left, 2021).    Assessment   Body Structures, Functions, Activity Limitations Requiring Skilled Therapeutic Intervention: Decreased functional mobility ;Decreased ADL status;Decreased strength;Decreased endurance;Decreased balance;Decreased high-level IADLs  Assessment: DX CHF.CGA BED MOBILITY,CGA TRANSFERS,CGA GAIT 100 FT NO DEVICES.FAIR BALANCE.PATIENT WOULD BENEFIT FROM SKILLED PT TO ADDRESS THESE DEFICITS.  Therapy Prognosis: Good  Decision Making: Medium Complexity  Requires PT Follow-Up: Yes  Activity Tolerance  Activity Tolerance: Patient tolerated treatment well     Plan   Physical Therapy Plan  General Plan: 2 times a day 7 days a week (1 X DAILY WEEKENDS AND HOLIDAYS.)  Current Treatment Recommendations: Strengthening, ROM, Balance training, Functional mobility training, Transfer training, ADL/Self-care training, IADL training, Gait training, Therapeutic activities, Safety education & 
Physical Therapy  Facility/Department: [facility] MED SURG  Daily Treatment Note  NAME: Pascual Peters Jr.  : 1950  MRN: [id number]    Date of Service: 2024    Discharge Recommendations:  Home independently, No therapy recommended at discharge        Patient Diagnosis(es): The encounter diagnosis was Acute on chronic congestive heart failure, unspecified heart failure type (HCC).    Assessment   Assessment: Completed supine and seated therex x20 in all available planes of motion. SBA for all ex, left pt in bed with call light within reach. Gt without 'x1, path deviations noted and mild SOB  Activity Tolerance: Patient tolerated treatment well;Patient limited by endurance     Plan    Physical Therapy Plan  General Plan: 2 times a day 7 days a week  Current Treatment Recommendations: Strengthening;ROM;Balance training;Functional mobility training;Transfer training;ADL/Self-care training;IADL training;Gait training;Therapeutic activities;Safety education & training;Patient/Caregiver education & training     Restrictions  Restrictions/Precautions  Restrictions/Precautions: General Precautions, Up as Tolerated  Required Braces or Orthoses?: No     Subjective    Subjective  Subjective: Pt semi-reclined in bed with feet elevated to assist in reducing edema, agreeable to therapy  Pain: 0/10  Orientation  Overall Orientation Status: Within Functional Limits     Objective   Vitals     Bed Mobility Training  Bed Mobility Training: Yes  Overall Level of Assistance: Modified independent  Interventions: Demonstration  Rolling: Modified independent  Supine to Sit: Modified independent  Sit to Supine: Modified independent  Scooting: Modified independent  Balance  Sitting: Intact  Standing: Impaired  Transfer Training  Transfer Training: Yes  Overall Level of Assistance: Stand-by assistance;Assist X1  Interventions: Demonstration  Sit to Stand: Stand-by assistance;Assist X1  Stand to Sit: Stand-by assistance;Assist 
Progress Note    SUBJECTIVE:    Patient seen for f/u of Acute on chronic combined systolic and diastolic CHF (congestive heart failure) (HCC).  He sitting up on bedside alert and no distress. Feels better today.  No hypoxia .  Less swelling    ROS:   Constitutional: negative  for fevers, and negative for chills.  Respiratory: negative for shortness of breath, negative for cough, and negative for wheezing  Cardiovascular: negative for chest pain, and negative for palpitations  Gastrointestinal: negative for abdominal pain, negative for nausea,negative for vomiting, negative for diarrhea, and negative for constipation     All other systems were reviewed with the patient and are negative unless otherwise stated in HPI      OBJECTIVE:      Vitals:   Vitals:    05/30/24 0715   BP: 99/67   Pulse: 69   Resp: 18   Temp: 97.7 °F (36.5 °C)   SpO2: 94%     Weight - Scale: 71 kg (156 lb 8 oz)   Height: 172.7 cm (5' 8\")     Weight  Wt Readings from Last 3 Encounters:   05/30/24 71 kg (156 lb 8 oz)   05/28/24 73.8 kg (162 lb 9.6 oz)   05/21/24 73.4 kg (161 lb 12.8 oz)     Body mass index is 23.8 kg/m².    24HR INTAKE/OUTPUT:      Intake/Output Summary (Last 24 hours) at 5/30/2024 0757  Last data filed at 5/30/2024 0719  Gross per 24 hour   Intake 720 ml   Output 1850 ml   Net -1130 ml     -----------------------------------------------------------------  Exam:    GEN:    Awake, alert and oriented x3.   EYES:  EOMI, pupils equal   NECK: Supple. No lymphadenopathy.  No carotid bruit  CVS:    regular rate and rhythm, 3/6 systolic murmur  PULM:  CTA, no wheezes, rales or rhonchi, no acute respiratory distress  ABD:    Bowels sounds normal.  Abdomen is soft.  No distention.  no tenderness to palpation.   EXT:  1+ edema bilaterally .  No calf tenderness.   NEURO: Moves all extremities.  Motor and sensory are grossly intact  SKIN:  No rashes.  No skin lesions.  
Pt alert and oriented x4 resting comfortably in bed at this time. Vitals and assessment completed as charted. Pt denies any pain or current needs at this time. Call light is within reach. Care ongoing.    
RESPIRATORY ASSESSMENT PROTOCOL                                                                                              Patient Name: Pascual Peters Jr. Room#: 0329/0329-01 : 1950     Admitting diagnosis: Acute diastolic CHF (congestive heart failure) ([facility]) [I50.31]  Acute on chronic congestive heart failure, unspecified heart failure type ([facility]) [I50.9]       Medical History:   Past Medical History:   Diagnosis Date    Acute non-ST elevation myocardial infarction (NSTEMI) ([facility]) 2020    Chronic kidney disease     Coronary artery disease     s/p CABG x3 in  and 2 stents on 2020    H/O echocardiogram 2021    EF 15-20% LV severly enlarged and LV wall thickness is normal Severe global hypokinesis with segmental abnormalities LA is mod dilated 34-39 with LA volume index of 34 ml/m2 Mild mitral regurg Mod tricupid regurg Mild to mod pulm HTN with est RV systolic pressure of 38 mmhg mod grade II diastolic dysfunciton    H/O echocardiogram 2021    EF 10-15% LA size was mildly dilated IVC sice is mildly dilated with reduced respiratory phasic changes CVP 5-10 mmHg    Hyperlipidemia     Kidney stone 2021    Smoker        PATIENT ASSESSMENT    LABORATORY DATA  Hematology:   Lab Results   Component Value Date/Time    WBC 7.9 2024 06:10 AM    RBC 3.68 2024 06:10 AM    HGB 10.5 2024 06:10 AM    HCT 34.0 2024 06:10 AM     2024 06:10 AM     Chemistry:  No results found for: \"PHART\", \"XQK7HHU\", \"PO2ART\", \"C6UQRIIK\", \"LDW6KKP\", \"PBEA\", \"NBEA\"    VITALS  Pulse: 65   Respirations: 18  BP: 96/62  SpO2: 96 % O2 Device: None (Room air)  Temp: 98.1 °F (36.7 °C)    SKIN COLOR  [x] Normal  [] Pale  [] Dusky  [] Cyanotic    RESPIRATORY PATTERN  [x] Normal  [] Dyspnea  [] Cheyne-Canela  [] Kussmaul  [] Biots    AMBULATORY  [x] Yes  [] No  [] With Assistance    PEAK FLOW  Predicted:     Personal Best:            Patient Acuity 0 1 2 3 4 Score   Level of Consciousness 
Reviewed discharge instructions with patient.  Patient aware of need to  new dose of Lasix.  Reviewed home medications and side effects to monitor for.  Patient aware of date/time of follow up appointments.  Instructed to follow a low sodium diet, not to exceed 2,000 mg of sodium in a day.  Patient aware of need for repeat labs.  Lab order forms given.  Reviewed Heart Failure Self-Management Plan and Heart Failure Weight Zone log.  Patient aware of need to weigh self every morning and log weights.  Aware of need to contact Dr. Soliz if he gains 2 pounds in a day or 3-5 pounds in a week or if he has increase in SOB or swelling in leg/feet.   Educational handout on Low sodium diet and Heart failure given to patient.   Questions answered.  Verbalizes understanding.  Copy of discharge instructions given to patient.  
to Sit: Contact-guard assistance  Stand Pivot Transfers: Contact-guard assistance  Bed to Chair: Contact-guard assistance  Toilet Transfer: Contact-guard assistance  Gait Training  Right Side Weight Bearing: As tolerated  Left Side Weight Bearing: As tolerated  Gait  Gait Training: Yes  Left Side Weight Bearing: As tolerated  Right Side Weight Bearing: As tolerated  Overall Level of Assistance: Contact-guard assistance  Distance (ft): 100 Feet  Assistive Device: None (GAIT NO DEVICES)  Interventions: Demonstration  Base of Support: Widened     PT Exercises  Exercise Treatment: Supine Ex x20, seated ex x20 ea,     Safety Devices  Type of Devices: Call light within reach;Left in chair;Nurse notified       Goals  Short Term Goals  Time Frame for Short Term Goals: 3 DAYS  Short Term Goal 1: IND TRANSFERS.  Short Term Goal 2: IND BED MOBILITY  Short Term Goal 3: IND GAIT NO DEVICES 150 FT  Long Term Goals  Time Frame for Long Term Goals : 1 WEEK  Long Term Goal 1: RETURN HOME AND RESUME REGULAR ACTIVITIES.  Patient Goals   Patient Goals : RETURN HOME ALONE    Education       AM-PAC - Mobility              Therapy Time   Individual Concurrent Group Co-treatment   Time In 1450         Time Out 1515         Minutes 25         Timed Code Treatment Minutes: 15 Minutes       Jennifer Vargas PTA           
Prophylaxis:   DVT: Warfarin  Stress Ulcer: H2 Blocker      Disposition:  Shared decision making: All test results, treatment options and disposition options were discussed with the patient today  Social determinants of health that may impact management: none  Code status: Full Code   Disposition: Discharge plan is pending    [facility] Advanced Care Planning documentation:  [x] I have confirmed that the patient's Advance Care Plan is present, Code Status is documented, or surrogate decision maker is listed in the patient's medical record  [If \"yes\", STOP HERE]     [] The patient's Advance Care Plan is NOT present because:    []  I confirmed today that the patient does not wish or was not able to name a   surrogate decision maker or provide and advance care plan.    [] Hospice care is currently being provided or has been provided within the   calendar year.    []  I did NOT confirm today the presence of an Advance Care Plan or surrogate   decision maker documented within the patient's medical record.   [DOES NOT SATISFY [facility] PERFORMANCE]    LOCO Foster - CNP , LOCO, NP-C  [facility]        5/29/2024, 8:40 AM

## 2024-05-30 NOTE — DISCHARGE SUMMARY
Discharge Summary    Pascual Peters Jr.  :  1950  MRN:  [id number]    Admit date:  2024      Discharge date: 2024     Admitting Physician:  Jermaine Esparza MD    Discharge Diagnoses:    Principal Problem:    Acute on chronic combined systolic and diastolic CHF (congestive heart failure) (HCC)  Active Problems:    Ischemic cardiomyopathy    Acute on chronic congestive heart failure (HCC)    Atrial fibrillation (HCC)    S/P CABG x 3    Secondary hypercoagulable state (HCC)    Hepatitis A  Resolved Problems:    * No resolved hospital problems. *      Hospital Course:   Pascual Peters Jr. is a 74 y.o. male admitted with acute on chronic combined CHF.  He presented with complaints of shortness of breath.  Symptom onset January.  He stated his symptoms have been progressing.  He complained of dyspnea on exertion.  Patient did have elevated liver enzymes and was reported to be jaundice.  He does have history of hepatitis A antibodies but no acute hepatitis.  He denied abdominal pain.  Urine dark.  He denied chest pain or palpitations.  He denied nausea vomiting diarrhea.  He did have 2+ pitting edema lower legs.  He denied any melena or hematochezia.  During patient's evaluation proBNP was 19,004 and 57.  Troponin was 33 and 36.  Initial alk phosphatase was 136, ALT 62, AST of 58 and total bilirubin 1.6.  Numbers were improved from outpatient workup the week before.  Hemoglobin stable at 10.6.  Chest x-ray showed pulmonary vascular congestion with small left pleural effusion.  EKG showed sinus rhythm with first-degree block.  During patient's admission labs were monitored electrolytes replaced.  Troponins were trended and stable at 33.  Patient completed echocardiogram in 2024 which showed showed an EF of 10 to 15%, severe global hypokinesis, moderate to severe tricuspid regurgitation with an estimated RVSP of 44 mmHg, moderate mitral regurgitation.  Patient was scheduled for a heart catheterization

## 2024-05-30 NOTE — PLAN OF CARE
Problem: Discharge Planning  Goal: Discharge to home or other facility with appropriate resources  5/30/2024 1129 by Mounika Tobias RN  Outcome: Adequate for Discharge  5/30/2024 0041 by Addis Calvert RN  Outcome: Progressing  Flowsheets (Taken 5/30/2024 0041)  Discharge to home or other facility with appropriate resources:   Identify barriers to discharge with patient and caregiver   Arrange for needed discharge resources and transportation as appropriate   Identify discharge learning needs (meds, wound care, etc)     Problem: Safety - Adult  Goal: Free from fall injury  5/30/2024 1129 by Mounika Tobias RN  Outcome: Adequate for Discharge  5/30/2024 0041 by Addis Calvert RN  Outcome: Progressing  Flowsheets (Taken 5/30/2024 0041)  Free From Fall Injury: Instruct family/caregiver on patient safety     Problem: Nutrition Deficit:  Goal: Optimize nutritional status  5/30/2024 1129 by Mounika Tobias RN  Outcome: Adequate for Discharge  5/30/2024 0041 by Addis Calvert RN  Outcome: Progressing     Problem: Cardiovascular - Adult  Goal: Maintains optimal cardiac output and hemodynamic stability  5/30/2024 1129 by Mounika Tobias RN  Outcome: Adequate for Discharge  5/30/2024 0041 by Addis Calvert RN  Outcome: Progressing  Flowsheets (Taken 5/30/2024 0041)  Maintains optimal cardiac output and hemodynamic stability:   Monitor blood pressure and heart rate   Monitor urine output and notify Licensed Independent Practitioner for values outside of normal range

## 2024-05-31 ENCOUNTER — CARE COORDINATION (OUTPATIENT)
Dept: CASE MANAGEMENT | Age: 74
End: 2024-05-31

## 2024-05-31 NOTE — CARE COORDINATION
given an opportunity to ask questions and does not have any further questions or concerns at this time. Were discharge instructions available to patient? Yes. Reviewed appropriate site of care based on symptoms and resources available to patient including: PCP  Specialist  When to call 911  [employer] Messaging. The patient agrees to contact the PCP office for questions related to their healthcare.     Medication reconciliation was performed with patient, who verbalizes understanding of administration of home medications. Medications reviewed, 1111F entered: Reviewed meds, no 1111f, AFL provider    Was patient discharged with a pulse oximeter? no    Non-face-to-face services provided:  Obtained and reviewed discharge summary and/or continuity of care documents  Assessment and support for treatment adherence and medication management-reviewed meds    Offered patient enrollment in the Remote Patient Monitoring (RPM) program for in-home monitoring: Patient is not eligible for RPM program because: affiliate provider.    Care Transitions 24 Hour Call    Do you have a copy of your discharge instructions?: Yes  Do you have all of your prescriptions and are they filled?: Yes  Have you been contacted by a [facility] Pharmacist?: No  Have you scheduled your follow up appointment?: Yes  How are you going to get to your appointment?: Car - drive self  Do you feel like you have everything you need to keep you well at home?: Yes  Care Transitions Interventions         Discussed follow-up appointments. If no appointment was previously scheduled, appointment scheduling offered: already scheduled.   Is follow up appointment scheduled within 7 days of discharge? Yes.    Follow Up  Future Appointments   Date Time Provider Department Center   6/3/2024 10:30 AM Jermaine Esparza MD [facility]   6/6/2024 11:40 AM [facility] ANTICOAGULATION CLINIC [facility] MED [facility] [place]   6/11/2024 12:50 AM SCHEDULE, [facility] CAR [facility] [place] CARD

## 2024-06-03 ENCOUNTER — HOSPITAL ENCOUNTER (OUTPATIENT)
Age: 74
Discharge: HOME | End: 2024-06-03
Payer: MEDICARE

## 2024-06-03 DIAGNOSIS — I50.9 ACUTE ON CHRONIC CONGESTIVE HEART FAILURE, UNSPECIFIED HEART FAILURE TYPE (HCC): ICD-10-CM

## 2024-06-03 LAB
ANION GAP SERPL CALCULATED.3IONS-SCNC: 15 MMOL/L (ref 9–17)
BASOPHILS # BLD: 0.03 K/UL (ref 0–0.2)
BASOPHILS NFR BLD: 0 % (ref 0–2)
BUN SERPL-MCNC: 29 MG/DL (ref 8–23)
BUN/CREAT SERPL: 22 (ref 9–20)
CALCIUM SERPL-MCNC: 9.7 MG/DL (ref 8.6–10.4)
CHLORIDE SERPL-SCNC: 96 MMOL/L (ref 98–107)
CO2 SERPL-SCNC: 23 MMOL/L (ref 20–31)
CREAT SERPL-MCNC: 1.3 MG/DL (ref 0.7–1.2)
EOSINOPHIL # BLD: <0.03 K/UL (ref 0–0.44)
EOSINOPHILS RELATIVE PERCENT: 0 % (ref 1–4)
ERYTHROCYTE [DISTWIDTH] IN BLOOD BY AUTOMATED COUNT: 16.1 % (ref 11.8–14.4)
GFR, ESTIMATED: 58 ML/MIN/1.73M2
GLUCOSE SERPL-MCNC: 124 MG/DL (ref 70–99)
HCT VFR BLD AUTO: 33.7 % (ref 40.7–50.3)
HGB BLD-MCNC: 10.4 G/DL (ref 13–17)
IMM GRANULOCYTES # BLD AUTO: 0.04 K/UL (ref 0–0.3)
IMM GRANULOCYTES NFR BLD: 1 %
LYMPHOCYTES NFR BLD: 1.03 K/UL (ref 1.1–3.7)
LYMPHOCYTES RELATIVE PERCENT: 12 % (ref 24–43)
MCH RBC QN AUTO: 28.6 PG (ref 25.2–33.5)
MCHC RBC AUTO-ENTMCNC: 30.9 G/DL (ref 28.4–34.8)
MCV RBC AUTO: 92.6 FL (ref 82.6–102.9)
MONOCYTES NFR BLD: 1.01 K/UL (ref 0.1–1.2)
MONOCYTES NFR BLD: 12 % (ref 3–12)
NEUTROPHILS NFR BLD: 75 % (ref 36–65)
NEUTS SEG NFR BLD: 6.61 K/UL (ref 1.5–8.1)
NRBC BLD-RTO: 0 PER 100 WBC
PLATELET # BLD AUTO: 254 K/UL (ref 138–453)
PMV BLD AUTO: 9.3 FL (ref 8.1–13.5)
POTASSIUM SERPL-SCNC: 4.3 MMOL/L (ref 3.7–5.3)
RBC # BLD AUTO: 3.64 M/UL (ref 4.21–5.77)
SODIUM SERPL-SCNC: 134 MMOL/L (ref 135–144)
WBC OTHER # BLD: 8.7 K/UL (ref 3.5–11.3)

## 2024-06-03 PROCEDURE — 85025 COMPLETE CBC W/AUTO DIFF WBC: CPT

## 2024-06-03 PROCEDURE — 80048 BASIC METABOLIC PNL TOTAL CA: CPT

## 2024-06-03 PROCEDURE — 36415 COLL VENOUS BLD VENIPUNCTURE: CPT

## 2024-06-06 ENCOUNTER — CARE COORDINATION (OUTPATIENT)
Dept: CARE COORDINATION | Age: 74
End: 2024-06-06

## 2024-06-06 ENCOUNTER — HOSPITAL ENCOUNTER (OUTPATIENT)
Dept: PHARMACY | Age: 74
Setting detail: THERAPIES SERIES
Discharge: HOME | End: 2024-06-06
Payer: MEDICARE

## 2024-06-06 VITALS
BODY MASS INDEX: 23.42 KG/M2 | WEIGHT: 154 LBS | SYSTOLIC BLOOD PRESSURE: 92 MMHG | DIASTOLIC BLOOD PRESSURE: 53 MMHG | HEART RATE: 79 BPM

## 2024-06-06 LAB — INR BLD: 1.4

## 2024-06-06 PROCEDURE — 99212 OFFICE O/P EST SF 10 MIN: CPT

## 2024-06-06 PROCEDURE — 85610 PROTHROMBIN TIME: CPT

## 2024-06-06 NOTE — CARE COORDINATION
Care Transitions Follow Up Call    Patient Current Location:  Home:   [facility] [address]    Care Transition Nurse contacted the patient by telephone to follow up after admission on 24.  Verified name and  with patient as identifiers.    Patient: Pascual Peters Jr.  Patient : 1950   MRN: <[id number]>  Reason for Admission: Acute CHF  Discharge Date: 24 RARS: Readmission Risk Score: 15.7      Needs to be reviewed by the provider   Additional needs identified to be addressed with provider: No  none             Method of communication with provider: none.    Spoke to Pascual for transitions call. Patient went to PCP for HFU on 6/3/24. Lasix was discontinued, changed to Bumex 1 MG daily, increased spironolactone to 25 MG daily. Continues to have decreased UOP, pedal edema. Will give new medications time to take effect. Weights are stable, was 151.4 on , is 148.4 today. Reminded to eat protein each meal, limit sodium.     Patient had INR check today, was 1.4. Pt missed yesterday's Coumadin, thought erroneously that it was discontinued. Pt will take 2 Coumadin today, 1/2 tomorrow, 1 MG Sat and Sun, 1/2 Monday, 1 MG TW. Follow up with INR in one week. Pt advised to wear compression socks for pedal edema.     Will follow up for transitions.    Follow Up  Future Appointments   Date Time Provider Department Center   2024 12:50 AM SCHEDULE, [facility]   2024 11:40 AM Figueroa Soliz MD [place] CARD [facility]   2024 11:00 AM [facility] ANTICOAGULATION CLINIC [facility] MED MG[facility] [place]   2024 10:30 AM Hung Sequeira PA-C [facility]   2024  2:45 PM Young Mccloud MD [facility]   2024  3:30 PM Key Garcia APRN - CNP TIFF GI [facility]   2024  1:30 PM Jefe Santos APRN - CNP TIFF PULM [facility]   2024  1:30 PM Jermaine Esparza MD [facility]    2025  1:00 PM Jermaine Esparza MD [facility]

## 2024-06-06 NOTE — PROGRESS NOTES
[facility]  Medication Management  ANTICOAGULATION    Referring Provider: Dr Soliz    GOAL INR: 2 - 3    TODAY'S INR: 1.4    WARFARIN Dosage: Take warfarin 2 tablets for 2 mg today then increase warfarin to half tablet for 0.5 mg MF and 1 mg tablet all other days.    INR (no units)   Date Value   2024 1.5   2024 1.6   2024 1.7   2024 1.9   05/10/2024 3.0   2024 3.1   2024 2       Hemoglobin   Date Value Ref Range Status   2024 10.4 (L) 13.0 - 17.0 g/dL Final     Hematocrit   Date Value Ref Range Status   2024 33.7 (L) 40.7 - 50.3 % Final     ALT   Date Value Ref Range Status   2024 62 (H) 5 - 41 U/L Final     AST   Date Value Ref Range Status   2024 58 (H) <40 U/L Final       Medication changes:  Stopped Lasix   Started Bumex 1 mg daily  Increased spironolactone to 25 mg daily(he was cutting them in half previously)   Stopped AMIODARONE (24) due to liver dysfunction    Notes:    Fingerstick INR drawn per clinic protocol. Patient states no visible blood in urine and no black tarry stool. Denies any missed doses of warfarin. No change in other maintenance medications or in diet. Will recheck INR in 2 weeks. Patient acknowledges working in consult agreement with pharmacist as referred by his/her physician.  Patient missed his warfarin dose last night.  Patient was admitted to [facility] 24 -  for CHF and Lasix was switched to Bumex 1 mg daily and increased spironolactone to 25 mg daily (he was cutting them in half previously).   Stopped AMIODARONE (24) due to liver which will cause patient to need more warfarin             For Pharmacy Admin Tracking Only    Intervention Detail: Adherence Monitorin, Dose Adjustment: 2, reason: Therapy Optimization, and New Rx: 3, reason: Needs Additional Therapy  Total # of Interventions Recommended: 6  Total # of Interventions Accepted: 6  Time Spent (min): 30      Nahomy RITCHIE

## 2024-06-07 ENCOUNTER — CLINICAL DOCUMENTATION (OUTPATIENT)
Dept: PALLATIVE CARE | Age: 74
End: 2024-06-07

## 2024-06-11 ENCOUNTER — NURSE ONLY (OUTPATIENT)
Dept: CARDIOLOGY | Age: 74
End: 2024-06-11

## 2024-06-11 ENCOUNTER — OFFICE VISIT (OUTPATIENT)
Dept: CARDIOLOGY | Age: 74
End: 2024-06-11
Payer: MEDICARE

## 2024-06-11 VITALS
SYSTOLIC BLOOD PRESSURE: 96 MMHG | OXYGEN SATURATION: 100 % | RESPIRATION RATE: 16 BRPM | HEART RATE: 93 BPM | DIASTOLIC BLOOD PRESSURE: 64 MMHG | BODY MASS INDEX: 22.96 KG/M2 | WEIGHT: 151 LBS

## 2024-06-11 DIAGNOSIS — I48.0 PAF (PAROXYSMAL ATRIAL FIBRILLATION) (HCC): ICD-10-CM

## 2024-06-11 DIAGNOSIS — I50.43 ACUTE ON CHRONIC SYSTOLIC AND DIASTOLIC HEART FAILURE, NYHA CLASS 3 (HCC): Primary | ICD-10-CM

## 2024-06-11 DIAGNOSIS — D64.9 MILD ANEMIA: ICD-10-CM

## 2024-06-11 DIAGNOSIS — I25.5 ISCHEMIC CARDIOMYOPATHY: ICD-10-CM

## 2024-06-11 DIAGNOSIS — I25.10 ASHD (ARTERIOSCLEROTIC HEART DISEASE): ICD-10-CM

## 2024-06-11 DIAGNOSIS — Z95.1 HISTORY OF CORONARY ARTERY BYPASS GRAFT X 3: ICD-10-CM

## 2024-06-11 DIAGNOSIS — E78.2 MIXED HYPERLIPIDEMIA: ICD-10-CM

## 2024-06-11 DIAGNOSIS — I50.43 ACUTE ON CHRONIC COMBINED SYSTOLIC (CONGESTIVE) AND DIASTOLIC (CONGESTIVE) HEART FAILURE (HCC): ICD-10-CM

## 2024-06-11 DIAGNOSIS — Z71.6 TOBACCO ABUSE COUNSELING: ICD-10-CM

## 2024-06-11 DIAGNOSIS — R94.39 ABNORMAL STRESS TEST: ICD-10-CM

## 2024-06-11 DIAGNOSIS — I95.9 HYPOTENSION, UNSPECIFIED HYPOTENSION TYPE: ICD-10-CM

## 2024-06-11 DIAGNOSIS — Z95.810 ICD (IMPLANTABLE CARDIOVERTER-DEFIBRILLATOR) IN PLACE: ICD-10-CM

## 2024-06-11 DIAGNOSIS — Z95.810 ICD (IMPLANTABLE CARDIOVERTER-DEFIBRILLATOR) IN PLACE: Primary | ICD-10-CM

## 2024-06-11 PROCEDURE — 3017F COLORECTAL CA SCREEN DOC REV: CPT | Performed by: FAMILY MEDICINE

## 2024-06-11 PROCEDURE — 1111F DSCHRG MED/CURRENT MED MERGE: CPT | Performed by: FAMILY MEDICINE

## 2024-06-11 PROCEDURE — G8428 CUR MEDS NOT DOCUMENT: HCPCS | Performed by: FAMILY MEDICINE

## 2024-06-11 PROCEDURE — 99214 OFFICE O/P EST MOD 30 MIN: CPT | Performed by: FAMILY MEDICINE

## 2024-06-11 PROCEDURE — 1123F ACP DISCUSS/DSCN MKR DOCD: CPT | Performed by: FAMILY MEDICINE

## 2024-06-11 PROCEDURE — G8420 CALC BMI NORM PARAMETERS: HCPCS | Performed by: FAMILY MEDICINE

## 2024-06-11 PROCEDURE — 4004F PT TOBACCO SCREEN RCVD TLK: CPT | Performed by: FAMILY MEDICINE

## 2024-06-12 ENCOUNTER — CARE COORDINATION (OUTPATIENT)
Dept: CARE COORDINATION | Age: 74
End: 2024-06-12

## 2024-06-12 NOTE — CARE COORDINATION
Care Transitions Note  Follow Up Call     Patient Current Location:  Home: [address] [facility] [address]    Care Transition Nurse contacted the patient by telephone. Verified name and  as identifiers.    Additional needs identified to be addressed with provider   No needs identified                 Method of communication with provider: none.    Care Summary Note: Spoke to Pascual for transitions call. Patient went to Cardiology follow up on 24. Pt is scheduled for cardiac cath on  for angina, ongoing SOB, exercise intolerance. Stated he has instruction for stopping blood thinner, has anticoagulation clinic on , RN Cardiology visit on . Pt has transportation for procedure. Reminded to elevate legs when seated, limit sodium, take time changing positions. Continues to smoke.     Plan of care updates since last contact:  Review of patient management of conditions/medications: Reviewed Cardiology notes. Cath scheduled for        Advance Care Planning:   Does patient have an Advance Directive: reviewed and current.    Medication Review:  No changes since last call.     Remote Patient Monitoring:  Offered patient enrollment in the Remote Patient Monitoring (RPM) program for in-home monitoring: Patient is not eligible for RPM program because: affiliate provider.    Assessments:  Care Transitions Subsequent and Final Call    Subsequent and Final Calls  Do you have any ongoing symptoms?: Yes  Onset of Patient-reported symptoms: Other  Patient-reported symptoms: Shortness of Breath, Fatigue  Interventions for patient-reported symptoms: Other  Have your medications changed?: No  Do you have any questions related to your medications?: No  Do you currently have any active services?: No  Do you have any needs or concerns that I can assist you with?: No  Identified Barriers: Other  Care Transitions Interventions  Other Interventions:              Follow Up Appointment:   Reviewed upcoming

## 2024-06-14 ENCOUNTER — HOSPITAL ENCOUNTER (OUTPATIENT)
Dept: PHARMACY | Age: 74
Setting detail: THERAPIES SERIES
Discharge: HOME | End: 2024-06-14
Payer: MEDICARE

## 2024-06-14 VITALS
DIASTOLIC BLOOD PRESSURE: 59 MMHG | SYSTOLIC BLOOD PRESSURE: 95 MMHG | WEIGHT: 151.4 LBS | HEART RATE: 90 BPM | BODY MASS INDEX: 23.02 KG/M2

## 2024-06-14 DIAGNOSIS — I48.91 ATRIAL FIBRILLATION, UNSPECIFIED TYPE (HCC): Primary | ICD-10-CM

## 2024-06-14 LAB — INR BLD: 1.5

## 2024-06-14 PROCEDURE — 85610 PROTHROMBIN TIME: CPT | Performed by: FAMILY MEDICINE

## 2024-06-14 PROCEDURE — 99212 OFFICE O/P EST SF 10 MIN: CPT | Performed by: FAMILY MEDICINE

## 2024-06-14 NOTE — PROGRESS NOTES
[facility]  Medication Management  ANTICOAGULATION    Referring Provider: Dr Soliz    GOAL INR: 2.0-3.0    TODAY'S INR: 1.5    WARFARIN Dosage: 1mg FSS, 0.5mg M     INR (no units)   Date Value   2024 1.5   2024 1.4   2024 1.5   2024 1.6   2024 1.7   2024 1.9   05/10/2024 3.0       Hemoglobin   Date Value Ref Range Status   2024 10.4 (L) 13.0 - 17.0 g/dL Final     Hematocrit   Date Value Ref Range Status   2024 33.7 (L) 40.7 - 50.3 % Final     ALT   Date Value Ref Range Status   2024 62 (H) 5 - 41 U/L Final     AST   Date Value Ref Range Status   2024 58 (H) <40 U/L Final       Medication changes:  No change    Notes:    Fingerstick INR drawn per clinic protocol. Patient states no visible blood in urine and no black tarry stool. Denies any missed doses of warfarin. No change in other maintenance medications or in diet. Will recheck INR in 5 days. Patient saw Dr Soliz on  and is tentatively planned to have heart cath on . Patient is to see cardiology nurse on  in anticipation of heart cath.  Patient's INR goal for heart cath is 1.5. With this in mind, patient will take warfarin 1mg for 3 days then 0.5mg and return to coumadin clinic following cardiology appt to determine dosing/plan for procedure.  Patient's SOB continues. Patient is taking daily weight at home and continues to lose weight (home weight today 146 lb).  Patient acknowledges working in consult agreement with pharmacist as referred by his/her physician.                  For Pharmacy Admin Tracking Only    Intervention Detail: Adherence Monitorin and Dose Adjustment: 1, reason: Therapy Optimization  Total # of Interventions Recommended: 3  Total # of Interventions Accepted: 3  Time Spent (min): 20      Linda Fritz R.Ph., 2024,3:04 PM

## 2024-06-14 NOTE — PATIENT INSTRUCTIONS
Please take 1/2 tablet (0.5mg) Monday and 1mg (1 tablet) all other days until INR check Tuesday.  Continue to monitor urine and stool for signs and symptoms of bleeding.   Please notify the clinic of any medication changes.   Please remember to bring all medications (both prescription and OTC) to your next visit.   Kindly notify the clinic if you are unable to make to your next appointment.   Follow warfarin dosing instructions on dosing calendar provided.

## 2024-06-18 ENCOUNTER — OFFICE VISIT (OUTPATIENT)
Dept: CARDIOLOGY | Age: 74
End: 2024-06-18
Payer: MEDICARE

## 2024-06-18 ENCOUNTER — HOSPITAL ENCOUNTER (OUTPATIENT)
Dept: PHARMACY | Age: 74
Setting detail: THERAPIES SERIES
Discharge: HOME | End: 2024-06-18
Payer: MEDICARE

## 2024-06-18 ENCOUNTER — HOSPITAL ENCOUNTER (OUTPATIENT)
Age: 74
Discharge: HOME | End: 2024-06-18
Payer: MEDICARE

## 2024-06-18 VITALS
SYSTOLIC BLOOD PRESSURE: 94 MMHG | HEIGHT: 68 IN | DIASTOLIC BLOOD PRESSURE: 58 MMHG | BODY MASS INDEX: 22.58 KG/M2 | OXYGEN SATURATION: 98 % | RESPIRATION RATE: 16 BRPM | HEART RATE: 88 BPM | WEIGHT: 149 LBS

## 2024-06-18 VITALS — WEIGHT: 148 LBS | BODY MASS INDEX: 22.5 KG/M2

## 2024-06-18 DIAGNOSIS — I95.9 HYPOTENSION, UNSPECIFIED HYPOTENSION TYPE: ICD-10-CM

## 2024-06-18 DIAGNOSIS — I25.10 ASHD (ARTERIOSCLEROTIC HEART DISEASE): ICD-10-CM

## 2024-06-18 DIAGNOSIS — I50.22 CHRONIC SYSTOLIC HEART FAILURE (HCC): ICD-10-CM

## 2024-06-18 DIAGNOSIS — I48.0 PAF (PAROXYSMAL ATRIAL FIBRILLATION) (HCC): ICD-10-CM

## 2024-06-18 DIAGNOSIS — R94.39 ABNORMAL STRESS TEST: ICD-10-CM

## 2024-06-18 DIAGNOSIS — I50.22 CHRONIC SYSTOLIC HEART FAILURE (HCC): Primary | ICD-10-CM

## 2024-06-18 DIAGNOSIS — D64.9 MILD ANEMIA: ICD-10-CM

## 2024-06-18 DIAGNOSIS — Z95.1 HX OF CABG: ICD-10-CM

## 2024-06-18 LAB
ANION GAP SERPL CALCULATED.3IONS-SCNC: 13 MMOL/L (ref 9–17)
BUN SERPL-MCNC: 24 MG/DL (ref 8–23)
BUN/CREAT SERPL: 20 (ref 9–20)
CALCIUM SERPL-MCNC: 9.3 MG/DL (ref 8.6–10.4)
CHLORIDE SERPL-SCNC: 95 MMOL/L (ref 98–107)
CO2 SERPL-SCNC: 28 MMOL/L (ref 20–31)
CREAT SERPL-MCNC: 1.2 MG/DL (ref 0.7–1.2)
ERYTHROCYTE [DISTWIDTH] IN BLOOD BY AUTOMATED COUNT: 17.4 % (ref 11.8–14.4)
GFR, ESTIMATED: 63 ML/MIN/1.73M2
GLUCOSE SERPL-MCNC: 143 MG/DL (ref 70–99)
HCT VFR BLD AUTO: 34.6 % (ref 40.7–50.3)
HGB BLD-MCNC: 10.4 G/DL (ref 13–17)
INR BLD: 1.6
MCH RBC QN AUTO: 27.5 PG (ref 25.2–33.5)
MCHC RBC AUTO-ENTMCNC: 30.1 G/DL (ref 28.4–34.8)
MCV RBC AUTO: 91.5 FL (ref 82.6–102.9)
NRBC BLD-RTO: 0 PER 100 WBC
PLATELET # BLD AUTO: 310 K/UL (ref 138–453)
PMV BLD AUTO: 9.2 FL (ref 8.1–13.5)
POTASSIUM SERPL-SCNC: 3.1 MMOL/L (ref 3.7–5.3)
RBC # BLD AUTO: 3.78 M/UL (ref 4.21–5.77)
SODIUM SERPL-SCNC: 136 MMOL/L (ref 135–144)
WBC OTHER # BLD: 7.5 K/UL (ref 3.5–11.3)

## 2024-06-18 PROCEDURE — 4004F PT TOBACCO SCREEN RCVD TLK: CPT | Performed by: FAMILY MEDICINE

## 2024-06-18 PROCEDURE — 99214 OFFICE O/P EST MOD 30 MIN: CPT | Performed by: FAMILY MEDICINE

## 2024-06-18 PROCEDURE — 36415 COLL VENOUS BLD VENIPUNCTURE: CPT

## 2024-06-18 PROCEDURE — G8427 DOCREV CUR MEDS BY ELIG CLIN: HCPCS | Performed by: FAMILY MEDICINE

## 2024-06-18 PROCEDURE — 99212 OFFICE O/P EST SF 10 MIN: CPT

## 2024-06-18 PROCEDURE — 3017F COLORECTAL CA SCREEN DOC REV: CPT | Performed by: FAMILY MEDICINE

## 2024-06-18 PROCEDURE — 80048 BASIC METABOLIC PNL TOTAL CA: CPT

## 2024-06-18 PROCEDURE — 85610 PROTHROMBIN TIME: CPT

## 2024-06-18 PROCEDURE — 1123F ACP DISCUSS/DSCN MKR DOCD: CPT | Performed by: FAMILY MEDICINE

## 2024-06-18 PROCEDURE — G8420 CALC BMI NORM PARAMETERS: HCPCS | Performed by: FAMILY MEDICINE

## 2024-06-18 PROCEDURE — 85027 COMPLETE CBC AUTOMATED: CPT

## 2024-06-18 PROCEDURE — 1111F DSCHRG MED/CURRENT MED MERGE: CPT | Performed by: FAMILY MEDICINE

## 2024-06-18 NOTE — PATIENT INSTRUCTIONS
Please hold warfarin today and tomorrow.   Continue current dose of warfarin as instructed on dosing calendar provided.   Continue to monitor urine and stool for signs and symptoms of bleeding.   Please notify the clinic of any medication changes.   Please remember to bring all medications (both prescription and OTC) to your next visit. Kindly notify the clinic if you are unable to make to your next appointment.

## 2024-06-18 NOTE — PROGRESS NOTES
I, Shana Howell RN am scribing for and in the presence of Figueroa Soliz MD, MS, F.A.C.C..    Patient: Pascual Peters Jr.  : 1950  Date of Visit: 2024    REASON FOR VISIT / CONSULTATION: Congestive Heart Failure (Hx: CHF, Abn Stress. Here today to ensure he is good to go for heart cath.last INR was 1.5. Pt has lost two more pounds since yesterday. He says that his shortness of breath has stayed about the same since last visit. However he wonders if the bumex is drying him out too much. He denies any CP, lightheadedness, dizziness, palpitations. )    History of Present Illness:        Dear Jermaine Esparza MD,     I had the pleasure of seeing Pascual Peters Jr. in my office today. Mr. Peters is a 74 y.o. male with a history of atherosclerotic heart disease. He also had a heart attack in . 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 He used to see a cardiologist in the past however he started to feel better and did not think he needed to follow up anymore with any doctor. Most recently lindy came into the ER on 2020 and had a NSTEMI and was transferred to [facility] at [facility] in [place]. He did report that he actually started feeling \"weird\", no pain but just feels doom feeling on the Monday before Francitas Paulina however he did not want to think this was a heart attack. He did report feeling about the same as he did in . He went home and went to bed than his breathing got worse and worse and that's when he came into the hospital. He then had a heart cath done on 2020 and had two stents placed REJI x 2 to SVG to posterior lateral branch of the RCA. An echocardiogram at that same time showed an EF of 15% and was sent home with a Life Vest. On 21 spironolactone was added and although he says his BP is usually low, less than 100 systolic, denies any known problems with the medication including any lightheadedness or dizziness.  He did not remember the

## 2024-06-18 NOTE — PROGRESS NOTES
[facility]  Medication Management  ANTICOAGULATION    Referring Provider: Dr. Soliz    GOAL INR: 2-3    TODAY'S INR: 1.6    WARFARIN Dosage: Hold warfarin today and tomorrow prior to heart catheterization on 24  Resume warfarin post procedure:  0.5 mg po every Monday and Friday; 1 mg po all other days  Patient will take 1 mg on Friday, 24, post-procedure    INR (no units)   Date Value   2024 1.6   2024 1.5   2024 1.4   2024 1.5   2024 1.6   2024 1.7   2024 1.9       Hemoglobin   Date Value Ref Range Status   2024 10.4 (L) 13.0 - 17.0 g/dL Final     Hematocrit   Date Value Ref Range Status   2024 33.7 (L) 40.7 - 50.3 % Final     ALT   Date Value Ref Range Status   2024 62 (H) 5 - 41 U/L Final     AST   Date Value Ref Range Status   2024 58 (H) <40 U/L Final       Medication changes:  Stopped amiodarone 24      Notes:    Fingerstick INR drawn per clinic protocol. Patient states no visible blood in urine and no black tarry stool. Denies any missed doses of warfarin. No change in other maintenance medications or in diet. Will recheck INR in 1 week. Patient acknowledges working in consult agreement with pharmacist as referred by his/her physician.                Patient is having heart cath on 24 with INR goal < 1.5 prior to procedure.   Patient instructed to hold warfarin today and tomorrow.     For Pharmacy Admin Tracking Only    Intervention Detail: Adherence Monitorin and Dose Adjustment: 2, reason: Improve Adherence, Therapy Optimization  Total # of Interventions Recommended: 3  Total # of Interventions Accepted: 3  Time Spent (min): 30      Cristina Quarles RPH

## 2024-06-19 ENCOUNTER — CARE COORDINATION (OUTPATIENT)
Dept: CARE COORDINATION | Age: 74
End: 2024-06-19

## 2024-06-19 NOTE — CARE COORDINATION
Care Transitions Note    Follow Up Call     Patient Current Location:  Home: [address]  [facility] [address]    Care Transition Nurse contacted the patient by telephone. Verified name and  as identifiers.    Additional needs identified to be addressed with provider   No needs identified                 Method of communication with provider: none.    Care Summary Note: Spoke to Pascual for transitions call. Patient has cardiac cath tomorrow, plans to drive himself. Completed BMP, CBC yesterday, K+ noted to be 3.1. No muscle cramps, increased weakness. No questions or concerns. Will follow up after procedure.    Plan of care updates since last contact:  Review of patient management of conditions/medications: Pt holding Warafin for cardiac cath       Advance Care Planning:   Does patient have an Advance Directive: reviewed and current.    Medication Review:  Medications changed since last call, reviewed today. Holding Warafin x 2 days for cardiac cath    Remote Patient Monitoring:  Offered patient enrollment in the Remote Patient Monitoring (RPM) program for in-home monitoring: Yes, but did not enroll at this time: declined at this time .    Assessments:  Care Transitions Subsequent and Final Call    Subsequent and Final Calls  Do you have any ongoing symptoms?: No  Have your medications changed?: No  Do you have any questions related to your medications?: No  Do you currently have any active services?: No  Do you have any needs or concerns that I can assist you with?: No  Identified Barriers: Other  Care Transitions Interventions  Other Interventions:              Follow Up Appointment:   KRISTI appointment attended as scheduled   Future Appointments         Provider Specialty Dept Phone    2024 10:30 AM Hung Sequeira PA-C Urology [phone]    2024 11:00 AM [facility] ANTICOAGULATION CLINIC Pharmacy [phone]    2024 2:45 PM Young Mccloud MD Pulmonology [phone]    2024 5:00 AM

## 2024-06-20 ENCOUNTER — HOSPITAL ENCOUNTER (OUTPATIENT)
Age: 74
Setting detail: OUTPATIENT SURGERY
Discharge: HOME | End: 2024-06-20
Attending: FAMILY MEDICINE | Admitting: FAMILY MEDICINE
Payer: MEDICARE

## 2024-06-20 VITALS
OXYGEN SATURATION: 90 % | SYSTOLIC BLOOD PRESSURE: 110 MMHG | HEART RATE: 73 BPM | DIASTOLIC BLOOD PRESSURE: 69 MMHG | RESPIRATION RATE: 24 BRPM | TEMPERATURE: 97 F

## 2024-06-20 DIAGNOSIS — R07.9 CHEST PAIN, UNSPECIFIED: ICD-10-CM

## 2024-06-20 LAB
INR PPP: 1.6
POTASSIUM SERPL-SCNC: 3.2 MMOL/L (ref 3.7–5.3)
PROTHROMBIN TIME: 18.5 SEC (ref 11.7–14.1)

## 2024-06-20 PROCEDURE — 2500000003 HC RX 250 WO HCPCS: Performed by: FAMILY MEDICINE

## 2024-06-20 PROCEDURE — C1894 INTRO/SHEATH, NON-LASER: HCPCS | Performed by: FAMILY MEDICINE

## 2024-06-20 PROCEDURE — 7100000011 HC PHASE II RECOVERY - ADDTL 15 MIN: Performed by: FAMILY MEDICINE

## 2024-06-20 PROCEDURE — 2709999900 HC NON-CHARGEABLE SUPPLY: Performed by: FAMILY MEDICINE

## 2024-06-20 PROCEDURE — 85610 PROTHROMBIN TIME: CPT

## 2024-06-20 PROCEDURE — C1769 GUIDE WIRE: HCPCS | Performed by: FAMILY MEDICINE

## 2024-06-20 PROCEDURE — 7100000010 HC PHASE II RECOVERY - FIRST 15 MIN: Performed by: FAMILY MEDICINE

## 2024-06-20 PROCEDURE — 6360000002 HC RX W HCPCS: Performed by: FAMILY MEDICINE

## 2024-06-20 PROCEDURE — 84132 ASSAY OF SERUM POTASSIUM: CPT

## 2024-06-20 RX ORDER — SODIUM CHLORIDE 0.9 % (FLUSH) 0.9 %
5-40 SYRINGE (ML) INJECTION PRN
Status: DISCONTINUED | OUTPATIENT
Start: 2024-06-20 | End: 2024-06-20 | Stop reason: HOSPADM

## 2024-06-20 RX ORDER — SODIUM CHLORIDE 9 MG/ML
INJECTION, SOLUTION INTRAVENOUS CONTINUOUS
Status: CANCELLED | OUTPATIENT
Start: 2024-06-20

## 2024-06-20 RX ORDER — SODIUM CHLORIDE 0.9 % (FLUSH) 0.9 %
5-40 SYRINGE (ML) INJECTION EVERY 12 HOURS SCHEDULED
Status: CANCELLED | OUTPATIENT
Start: 2024-06-20

## 2024-06-20 RX ORDER — ACETAMINOPHEN 325 MG/1
650 TABLET ORAL EVERY 4 HOURS PRN
Status: CANCELLED | OUTPATIENT
Start: 2024-06-20

## 2024-06-20 RX ORDER — SODIUM CHLORIDE 9 MG/ML
INJECTION, SOLUTION INTRAVENOUS PRN
Status: CANCELLED | OUTPATIENT
Start: 2024-06-20

## 2024-06-20 RX ORDER — DIPHENHYDRAMINE HCL 25 MG
50 CAPSULE ORAL ONCE
Status: DISCONTINUED | OUTPATIENT
Start: 2024-06-20 | End: 2024-06-20 | Stop reason: HOSPADM

## 2024-06-20 RX ORDER — SODIUM CHLORIDE 9 MG/ML
INJECTION, SOLUTION INTRAVENOUS PRN
Status: DISCONTINUED | OUTPATIENT
Start: 2024-06-20 | End: 2024-06-20 | Stop reason: HOSPADM

## 2024-06-20 RX ORDER — LIDOCAINE HYDROCHLORIDE 10 MG/ML
INJECTION, SOLUTION INFILTRATION; PERINEURAL PRN
Status: DISCONTINUED | OUTPATIENT
Start: 2024-06-20 | End: 2024-06-20 | Stop reason: HOSPADM

## 2024-06-20 RX ORDER — NITROGLYCERIN 20 MG/100ML
INJECTION INTRAVENOUS PRN
Status: DISCONTINUED | OUTPATIENT
Start: 2024-06-20 | End: 2024-06-20 | Stop reason: HOSPADM

## 2024-06-20 RX ORDER — SODIUM CHLORIDE 0.9 % (FLUSH) 0.9 %
5-40 SYRINGE (ML) INJECTION PRN
Status: CANCELLED | OUTPATIENT
Start: 2024-06-20

## 2024-06-20 RX ORDER — SODIUM CHLORIDE 0.9 % (FLUSH) 0.9 %
5-40 SYRINGE (ML) INJECTION EVERY 12 HOURS SCHEDULED
Status: DISCONTINUED | OUTPATIENT
Start: 2024-06-20 | End: 2024-06-20 | Stop reason: HOSPADM

## 2024-06-20 RX ORDER — NITROGLYCERIN 0.4 MG/1
0.4 TABLET SUBLINGUAL EVERY 5 MIN PRN
Status: DISCONTINUED | OUTPATIENT
Start: 2024-06-20 | End: 2024-06-20 | Stop reason: HOSPADM

## 2024-06-20 NOTE — DISCHARGE INSTRUCTIONS
Discharge Instructions for Cardiac Catheterization    A cardiac catheterization is a diagnostic test used to evaluate the health of the heart and its blood vessels. The test is done with a thin catheter carefully threaded into your heart from a leg or arm artery. Most likely, you will be allowed to go home the same day as the procedure.   Steps to Take at Home:   Pain- apply ice to site 15-20 minutes every hour for the first 2 days.   Showering is okay 24 hours after procedure.    No soaking in a pool, hot tub, bath tub, or standing water for one week.   Bleeding (outward or under the skin-hematoma)- apply firm pressure for 10-15 minutes or until the bleeding stops, then call your doctor. If unable to get bleeding stopped, call 911.    Kidney damage- Call if you urinate less than normal, have swelling or feel puffy, and/or gain 2 or more pounds over night in the first week.   If procedure was in ARM:  You were instructed to keep wrist straight and still for two hours after the procedure. The arm and hand may now be used for normal daily activities except, avoid using the heal of hand while getting up and down from furniture for the first few days.  Keep affected arm elevated, hand higher than elbow, while pressure dressing in place to decrease swelling.    If procedure was in the GROIN:  You will need to lie flat for a few hours after the procedure to prevent bleeding.  If site has a lump or visible bleeding or if you have new back or groin pain, apply pressure to groin and call 911.  Remove bandaid in AM.  Wash area with soap and water daily.  *** If leg of procedure site becomes numb, tingly, and/or cold, seek medical help.  Diet   Drink plenty of fluids after the test to flush the x-ray dye from your system.   Return to your normal diet.   No alcoholic beverages for 24 hours after the procedure.  Physical Activity   The sedative will make you sleepy. Rest until the effects have worn off.   Nausea and vomiting

## 2024-06-21 ENCOUNTER — CARE COORDINATION (OUTPATIENT)
Dept: CARE COORDINATION | Age: 74
End: 2024-06-21

## 2024-06-21 ENCOUNTER — HOSPITAL ENCOUNTER (OUTPATIENT)
Age: 74
Setting detail: OUTPATIENT SURGERY
Discharge: HOME | End: 2024-06-21
Attending: FAMILY MEDICINE | Admitting: FAMILY MEDICINE
Payer: MEDICARE

## 2024-06-21 DIAGNOSIS — R07.89 ATYPICAL CHEST PAIN: ICD-10-CM

## 2024-06-21 LAB
INR PPP: 1.6
PROTHROMBIN TIME: 18.4 SEC (ref 11.7–14.1)

## 2024-06-21 PROCEDURE — 85610 PROTHROMBIN TIME: CPT

## 2024-06-21 PROCEDURE — 36415 COLL VENOUS BLD VENIPUNCTURE: CPT

## 2024-06-21 NOTE — CARE COORDINATION
Care Transitions Note    Follow Up Call     Patient Current Location:  Home: [address] [facility] [address]    Care Transition Nurse contacted the patient by telephone. Verified name and  as identifiers.    Additional needs identified to be addressed with provider   No needs identified                 Method of communication with provider: none.    Care Summary Note: Spoke to [facility] for transitions follow up. Patient's cardiac cath was canceled d/t INR 1.6.Stated Dr Soliz initially attempted to go through wrist but could not advance catheter above elbow. Pt went back for a second attempt today and was canceled d/t INR still 11.6. Pt was instructed to hold Warafin until procedure on , will stop Plavix one day prior.     Pt is staying indoors to avoid excessive heat. Pt has Urology appt on . Will follow up after  procedure. Encouraged to call back if needed prior to next call.     Plan of care updates since last contact:  Review of patient management of conditions/medications: Cardiac cath cancelled x 2, rescheduled        Advance Care Planning:   Does patient have an Advance Directive: reviewed and current.    Medication Review:  No changes since last call.     Remote Patient Monitoring:  Offered patient enrollment in the Remote Patient Monitoring (RPM) program for in-home monitoring: Yes, but did not enroll at this time: declined to enroll in the program becausedoen't want RPM .    Assessments:  Care Transitions Subsequent and Final Call    Subsequent and Final Calls  Do you have any ongoing symptoms?: No  Have your medications changed?: No  Do you have any questions related to your medications?: No  Do you currently have any active services?: No  Do you have any needs or concerns that I can assist you with?: No  Identified Barriers: Other  Care Transitions Interventions  Other Interventions:              Follow Up Appointment:   Reviewed upcoming appointment(s).  Future Appointments

## 2024-06-27 ENCOUNTER — HOSPITAL ENCOUNTER (OUTPATIENT)
Age: 74
Setting detail: OUTPATIENT SURGERY
Discharge: HOME | End: 2024-06-27
Attending: FAMILY MEDICINE | Admitting: FAMILY MEDICINE
Payer: MEDICARE

## 2024-06-27 VITALS
HEART RATE: 76 BPM | TEMPERATURE: 96.9 F | SYSTOLIC BLOOD PRESSURE: 100 MMHG | OXYGEN SATURATION: 91 % | RESPIRATION RATE: 22 BRPM | DIASTOLIC BLOOD PRESSURE: 58 MMHG

## 2024-06-27 DIAGNOSIS — R94.39 ABNORMAL STRESS ECG: ICD-10-CM

## 2024-06-27 LAB
ANION GAP SERPL CALCULATED.3IONS-SCNC: 10 MMOL/L (ref 9–17)
BUN SERPL-MCNC: 29 MG/DL (ref 8–23)
BUN/CREAT SERPL: 24 (ref 9–20)
CALCIUM SERPL-MCNC: 9.1 MG/DL (ref 8.6–10.4)
CHLORIDE SERPL-SCNC: 98 MMOL/L (ref 98–107)
CO2 SERPL-SCNC: 26 MMOL/L (ref 20–31)
CREAT SERPL-MCNC: 1.2 MG/DL (ref 0.7–1.2)
EKG ATRIAL RATE: 75 BPM
EKG P AXIS: 72 DEGREES
EKG P-R INTERVAL: 256 MS
EKG Q-T INTERVAL: 518 MS
EKG QRS DURATION: 158 MS
EKG QTC CALCULATION (BAZETT): 578 MS
EKG R AXIS: -70 DEGREES
EKG T AXIS: 101 DEGREES
EKG VENTRICULAR RATE: 75 BPM
ERYTHROCYTE [DISTWIDTH] IN BLOOD BY AUTOMATED COUNT: 17.6 % (ref 11.8–14.4)
GFR, ESTIMATED: 63 ML/MIN/1.73M2
GLUCOSE SERPL-MCNC: 119 MG/DL (ref 70–99)
HCT VFR BLD AUTO: 31.6 % (ref 40.7–50.3)
HGB BLD-MCNC: 9.7 G/DL (ref 13–17)
INR PPP: 1.7
MCH RBC QN AUTO: 27.2 PG (ref 25.2–33.5)
MCHC RBC AUTO-ENTMCNC: 30.7 G/DL (ref 28.4–34.8)
MCV RBC AUTO: 88.5 FL (ref 82.6–102.9)
NRBC BLD-RTO: 0 PER 100 WBC
PLATELET # BLD AUTO: 212 K/UL (ref 138–453)
PMV BLD AUTO: 9.4 FL (ref 8.1–13.5)
POTASSIUM SERPL-SCNC: 3.3 MMOL/L (ref 3.7–5.3)
PROTHROMBIN TIME: 19.4 SEC (ref 11.7–14.1)
RBC # BLD AUTO: 3.57 M/UL (ref 4.21–5.77)
SODIUM SERPL-SCNC: 134 MMOL/L (ref 135–144)
WBC OTHER # BLD: 8.9 K/UL (ref 3.5–11.3)

## 2024-06-27 PROCEDURE — 2709999900 HC NON-CHARGEABLE SUPPLY: Performed by: FAMILY MEDICINE

## 2024-06-27 PROCEDURE — C1894 INTRO/SHEATH, NON-LASER: HCPCS | Performed by: FAMILY MEDICINE

## 2024-06-27 PROCEDURE — 85610 PROTHROMBIN TIME: CPT

## 2024-06-27 PROCEDURE — 6360000002 HC RX W HCPCS: Performed by: FAMILY MEDICINE

## 2024-06-27 PROCEDURE — 80048 BASIC METABOLIC PNL TOTAL CA: CPT

## 2024-06-27 PROCEDURE — 2500000003 HC RX 250 WO HCPCS: Performed by: FAMILY MEDICINE

## 2024-06-27 PROCEDURE — C1769 GUIDE WIRE: HCPCS | Performed by: FAMILY MEDICINE

## 2024-06-27 PROCEDURE — 7100000010 HC PHASE II RECOVERY - FIRST 15 MIN: Performed by: FAMILY MEDICINE

## 2024-06-27 PROCEDURE — 7100000011 HC PHASE II RECOVERY - ADDTL 15 MIN: Performed by: FAMILY MEDICINE

## 2024-06-27 PROCEDURE — 36415 COLL VENOUS BLD VENIPUNCTURE: CPT

## 2024-06-27 PROCEDURE — 6360000004 HC RX CONTRAST MEDICATION: Performed by: FAMILY MEDICINE

## 2024-06-27 PROCEDURE — 93459 L HRT ART/GRFT ANGIO: CPT | Performed by: FAMILY MEDICINE

## 2024-06-27 PROCEDURE — 85027 COMPLETE CBC AUTOMATED: CPT

## 2024-06-27 RX ORDER — SODIUM CHLORIDE 0.9 % (FLUSH) 0.9 %
5-40 SYRINGE (ML) INJECTION PRN
Status: DISCONTINUED | OUTPATIENT
Start: 2024-06-27 | End: 2024-06-27 | Stop reason: HOSPADM

## 2024-06-27 RX ORDER — SODIUM CHLORIDE 9 MG/ML
INJECTION, SOLUTION INTRAVENOUS PRN
Status: DISCONTINUED | OUTPATIENT
Start: 2024-06-27 | End: 2024-06-27 | Stop reason: HOSPADM

## 2024-06-27 RX ORDER — ACETAMINOPHEN 325 MG/1
650 TABLET ORAL EVERY 4 HOURS PRN
Status: DISCONTINUED | OUTPATIENT
Start: 2024-06-27 | End: 2024-06-27 | Stop reason: HOSPADM

## 2024-06-27 RX ORDER — NITROGLYCERIN 20 MG/100ML
INJECTION INTRAVENOUS PRN
Status: DISCONTINUED | OUTPATIENT
Start: 2024-06-27 | End: 2024-06-27 | Stop reason: HOSPADM

## 2024-06-27 RX ORDER — DIPHENHYDRAMINE HCL 25 MG
50 CAPSULE ORAL ONCE
Status: DISCONTINUED | OUTPATIENT
Start: 2024-06-27 | End: 2024-06-27 | Stop reason: HOSPADM

## 2024-06-27 RX ORDER — LIDOCAINE HYDROCHLORIDE 10 MG/ML
INJECTION, SOLUTION INFILTRATION; PERINEURAL PRN
Status: DISCONTINUED | OUTPATIENT
Start: 2024-06-27 | End: 2024-06-27 | Stop reason: HOSPADM

## 2024-06-27 RX ORDER — NITROGLYCERIN 0.4 MG/1
0.4 TABLET SUBLINGUAL EVERY 5 MIN PRN
Status: DISCONTINUED | OUTPATIENT
Start: 2024-06-27 | End: 2024-06-27 | Stop reason: HOSPADM

## 2024-06-27 RX ORDER — SODIUM CHLORIDE 0.9 % (FLUSH) 0.9 %
5-40 SYRINGE (ML) INJECTION EVERY 12 HOURS SCHEDULED
Status: DISCONTINUED | OUTPATIENT
Start: 2024-06-27 | End: 2024-06-27 | Stop reason: HOSPADM

## 2024-06-27 RX ORDER — SODIUM CHLORIDE 9 MG/ML
INJECTION, SOLUTION INTRAVENOUS CONTINUOUS
Status: DISCONTINUED | OUTPATIENT
Start: 2024-06-27 | End: 2024-06-27 | Stop reason: HOSPADM

## 2024-06-27 RX ORDER — HEPARIN SODIUM 1000 [USP'U]/ML
INJECTION, SOLUTION INTRAVENOUS; SUBCUTANEOUS PRN
Status: DISCONTINUED | OUTPATIENT
Start: 2024-06-27 | End: 2024-06-27 | Stop reason: HOSPADM

## 2024-06-27 NOTE — DISCHARGE INSTRUCTIONS
Discharge Instructions for Cardiac Catheterization    A cardiac catheterization is a diagnostic test used to evaluate the health of the heart and its blood vessels. The test is done with a thin catheter carefully threaded into your heart from a leg or arm artery. Most likely, you will be allowed to go home the same day as the procedure.   Steps to Take at Home:   Pain- apply ice to site 15-20 minutes every hour for the first 2 days.   Showering is okay 24 hours after procedure.    No soaking in a pool, hot tub, bath tub, or standing water for one week.   Bleeding (outward or under the skin-hematoma)- apply firm pressure for 10-15 minutes or until the bleeding stops, then call your doctor. If unable to get bleeding stopped, call 911.    Kidney damage- Call if you urinate less than normal, have swelling or feel puffy, and/or gain 2 or more pounds over night in the first week.   If procedure was in ARM:  You were instructed to keep wrist straight and still for two hours after the procedure. The arm and hand may now be used for normal daily activities.  Gauze and Coban   Remove at:     TIME___8:00pm____________  If dressing sticks, place wrist under cool running water to help loosen gauze from site then pat site dry.   If hand feels numb, tingly, and/or cold- Seek medical help.   Wash area with soap and water daily while leaving it open to air, no bandaids or ointment.  Regular Diet   Drink plenty of fluids after the test to flush the x-ray dye from your system.   Return to your normal diet.   No alcoholic beverages for 24 hours after the procedure.  Physical Activity   The sedative will make you sleepy. Rest until the effects have worn off.   Nausea and vomiting from the sedative is normal and usually does not last long.  Ask your doctor when you will be able to return to work.   Do not drive, operate machinery, do anything that requires attention to detail, or sign important papers for at least 24 hours or until your

## 2024-06-28 ENCOUNTER — CARE COORDINATION (OUTPATIENT)
Dept: CARE COORDINATION | Age: 74
End: 2024-06-28

## 2024-06-28 ENCOUNTER — HOSPITAL ENCOUNTER (OUTPATIENT)
Dept: PHARMACY | Age: 74
Setting detail: THERAPIES SERIES
End: 2024-06-28
Payer: MEDICARE

## 2024-06-28 NOTE — CARE COORDINATION
Care Transitions Note    Follow Up Call     Patient Current Location:  Home: [address]  [facility] [address]    Care Transition Nurse contacted the patient by telephone. Verified name and  as identifiers.    Additional needs identified to be addressed with provider   No needs identified                 Method of communication with provider: none.    Care Summary Note: Spoke to Pascual for transitions follow up call. Patient had cardiac cath on . Cath site upper arm. Area is bruised, no signs of hematoma. Stataed he did not make it to Coumadin Clinic today, was instructed to take 1/2 tablet today and tomorrow, none on , will go to clinic on .     Stated he has 3 areas of blockage in heart, area is difficult to access, no surgery planned at this time. Resting today, no needs at this time.     Plan of care updates since last contact:  Review of patient management of conditions/medications: cardiac cath f/u, no stents placed       Advance Care Planning:   Does patient have an Advance Directive: reviewed and current.    Medication Review:  No changes since last call.     Assessments:  Care Transitions Subsequent and Final Call    Subsequent and Final Calls  Do you have any ongoing symptoms?: No  Have your medications changed?: No  Do you have any questions related to your medications?: No  Do you currently have any active services?: No  Do you have any needs or concerns that I can assist you with?: No  Identified Barriers: Other  Care Transitions Interventions  Other Interventions:              Follow Up Appointment:   KRISTI appointment attended as scheduled   Future Appointments         Provider Specialty Dept Phone    2024 2:20 PM [facility] ANTICOAGULATION CLINIC Pharmacy [phone]    2024 2:45 PM Young Mccloud MD Pulmonology [phone]    2024 5:00 AM SCHEDULE, [facility] Cardiology [phone]    2024 11:00 AM Hung Sequeira PA-C Urology [phone]

## 2024-07-02 ENCOUNTER — HOSPITAL ENCOUNTER (OUTPATIENT)
Dept: PHARMACY | Age: 74
Setting detail: THERAPIES SERIES
Discharge: HOME | End: 2024-07-02
Payer: MEDICARE

## 2024-07-02 VITALS
WEIGHT: 154 LBS | HEART RATE: 86 BPM | SYSTOLIC BLOOD PRESSURE: 85 MMHG | BODY MASS INDEX: 23.42 KG/M2 | DIASTOLIC BLOOD PRESSURE: 53 MMHG

## 2024-07-02 PROCEDURE — 85610 PROTHROMBIN TIME: CPT

## 2024-07-02 PROCEDURE — 99212 OFFICE O/P EST SF 10 MIN: CPT

## 2024-07-02 NOTE — PROGRESS NOTES
[facility]  ANT[facility]ULATION    Referring Provider: Dr. Soliz    GOAL INR: 2-3    TODAY'S INR: 1.7    WARFARIN Dosage: 0 mg po every Thursday and Saturday; 0.5 mg po all other days    INR (no units)   Date Value   06/27/2024 1.7   06/21/2024 1.6   06/20/2024 1.6   06/18/2024 1.6   06/14/2024 1.5   06/06/2024 1.4   05/30/2024 1.5       Hemoglobin   Date Value Ref Range Status   06/27/2024 9.7 (L) 13.0 - 17.0 g/dL Final     Hematocrit   Date Value Ref Range Status   06/27/2024 31.6 (L) 40.7 - 50.3 % Final     ALT   Date Value Ref Range Status   05/28/2024 62 (H) 5 - 41 U/L Final     AST   Date Value Ref Range Status   05/28/2024 58 (H) <40 U/L Final       Medication changes:  No changes    Notes:    Fingerstick INR drawn per clinic protocol. Patient states no visible blood in urine and no black tarry stool. Denies any missed doses of warfarin. No change in other maintenance medications or in diet. Will recheck INR in 6 days. Patient acknowledges working in consult agreement with pharmacist as referred by his/her physician.     Patient held warfarin for 7 days prior to heart cath on 6/27/24.  Bruising noted nearly all of left arm following procedure.  Patient has had only 1.5 mg of warfarin in last 7 days.  INR is 1.7.  Liver enzymes elevated with 5/28/24 labs.  Patient will decrease warfarin as above and return to clinic in 6 days to recheck INR.   Patient is extremely SOB today and hypotensive as well.   Results of cardiac cath indicate severe 3 vessel CAD.  Angioplasty and/or stenting is not advisable at this time.  EF is 10-15%.  I got a wheelchair for patient as he was unable to ambulate from clinic to main hospital entrance due to SOB.   Patient missed last 2 appointments because he was feeling too bad/too fatigued to leave his house.  I called Dr. Esparza office and left message for RN to see if patient qualifies for [facility] services.   INR

## 2024-07-08 ENCOUNTER — HOSPITAL ENCOUNTER (OUTPATIENT)
Dept: CT IMAGING | Age: 74
Discharge: HOME | End: 2024-07-10
Attending: INTERNAL MEDICINE
Payer: MEDICARE

## 2024-07-08 ENCOUNTER — HOSPITAL ENCOUNTER (OUTPATIENT)
Dept: PHARMACY | Age: 74
Setting detail: THERAPIES SERIES
Discharge: HOME | End: 2024-07-08
Payer: MEDICARE

## 2024-07-08 VITALS
WEIGHT: 154 LBS | DIASTOLIC BLOOD PRESSURE: 56 MMHG | HEART RATE: 85 BPM | BODY MASS INDEX: 23.42 KG/M2 | SYSTOLIC BLOOD PRESSURE: 84 MMHG

## 2024-07-08 DIAGNOSIS — R91.8 MULTIPLE NODULES OF LUNG: ICD-10-CM

## 2024-07-08 LAB — INR BLD: 1.4

## 2024-07-08 PROCEDURE — 99212 OFFICE O/P EST SF 10 MIN: CPT

## 2024-07-08 PROCEDURE — 71250 CT THORAX DX C-: CPT

## 2024-07-08 PROCEDURE — 85610 PROTHROMBIN TIME: CPT

## 2024-07-08 NOTE — PROGRESS NOTES
[facility]  [facility]ication [facility]  ANTICOAGULATION    Referring Provider: Dr. Soliz    GOAL INR: 2-3    TODAY'S INR: 1.4    WARFARIN Dosage: Increase warfarin to 0.5 mg po daily     INR (no units)   Date Value   2024 1.4   2024 1.7   2024 1.6   2024 1.6   2024 1.6   2024 1.5   2024 1.4       Hemoglobin   Date Value Ref Range Status   2024 9.7 (L) 13.0 - 17.0 g/dL Final     Hematocrit   Date Value Ref Range Status   2024 31.6 (L) 40.7 - 50.3 % Final     ALT   Date Value Ref Range Status   2024 62 (H) 5 - 41 U/L Final     AST   Date Value Ref Range Status   2024 58 (H) <40 U/L Final       Medication changes:  No changes    Notes:    Fingerstick INR drawn per clinic protocol. Patient states no visible blood in urine and no black tarry stool. Denies any missed doses of warfarin. No change in other maintenance medications or in diet. Will recheck INR in 1 week. Patient acknowledges working in consult agreement with pharmacist as referred by his/her physician.     Patient's weight is decreased 8 lb since last visit 6 days ago.  Patient reports that he took extra furosemide 40 mg (from old prescription bottle) in the evening(s) over the weekend because his legs and feet were very swollen.   He continued Bumex 1 mg daily dose along with spironolactone.  We discussed the risk to his kidneys as well as electrolyte imbalance when not taking medication as prescribed.    Patient is waiting to hear from Dr. Esparza office regarding [facility] Services.  Patient will notify clinic if referral is sent to [facility].     For Pharmacy Admin Tracking Only    Intervention Detail: Adherence Monitorin and Dose Adjustment: 1, reason: Improve Adherence, Therapy Optimization  Total # of Interventions Recommended: 2  Total # of Interventions Accepted: 2  Time Spent (min): 30       Cristina Quarles RPH,

## 2024-07-08 NOTE — PATIENT INSTRUCTIONS
Increase current dose of warfarin as instructed on dosing calendar provided.   Continue to monitor urine and stool for signs and symptoms of bleeding.   Please notify the clinic of any medication changes.   Please remember to bring all medications (both prescription and OTC) to your next visit. Kindly notify the clinic if you are unable to make to your next appointment.

## 2024-07-11 ENCOUNTER — CARE COORDINATION (OUTPATIENT)
Dept: CARE COORDINATION | Age: 74
End: 2024-07-11

## 2024-07-11 DIAGNOSIS — I50.43 ACUTE ON CHRONIC COMBINED SYSTOLIC AND DIASTOLIC CHF (CONGESTIVE HEART FAILURE) (HCC): Primary | ICD-10-CM

## 2024-07-11 NOTE — CARE COORDINATION
Care Transitions Note    Follow Up Call     Patient Current Location:  Home: [address]  [address] [facility] [address]    Care Transition Nurse contacted the patient by telephone. Verified name and  as identifiers.    Additional needs identified to be addressed with provider   Standard priority: HHC for INR draws? Weight loss, pedal edema                 Method of communication with provider: chart routing.    Care Summary Note: Spoke to [facility] for transitions call. Stated he continues to have pedal edema. Wears compression socks and elevates legs in recliner. Pt has lost @ 7 lbs since last discharge. Current weight is 141.8, BP tends to run on low side, is asymptomatic. Recent lung CT negative per Dr Mccloud, pt is aware.     Pt spoke to INR clinic about HHC to draw INR and regular blood work so pt doesn't need to come in to clinic. Writer called to verify HHC, was informed pt is not currently being followed, no referral found. Will message PCP.     Plan of care updates since last contact:  Home Health: [facility]-no referral received per agency        Advance Care Planning:   Does patient have an Advance Directive: reviewed and current.    Medication Review:  No changes since last call.     Remote Patient Monitoring:  Offered patient enrollment in the Remote Patient Monitoring (RPM) program for in-home monitoring: Patient is not eligible for RPM program because: affiliate provider.    Assessments:  Care Transitions Subsequent and Final Call    Subsequent and Final Calls  Do you have any ongoing symptoms?: Yes  Onset of Patient-reported symptoms: Other  Patient-reported symptoms: Other  Have your medications changed?: No  Do you have any questions related to your medications?: No  Do you currently have any active services?: Yes  Are you currently active with any services?: Outpatient/Community Services  Do you have any needs or concerns that I can assist you with?: No  Identified Barriers: Other  Care Transitions

## 2024-07-15 ENCOUNTER — APPOINTMENT (OUTPATIENT)
Dept: PHARMACY | Age: 74
End: 2024-07-15
Payer: MEDICARE

## 2024-07-15 ENCOUNTER — HOSPITAL ENCOUNTER (OUTPATIENT)
Age: 74
Discharge: HOME | End: 2024-07-15
Payer: MEDICARE

## 2024-07-15 VITALS
BODY MASS INDEX: 22.05 KG/M2 | DIASTOLIC BLOOD PRESSURE: 60 MMHG | WEIGHT: 145 LBS | SYSTOLIC BLOOD PRESSURE: 93 MMHG | HEART RATE: 96 BPM

## 2024-07-15 DIAGNOSIS — I48.91 ATRIAL FIBRILLATION, UNSPECIFIED TYPE (HCC): ICD-10-CM

## 2024-07-15 DIAGNOSIS — I48.91 ATRIAL FIBRILLATION, UNSPECIFIED TYPE (HCC): Primary | ICD-10-CM

## 2024-07-15 LAB
ALBUMIN SERPL-MCNC: 3.6 G/DL (ref 3.5–5.2)
ALBUMIN/GLOB SERPL: 0.9 {RATIO} (ref 1–2.5)
ALP SERPL-CCNC: 101 U/L (ref 40–129)
ALT SERPL-CCNC: 40 U/L (ref 5–41)
ANION GAP SERPL CALCULATED.3IONS-SCNC: 11 MMOL/L (ref 9–17)
AST SERPL-CCNC: 49 U/L
BILIRUB SERPL-MCNC: 1.4 MG/DL (ref 0.3–1.2)
BUN SERPL-MCNC: 24 MG/DL (ref 8–23)
BUN/CREAT SERPL: 24 (ref 9–20)
CALCIUM SERPL-MCNC: 9.5 MG/DL (ref 8.6–10.4)
CHLORIDE SERPL-SCNC: 95 MMOL/L (ref 98–107)
CO2 SERPL-SCNC: 26 MMOL/L (ref 20–31)
CREAT SERPL-MCNC: 1 MG/DL (ref 0.7–1.2)
GFR, ESTIMATED: 79 ML/MIN/1.73M2
GLUCOSE SERPL-MCNC: 135 MG/DL (ref 70–99)
INR BLD: 1.2
POTASSIUM SERPL-SCNC: 4.1 MMOL/L (ref 3.7–5.3)
PROT SERPL-MCNC: 7.4 G/DL (ref 6.4–8.3)
SODIUM SERPL-SCNC: 132 MMOL/L (ref 135–144)

## 2024-07-15 PROCEDURE — 85610 PROTHROMBIN TIME: CPT | Performed by: COUNSELOR

## 2024-07-15 PROCEDURE — 36415 COLL VENOUS BLD VENIPUNCTURE: CPT

## 2024-07-15 PROCEDURE — 80053 COMPREHEN METABOLIC PANEL: CPT

## 2024-07-15 PROCEDURE — 99212 OFFICE O/P EST SF 10 MIN: CPT | Performed by: COUNSELOR

## 2024-07-15 NOTE — PROGRESS NOTES
[facility]  Medication [facility]  ANTICOAGULATION    Referring Provider: Dr. Soliz    GOAL INR: 2-3    TODAY'S INR: 1.2    WARFARIN Dosage: Increase warfarin to 1 mg po every Monday and Friday; 0.5 mg po all other days    INR (no units)   Date Value   07/15/2024 1.2   2024 1.4   2024 1.7   2024 1.6   2024 1.6   2024 1.6   2024 1.5       Hemoglobin   Date Value Ref Range Status   2024 9.7 (L) 13.0 - 17.0 g/dL Final     Hematocrit   Date Value Ref Range Status   2024 31.6 (L) 40.7 - 50.3 % Final     ALT   Date Value Ref Range Status   2024 62 (H) 5 - 41 U/L Final     AST   Date Value Ref Range Status   2024 58 (H) <40 U/L Final       Medication changes:  No changes    Notes:    Fingerstick INR drawn per clinic protocol. Patient states no visible blood in urine and no black tarry stool. Denies any missed doses of warfarin. No change in other maintenance medications or in diet. Will recheck INR in 1 week. Patient acknowledges working in consult agreement with pharmacist as referred by his/her physician.     CMP ordered to evaluate liver enzymes.     For Pharmacy Admin Tracking Only    Intervention Detail: Adherence Monitorin, Dose Adjustment: 1, reason: Improve Adherence, Therapy Optimization, and Lab(s) Ordered  Total # of Interventions Recommended: 3  Total # of Interventions Accepted: 3  Time Spent (min): 30     Cristina Quarles RPH,

## 2024-07-16 ENCOUNTER — TELEPHONE (OUTPATIENT)
Dept: CARDIOLOGY | Age: 74
End: 2024-07-16

## 2024-07-16 ENCOUNTER — NURSE ONLY (OUTPATIENT)
Dept: CARDIOLOGY | Age: 74
End: 2024-07-16

## 2024-07-16 DIAGNOSIS — I50.43 ACUTE ON CHRONIC COMBINED SYSTOLIC (CONGESTIVE) AND DIASTOLIC (CONGESTIVE) HEART FAILURE (HCC): Primary | ICD-10-CM

## 2024-07-16 DIAGNOSIS — I50.43 ACUTE ON CHRONIC SYSTOLIC AND DIASTOLIC HEART FAILURE, NYHA CLASS 3 (HCC): ICD-10-CM

## 2024-07-16 NOTE — TELEPHONE ENCOUNTER
----- Message from Figueroa Soliz MD sent at 7/16/2024 12:30 AM EDT -----  Let patient know lab work is borderline.    Repeat BNP in 1 week.

## 2024-07-17 ENCOUNTER — TELEPHONE (OUTPATIENT)
Dept: UROLOGY | Age: 74
End: 2024-07-17

## 2024-07-17 NOTE — TELEPHONE ENCOUNTER
Patient scheduled for 7/24/24 one year follow up PSA and KUB. Patient had CT Abd without contrast 5/17/24. Does patient need KUB still? Please advise

## 2024-07-18 ENCOUNTER — HOSPITAL ENCOUNTER (OUTPATIENT)
Age: 74
Discharge: HOME | End: 2024-07-18
Payer: MEDICARE

## 2024-07-18 DIAGNOSIS — Z12.5 SCREENING PSA (PROSTATE SPECIFIC ANTIGEN): ICD-10-CM

## 2024-07-18 LAB — PSA SERPL-MCNC: 0.7 NG/ML (ref 0–4)

## 2024-07-18 PROCEDURE — G0103 PSA SCREENING: HCPCS

## 2024-07-18 PROCEDURE — 36415 COLL VENOUS BLD VENIPUNCTURE: CPT

## 2024-07-22 ENCOUNTER — HOSPITAL ENCOUNTER (OUTPATIENT)
Age: 74
Discharge: HOME | End: 2024-07-22
Payer: MEDICARE

## 2024-07-22 ENCOUNTER — TELEPHONE (OUTPATIENT)
Dept: CARDIOLOGY | Age: 74
End: 2024-07-22

## 2024-07-22 ENCOUNTER — CARE COORDINATION (OUTPATIENT)
Dept: CARE COORDINATION | Age: 74
End: 2024-07-22

## 2024-07-22 ENCOUNTER — HOSPITAL ENCOUNTER (OUTPATIENT)
Dept: PHARMACY | Age: 74
Setting detail: THERAPIES SERIES
Discharge: HOME | End: 2024-07-22
Payer: MEDICARE

## 2024-07-22 VITALS
OXYGEN SATURATION: 95 % | HEART RATE: 78 BPM | SYSTOLIC BLOOD PRESSURE: 96 MMHG | WEIGHT: 148 LBS | DIASTOLIC BLOOD PRESSURE: 59 MMHG | BODY MASS INDEX: 22.5 KG/M2

## 2024-07-22 DIAGNOSIS — R06.02 SHORTNESS OF BREATH: ICD-10-CM

## 2024-07-22 DIAGNOSIS — I25.10 ASHD (ARTERIOSCLEROTIC HEART DISEASE): ICD-10-CM

## 2024-07-22 DIAGNOSIS — I50.43 ACUTE ON CHRONIC COMBINED SYSTOLIC (CONGESTIVE) AND DIASTOLIC (CONGESTIVE) HEART FAILURE (HCC): Primary | ICD-10-CM

## 2024-07-22 DIAGNOSIS — I50.22 CHRONIC SYSTOLIC HEART FAILURE (HCC): ICD-10-CM

## 2024-07-22 DIAGNOSIS — I50.43 ACUTE ON CHRONIC COMBINED SYSTOLIC (CONGESTIVE) AND DIASTOLIC (CONGESTIVE) HEART FAILURE (HCC): ICD-10-CM

## 2024-07-22 LAB
BNP SERPL-MCNC: ABNORMAL PG/ML
INR BLD: 1.5

## 2024-07-22 PROCEDURE — 99212 OFFICE O/P EST SF 10 MIN: CPT

## 2024-07-22 PROCEDURE — 83880 ASSAY OF NATRIURETIC PEPTIDE: CPT

## 2024-07-22 PROCEDURE — 85610 PROTHROMBIN TIME: CPT

## 2024-07-22 PROCEDURE — 36415 COLL VENOUS BLD VENIPUNCTURE: CPT

## 2024-07-22 NOTE — PROGRESS NOTES
[facility]  Medication Management  ANTICOAGULATION    Referring Provider: Dr. Soliz     GOAL INR: 2 - 3     TODAY'S INR: 1.5     WARFARIN Dosage: Increase warfarin to 1 mg MWF and half tablet for 0.5 mg all other days.    INR (no units)   Date Value   07/15/2024 1.2   2024 1.4   2024 1.7   2024 1.6   2024 1.6   2024 1.6   2024 1.5       Hemoglobin   Date Value Ref Range Status   2024 9.7 (L) 13.0 - 17.0 g/dL Final     Hematocrit   Date Value Ref Range Status   2024 31.6 (L) 40.7 - 50.3 % Final     ALT   Date Value Ref Range Status   07/15/2024 40 5 - 41 U/L Final     AST   Date Value Ref Range Status   07/15/2024 49 (H) <40 U/L Final       Medication changes:  Stopped Amiodarone in 24    Notes:    Fingerstick INR drawn per clinic protocol. Patient states no visible blood in urine and no black tarry stool. Denies any missed doses of warfarin. No change in other maintenance medications or in diet. Will recheck INR in 1.5 weeks. Patient acknowledges working in consult agreement with pharmacist as referred by his/her physician.    Patient had a heart cath and has 3 blockages in his heart.   Patient is very SOB even at rest.    Patient continues to have BLE edema.   24 EF of 10 -15%     For Pharmacy Admin Tracking Only    Intervention Detail: Adherence Monitorin, Dose Adjustment: 1, reason: Therapy Optimization, and New Rx: 1, reason: Needs Additional Therapy  Total # of Interventions Recommended: 3  Total # of Interventions Accepted: 3  Time Spent (min): 20      Nahomy Mace RPH, PharmD

## 2024-07-22 NOTE — CARE COORDINATION
Care Transitions Note    Final Call     Patient Current Location:  Home: [address]  [facility] [address]    Care Transition Nurse contacted the patient by telephone. Verified name and  as identifiers.    Patient graduated from the Care Transitions program on 24.  Patient/family progressing towards self management. .      Advance Care Planning:   Does patient have an Advance Directive: reviewed and current.    Handoff:   Patient/family agreeable to [facility] outreach.      Care Summary Note: Spoke to Gene for final transitions call. Patient's Coumadin Clinic RN was attempting to refer pt to [facility] for INR checks d/t chronic SOB. Pt stated [facility] unable to do visits just for INR checks. Writer sent message to PCP regarding possible meter to perform INR self checks at home.     Patient completed BNP for Dr Soliz today, is awaiting results. Pt has Urology appt on . PSA lab wnl.     Continues to have bilateral pedal edema, wears compression socks daily. SOB is baseline.    Patient is agreeable to referral to [facility] Seble Jackson.     Assessments:  Care Transitions Subsequent and Final Call    Subsequent and Final Calls  Do you have any ongoing symptoms?: Yes  Onset of Patient-reported symptoms: Other  Patient-reported symptoms: Other  Interventions for patient-reported symptoms: Other  Have your medications changed?: No  Do you have any questions related to your medications?: No  Do you currently have any active services?: No  Are you currently active with any services?: Outpatient/Community Services  Do you have any needs or concerns that I can assist you with?: Yes  Patient-reported Needs or Concerns: would like to rest INR at home  Identified Barriers: Other  Care Transitions Interventions  Other Interventions:              Upcoming Appointments:    Future Appointments         Provider Specialty Dept Phone    2024 11:00 AM Hung Sequeira PA-C Urology [phone]    2024 10:00 AM MARISOL

## 2024-07-22 NOTE — CARE COORDINATION
Care Transitions Note    Follow Up Call     Attempted to reach patient for transitions of care follow up.  Unable to reach patient.      Outreach Attempts:   HIPAA compliant voicemail left for patient.     Care Summary Note: Unable to reach for final transitions call. Left message requesting call back.    Follow Up Appointment:   Future Appointments         Provider Specialty Dept Phone    7/24/2024 11:00 AM Hung Sequeira PA-C Urology [phone]    8/1/2024 10:00 AM [facility] ANTICOAGULATION CLINIC Pharmacy [phone]    8/5/2024 1:00 PM Young Mccloud MD Pulmonology [phone]    8/20/2024 5:05 AM SCHEDULE, [facility] Cardiology [phone]    8/20/2024 2:00 PM Figueroa Soliz MD Cardiology [phone]    8/29/2024 2:30 PM Key Garcia, APRN - CNP Gastroenterology [phone]    11/20/2024 1:30 PM Jefe Santos, APRN - CNP Pulmonology [phone]    11/26/2024 1:30 PM Jermaine Esparza MD Internal Medicine [phone]    5/27/2025 1:00 PM Jermaine Esparza MD Internal Medicine [phone]            Plan for follow-up on next business day.  based on severity of symptoms and risk factors. Plan for next call: symptom management-final call, hand off to ACM? C for INR?    Genesis Garcia RN

## 2024-07-23 ENCOUNTER — CARE COORDINATION (OUTPATIENT)
Dept: CARE COORDINATION | Age: 74
End: 2024-07-23

## 2024-07-23 NOTE — CARE COORDINATION
Ambulatory Care Coordination Note     2024 3:57 PM     Patient Current Location:  Home: [address] [facility] [address]     This patient was received as a referral from Care Transition Nurse.    ACM contacted the patient by telephone. Verified name and  with patient as identifiers. Provided introduction to self, and explanation of the ACM role.   Patient accepted care management services at this time.          ACM: Seble Jackson RN     Challenges to be reviewed by the provider   Additional needs identified to be addressed with provider No  none               Method of communication with provider: none.    Care Summary Note: PCP office in contact with patient and faxed over DME for PT/INR home monitor to [facility].     PCP/Specialist follow up:   Future Appointments         Provider Specialty Dept Phone    2024 11:00 AM Hung Sequeira PA-C Urology [phone]    2024 10:00 AM [facility] ANTICOAGULATION CLINIC Pharmacy [phone]    2024 1:00 PM Young Mccloud MD Pulmonology [phone]    2024 5:05 AM SCHEDULE, [facility] Cardiology [phone]    2024 2:00 PM Figueroa Soliz MD Cardiology [phone]    2024 2:30 PM Key Garcia, APRN - CNP Gastroenterology [phone]    2024 1:30 PM Jefe Santos, APRN - CNP Pulmonology [phone]    2024 1:30 PM Jermaine Esparza MD Internal Medicine [phone]    2025 1:00 PM Jermaine Esparza MD Internal Medicine [phone]            Follow Up:   Plan for next ACM outreach in approximately 1-2 days  to complete:  - PT/INR monitor. Hear from [facility]  .

## 2024-07-24 ENCOUNTER — HOSPITAL ENCOUNTER (OUTPATIENT)
Age: 74
Setting detail: SPECIMEN
Discharge: HOME | End: 2024-07-24
Payer: MEDICARE

## 2024-07-24 ENCOUNTER — OFFICE VISIT (OUTPATIENT)
Dept: UROLOGY | Age: 74
End: 2024-07-24
Payer: MEDICARE

## 2024-07-24 VITALS
DIASTOLIC BLOOD PRESSURE: 58 MMHG | SYSTOLIC BLOOD PRESSURE: 90 MMHG | HEART RATE: 73 BPM | BODY MASS INDEX: 22.5 KG/M2 | WEIGHT: 148 LBS | RESPIRATION RATE: 18 BRPM | OXYGEN SATURATION: 98 %

## 2024-07-24 DIAGNOSIS — R31.0 GROSS HEMATURIA: ICD-10-CM

## 2024-07-24 DIAGNOSIS — N20.0 RENAL STONE: ICD-10-CM

## 2024-07-24 DIAGNOSIS — Z12.5 SCREENING PSA (PROSTATE SPECIFIC ANTIGEN): Primary | ICD-10-CM

## 2024-07-24 LAB
BILIRUB UR QL STRIP: NEGATIVE
CLARITY UR: CLEAR
COLOR UR: YELLOW
EPI CELLS #/AREA URNS HPF: NORMAL /HPF (ref 0–5)
GLUCOSE UR STRIP-MCNC: NEGATIVE MG/DL
HGB UR QL STRIP.AUTO: NEGATIVE
KETONES UR STRIP-MCNC: NEGATIVE MG/DL
LEUKOCYTE ESTERASE UR QL STRIP: NEGATIVE
NITRITE UR QL STRIP: NEGATIVE
PH UR STRIP: 6 [PH] (ref 5–9)
PROT UR STRIP-MCNC: NEGATIVE MG/DL
RBC #/AREA URNS HPF: NORMAL /HPF (ref 0–2)
SP GR UR STRIP: 1.01 (ref 1.01–1.02)
UROBILINOGEN UR STRIP-ACNC: NORMAL EU/DL (ref 0–1)
WBC #/AREA URNS HPF: NORMAL /HPF (ref 0–5)

## 2024-07-24 PROCEDURE — 99214 OFFICE O/P EST MOD 30 MIN: CPT | Performed by: PHYSICIAN ASSISTANT

## 2024-07-24 PROCEDURE — 81001 URINALYSIS AUTO W/SCOPE: CPT

## 2024-07-24 PROCEDURE — 1123F ACP DISCUSS/DSCN MKR DOCD: CPT | Performed by: PHYSICIAN ASSISTANT

## 2024-07-24 PROCEDURE — G8420 CALC BMI NORM PARAMETERS: HCPCS | Performed by: PHYSICIAN ASSISTANT

## 2024-07-24 PROCEDURE — 3017F COLORECTAL CA SCREEN DOC REV: CPT | Performed by: PHYSICIAN ASSISTANT

## 2024-07-24 PROCEDURE — 4004F PT TOBACCO SCREEN RCVD TLK: CPT | Performed by: PHYSICIAN ASSISTANT

## 2024-07-24 PROCEDURE — G8427 DOCREV CUR MEDS BY ELIG CLIN: HCPCS | Performed by: PHYSICIAN ASSISTANT

## 2024-07-24 ASSESSMENT — ENCOUNTER SYMPTOMS
NAUSEA: 0
COUGH: 0
EYE REDNESS: 0
CONSTIPATION: 0
ABDOMINAL PAIN: 0
COLOR CHANGE: 0
WHEEZING: 0
BACK PAIN: 0
SHORTNESS OF BREATH: 0
VOMITING: 0

## 2024-07-24 NOTE — PROGRESS NOTES
HPI:      Patient is a 74 y.o. male in no acute distress.  He is alert and oriented to person, place, and time.       History  1988 ESWL and spontaneous stone passage     5/2021 referral from Dr. Esparza for gross hematuria.  He is a current smoker.  He does take Eliquis and Plavix.     6/2021 CT urogram showed a 5 mm proximal left ureteral stone with minimal hydronephrosis.  There is also an 11 mm left renal stone and punctate right renal stones.  No suspicious renal mass or filling defect. Screening PSA 1.17.     6/22/2021 left HLL    PSA  7/2024 - 0.7  11/2023 - 0.70  6/2023 - 0.84  6/2022 - 1.03  6/2021 - 1.17    Today:  Patient is here today for 1 year follow-up gross hematuria, stones, screening PSA.  We do see the patient for renal calculus.  We also see him for gross hematuria.  Patient to get a screening PSA.  Current value is 0.7. CT from 5/2024 independently reviewed showing bilateral renal calculi, largest 4mm.  No stone passage over the last year. No hematuria.     Past Medical History:   Diagnosis Date    Acute non-ST elevation myocardial infarction (NSTEMI) ([facility]) 12/24/2020    Chronic kidney disease     Coronary artery disease     s/p CABG x3 in 2008 and 2 stents on 12/24/2020    H/O echocardiogram 02/08/2021    EF 15-20% LV severly enlarged and LV wall thickness is normal Severe global hypokinesis with segmental abnormalities LA is mod dilated 34-39 with LA volume index of 34 ml/m2 Mild mitral regurg Mod tricupid regurg Mild to mod pulm HTN with est RV systolic pressure of 38 mmhg mod grade II diastolic dysfunciton    H/O echocardiogram 04/01/2021    EF 10-15% LA size was mildly dilated IVC sice is mildly dilated with reduced respiratory phasic changes CVP 5-10 mmHg    Hepatitis A     Hyperlipidemia     Kidney stone 06/2021    Smoker      Past Surgical History:   Procedure Laterality Date    BLADDER SURGERY Left 06/22/2021    CYSTOSCOPY STENT INSERTION performed by Sigifredo Ledezma MD at [facility] OR

## 2024-07-25 ENCOUNTER — TELEPHONE (OUTPATIENT)
Dept: UROLOGY | Age: 74
End: 2024-07-25

## 2024-07-25 NOTE — TELEPHONE ENCOUNTER
----- Message from Hung Sequeira PA-C sent at 7/25/2024 10:08 AM EDT -----  Please let him know there is no significant blood seen in his urine

## 2024-07-26 ENCOUNTER — CARE COORDINATION (OUTPATIENT)
Dept: CARE COORDINATION | Age: 74
End: 2024-07-26

## 2024-07-26 NOTE — CARE COORDINATION
Ambulatory Care Coordination Note     2024 12:09 PM     Patient Current Location:  Home: [address] [facility] [address]     This patient was received as a referral from Care Transition Nurse.    ACM contacted the patient by telephone. Verified name and  with patient as identifiers. Provided introduction to self, and explanation of the ACM role.   Patient accepted care management services at this time.          ACM: Seble Jackson RN     Challenges to be reviewed by the provider   Additional needs identified to be addressed with provider No  none               Method of communication with provider: none.    Care Summary Note: Spoke with Gene. States he is doing well today. Has not heard from [facility] about INR monitor yet- informed it can take few days to wait on insurance response. Will mail him a INR log to have to keep diary. Will check back next week to see if he has heard from [facility] or if ACM needs to call and follow up.     PCP/Specialist follow up:   Future Appointments         Provider Specialty Dept Phone    2024 10:00 AM [facility] ANTICOAGULATION CLINIC Pharmacy [phone]    2024 1:00 PM Young Mccloud MD Pulmonology [phone]    2024 5:05 AM SCHEDULE, [facility] Cardiology [phone]    2024 2:00 PM Figueroa Soliz MD Cardiology [phone]    2024 2:30 PM Key Garcia APRN - CNP Gastroenterology [phone]    2024 1:30 PM Jefe Santos, APRN - CNP Pulmonology [phone]    2024 1:30 PM Jermaine Esparza MD Internal Medicine [phone]    2025 1:00 PM Jermaine Esparza MD Internal Medicine [phone]            Follow Up:   Plan for next ACM outreach in approximately 1 week to complete:  - [facility]- monitor .   Patient  is agreeable to this plan.

## 2024-07-29 DIAGNOSIS — R06.02 SHORTNESS OF BREATH: ICD-10-CM

## 2024-07-29 DIAGNOSIS — I25.10 ASHD (ARTERIOSCLEROTIC HEART DISEASE): Primary | ICD-10-CM

## 2024-07-29 DIAGNOSIS — I48.0 PAROXYSMAL ATRIAL FIBRILLATION (HCC): ICD-10-CM

## 2024-07-29 RX ORDER — CLOPIDOGREL BISULFATE 75 MG/1
75 TABLET ORAL DAILY
Qty: 90 TABLET | Refills: 3 | Status: SHIPPED | OUTPATIENT
Start: 2024-07-29

## 2024-07-29 RX ORDER — METOPROLOL SUCCINATE 25 MG/1
25 TABLET, EXTENDED RELEASE ORAL NIGHTLY
Qty: 90 TABLET | Refills: 3 | Status: SHIPPED | OUTPATIENT
Start: 2024-07-29

## 2024-07-30 ENCOUNTER — CARE COORDINATION (OUTPATIENT)
Dept: CARE COORDINATION | Age: 74
End: 2024-07-30

## 2024-07-30 NOTE — CARE COORDINATION
Ambulatory Care Coordination Note     2024      Patient Current Location:  Home: [address] [facility] [address]     ACM contacted the patient by telephone. Verified name and  with patient as identifiers.         ACM: Seble Jackson RN     Challenges to be reviewed by the provider   Additional needs identified to be addressed with provider No  none               Method of communication with provider: none.    Care Summary Note: Incoming call from Gene, returning missed call earlier today. States he has not yet heard from [facility] about monitor. Agreeable to ACM following up with DME provider. Denied any questions/concerns today.     Call placed to [facility]. Voicemail left requesting call back to ACM.       PCP/Specialist follow up:   Future Appointments         Provider Specialty Dept Phone    2024 10:00 AM [facility] ANTICOAGULATION CLINIC Pharmacy [phone]    2024 1:00 PM Young Mccloud MD Pulmonology [phone]    2024 5:05 AM SCHEDULE, [facility] Cardiology [phone]    2024 2:00 PM Figueroa Soliz MD Cardiology [phone]    2024 2:30 PM Key Garcia, APRN - CNP Gastroenterology [phone]    2024 1:30 PM Jefe Santos, APRN - CNP Pulmonology [phone]    2024 1:30 PM Jermaine Esparza MD Internal Medicine [phone]    2025 1:00 PM Jermaine Esparza MD Internal Medicine [phone]            Follow Up:   Plan for next ACM outreach in approximately 1 week to complete:  -hear from [facility] about monitor?   - CHF assessment   Patient  is agreeable to this plan.

## 2024-07-31 ENCOUNTER — CARE COORDINATION (OUTPATIENT)
Dept: CARE COORDINATION | Age: 74
End: 2024-07-31

## 2024-08-05 ENCOUNTER — OFFICE VISIT (OUTPATIENT)
Dept: PULMONOLOGY | Age: 74
End: 2024-08-05
Payer: MEDICARE

## 2024-08-05 ENCOUNTER — ANTI-COAG VISIT (OUTPATIENT)
Age: 74
End: 2024-08-05
Payer: MEDICARE

## 2024-08-05 ENCOUNTER — CARE COORDINATION (OUTPATIENT)
Dept: CARE COORDINATION | Age: 74
End: 2024-08-05

## 2024-08-05 VITALS
SYSTOLIC BLOOD PRESSURE: 77 MMHG | HEIGHT: 68 IN | WEIGHT: 148 LBS | BODY MASS INDEX: 22.43 KG/M2 | RESPIRATION RATE: 16 BRPM | OXYGEN SATURATION: 96 % | TEMPERATURE: 96.4 F | DIASTOLIC BLOOD PRESSURE: 48 MMHG | HEART RATE: 76 BPM

## 2024-08-05 VITALS
BODY MASS INDEX: 22.96 KG/M2 | WEIGHT: 151 LBS | HEART RATE: 74 BPM | DIASTOLIC BLOOD PRESSURE: 50 MMHG | SYSTOLIC BLOOD PRESSURE: 76 MMHG

## 2024-08-05 DIAGNOSIS — Z95.1 S/P CABG X 3: ICD-10-CM

## 2024-08-05 DIAGNOSIS — I48.0 PAROXYSMAL ATRIAL FIBRILLATION (HCC): ICD-10-CM

## 2024-08-05 DIAGNOSIS — J44.9 CHRONIC OBSTRUCTIVE PULMONARY DISEASE, UNSPECIFIED COPD TYPE (HCC): ICD-10-CM

## 2024-08-05 DIAGNOSIS — F17.200 SMOKING: ICD-10-CM

## 2024-08-05 DIAGNOSIS — D64.9 ANEMIA, UNSPECIFIED TYPE: ICD-10-CM

## 2024-08-05 DIAGNOSIS — R91.8 MULTIPLE NODULES OF LUNG: ICD-10-CM

## 2024-08-05 DIAGNOSIS — R06.09 DYSPNEA ON EXERTION: Primary | ICD-10-CM

## 2024-08-05 DIAGNOSIS — I50.42 CHRONIC COMBINED SYSTOLIC (CONGESTIVE) AND DIASTOLIC (CONGESTIVE) HEART FAILURE (HCC): ICD-10-CM

## 2024-08-05 PROBLEM — I48.20 CHRONIC ATRIAL FIBRILLATION (HCC): Status: ACTIVE | Noted: 2022-01-07

## 2024-08-05 LAB — INR BLD: 1.3

## 2024-08-05 PROCEDURE — 3017F COLORECTAL CA SCREEN DOC REV: CPT | Performed by: INTERNAL MEDICINE

## 2024-08-05 PROCEDURE — G8427 DOCREV CUR MEDS BY ELIG CLIN: HCPCS | Performed by: INTERNAL MEDICINE

## 2024-08-05 PROCEDURE — 1123F ACP DISCUSS/DSCN MKR DOCD: CPT | Performed by: INTERNAL MEDICINE

## 2024-08-05 PROCEDURE — 99214 OFFICE O/P EST MOD 30 MIN: CPT | Performed by: INTERNAL MEDICINE

## 2024-08-05 PROCEDURE — 99212 OFFICE O/P EST SF 10 MIN: CPT

## 2024-08-05 PROCEDURE — 99213 OFFICE O/P EST LOW 20 MIN: CPT | Performed by: INTERNAL MEDICINE

## 2024-08-05 PROCEDURE — 4004F PT TOBACCO SCREEN RCVD TLK: CPT | Performed by: INTERNAL MEDICINE

## 2024-08-05 PROCEDURE — 85610 PROTHROMBIN TIME: CPT

## 2024-08-05 PROCEDURE — 3023F SPIROM DOC REV: CPT | Performed by: INTERNAL MEDICINE

## 2024-08-05 PROCEDURE — G8420 CALC BMI NORM PARAMETERS: HCPCS | Performed by: INTERNAL MEDICINE

## 2024-08-05 NOTE — PROGRESS NOTES
[facility]  Medication Management  ANTICOAGULATION    Referring Provider: Dr Soliz    GOAL INR: 2 - 3    TODAY'S INR: 1.3    WARFARIN Dosage: Increase warfarin to 0.5 mg Sun, Thur and 1 mg tablet all other days.    INR (no units)   Date Value   2024 1.5   07/15/2024 1.2   2024 1.4   2024 1.7   2024 1.6   2024 1.6   2024 1.6       Hemoglobin   Date Value Ref Range Status   2024 9.7 (L) 13.0 - 17.0 g/dL Final     Hematocrit   Date Value Ref Range Status   2024 31.6 (L) 40.7 - 50.3 % Final     ALT   Date Value Ref Range Status   07/15/2024 40 5 - 41 U/L Final     AST   Date Value Ref Range Status   07/15/2024 49 (H) <40 U/L Final       Medication changes:  none    Notes:    Fingerstick INR drawn per clinic protocol. Patient states no visible blood in urine and no black tarry stool. Denies any missed doses of warfarin. No change in other maintenance medications or in diet. Will recheck INR in 1.5 weeks. Patient acknowledges working in consult agreement with pharmacist as referred by his/her physician.                  For Pharmacy Admin Tracking Only    Intervention Detail: Adherence Monitorin and Dose Adjustment: 2, reason: Therapy Optimization  Total # of Interventions Recommended: 3  Total # of Interventions Accepted: 3  Time Spent (min): 20      Nahomy Mace RPH, PharmD

## 2024-08-05 NOTE — PROGRESS NOTES
PULMONARY outpatient progress note        REFERRED BY: Jermaine Esparza MD    REASON FOR CONSULTATION: Shortness of breath    Patient is being seen in follow-up for-  1. Dyspnea on exertion    2. Chronic combined systolic (congestive) and diastolic (congestive) heart failure (HCC)    3. Paroxysmal atrial fibrillation (HCC)    4. Anemia, unspecified type    5. S/P CABG x 3    6. Chronic obstructive pulmonary disease, unspecified COPD type ([facility])    7. Smoking    8. Multiple nodules of lung          HISTORY OF PRESENT ILLNESS:    Pascual Peters Jr. is a 74 y.o. year old male here for evaluation of above problem  Patient has class II-III shortness of breath associated with wheezing at bedtime  Decreases with drinking of water  There is also associated cough with mucoid sputum  No orthopnea, PND or increasing pedal edema  No chest pain or pressure  Patient is known to have anemia, review of records showing worsening anemia since November 2023  Basic metabolic panel without any kidney dysfunction or acidosis  Patient known to have severe chronic systolic congestive heart failure with an ejection fraction of 15 to 20% along with diastolic dysfunction also    Interval history:    Patient continues to have bilateral edema  Denied any shortness of breath or wheezing  Not much cough or sputum production  No hemoptysis  No orthopnea or PND  No chest pain or pressure  Patient continues to smoke  Denies any palpitations  Patient had a low blood pressure at this visit, asymptomatic  Patient on multiple medications for heart failure affecting his blood pressure  Patient will call cardiology and get the medications adjusted      PAST MEDICAL HISTORY:       Diagnosis Date    Acute non-ST elevation myocardial infarction (NSTEMI) ([facility]) 12/24/2020    Chronic kidney disease     Coronary artery disease     s/p CABG x3 in 2008 and 2 stents on 12/24/2020    H/O echocardiogram 02/08/2021    EF 15-20% LV severly enlarged and

## 2024-08-05 NOTE — CARE COORDINATION
Ambulatory Care Coordination Note     8/5/2024 3:39 PM     Call placed to [facility] to follow up on PT/INR home monitor as patient has yet to hear from company. Spoke with patient representative Tatiana. She attempted to search patient in system and was unable to find. Tatiana searched fax folder and did not find fax, stated they never received anything. Tatiana provided fax number to send DME order to and it was noted as same number PCP office faxed. Will ask PCP office to re-fax again to [phone].     Future Appointments   Date Time Provider Department Center   8/14/2024  1:40 PM [facility] ANTICOAGULATION CLINIC [facility] MED MGMT [place]   8/20/2024  5:05 AM SCHEDULE, [facility] TIFFIN CAR [facility] TIFF CARD [facility]   8/20/2024  2:00 PM Figueroa Soliz MD TIFF CARD TPP   8/29/2024  2:30 PM Key Garcia, APRN - CNP TIFF GI [facility]   11/26/2024  1:30 PM Jermaine Esparza MD [facility] C.[facility]   2/3/2025  1:30 PM Young Mccloud MD TIFF PULM [facility]   5/27/2025  1:00 PM Jermaine Esparza MD [facility] C.D. [facility] M       Care Coordination Plan of Care:   This nurse Care Coordinator will  - ask PCP office to re fax DME order   -plan outreach call to [facility] at end of week for follow up to confirm they received fax

## 2024-08-12 ENCOUNTER — CARE COORDINATION (OUTPATIENT)
Dept: CARE COORDINATION | Age: 74
End: 2024-08-12

## 2024-08-12 NOTE — CARE COORDINATION
Ambulatory Care Coordination Note     2024 2:15 PM     Patient Current Location:  Home: [address] [place] [address]     ACM contacted the patient by telephone. Verified name and  with patient as identifiers.         ACM: Seble Jackson RN     Challenges to be reviewed by the provider   Additional needs identified to be addressed with provider No  none               Method of communication with provider: none.    Care Summary Note: Spoke with Gene. Reports he is sleepy today but good. Had pulmonology follow up on  and coumadin clinic . DME order for PT/INR sent to [facility] as [facility] does not carry. Denied any new needs today.       PCP/Specialist follow up:   Future Appointments         Provider Specialty Dept Phone    2024 1:40 PM [facility] ANTICOAGULATION CLINIC Pharmacy [phone]    2024 5:05 AM SCHEDULE, [facility] MASON CAR [employer] Cardiology [phone]    2024 2:00 PM Figueroa Soliz MD Cardiology [phone]    2024 2:30 PM Key Garcia, APRN - CNP Gastroenterology [phone]    2024 1:30 PM Jermaine Esparza MD Internal Medicine [phone]    2/3/2025 1:30 PM Young Mccloud MD Pulmonology [phone]    2025 1:00 PM Jermaine Esparza MD Internal Medicine [phone]            Follow Up:   Plan for next ACM outreach in approximately 2 weeks to complete:  - goal progression  - education .   -weight  -edema?  Patient  is agreeable to this plan.

## 2024-08-14 ENCOUNTER — ANTI-COAG VISIT (OUTPATIENT)
Age: 74
End: 2024-08-14
Payer: MEDICARE

## 2024-08-14 DIAGNOSIS — I48.20 CHRONIC ATRIAL FIBRILLATION (HCC): Primary | ICD-10-CM

## 2024-08-14 LAB
INTERNATIONAL NORMALIZATION RATIO, POC: 1.4
PROTHROMBIN TIME, POC: 0

## 2024-08-14 PROCEDURE — 99212 OFFICE O/P EST SF 10 MIN: CPT

## 2024-08-14 PROCEDURE — 85610 PROTHROMBIN TIME: CPT

## 2024-08-14 NOTE — PROGRESS NOTES
[facility]  Medication Management  ANTICOAGULATION    Referring Provider: Dr Soliz    GOAL INR: 2 - 3    TODAY'S INR: 1.4    WARFARIN Dosage: Take warfarin 2 tablets for 2 mg today then increase warfarin to 1.5 tablets for 1.5 mg Sundays and 1 mg all other days.    INR (no units)   Date Value   2024 1.3   2024 1.5   07/15/2024 1.2   2024 1.4   2024 1.7   2024 1.6   2024 1.6     INR,(POC) (no units)   Date Value   2024 1.4       Hemoglobin   Date Value Ref Range Status   2024 9.7 (L) 13.0 - 17.0 g/dL Final     Hematocrit   Date Value Ref Range Status   2024 31.6 (L) 40.7 - 50.3 % Final     ALT   Date Value Ref Range Status   07/15/2024 40 5 - 41 U/L Final     AST   Date Value Ref Range Status   07/15/2024 49 (H) <40 U/L Final       Medication changes:  none    Notes:    Fingerstick INR drawn per clinic protocol. Patient states no visible blood in urine and no black tarry stool. Denies any missed doses of warfarin. No change in other maintenance medications or in diet. Will recheck INR in 2 weeks . Patient acknowledges working in consult agreement with pharmacist as referred by his/her physician.        Per care coordinator note 24 Seble HUFFMAN order for PT/INR sent to [facility] as [facility] does not carry. Denied any new needs today.     Gene was under the assumption that this clinic would be doing the warfarin dosing with his home INR checks.  I informed him that this [facility] clinic does not do home INR monitoring.            For Pharmacy Admin Tracking Only    Intervention Detail: Adherence Monitorin and Dose Adjustment: 2, reason: Therapy Optimization  Total # of Interventions Recommended: 3  Total # of Interventions Accepted: 3  Time Spent (min): 20      Nahomy Mace [facility], PharmD

## 2024-08-20 ENCOUNTER — HOSPITAL ENCOUNTER (OUTPATIENT)
Age: 74
Discharge: HOME | End: 2024-08-20
Payer: MEDICARE

## 2024-08-20 ENCOUNTER — NURSE ONLY (OUTPATIENT)
Dept: CARDIOLOGY | Age: 74
End: 2024-08-20

## 2024-08-20 ENCOUNTER — OFFICE VISIT (OUTPATIENT)
Dept: CARDIOLOGY | Age: 74
End: 2024-08-20

## 2024-08-20 VITALS
OXYGEN SATURATION: 96 % | HEART RATE: 71 BPM | HEIGHT: 68 IN | BODY MASS INDEX: 22.55 KG/M2 | WEIGHT: 148.8 LBS | SYSTOLIC BLOOD PRESSURE: 85 MMHG | RESPIRATION RATE: 16 BRPM | DIASTOLIC BLOOD PRESSURE: 46 MMHG

## 2024-08-20 DIAGNOSIS — I48.0 PAF (PAROXYSMAL ATRIAL FIBRILLATION) (HCC): ICD-10-CM

## 2024-08-20 DIAGNOSIS — I25.10 ASHD (ARTERIOSCLEROTIC HEART DISEASE): ICD-10-CM

## 2024-08-20 DIAGNOSIS — I25.5 ISCHEMIC CARDIOMYOPATHY: ICD-10-CM

## 2024-08-20 DIAGNOSIS — D63.8 ANEMIA OF CHRONIC DISEASE: ICD-10-CM

## 2024-08-20 DIAGNOSIS — I50.22 CHRONIC SYSTOLIC HEART FAILURE (HCC): ICD-10-CM

## 2024-08-20 DIAGNOSIS — I50.22 CHRONIC SYSTOLIC HEART FAILURE (HCC): Primary | ICD-10-CM

## 2024-08-20 DIAGNOSIS — Z95.810 ICD (IMPLANTABLE CARDIOVERTER-DEFIBRILLATOR) IN PLACE: ICD-10-CM

## 2024-08-20 DIAGNOSIS — Z95.1 S/P CABG X 3: ICD-10-CM

## 2024-08-20 DIAGNOSIS — I95.9 HYPOTENSION, UNSPECIFIED HYPOTENSION TYPE: ICD-10-CM

## 2024-08-20 LAB
ALBUMIN SERPL-MCNC: 3.7 G/DL (ref 3.5–5.2)
ALBUMIN/GLOB SERPL: 0.9 {RATIO} (ref 1–2.5)
ALP SERPL-CCNC: 105 U/L (ref 40–129)
ALT SERPL-CCNC: 18 U/L (ref 5–41)
ANION GAP SERPL CALCULATED.3IONS-SCNC: 14 MMOL/L (ref 9–17)
AST SERPL-CCNC: 34 U/L
BILIRUB SERPL-MCNC: 1.3 MG/DL (ref 0.3–1.2)
BUN SERPL-MCNC: 28 MG/DL (ref 8–23)
BUN/CREAT SERPL: 28 (ref 9–20)
CALCIUM SERPL-MCNC: 10 MG/DL (ref 8.6–10.4)
CHLORIDE SERPL-SCNC: 97 MMOL/L (ref 98–107)
CO2 SERPL-SCNC: 24 MMOL/L (ref 20–31)
CREAT SERPL-MCNC: 1 MG/DL (ref 0.7–1.2)
ERYTHROCYTE [DISTWIDTH] IN BLOOD BY AUTOMATED COUNT: 19.9 % (ref 11.8–14.4)
GFR, ESTIMATED: 79 ML/MIN/1.73M2
GLUCOSE SERPL-MCNC: 94 MG/DL (ref 70–99)
HCT VFR BLD AUTO: 35.1 % (ref 40.7–50.3)
HGB BLD-MCNC: 10.7 G/DL (ref 13–17)
MCH RBC QN AUTO: 26 PG (ref 25.2–33.5)
MCHC RBC AUTO-ENTMCNC: 30.5 G/DL (ref 28.4–34.8)
MCV RBC AUTO: 85.4 FL (ref 82.6–102.9)
NRBC BLD-RTO: 0 PER 100 WBC
PLATELET # BLD AUTO: 260 K/UL (ref 138–453)
PMV BLD AUTO: 9.1 FL (ref 8.1–13.5)
POTASSIUM SERPL-SCNC: 4.9 MMOL/L (ref 3.7–5.3)
PROT SERPL-MCNC: 7.8 G/DL (ref 6.4–8.3)
RBC # BLD AUTO: 4.11 M/UL (ref 4.21–5.77)
SODIUM SERPL-SCNC: 135 MMOL/L (ref 135–144)
WBC OTHER # BLD: 8.2 K/UL (ref 3.5–11.3)

## 2024-08-20 PROCEDURE — 85027 COMPLETE CBC AUTOMATED: CPT

## 2024-08-20 PROCEDURE — 80053 COMPREHEN METABOLIC PANEL: CPT

## 2024-08-20 PROCEDURE — 36415 COLL VENOUS BLD VENIPUNCTURE: CPT

## 2024-08-20 NOTE — PROGRESS NOTES
I, Shana Howell RN am scribing for and in the presence of Figueroa Soliz MD, MS, F.A.C.C..    Patient: Pascual Peters Jr.  : 1950  Date of Visit: 2024    REASON FOR VISIT / CONSULTATION: Congestive Heart Failure (Hx: CHF, CAD (CABG, PAF. Heart cath-Maximal management due to no good stenting options. Pt reports that he continues to have shortness of breath but has stayed the same since last visit. Denies Lightheadedness, dizziness, falls or near falls. Denies CP, Palpitations. )    History of Present Illness:        Dear Jermaine Esparza MD,     I had the pleasure of seeing Pascual Peters Jr. in my office today. Mr. Peters is a 74 y.o. male with a history of atherosclerotic heart disease. He also had a heart attack in . 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 He used to see a cardiologist in the past however he started to feel better and did not think he needed to follow up anymore with any doctor. Most recently lindy came into the ER on 2020 and had a NSTEMI and was transferred to [facility] in [place]. He did report that he actually started feeling \"weird\", no pain but just feels doom feeling on the Monday before Susan Paulina however he did not want to think this was a heart attack. He did report feeling about the same as he did in . He went home and went to bed than his breathing got worse and worse and that's when he came into the hospital. He then had a heart cath done on 2020 and had two stents placed REJI x 2 to SVG to posterior lateral branch of the RCA. An echocardiogram at that same time showed an EF of 15% and was sent home with a Life Vest. On 21 spironolactone was added and although he says his BP is usually low, less than 100 systolic, denies any known problems with the medication including any lightheadedness or dizziness.  He did not remember the last time he was told what his heart strength was he does not remember.

## 2024-08-21 ENCOUNTER — TELEPHONE (OUTPATIENT)
Dept: CARDIOLOGY | Age: 74
End: 2024-08-21

## 2024-08-21 NOTE — TELEPHONE ENCOUNTER
----- Message from Dr. Figueroa Soliz MD sent at 8/20/2024 10:48 PM EDT -----  Let Patient know test results actually do look a bit better than last check. Still a bit anemic but better. Continue current treatment. Follow up with Dr. Esparza as discussed.    Thanks.

## 2024-08-26 DIAGNOSIS — I25.5 ISCHEMIC CARDIOMYOPATHY: ICD-10-CM

## 2024-08-26 DIAGNOSIS — R42 DIZZINESS AND GIDDINESS: ICD-10-CM

## 2024-08-26 RX ORDER — MIDODRINE HYDROCHLORIDE 10 MG/1
10 TABLET ORAL
Qty: 270 TABLET | Refills: 3 | Status: SHIPPED | OUTPATIENT
Start: 2024-08-26 | End: 2025-08-21

## 2024-08-29 ENCOUNTER — ANTI-COAG VISIT (OUTPATIENT)
Age: 74
End: 2024-08-29
Payer: MEDICARE

## 2024-08-29 ENCOUNTER — OFFICE VISIT (OUTPATIENT)
Dept: GASTROENTEROLOGY | Age: 74
End: 2024-08-29
Payer: MEDICARE

## 2024-08-29 ENCOUNTER — CARE COORDINATION (OUTPATIENT)
Dept: CARE COORDINATION | Age: 74
End: 2024-08-29

## 2024-08-29 VITALS
OXYGEN SATURATION: 98 % | RESPIRATION RATE: 18 BRPM | SYSTOLIC BLOOD PRESSURE: 96 MMHG | DIASTOLIC BLOOD PRESSURE: 58 MMHG | WEIGHT: 148 LBS | HEART RATE: 76 BPM | BODY MASS INDEX: 22.5 KG/M2

## 2024-08-29 VITALS
DIASTOLIC BLOOD PRESSURE: 49 MMHG | SYSTOLIC BLOOD PRESSURE: 86 MMHG | HEART RATE: 81 BPM | WEIGHT: 148 LBS | BODY MASS INDEX: 22.5 KG/M2

## 2024-08-29 DIAGNOSIS — D64.9 ANEMIA, UNSPECIFIED TYPE: ICD-10-CM

## 2024-08-29 DIAGNOSIS — I48.20 CHRONIC ATRIAL FIBRILLATION (HCC): Primary | ICD-10-CM

## 2024-08-29 DIAGNOSIS — R19.5 POSITIVE FIT (FECAL IMMUNOCHEMICAL TEST): Primary | ICD-10-CM

## 2024-08-29 LAB
INTERNATIONAL NORMALIZATION RATIO, POC: 1.4
PROTHROMBIN TIME, POC: 0

## 2024-08-29 PROCEDURE — 85610 PROTHROMBIN TIME: CPT

## 2024-08-29 PROCEDURE — 4004F PT TOBACCO SCREEN RCVD TLK: CPT | Performed by: NURSE PRACTITIONER

## 2024-08-29 PROCEDURE — G8420 CALC BMI NORM PARAMETERS: HCPCS | Performed by: NURSE PRACTITIONER

## 2024-08-29 PROCEDURE — 1123F ACP DISCUSS/DSCN MKR DOCD: CPT | Performed by: NURSE PRACTITIONER

## 2024-08-29 PROCEDURE — 99212 OFFICE O/P EST SF 10 MIN: CPT

## 2024-08-29 PROCEDURE — 99213 OFFICE O/P EST LOW 20 MIN: CPT | Performed by: NURSE PRACTITIONER

## 2024-08-29 PROCEDURE — 3017F COLORECTAL CA SCREEN DOC REV: CPT | Performed by: NURSE PRACTITIONER

## 2024-08-29 PROCEDURE — G8427 DOCREV CUR MEDS BY ELIG CLIN: HCPCS | Performed by: NURSE PRACTITIONER

## 2024-08-29 ASSESSMENT — ENCOUNTER SYMPTOMS
COUGH: 1
ABDOMINAL PAIN: 0
SHORTNESS OF BREATH: 0
COLOR CHANGE: 0
BLOOD IN STOOL: 0
WHEEZING: 0
ABDOMINAL DISTENTION: 0
ANAL BLEEDING: 0

## 2024-08-29 NOTE — PROGRESS NOTES
[address]  [address]  [place] [address]    Chief Complaint   Patient presents with    Follow Up After Procedure     Patient presents for follow up for melena, anemia, and positive fit. Pt denies rectal bleeding at this time, no abd pain, and normal BM with no issues or concerns.          HPI Pascual Peters Jr. is a 74 y.o. old male who has a past medical history of CAD with history of MI s/p cardiac stents in 2020 and defibrillator in 2021, atrial fibrillation, hyperlipidemia, tobacco use initially presented 3/2024 as a new GI referral with concerns for positive Fit Fecal Test and anemia.  Today complains of SOB with walking.  According to Gene, he seen his cardiologist prior to this visit, he is planned to have a cardiac stress test and a cardiac echo done.  He endorses that he had a chest x-ray today.  He is a current cigarette smoker, endorses as he is trying to quit and currently smoking 7 to 8 cigarettes a day.     Family history of colon cancer: No  Blood in stool: No  Unintentional weight loss: No  Abdominal pain: No  Prior colonoscopy: No  Constipation history: No  Number of bowel movements a day: 1-2  Change in stool caliber: No  History of GERD or Acid Reflux: No  Use of PPI's. No    Interval history 8/29/24:  Follow-up OV anemia and history of a positive FIT.  Stools are tan and regular.  No constipation.  No GERD or acid.  Pascual endorses that his is not interested in a colonoscopy as it is \"the lowest on my list\".  Pascual voices that he continues to \"hold water.  Can't put stents in my heart because I won't get back off the table\".    Last cardiac cath completed 6/27/2024 and angioplasty and or stenting not advisable.     Pascual voices that he had some issues with his liver after his eyes \"turned yellow\".  He denies abdominal pain, N/V. On further work up via his PCP, he was found to have elevated LFT (5/17/24) with  AST of 141 , ALT of 213.  LFT have trended down on 8/20/2024 recheck with a

## 2024-08-29 NOTE — PATIENT INSTRUCTIONS
SURVEY:    You may be receiving a survey from [facility] regarding your visit today.    You may get this in the mail, through your MyChart, or in your email.     Please complete the survey to enable us to provide the highest quality of care to you and your family.    If you cannot score us a very good (5 Stars) on any question, please call the office to discuss how we could of made your experience exceptional.    Thank you!    MD Dr. Alba Valadez MD Wendy Williams, LOCO-AMANDA Hernandez LPN Brenda Boehler, LPN Jena Adams, MA    Phone: [phone]  Fax: [phone]    Office Hours:   M-TH 8-5, F: 8-12

## 2024-08-29 NOTE — CARE COORDINATION
Ambulatory Care Coordination Note     8/29/2024 2:29 PM     Call placed to [facility]. Spoke with representative who voiced she is not the normal person who works this department, that person if off today. She is unable to find anything. Rep took ACM name and phone number and will call ACM back when she knows more.     Chart reviewed: noted patient attended Med management- coumadin clinic today     Future Appointments   Date Time Provider Department Center   9/12/2024  1:00 PM [facility] ANTICOAGULATION CLINIC [facility] MED MGMT [place]   9/24/2024  4:45 AM SCHEDULE, [email or web address] TIFFIN CAR [facility]TRONIC TIFF CARD [facility]   11/19/2024  1:00 PM Figueroa Soliz MD TIFF CARD [facility]   11/26/2024  1:30 PM Jermaine Esparza MD [facility]   2/3/2025  1:30 PM [facility]   5/27/2025  1:00 PM Jermaine Esparza MD [facility] C[facility]     Care Coordination Plan of Care:   This nurse Care Coordinator will  - await return call from [facility] with update on home PT/INR monitor  -update patient

## 2024-08-29 NOTE — PROGRESS NOTES
[facility]  Medication Management  ANTICOAGULATION    Referring Provider: Dr Soliz     GOAL INR: 2 - 3     TODAY'S INR: 1.4     WARFARIN Dosage: Take warfarin 2 tablets for 2 mg today and increase warfarin to 2 tablets for 2 mg Sun, Fri and 1 mg all other days.    INR (no units)   Date Value   2024 1.3   2024 1.5   07/15/2024 1.2   2024 1.4   2024 1.7   2024 1.6   2024 1.6     INR,(POC) (no units)   Date Value   2024 1.4       Hemoglobin   Date Value Ref Range Status   2024 10.7 (L) 13.0 - 17.0 g/dL Final     Hematocrit   Date Value Ref Range Status   2024 35.1 (L) 40.7 - 50.3 % Final     ALT   Date Value Ref Range Status   2024 18 5 - 41 U/L Final     AST   Date Value Ref Range Status   2024 34 <40 U/L Final       Medication changes:  none    Notes:    Fingerstick INR drawn per clinic protocol. Patient states no visible blood in urine and no black tarry stool. Denies any missed doses of warfarin. No change in other maintenance medications or in diet. Will recheck INR in 2 weeks. Patient acknowledges working in consult agreement with pharmacist as referred by his/her physician.                  For Pharmacy Admin Tracking Only    Intervention Detail: Adherence Monitorin and Dose Adjustment: 2, reason: Therapy Optimization  Total # of Interventions Recommended: 3  Total # of Interventions Accepted: 3  Time Spent (min): 20      Nahomy Mace [facility], PharmD

## 2024-08-30 NOTE — CARE COORDINATION
Spoke with Lizzy Motley with [facility]. States that Pascual was actually scheduled to be set up with his home PT/INR device last week however Gene called and cancelled. Lizzy states she called Pascual back to confirm his cancellation anD Gene told her that he no longer wishes to have this device. Lizzy states she actually stopped into PCP office yesterday and spoke with Negar to let her know as well.     Future Appointments   Date Time Provider Department [place]   9/12/2024  1:00 PM [facility] ANTICOAGULATION CLINIC [facility] MED MGMT [place]   9/24/2024  4:45 AM SCHEDULE, [facility] TIFFIN CAR MEDTRONIC TIFF CARD [facility]   11/19/2024  1:00 PM Figueroa Soliz MD TIFF CARD [facility]   11/26/2024  1:30 PM Jermaine Esparza MD [facility]   2/3/2025  1:30 PM [facility]   5/27/2025  1:00 PM Jermaine Esparza MD [facility]

## 2024-09-09 DIAGNOSIS — I25.5 ISCHEMIC CARDIOMYOPATHY: Primary | ICD-10-CM

## 2024-09-09 DIAGNOSIS — I48.0 PAF (PAROXYSMAL ATRIAL FIBRILLATION) (HCC): ICD-10-CM

## 2024-09-09 RX ORDER — WARFARIN SODIUM 1 MG/1
1 TABLET ORAL DAILY
Qty: 90 TABLET | Refills: 3 | Status: SHIPPED | OUTPATIENT
Start: 2024-09-09

## 2024-09-10 ENCOUNTER — CARE COORDINATION (OUTPATIENT)
Dept: CARE COORDINATION | Age: 74
End: 2024-09-10

## 2024-09-11 DIAGNOSIS — I48.0 PAF (PAROXYSMAL ATRIAL FIBRILLATION) (HCC): Primary | ICD-10-CM

## 2024-09-12 ENCOUNTER — ANTI-COAG VISIT (OUTPATIENT)
Age: 74
End: 2024-09-12
Payer: MEDICARE

## 2024-09-12 VITALS
HEART RATE: 80 BPM | BODY MASS INDEX: 23.11 KG/M2 | DIASTOLIC BLOOD PRESSURE: 62 MMHG | WEIGHT: 152 LBS | SYSTOLIC BLOOD PRESSURE: 99 MMHG

## 2024-09-12 DIAGNOSIS — I48.20 CHRONIC ATRIAL FIBRILLATION (HCC): Primary | ICD-10-CM

## 2024-09-12 LAB
INTERNATIONAL NORMALIZATION RATIO, POC: 1.6
PROTHROMBIN TIME, POC: 0

## 2024-09-12 PROCEDURE — 99212 OFFICE O/P EST SF 10 MIN: CPT

## 2024-09-12 PROCEDURE — 85610 PROTHROMBIN TIME: CPT

## 2024-09-27 ENCOUNTER — ANTI-COAG VISIT (OUTPATIENT)
Age: 74
End: 2024-09-27
Payer: MEDICARE

## 2024-09-27 VITALS
BODY MASS INDEX: 22.96 KG/M2 | WEIGHT: 151 LBS | SYSTOLIC BLOOD PRESSURE: 95 MMHG | HEART RATE: 74 BPM | DIASTOLIC BLOOD PRESSURE: 54 MMHG

## 2024-09-27 DIAGNOSIS — I48.20 CHRONIC ATRIAL FIBRILLATION (HCC): Primary | ICD-10-CM

## 2024-09-27 LAB
INTERNATIONAL NORMALIZATION RATIO, POC: 1.8
PROTHROMBIN TIME, POC: 0

## 2024-09-27 PROCEDURE — 99212 OFFICE O/P EST SF 10 MIN: CPT | Performed by: COUNSELOR

## 2024-09-27 PROCEDURE — 85610 PROTHROMBIN TIME: CPT | Performed by: COUNSELOR

## 2024-09-27 RX ORDER — WARFARIN SODIUM 2 MG/1
2 TABLET ORAL SEE ADMIN INSTRUCTIONS
Qty: 90 TABLET | Refills: 2 | Status: SHIPPED | OUTPATIENT
Start: 2024-09-27

## 2024-09-27 RX ORDER — WARFARIN SODIUM 2 MG/1
2 TABLET ORAL
COMMUNITY
End: 2024-09-27 | Stop reason: SDUPTHER

## 2024-10-11 ENCOUNTER — APPOINTMENT (OUTPATIENT)
Age: 74
End: 2024-10-11
Payer: MEDICARE

## 2024-10-16 ENCOUNTER — ANTI-COAG VISIT (OUTPATIENT)
Age: 74
End: 2024-10-16
Payer: MEDICARE

## 2024-10-16 VITALS
BODY MASS INDEX: 23.42 KG/M2 | HEART RATE: 90 BPM | SYSTOLIC BLOOD PRESSURE: 110 MMHG | DIASTOLIC BLOOD PRESSURE: 63 MMHG | WEIGHT: 154 LBS

## 2024-10-16 DIAGNOSIS — I48.20 CHRONIC ATRIAL FIBRILLATION (HCC): Primary | ICD-10-CM

## 2024-10-16 LAB
INTERNATIONAL NORMALIZATION RATIO, POC: 1.6
PROTHROMBIN TIME, POC: 0

## 2024-10-16 PROCEDURE — 99212 OFFICE O/P EST SF 10 MIN: CPT

## 2024-10-16 PROCEDURE — 85610 PROTHROMBIN TIME: CPT

## 2024-10-16 NOTE — PROGRESS NOTES
[facility]  Medication Management  ANTICOAGULATION    Referring Provider: Dr. Soliz     GOAL INR: 2 - 3     TODAY'S INR: 1.6     WARFARIN Dosage: Take warfarin 1.5 tablet for 3 mg today then increase warfarin to 2 mg daily.    INR (no units)   Date Value   2024 1.3   2024 1.5   07/15/2024 1.2   2024 1.4   2024 1.7   2024 1.6   2024 1.6     INR,(POC) (no units)   Date Value   2024 1.8   2024 1.6   2024 1.4   2024 1.4       Hemoglobin   Date Value Ref Range Status   2024 10.7 (L) 13.0 - 17.0 g/dL Final     Hematocrit   Date Value Ref Range Status   2024 35.1 (L) 40.7 - 50.3 % Final     ALT   Date Value Ref Range Status   2024 18 5 - 41 U/L Final     AST   Date Value Ref Range Status   2024 34 <40 U/L Final       Medication changes:  none    Notes:    Fingerstick INR drawn per clinic protocol. Patient states no visible blood in urine and no black tarry stool. Denies any missed doses of warfarin. No change in other maintenance medications or in diet. Will recheck INR in 2 weeks. Patient acknowledges working in consult agreement with pharmacist as referred by his/her physician.                  For Pharmacy Admin Tracking Only    Intervention Detail: Adherence Monitorin and Dose Adjustment: 2, reason: Therapy Optimization  Total # of Interventions Recommended: 3  Total # of Interventions Accepted: 3  Time Spent (min): 20      Nahomy Mace RP, PharmD

## 2024-10-29 ENCOUNTER — NURSE ONLY (OUTPATIENT)
Dept: CARDIOLOGY | Age: 74
End: 2024-10-29

## 2024-10-29 DIAGNOSIS — I50.22 CHRONIC SYSTOLIC HEART FAILURE (HCC): Primary | ICD-10-CM

## 2024-10-31 ENCOUNTER — ANTI-COAG VISIT (OUTPATIENT)
Age: 74
End: 2024-10-31
Payer: MEDICARE

## 2024-10-31 VITALS
HEART RATE: 84 BPM | SYSTOLIC BLOOD PRESSURE: 102 MMHG | BODY MASS INDEX: 23.87 KG/M2 | DIASTOLIC BLOOD PRESSURE: 58 MMHG | WEIGHT: 157 LBS

## 2024-10-31 DIAGNOSIS — I48.91 ATRIAL FIBRILLATION, UNSPECIFIED TYPE (HCC): Primary | ICD-10-CM

## 2024-10-31 DIAGNOSIS — I48.20 CHRONIC ATRIAL FIBRILLATION (HCC): ICD-10-CM

## 2024-10-31 LAB
INTERNATIONAL NORMALIZATION RATIO, POC: 1.9
PROTHROMBIN TIME, POC: 0

## 2024-10-31 PROCEDURE — 99212 OFFICE O/P EST SF 10 MIN: CPT

## 2024-10-31 PROCEDURE — 85610 PROTHROMBIN TIME: CPT

## 2024-10-31 NOTE — PROGRESS NOTES
[facility]  Medication Management  ANTICOAGULATION    Referring Provider: Dr. Soliz     GOAL INR: 2 - 3     TODAY'S INR: 1.9     WARFARIN Dosage: Warfarin 1.5 tablets (3 mg) today, then increase to Warfarin 1.5 tablets (3 mg) on  and 1 tablet (2 mg) all other days of the week.      INR (no units)   Date Value   2024 1.3   2024 1.5   07/15/2024 1.2   2024 1.4   2024 1.7   2024 1.6   2024 1.6     INR,(POC) (no units)   Date Value   10/16/2024 1.6   2024 1.8   2024 1.6   2024 1.4   2024 1.4       Hemoglobin   Date Value Ref Range Status   2024 10.7 (L) 13.0 - 17.0 g/dL Final     Hematocrit   Date Value Ref Range Status   2024 35.1 (L) 40.7 - 50.3 % Final     ALT   Date Value Ref Range Status   2024 18 5 - 41 U/L Final     AST   Date Value Ref Range Status   2024 34 <40 U/L Final       Medication changes:  none    Notes:    Fingerstick INR drawn per clinic protocol. Patient states no visible blood in urine and no black tarry stool. Denies any missed doses of warfarin. No change in other maintenance medications or in diet. Will recheck INR in 2 weeks. Patient acknowledges working in consult agreement with pharmacist as referred by his/her physician.                  For Pharmacy Admin Tracking Only    Intervention Detail: Adherence Monitorin and Dose Adjustment: 1, reason: Therapy Optimization  Total # of Interventions Recommended: 2  Total # of Interventions Accepted: 2  Time Spent (min): 20      Addis Pabon RPH, PharmD

## 2024-11-14 ENCOUNTER — APPOINTMENT (OUTPATIENT)
Age: 74
End: 2024-11-14
Payer: MEDICARE

## 2024-11-19 ENCOUNTER — OFFICE VISIT (OUTPATIENT)
Dept: CARDIOLOGY | Age: 74
End: 2024-11-19
Payer: MEDICARE

## 2024-11-19 VITALS
OXYGEN SATURATION: 99 % | HEIGHT: 68 IN | WEIGHT: 172 LBS | RESPIRATION RATE: 18 BRPM | HEART RATE: 90 BPM | SYSTOLIC BLOOD PRESSURE: 92 MMHG | BODY MASS INDEX: 26.07 KG/M2 | DIASTOLIC BLOOD PRESSURE: 58 MMHG

## 2024-11-19 DIAGNOSIS — D63.8 ANEMIA OF CHRONIC DISEASE: ICD-10-CM

## 2024-11-19 DIAGNOSIS — E78.2 MIXED HYPERLIPIDEMIA: ICD-10-CM

## 2024-11-19 DIAGNOSIS — Z95.810 ICD (IMPLANTABLE CARDIOVERTER-DEFIBRILLATOR) IN PLACE: ICD-10-CM

## 2024-11-19 DIAGNOSIS — I25.5 ISCHEMIC CARDIOMYOPATHY: ICD-10-CM

## 2024-11-19 DIAGNOSIS — Z95.1 S/P CABG X 3: ICD-10-CM

## 2024-11-19 DIAGNOSIS — I48.0 PAF (PAROXYSMAL ATRIAL FIBRILLATION) (HCC): ICD-10-CM

## 2024-11-19 DIAGNOSIS — I25.10 ASHD (ARTERIOSCLEROTIC HEART DISEASE): ICD-10-CM

## 2024-11-19 DIAGNOSIS — R06.02 SHORTNESS OF BREATH: ICD-10-CM

## 2024-11-19 DIAGNOSIS — Z71.6 TOBACCO ABUSE COUNSELING: ICD-10-CM

## 2024-11-19 DIAGNOSIS — I50.22 CHRONIC SYSTOLIC HEART FAILURE (HCC): Primary | ICD-10-CM

## 2024-11-19 PROCEDURE — G8427 DOCREV CUR MEDS BY ELIG CLIN: HCPCS | Performed by: FAMILY MEDICINE

## 2024-11-19 PROCEDURE — 4004F PT TOBACCO SCREEN RCVD TLK: CPT | Performed by: FAMILY MEDICINE

## 2024-11-19 PROCEDURE — 99211 OFF/OP EST MAY X REQ PHY/QHP: CPT | Performed by: FAMILY MEDICINE

## 2024-11-19 PROCEDURE — G8419 CALC BMI OUT NRM PARAM NOF/U: HCPCS | Performed by: FAMILY MEDICINE

## 2024-11-19 PROCEDURE — 99215 OFFICE O/P EST HI 40 MIN: CPT | Performed by: FAMILY MEDICINE

## 2024-11-19 PROCEDURE — G8484 FLU IMMUNIZE NO ADMIN: HCPCS | Performed by: FAMILY MEDICINE

## 2024-11-19 PROCEDURE — 3017F COLORECTAL CA SCREEN DOC REV: CPT | Performed by: FAMILY MEDICINE

## 2024-11-19 PROCEDURE — 1159F MED LIST DOCD IN RCRD: CPT | Performed by: FAMILY MEDICINE

## 2024-11-19 PROCEDURE — 1123F ACP DISCUSS/DSCN MKR DOCD: CPT | Performed by: FAMILY MEDICINE

## 2024-11-19 RX ORDER — DAPAGLIFLOZIN 10 MG/1
10 TABLET, FILM COATED ORAL EVERY MORNING
Qty: 30 TABLET | Refills: 0 | Status: SHIPPED | OUTPATIENT
Start: 2024-11-19

## 2024-11-19 RX ORDER — DAPAGLIFLOZIN 10 MG/1
10 TABLET, FILM COATED ORAL EVERY MORNING
Qty: 90 TABLET | Refills: 3 | Status: SHIPPED | OUTPATIENT
Start: 2024-11-19

## 2024-11-19 RX ORDER — DAPAGLIFLOZIN 10 MG/1
10 TABLET, FILM COATED ORAL EVERY MORNING
Qty: 14 TABLET | Refills: 0 | Status: SHIPPED | COMMUNITY
Start: 2024-11-19

## 2024-11-19 NOTE — PROGRESS NOTES
at [facility]    CORONARY ARTERY BYPASS GRAFT  2008    x 3 vessels    CYSTOSCOPY Left 06/22/2021    CYSTOSCOPY URETEROSCOPY LASER performed by Sigifredo Ledezma MD at [facility] OR    CYSTOURETHROSCOPY/STENT REMOVAL Left 06/22/2021    VASCULAR SURGERY      cabg harvests    Social History:  Social History     Tobacco Use    Smoking status: Every Day     Current packs/day: 0.30     Average packs/day: 1 pack/day for 56.9 years (56.4 ttl pk-yrs)     Types: Cigarettes     Start date: 1/1/1968    Smokeless tobacco: Never   Vaping Use    Vaping status: Never Used   Substance Use Topics    Alcohol use: Not Currently    Drug use: Never        CURRENT MEDICATIONS:        Outpatient Medications Marked as Taking for the 11/19/24 encounter (Office Visit) with Figueroa Soliz MD   Medication Sig Dispense Refill    warfarin (COUMADIN) 2 MG tablet Take 1 tablet by mouth See Admin Instructions Coumadin Clinic:  0.5 tablet (=1 mg) po every Monday and Friday and 1 tablet (=2 mg) po all other days (Sun-Tues-Thurs-Sat) (Patient taking differently: Take 1 tablet by mouth See Admin Instructions Coumadin Clinic:  3 mg (1.5 tablets) on Fridays and 2 mg (1 tablet)  all other days) 90 tablet 2    warfarin (COUMADIN) 1 MG tablet Take 1 tablet by mouth daily Coumadin Clinic: 1 mg po daily with an additional; 0.5 mg on Sundays 90 tablet 3    midodrine (PROAMATINE) 10 MG tablet Take 1 tablet by mouth 3 times daily (with meals) 270 tablet 3    clopidogrel (PLAVIX) 75 MG tablet Take 1 tablet by mouth daily 90 tablet 3    metoprolol succinate (TOPROL XL) 25 MG extended release tablet Take 1 tablet by mouth nightly 90 tablet 3    bumetanide (BUMEX) 1 MG tablet Take 1 tablet by mouth daily 90 tablet 3    spironolactone (ALDACTONE) 25 MG tablet Take 1 tablet by mouth daily 90 tablet 3    albuterol sulfate HFA (VENTOLIN HFA) 108 (90 Base) MCG/ACT inhaler Inhale 2 puffs into the lungs 4 times daily as needed for Wheezing 18 g 5    ramipril (ALTACE) 5 MG

## 2024-11-21 ENCOUNTER — HOSPITAL ENCOUNTER (OUTPATIENT)
Age: 74
Discharge: HOME | End: 2024-11-21
Payer: MEDICARE

## 2024-11-21 ENCOUNTER — ANTI-COAG VISIT (OUTPATIENT)
Age: 74
End: 2024-11-21
Payer: MEDICARE

## 2024-11-21 VITALS
HEART RATE: 94 BPM | BODY MASS INDEX: 25.7 KG/M2 | SYSTOLIC BLOOD PRESSURE: 107 MMHG | WEIGHT: 169 LBS | DIASTOLIC BLOOD PRESSURE: 61 MMHG

## 2024-11-21 DIAGNOSIS — E78.2 MIXED HYPERLIPIDEMIA: ICD-10-CM

## 2024-11-21 DIAGNOSIS — R73.01 IMPAIRED FASTING GLUCOSE: ICD-10-CM

## 2024-11-21 DIAGNOSIS — I48.20 CHRONIC ATRIAL FIBRILLATION (HCC): Primary | ICD-10-CM

## 2024-11-21 LAB
ALBUMIN SERPL-MCNC: 3.5 G/DL (ref 3.5–5.2)
ALBUMIN/GLOB SERPL: 1 {RATIO} (ref 1–2.5)
ALP SERPL-CCNC: 96 U/L (ref 40–129)
ALT SERPL-CCNC: 15 U/L (ref 10–50)
ANION GAP SERPL CALCULATED.3IONS-SCNC: 11 MMOL/L (ref 9–16)
AST SERPL-CCNC: 33 U/L (ref 10–50)
BASOPHILS # BLD: 0.06 K/UL (ref 0–0.2)
BASOPHILS NFR BLD: 1 % (ref 0–2)
BILIRUB SERPL-MCNC: 1.3 MG/DL (ref 0–1.2)
BUN SERPL-MCNC: 23 MG/DL (ref 8–23)
BUN/CREAT SERPL: 19 (ref 9–20)
CALCIUM SERPL-MCNC: 9.1 MG/DL (ref 8.6–10.4)
CHLORIDE SERPL-SCNC: 98 MMOL/L (ref 98–107)
CHOLEST SERPL-MCNC: 137 MG/DL (ref 0–199)
CHOLESTEROL/HDL RATIO: 6
CO2 SERPL-SCNC: 26 MMOL/L (ref 20–31)
CREAT SERPL-MCNC: 1.2 MG/DL (ref 0.7–1.2)
EOSINOPHIL # BLD: 0 K/UL (ref 0–0.44)
EOSINOPHILS RELATIVE PERCENT: 0 % (ref 1–4)
ERYTHROCYTE [DISTWIDTH] IN BLOOD BY AUTOMATED COUNT: 20.2 % (ref 11.8–14.4)
EST. AVERAGE GLUCOSE BLD GHB EST-MCNC: 146 MG/DL
GFR, ESTIMATED: 67 ML/MIN/1.73M2
GLUCOSE SERPL-MCNC: 126 MG/DL (ref 74–99)
HBA1C MFR BLD: 6.7 % (ref 4–6)
HCT VFR BLD AUTO: 36.3 % (ref 40.7–50.3)
HDLC SERPL-MCNC: 23 MG/DL
HGB BLD-MCNC: 10.6 G/DL (ref 13–17)
IMM GRANULOCYTES # BLD AUTO: 0 K/UL (ref 0–0.3)
IMM GRANULOCYTES NFR BLD: 0 %
INTERNATIONAL NORMALIZATION RATIO, POC: 2.4
LDLC SERPL CALC-MCNC: 104 MG/DL (ref 0–100)
LYMPHOCYTES NFR BLD: 0.79 K/UL (ref 1.1–3.7)
LYMPHOCYTES RELATIVE PERCENT: 13 % (ref 24–43)
MCH RBC QN AUTO: 25.2 PG (ref 25.2–33.5)
MCHC RBC AUTO-ENTMCNC: 29.2 G/DL (ref 28.4–34.8)
MCV RBC AUTO: 86.2 FL (ref 82.6–102.9)
MONOCYTES NFR BLD: 0.73 K/UL (ref 0.1–1.2)
MONOCYTES NFR BLD: 12 % (ref 3–12)
MORPHOLOGY: ABNORMAL
NEUTROPHILS NFR BLD: 74 % (ref 36–65)
NEUTS SEG NFR BLD: 4.52 K/UL (ref 1.5–8.1)
NRBC BLD-RTO: 0 PER 100 WBC
PLATELET # BLD AUTO: 242 K/UL (ref 138–453)
PMV BLD AUTO: 9.1 FL (ref 8.1–13.5)
POTASSIUM SERPL-SCNC: 3.4 MMOL/L (ref 3.7–5.3)
PROT SERPL-MCNC: 7.1 G/DL (ref 6.6–8.7)
PROTHROMBIN TIME, POC: 0
RBC # BLD AUTO: 4.21 M/UL (ref 4.21–5.77)
SODIUM SERPL-SCNC: 135 MMOL/L (ref 136–145)
TRIGL SERPL-MCNC: 49 MG/DL
VLDLC SERPL CALC-MCNC: 10 MG/DL (ref 1–30)
WBC OTHER # BLD: 6.1 K/UL (ref 3.5–11.3)

## 2024-11-21 PROCEDURE — 99211 OFF/OP EST MAY X REQ PHY/QHP: CPT

## 2024-11-21 PROCEDURE — 85610 PROTHROMBIN TIME: CPT

## 2024-11-21 PROCEDURE — 80053 COMPREHEN METABOLIC PANEL: CPT

## 2024-11-21 PROCEDURE — 36415 COLL VENOUS BLD VENIPUNCTURE: CPT

## 2024-11-21 PROCEDURE — 85025 COMPLETE CBC W/AUTO DIFF WBC: CPT

## 2024-11-21 PROCEDURE — 83036 HEMOGLOBIN GLYCOSYLATED A1C: CPT

## 2024-11-21 PROCEDURE — 80061 LIPID PANEL: CPT

## 2024-11-21 NOTE — PROGRESS NOTES
[facility]  Medication Management  ANTICOAGULATION    Referring Provider: Dr. Soliz     GOAL INR: 2 - 3     TODAY'S INR: 2.4     WARFARIN Dosage: Continue warfarin 1.5 tablets for 3 mg on  and 2 mg tablet all other days.    INR (no units)   Date Value   2024 1.3   2024 1.5   07/15/2024 1.2   2024 1.4   2024 1.7   2024 1.6   2024 1.6     INR,(POC) (no units)   Date Value   10/31/2024 1.9   10/16/2024 1.6   2024 1.8   2024 1.6   2024 1.4   2024 1.4       Hemoglobin   Date Value Ref Range Status   2024 10.7 (L) 13.0 - 17.0 g/dL Final     Hematocrit   Date Value Ref Range Status   2024 35.1 (L) 40.7 - 50.3 % Final     ALT   Date Value Ref Range Status   2024 18 5 - 41 U/L Final     AST   Date Value Ref Range Status   2024 34 <40 U/L Final       Medication changes:  Started Farxiga 24    Notes:    Fingerstick INR drawn per clinic protocol. Patient states no visible blood in urine and no black tarry stool. Denies any missed doses of warfarin. No change in other maintenance medications or in diet. Will recheck INR in 3 weeks. Patient acknowledges working in consult agreement with pharmacist as referred by his/her physician.     Patient's weigh is up 13 pounds and he started Farxiga.             For Pharmacy Admin Tracking Only    Intervention Detail: Adherence Monitorin and New Rx: 1, reason: Needs Additional Therapy  Total # of Interventions Recommended: 2  Total # of Interventions Accepted: 2  Time Spent (min): 20      Nahomy Mace RPH, PharmD

## 2024-11-26 ENCOUNTER — HOSPITAL ENCOUNTER (OUTPATIENT)
Age: 74
Discharge: HOME | End: 2024-11-26
Payer: MEDICARE

## 2024-11-26 DIAGNOSIS — D63.8 ANEMIA OF CHRONIC DISEASE: ICD-10-CM

## 2024-11-26 DIAGNOSIS — E78.2 MIXED HYPERLIPIDEMIA: ICD-10-CM

## 2024-11-26 DIAGNOSIS — I48.0 PAF (PAROXYSMAL ATRIAL FIBRILLATION) (HCC): ICD-10-CM

## 2024-11-26 DIAGNOSIS — Z95.810 ICD (IMPLANTABLE CARDIOVERTER-DEFIBRILLATOR) IN PLACE: ICD-10-CM

## 2024-11-26 DIAGNOSIS — I25.10 ASHD (ARTERIOSCLEROTIC HEART DISEASE): ICD-10-CM

## 2024-11-26 DIAGNOSIS — Z95.1 S/P CABG X 3: ICD-10-CM

## 2024-11-26 DIAGNOSIS — Z71.6 TOBACCO ABUSE COUNSELING: ICD-10-CM

## 2024-11-26 DIAGNOSIS — I25.5 ISCHEMIC CARDIOMYOPATHY: ICD-10-CM

## 2024-11-26 DIAGNOSIS — R06.02 SHORTNESS OF BREATH: ICD-10-CM

## 2024-11-26 DIAGNOSIS — I50.22 CHRONIC SYSTOLIC HEART FAILURE (HCC): ICD-10-CM

## 2024-11-26 LAB
ANION GAP SERPL CALCULATED.3IONS-SCNC: 13 MMOL/L (ref 9–16)
BUN SERPL-MCNC: 19 MG/DL (ref 8–23)
BUN/CREAT SERPL: 17 (ref 9–20)
CALCIUM SERPL-MCNC: 9.3 MG/DL (ref 8.6–10.4)
CHLORIDE SERPL-SCNC: 97 MMOL/L (ref 98–107)
CO2 SERPL-SCNC: 25 MMOL/L (ref 20–31)
CREAT SERPL-MCNC: 1.1 MG/DL (ref 0.7–1.2)
ERYTHROCYTE [DISTWIDTH] IN BLOOD BY AUTOMATED COUNT: 20.1 % (ref 11.8–14.4)
GFR, ESTIMATED: 71 ML/MIN/1.73M2
GLUCOSE SERPL-MCNC: 119 MG/DL (ref 74–99)
HCT VFR BLD AUTO: 36.6 % (ref 40.7–50.3)
HGB BLD-MCNC: 10.6 G/DL (ref 13–17)
MCH RBC QN AUTO: 25.4 PG (ref 25.2–33.5)
MCHC RBC AUTO-ENTMCNC: 29 G/DL (ref 28.4–34.8)
MCV RBC AUTO: 87.8 FL (ref 82.6–102.9)
NRBC BLD-RTO: 0 PER 100 WBC
PLATELET # BLD AUTO: 223 K/UL (ref 138–453)
PMV BLD AUTO: 9.2 FL (ref 8.1–13.5)
POTASSIUM SERPL-SCNC: 3.7 MMOL/L (ref 3.7–5.3)
RBC # BLD AUTO: 4.17 M/UL (ref 4.21–5.77)
SODIUM SERPL-SCNC: 135 MMOL/L (ref 136–145)
WBC OTHER # BLD: 5.5 K/UL (ref 3.5–11.3)

## 2024-11-26 PROCEDURE — 80048 BASIC METABOLIC PNL TOTAL CA: CPT

## 2024-11-26 PROCEDURE — 85027 COMPLETE CBC AUTOMATED: CPT

## 2024-11-26 PROCEDURE — 36415 COLL VENOUS BLD VENIPUNCTURE: CPT

## 2024-11-27 ENCOUNTER — TELEPHONE (OUTPATIENT)
Dept: CARDIOLOGY | Age: 74
End: 2024-11-27

## 2024-11-27 NOTE — TELEPHONE ENCOUNTER
----- Message from Dr. Figueroa Soliz MD sent at 11/27/2024 12:28 AM EST -----  Please let Mr. Peters know that their recent test results are largely unremarkable and/or relatively normal for them. No further action is needed at this time. Please continue with your current care plan.

## 2024-12-03 ENCOUNTER — NURSE ONLY (OUTPATIENT)
Dept: CARDIOLOGY | Age: 74
End: 2024-12-03

## 2024-12-03 DIAGNOSIS — I50.22 CHRONIC SYSTOLIC HEART FAILURE (HCC): Primary | ICD-10-CM

## 2024-12-09 ENCOUNTER — ANTI-COAG VISIT (OUTPATIENT)
Age: 74
End: 2024-12-09
Payer: MEDICARE

## 2024-12-09 VITALS
WEIGHT: 166 LBS | SYSTOLIC BLOOD PRESSURE: 86 MMHG | BODY MASS INDEX: 25.24 KG/M2 | HEART RATE: 88 BPM | DIASTOLIC BLOOD PRESSURE: 55 MMHG

## 2024-12-09 DIAGNOSIS — I48.20 CHRONIC ATRIAL FIBRILLATION (HCC): Primary | ICD-10-CM

## 2024-12-09 LAB
INTERNATIONAL NORMALIZATION RATIO, POC: 3
PROTHROMBIN TIME, POC: 0

## 2024-12-09 PROCEDURE — 85610 PROTHROMBIN TIME: CPT

## 2024-12-09 PROCEDURE — 99212 OFFICE O/P EST SF 10 MIN: CPT

## 2024-12-09 NOTE — PROGRESS NOTES
[facility]  Medication Management  ANTICOAGULATION    Referring Provider: Dr. Soliz     GOAL INR: 2 - 3     TODAY'S INR: 3.0     WARFARIN Dosage: Take warfarin 1 mg today then decrease warfarin to 2 mg tablet daily.    INR (no units)   Date Value   2024 1.3   2024 1.5   07/15/2024 1.2   2024 1.4   2024 1.7   2024 1.6   2024 1.6     INR,(POC) (no units)   Date Value   2024 2.4   10/31/2024 1.9   10/16/2024 1.6   2024 1.8   2024 1.6   2024 1.4   2024 1.4       Hemoglobin   Date Value Ref Range Status   2024 10.6 (L) 13.0 - 17.0 g/dL Final     Hematocrit   Date Value Ref Range Status   2024 36.6 (L) 40.7 - 50.3 % Final     ALT   Date Value Ref Range Status   2024 15 10 - 50 U/L Final     AST   Date Value Ref Range Status   2024 33 10 - 50 U/L Final       Medication changes:  Started Farxiga    Notes:    Fingerstick INR drawn per clinic protocol. Patient states no visible blood in urine and no black tarry stool. Denies any missed doses of warfarin. No change in other maintenance medications or in diet. Will recheck INR in 1.5 weeks. Patient acknowledges working in consult agreement with pharmacist as referred by his/her physician.  Patient is swollen/edema from waist down and both eyes have pockets of fluid that look like water blisters with L>R.    I spoke to Dr Soliz who agrees to admit patient.  Patient states he has some things he has to get done before going into the hospital.   Patient states he needs a couple days to get things taken care of before he comes into the hospital.       Patient came to appt an hour late.    For Pharmacy Admin Tracking Only    Intervention Detail: Adherence Monitorin, Dose Adjustment: 1, reason: Therapy Optimization, and New Rx: 1, reason: Needs Additional Therapy  Total # of Interventions Recommended: 3  Total # of Interventions Accepted: 3  Time Spent (min):

## 2024-12-19 ENCOUNTER — APPOINTMENT (OUTPATIENT)
Age: 74
End: 2024-12-19
Payer: MEDICARE

## 2024-12-24 ENCOUNTER — APPOINTMENT (OUTPATIENT)
Age: 74
End: 2024-12-24
Payer: MEDICARE

## 2025-01-01 ENCOUNTER — APPOINTMENT (OUTPATIENT)
Dept: GENERAL RADIOLOGY | Age: 75
DRG: 283 | End: 2025-01-01
Attending: INTERNAL MEDICINE
Payer: MEDICARE

## 2025-01-01 ENCOUNTER — HOSPITAL ENCOUNTER (EMERGENCY)
Age: 75
Discharge: ANOTHER HOSPITAL | End: 2025-01-28
Attending: EMERGENCY MEDICINE
Payer: MEDICARE

## 2025-01-01 ENCOUNTER — APPOINTMENT (OUTPATIENT)
Age: 75
DRG: 283 | End: 2025-01-01
Attending: INTERNAL MEDICINE
Payer: MEDICARE

## 2025-01-01 ENCOUNTER — HOSPITAL ENCOUNTER (INPATIENT)
Age: 75
LOS: 2 days | DRG: 283 | End: 2025-01-30
Attending: INTERNAL MEDICINE | Admitting: INTERNAL MEDICINE
Payer: MEDICARE

## 2025-01-01 ENCOUNTER — APPOINTMENT (OUTPATIENT)
Dept: GENERAL RADIOLOGY | Age: 75
End: 2025-01-01
Payer: MEDICARE

## 2025-01-01 VITALS
DIASTOLIC BLOOD PRESSURE: 62 MMHG | BODY MASS INDEX: 25.39 KG/M2 | OXYGEN SATURATION: 98 % | WEIGHT: 167 LBS | HEART RATE: 92 BPM | TEMPERATURE: 99 F | SYSTOLIC BLOOD PRESSURE: 103 MMHG | RESPIRATION RATE: 24 BRPM

## 2025-01-01 VITALS
SYSTOLIC BLOOD PRESSURE: 76 MMHG | OXYGEN SATURATION: 93 % | BODY MASS INDEX: 27.4 KG/M2 | DIASTOLIC BLOOD PRESSURE: 59 MMHG | HEART RATE: 79 BPM | HEIGHT: 68 IN | WEIGHT: 180.78 LBS | RESPIRATION RATE: 25 BRPM | TEMPERATURE: 97.7 F

## 2025-01-01 DIAGNOSIS — I50.9 ACUTE ON CHRONIC CONGESTIVE HEART FAILURE, UNSPECIFIED HEART FAILURE TYPE (HCC): Primary | ICD-10-CM

## 2025-01-01 DIAGNOSIS — R57.0 CARDIOGENIC SHOCK: ICD-10-CM

## 2025-01-01 DIAGNOSIS — I50.43 ACUTE ON CHRONIC COMBINED SYSTOLIC AND DIASTOLIC CHF (CONGESTIVE HEART FAILURE) (HCC): Primary | ICD-10-CM

## 2025-01-01 DIAGNOSIS — I20.9 ANGINA PECTORIS (HCC): ICD-10-CM

## 2025-01-01 DIAGNOSIS — R79.89 ELEVATED TROPONIN: ICD-10-CM

## 2025-01-01 DIAGNOSIS — R06.03 RESPIRATORY DISTRESS: ICD-10-CM

## 2025-01-01 LAB
ABO + RH BLD: NORMAL
ALBUMIN SERPL-MCNC: 3 G/DL (ref 3.5–5.2)
ALBUMIN SERPL-MCNC: 3.4 G/DL (ref 3.5–5.2)
ALBUMIN SERPL-MCNC: 3.7 G/DL (ref 3.5–5.2)
ALBUMIN/GLOB SERPL: 0.9 {RATIO} (ref 1–2.5)
ALBUMIN/GLOB SERPL: 0.9 {RATIO} (ref 1–2.5)
ALBUMIN/GLOB SERPL: 1 {RATIO} (ref 1–2.5)
ALLEN TEST: ABNORMAL
ALP SERPL-CCNC: 114 U/L (ref 40–129)
ALP SERPL-CCNC: 133 U/L (ref 40–129)
ALP SERPL-CCNC: 182 U/L (ref 40–129)
ALT SERPL-CCNC: 29 U/L (ref 10–50)
ALT SERPL-CCNC: 34 U/L (ref 10–50)
ALT SERPL-CCNC: 46 U/L (ref 10–50)
ANION GAP SERPL CALCULATED.3IONS-SCNC: 12 MMOL/L (ref 9–16)
ANION GAP SERPL CALCULATED.3IONS-SCNC: 13 MMOL/L (ref 9–16)
ANION GAP SERPL CALCULATED.3IONS-SCNC: 14 MMOL/L (ref 9–16)
ANION GAP SERPL CALCULATED.3IONS-SCNC: 16 MMOL/L (ref 9–16)
ANION GAP SERPL CALCULATED.3IONS-SCNC: 19 MMOL/L (ref 9–16)
ARM BAND NUMBER: NORMAL
ARTERIAL PATENCY WRIST A: YES
ARTERIAL PATENCY WRIST A: YES
AST SERPL-CCNC: 47 U/L (ref 10–50)
AST SERPL-CCNC: 74 U/L (ref 10–50)
AST SERPL-CCNC: 89 U/L (ref 10–50)
B PARAP IS1001 DNA NPH QL NAA+NON-PROBE: NOT DETECTED
B PERT DNA SPEC QL NAA+PROBE: NOT DETECTED
BASOPHILS # BLD: 0 K/UL (ref 0–0.2)
BASOPHILS NFR BLD: 0 % (ref 0–2)
BDY SITE: ABNORMAL
BDY SITE: ABNORMAL
BILIRUB DIRECT SERPL-MCNC: 1.1 MG/DL (ref 0–0.2)
BILIRUB DIRECT SERPL-MCNC: 1.2 MG/DL (ref 0–0.2)
BILIRUB INDIRECT SERPL-MCNC: 0.5 MG/DL (ref 0–1)
BILIRUB INDIRECT SERPL-MCNC: 0.6 MG/DL (ref 0–1)
BILIRUB SERPL-MCNC: 1.6 MG/DL (ref 0–1.2)
BILIRUB SERPL-MCNC: 1.6 MG/DL (ref 0–1.2)
BILIRUB SERPL-MCNC: 1.8 MG/DL (ref 0–1.2)
BLOOD BANK SAMPLE EXPIRATION: NORMAL
BLOOD GROUP ANTIBODIES SERPL: NEGATIVE
BNP SERPL-MCNC: ABNORMAL PG/ML (ref 0–450)
BODY TEMPERATURE: 37
BUN BLD-MCNC: 51 MG/DL (ref 8–26)
BUN SERPL-MCNC: 19 MG/DL (ref 8–23)
BUN SERPL-MCNC: 26 MG/DL (ref 8–23)
BUN SERPL-MCNC: 37 MG/DL (ref 8–23)
BUN SERPL-MCNC: 41 MG/DL (ref 8–23)
BUN SERPL-MCNC: 47 MG/DL (ref 8–23)
BUN/CREAT SERPL: 16 (ref 9–20)
C PNEUM DNA NPH QL NAA+NON-PROBE: NOT DETECTED
CA-I BLD-SCNC: 1.02 MMOL/L (ref 1.13–1.33)
CA-I BLD-SCNC: 1.19 MMOL/L (ref 1.15–1.33)
CALCIUM SERPL-MCNC: 8.4 MG/DL (ref 8.6–10.4)
CALCIUM SERPL-MCNC: 8.7 MG/DL (ref 8.6–10.4)
CALCIUM SERPL-MCNC: 8.8 MG/DL (ref 8.6–10.4)
CALCIUM SERPL-MCNC: 9.3 MG/DL (ref 8.6–10.4)
CALCIUM SERPL-MCNC: 9.3 MG/DL (ref 8.6–10.4)
CHLORIDE BLD-SCNC: 98 MMOL/L (ref 98–107)
CHLORIDE SERPL-SCNC: 96 MMOL/L (ref 98–107)
CHLORIDE SERPL-SCNC: 96 MMOL/L (ref 98–107)
CHLORIDE SERPL-SCNC: 98 MMOL/L (ref 98–107)
CHLORIDE SERPL-SCNC: 98 MMOL/L (ref 98–107)
CHLORIDE SERPL-SCNC: 99 MMOL/L (ref 98–107)
CHOLEST SERPL-MCNC: 74 MG/DL (ref 0–199)
CHOLESTEROL/HDL RATIO: 3.2
CO2 BLD CALC-SCNC: 22 MMOL/L (ref 22–30)
CO2 SERPL-SCNC: 17 MMOL/L (ref 20–31)
CO2 SERPL-SCNC: 21 MMOL/L (ref 20–31)
CO2 SERPL-SCNC: 22 MMOL/L (ref 20–31)
COHGB MFR BLD: 0.7 % (ref 0–5)
CREAT SERPL-MCNC: 1.2 MG/DL (ref 0.7–1.2)
CREAT SERPL-MCNC: 1.4 MG/DL (ref 0.7–1.2)
CREAT SERPL-MCNC: 1.5 MG/DL (ref 0.7–1.2)
CREAT SERPL-MCNC: 1.5 MG/DL (ref 0.7–1.2)
CREAT SERPL-MCNC: 1.7 MG/DL (ref 0.7–1.2)
CRP SERPL HS-MCNC: 30.8 MG/L (ref 0–5)
ECHO AO ROOT DIAM: 2.9 CM
ECHO AO ROOT INDEX: 1.53 CM/M2
ECHO AV AREA PEAK VELOCITY: 1.7 CM2
ECHO AV AREA VTI: 1.6 CM2
ECHO AV AREA/BSA PEAK VELOCITY: 0.9 CM2/M2
ECHO AV AREA/BSA VTI: 0.8 CM2/M2
ECHO AV MEAN GRADIENT: 3 MMHG
ECHO AV MEAN VELOCITY: 0.8 M/S
ECHO AV PEAK GRADIENT: 7 MMHG
ECHO AV PEAK VELOCITY: 1.3 M/S
ECHO AV VELOCITY RATIO: 0.46
ECHO AV VTI: 24.4 CM
ECHO BSA: 1.92 M2
ECHO BSA: 1.92 M2
ECHO EST RA PRESSURE: 15 MMHG
ECHO LA AREA 2C: 22.5 CM2
ECHO LA AREA 4C: 26.1 CM2
ECHO LA DIAMETER INDEX: 2.42 CM/M2
ECHO LA DIAMETER: 4.6 CM
ECHO LA MAJOR AXIS: 7.4 CM
ECHO LA MINOR AXIS: 6.4 CM
ECHO LA TO AORTIC ROOT RATIO: 1.59
ECHO LA VOL BP: 75 ML (ref 18–58)
ECHO LA VOL MOD A2C: 65 ML (ref 18–58)
ECHO LA VOL MOD A4C: 77 ML (ref 18–58)
ECHO LA VOL/BSA BIPLANE: 39 ML/M2 (ref 16–34)
ECHO LA VOLUME INDEX MOD A2C: 34 ML/M2 (ref 16–34)
ECHO LA VOLUME INDEX MOD A4C: 41 ML/M2 (ref 16–34)
ECHO LV E' LATERAL VELOCITY: 4.57 CM/S
ECHO LV E' SEPTAL VELOCITY: 3.7 CM/S
ECHO LV EDV A4C: 222 ML
ECHO LV EDV INDEX A4C: 117 ML/M2
ECHO LV EF PHYSICIAN: 15 %
ECHO LV EJECTION FRACTION A4C: 7 %
ECHO LV ESV A4C: 206 ML
ECHO LV ESV INDEX A4C: 108 ML/M2
ECHO LV FRACTIONAL SHORTENING: 10 % (ref 28–44)
ECHO LV INTERNAL DIMENSION DIASTOLE INDEX: 3.63 CM/M2
ECHO LV INTERNAL DIMENSION DIASTOLIC: 6.9 CM (ref 4.2–5.9)
ECHO LV INTERNAL DIMENSION SYSTOLIC INDEX: 3.26 CM/M2
ECHO LV INTERNAL DIMENSION SYSTOLIC: 6.2 CM
ECHO LV IVSD: 0.8 CM (ref 0.6–1)
ECHO LV MASS 2D: 220.4 G (ref 88–224)
ECHO LV MASS INDEX 2D: 116 G/M2 (ref 49–115)
ECHO LV POSTERIOR WALL DIASTOLIC: 0.7 CM (ref 0.6–1)
ECHO LV RELATIVE WALL THICKNESS RATIO: 0.2
ECHO LVOT AREA: 3.5 CM2
ECHO LVOT AV VTI INDEX: 0.47
ECHO LVOT DIAM: 2.1 CM
ECHO LVOT MEAN GRADIENT: 1 MMHG
ECHO LVOT PEAK GRADIENT: 2 MMHG
ECHO LVOT PEAK VELOCITY: 0.6 M/S
ECHO LVOT STROKE VOLUME INDEX: 20.8 ML/M2
ECHO LVOT SV: 39.5 ML
ECHO LVOT VTI: 11.4 CM
ECHO MV A VELOCITY: 0.54 M/S
ECHO MV AREA VTI: 1.9 CM2
ECHO MV E DECELERATION TIME (DT): 141 MS
ECHO MV E VELOCITY: 0.87 M/S
ECHO MV E/A RATIO: 1.61
ECHO MV E/E' LATERAL: 19.04
ECHO MV E/E' RATIO (AVERAGED): 21.28
ECHO MV E/E' SEPTAL: 23.51
ECHO MV LVOT VTI INDEX: 1.87
ECHO MV MAX VELOCITY: 0.9 M/S
ECHO MV MEAN GRADIENT: 2 MMHG
ECHO MV MEAN VELOCITY: 0.6 M/S
ECHO MV PEAK GRADIENT: 3 MMHG
ECHO MV REGURGITANT ALIASING (NYQUIST) VELOCITY: 31 CM/S
ECHO MV REGURGITANT PEAK GRADIENT: 67 MMHG
ECHO MV REGURGITANT PEAK VELOCITY: 4.1 M/S
ECHO MV REGURGITANT RADIUS PISA: 0.9 CM
ECHO MV REGURGITANT VTIA: 137 CM
ECHO MV VTI: 21.3 CM
ECHO PV MAX VELOCITY: 0.7 M/S
ECHO PV PEAK GRADIENT: 2 MMHG
ECHO RIGHT VENTRICULAR SYSTOLIC PRESSURE (RVSP): 34 MMHG
ECHO RV BASAL DIMENSION: 4.6 CM
ECHO RV FREE WALL PEAK S': 6.5 CM/S
ECHO RV TAPSE: 1 CM (ref 1.7–?)
ECHO TV REGURGITANT MAX VELOCITY: 2.19 M/S
ECHO TV REGURGITANT PEAK GRADIENT: 19 MMHG
EGFR, POC: 45 ML/MIN/1.73M2
EKG ATRIAL RATE: 62 BPM
EKG ATRIAL RATE: 84 BPM
EKG ATRIAL RATE: 88 BPM
EKG P AXIS: -56 DEGREES
EKG P AXIS: 42 DEGREES
EKG P AXIS: 43 DEGREES
EKG P-R INTERVAL: 192 MS
EKG P-R INTERVAL: 198 MS
EKG P-R INTERVAL: 208 MS
EKG Q-T INTERVAL: 362 MS
EKG Q-T INTERVAL: 396 MS
EKG Q-T INTERVAL: 432 MS
EKG Q-T INTERVAL: 438 MS
EKG QRS DURATION: 132 MS
EKG QRS DURATION: 148 MS
EKG QRS DURATION: 156 MS
EKG QRS DURATION: 158 MS
EKG QTC CALCULATION (BAZETT): 492 MS
EKG QTC CALCULATION (BAZETT): 510 MS
EKG QTC CALCULATION (BAZETT): 511 MS
EKG QTC CALCULATION (BAZETT): 529 MS
EKG R AXIS: -117 DEGREES
EKG R AXIS: -82 DEGREES
EKG R AXIS: 171 DEGREES
EKG R AXIS: 179 DEGREES
EKG T AXIS: -38 DEGREES
EKG T AXIS: 46 DEGREES
EKG T AXIS: 68 DEGREES
EKG T AXIS: 89 DEGREES
EKG VENTRICULAR RATE: 120 BPM
EKG VENTRICULAR RATE: 84 BPM
EKG VENTRICULAR RATE: 88 BPM
EKG VENTRICULAR RATE: 93 BPM
EOSINOPHIL # BLD: 0 K/UL (ref 0–0.4)
EOSINOPHIL # BLD: 0 K/UL (ref 0–0.4)
EOSINOPHIL # BLD: 0 K/UL (ref 0–0.44)
EOSINOPHIL # BLD: 0 K/UL (ref 0–0.44)
EOSINOPHILS RELATIVE PERCENT: 0 % (ref 1–4)
ERYTHROCYTE [DISTWIDTH] IN BLOOD BY AUTOMATED COUNT: 22.5 % (ref 11.8–14.4)
ERYTHROCYTE [SEDIMENTATION RATE] IN BLOOD BY PHOTOMETRIC METHOD: 20 MM/HR (ref 0–20)
EST. AVERAGE GLUCOSE BLD GHB EST-MCNC: 134 MG/DL
FIO2 ON VENT: 21 %
FIO2 ON VENT: 30 %
FIO2 ON VENT: ABNORMAL %
FIO2: 3
FIO2: 70
FLUAV AG SPEC QL: NEGATIVE
FLUAV RNA NPH QL NAA+NON-PROBE: NOT DETECTED
FLUBV AG SPEC QL: NEGATIVE
FLUBV RNA NPH QL NAA+NON-PROBE: NOT DETECTED
GAS FLOW.O2 O2 DELIVERY SYS: ABNORMAL L/MIN
GAS FLOW.O2 O2 DELIVERY SYS: ABNORMAL L/MIN
GFR, ESTIMATED: 42 ML/MIN/1.73M2
GFR, ESTIMATED: 48 ML/MIN/1.73M2
GFR, ESTIMATED: 48 ML/MIN/1.73M2
GFR, ESTIMATED: 52 ML/MIN/1.73M2
GFR, ESTIMATED: 62 ML/MIN/1.73M2
GLOBULIN SER CALC-MCNC: 3.2 G/DL
GLOBULIN SER CALC-MCNC: 3.4 G/DL
GLUCOSE BLD-MCNC: 163 MG/DL (ref 74–100)
GLUCOSE BLD-MCNC: 167 MG/DL (ref 74–100)
GLUCOSE BLD-MCNC: 186 MG/DL (ref 74–100)
GLUCOSE BLD-MCNC: 189 MG/DL (ref 74–100)
GLUCOSE BLD-MCNC: 89 MG/DL (ref 75–110)
GLUCOSE SERPL-MCNC: 100 MG/DL (ref 74–99)
GLUCOSE SERPL-MCNC: 138 MG/DL (ref 74–99)
GLUCOSE SERPL-MCNC: 171 MG/DL (ref 74–99)
GLUCOSE SERPL-MCNC: 192 MG/DL (ref 74–99)
GLUCOSE SERPL-MCNC: 72 MG/DL (ref 74–99)
HADV DNA NPH QL NAA+NON-PROBE: NOT DETECTED
HBA1C MFR BLD: 6.3 % (ref 4–6)
HBCO, MIXED, EXTENDED: 0 % (ref 0–5)
HBCO, MIXED, EXTENDED: 0.1 % (ref 0–5)
HBCO, MIXED, EXTENDED: 0.3 % (ref 0–5)
HBCO, MIXED, EXTENDED: 0.7 % (ref 0–5)
HCO3 ARTERIAL: 15.8 MMOL/L (ref 22–26)
HCO3 ARTERIAL: 22.6 MMOL/L (ref 22–26)
HCO3 VENOUS: 24.5 MMOL/L (ref 24–30)
HCOV 229E RNA NPH QL NAA+NON-PROBE: NOT DETECTED
HCOV HKU1 RNA NPH QL NAA+NON-PROBE: NOT DETECTED
HCOV NL63 RNA NPH QL NAA+NON-PROBE: NOT DETECTED
HCOV OC43 RNA NPH QL NAA+NON-PROBE: NOT DETECTED
HCT VFR BLD AUTO: 33 % (ref 40.7–50.3)
HCT VFR BLD AUTO: 36.1 % (ref 40.7–50.3)
HCT VFR BLD AUTO: 37 % (ref 41–53)
HCT VFR BLD AUTO: 37.1 % (ref 40.7–50.3)
HCT VFR BLD AUTO: 38.4 % (ref 40.7–50.3)
HDLC SERPL-MCNC: 23 MG/DL
HEMOGLOBIN, MIXED, EXTENDED: 10.5 G/DL (ref 12–18)
HEMOGLOBIN, MIXED, EXTENDED: 10.8 G/DL (ref 12–18)
HEMOGLOBIN, MIXED, EXTENDED: 11 G/DL (ref 12–18)
HEMOGLOBIN, MIXED, EXTENDED: 12.4 G/DL (ref 12–18)
HGB BLD-MCNC: 10.4 G/DL (ref 13–17)
HGB BLD-MCNC: 10.9 G/DL (ref 13–17)
HGB BLD-MCNC: 10.9 G/DL (ref 13–17)
HGB BLD-MCNC: 11.3 G/DL (ref 13–17)
HMPV RNA NPH QL NAA+NON-PROBE: NOT DETECTED
HPIV1 RNA NPH QL NAA+NON-PROBE: NOT DETECTED
HPIV2 RNA NPH QL NAA+NON-PROBE: NOT DETECTED
HPIV3 RNA NPH QL NAA+NON-PROBE: NOT DETECTED
HPIV4 RNA NPH QL NAA+NON-PROBE: NOT DETECTED
IMM GRANULOCYTES # BLD AUTO: 0 K/UL (ref 0–0.3)
IMM GRANULOCYTES # BLD AUTO: 0 K/UL (ref 0–0.3)
IMM GRANULOCYTES # BLD AUTO: 0.19 K/UL (ref 0–0.3)
IMM GRANULOCYTES # BLD AUTO: 0.24 K/UL (ref 0–0.3)
IMM GRANULOCYTES NFR BLD: 0 %
IMM GRANULOCYTES NFR BLD: 0 %
IMM GRANULOCYTES NFR BLD: 1 %
IMM GRANULOCYTES NFR BLD: 1 %
INR PPP: 11.5
INR PPP: 2.4
INR PPP: 3.3
LACTIC ACID, WHOLE BLOOD: 2.6 MMOL/L (ref 0.7–2.1)
LDLC SERPL CALC-MCNC: 42 MG/DL (ref 0–100)
LYMPHOCYTES NFR BLD: 0 K/UL (ref 1–4.8)
LYMPHOCYTES NFR BLD: 0.55 K/UL (ref 1.1–3.7)
LYMPHOCYTES NFR BLD: 0.56 K/UL (ref 1.1–3.7)
LYMPHOCYTES NFR BLD: 0.72 K/UL (ref 1–4.8)
LYMPHOCYTES RELATIVE PERCENT: 0 % (ref 24–44)
LYMPHOCYTES RELATIVE PERCENT: 3 % (ref 24–43)
LYMPHOCYTES RELATIVE PERCENT: 3 % (ref 24–44)
LYMPHOCYTES RELATIVE PERCENT: 5 % (ref 24–43)
M PNEUMO DNA NPH QL NAA+NON-PROBE: NOT DETECTED
MAGNESIUM SERPL-MCNC: 1.9 MG/DL (ref 1.6–2.4)
MAGNESIUM SERPL-MCNC: 2 MG/DL (ref 1.6–2.4)
MAGNESIUM SERPL-MCNC: 2.8 MG/DL (ref 1.6–2.4)
MCH RBC QN AUTO: 26.5 PG (ref 25.2–33.5)
MCH RBC QN AUTO: 26.8 PG (ref 25.2–33.5)
MCH RBC QN AUTO: 27.2 PG (ref 25.2–33.5)
MCH RBC QN AUTO: 27.5 PG (ref 25.2–33.5)
MCHC RBC AUTO-ENTMCNC: 28.4 G/DL (ref 28.4–34.8)
MCHC RBC AUTO-ENTMCNC: 30.2 G/DL (ref 28.4–34.8)
MCHC RBC AUTO-ENTMCNC: 30.5 G/DL (ref 28.4–34.8)
MCHC RBC AUTO-ENTMCNC: 31.5 G/DL (ref 28.4–34.8)
MCV RBC AUTO: 86.2 FL (ref 82.6–102.9)
MCV RBC AUTO: 87.9 FL (ref 82.6–102.9)
MCV RBC AUTO: 91.2 FL (ref 82.6–102.9)
MCV RBC AUTO: 93.2 FL (ref 82.6–102.9)
METHB, MIXED, EXTENDED: 0.2 % (ref 0–1.5)
METHB, MIXED, EXTENDED: 0.2 % (ref 0–1.5)
METHB, MIXED, EXTENDED: 0.3 % (ref 0–1.5)
METHB, MIXED, EXTENDED: 0.7 % (ref 0–1.5)
MICROORGANISM SPEC CULT: ABNORMAL
MODE: ABNORMAL
MONOCYTES NFR BLD: 0.44 K/UL (ref 0.1–1.2)
MONOCYTES NFR BLD: 0.48 K/UL (ref 0.1–0.8)
MONOCYTES NFR BLD: 0.64 K/UL (ref 0.1–0.8)
MONOCYTES NFR BLD: 1.48 K/UL (ref 0.1–1.2)
MONOCYTES NFR BLD: 2 % (ref 1–7)
MONOCYTES NFR BLD: 3 % (ref 1–7)
MONOCYTES NFR BLD: 4 % (ref 3–12)
MONOCYTES NFR BLD: 8 % (ref 3–12)
MORPHOLOGY: ABNORMAL
NEGATIVE BASE EXCESS, ART: 0.5 MMOL/L (ref 0–2)
NEGATIVE BASE EXCESS, ART: 1.3 MMOL/L (ref 0–2)
NEGATIVE BASE EXCESS, ART: 3.6 MMOL/L (ref 0–2)
NEGATIVE BASE EXCESS, ART: 4.8 MMOL/L (ref 0–2)
NEGATIVE BASE EXCESS, ART: 5 MMOL/L (ref 0–2)
NEGATIVE BASE EXCESS, ART: 6 MMOL/L (ref 0–2)
NEGATIVE BASE EXCESS, VEN: 1.2 MMOL/L (ref 0–2)
NEUTROPHILS NFR BLD: 88 % (ref 36–65)
NEUTROPHILS NFR BLD: 91 % (ref 36–65)
NEUTROPHILS NFR BLD: 94 % (ref 36–66)
NEUTROPHILS NFR BLD: 97 % (ref 36–66)
NEUTS SEG NFR BLD: 16.27 K/UL (ref 1.5–8.1)
NEUTS SEG NFR BLD: 20.76 K/UL (ref 1.8–7.7)
NEUTS SEG NFR BLD: 22.66 K/UL (ref 1.8–7.7)
NEUTS SEG NFR BLD: 9.91 K/UL (ref 1.5–8.1)
NRBC BLD-RTO: 0 PER 100 WBC
NRBC BLD-RTO: 0.1 PER 100 WBC
O2 CONTENT, MIXED, EXTENDED: 10 VOL % (ref 12–20)
O2 CONTENT, MIXED, EXTENDED: 8 VOL % (ref 12–20)
O2 CONTENT, MIXED, EXTENDED: 8 VOL % (ref 12–20)
O2 CONTENT, MIXED, EXTENDED: 9 VOL % (ref 12–20)
O2 DELIVERY DEVICE: ABNORMAL
O2 SAT, ARTERIAL: 94.2 % (ref 95–98)
O2 SAT, ARTERIAL: 97.9 % (ref 95–98)
O2 SAT, VEN: 33.8 % (ref 60–85)
OXYGEN STATUS: 28
OXYGEN STATUS: 50
OXYGEN STATUS: ABNORMAL
OXYGEN STATUS: ABNORMAL
PARTIAL THROMBOPLASTIN TIME: 29.9 SEC (ref 26.8–34.8)
PCO2 ARTERIAL: 21.1 MMHG (ref 35–45)
PCO2 ARTERIAL: 33 MMHG (ref 35–45)
PCO2 VENOUS: 45.2 MM HG (ref 39–55)
PCO2, ART, TEMP ADJ: 21.1 (ref 35–45)
PCO2, ART, TEMP ADJ: 33 (ref 35–45)
PH ARTERIAL: 7.45 (ref 7.35–7.45)
PH ARTERIAL: 7.49 (ref 7.35–7.45)
PH VENOUS: 7.35 (ref 7.32–7.42)
PH, ART, TEMP ADJ: 7.45 (ref 7.35–7.45)
PH, ART, TEMP ADJ: 7.49 (ref 7.35–7.45)
PLATELET # BLD AUTO: 149 K/UL (ref 138–453)
PLATELET # BLD AUTO: 159 K/UL (ref 138–453)
PLATELET # BLD AUTO: 160 K/UL (ref 138–453)
PLATELET # BLD AUTO: 176 K/UL (ref 138–453)
PMV BLD AUTO: 10 FL (ref 8.1–13.5)
PMV BLD AUTO: 10.6 FL (ref 8.1–13.5)
PMV BLD AUTO: 9.6 FL (ref 8.1–13.5)
PMV BLD AUTO: 9.9 FL (ref 8.1–13.5)
PO2 ARTERIAL: 66.3 MMHG (ref 80–100)
PO2 ARTERIAL: 96 MMHG (ref 80–100)
PO2 VENOUS: 21.6 MM HG (ref 30–50)
PO2, ART, TEMP ADJ: 66.3 MMHG (ref 80–100)
PO2, ART, TEMP ADJ: 96 MMHG (ref 80–100)
POC ANION GAP: 13 MMOL/L (ref 7–16)
POC CREATININE: 1.6 MG/DL (ref 0.51–1.19)
POC HCO3: 20 MMOL/L (ref 21–28)
POC HCO3: 20.8 MMOL/L (ref 21–28)
POC HCO3: 21 MMOL/L (ref 21–28)
POC HCO3: 22.8 MMOL/L (ref 21–28)
POC HEMOGLOBIN (CALC): 12.7 G/DL (ref 13.5–17.5)
POC LACTIC ACID: 2.2 MMOL/L (ref 0.56–1.39)
POC LACTIC ACID: 4.1 MMOL/L (ref 0.56–1.39)
POC LACTIC ACID: 4.1 MMOL/L (ref 0.56–1.39)
POC LACTIC ACID: 4.2 MMOL/L (ref 0.56–1.39)
POC O2 SATURATION: 95.4 % (ref 94–98)
POC O2 SATURATION: 96.3 % (ref 94–98)
POC O2 SATURATION: 98.3 % (ref 94–98)
POC O2 SATURATION: 99.7 % (ref 94–98)
POC PCO2: 35.5 MM HG (ref 35–48)
POC PCO2: 35.6 MM HG (ref 35–48)
POC PCO2: 39.5 MM HG (ref 35–48)
POC PCO2: 40.3 MM HG (ref 35–48)
POC PH: 7.3 (ref 7.35–7.45)
POC PH: 7.33 (ref 7.35–7.45)
POC PH: 7.38 (ref 7.35–7.45)
POC PH: 7.42 (ref 7.35–7.45)
POC PO2: 120.7 MM HG (ref 83–108)
POC PO2: 217.8 MM HG (ref 83–108)
POC PO2: 78.6 MM HG (ref 83–108)
POC PO2: 82.1 MM HG (ref 83–108)
POTASSIUM BLD-SCNC: 4.7 MMOL/L (ref 3.5–4.5)
POTASSIUM SERPL-SCNC: 4.2 MMOL/L (ref 3.7–5.3)
POTASSIUM SERPL-SCNC: 4.3 MMOL/L (ref 3.7–5.3)
POTASSIUM SERPL-SCNC: 4.3 MMOL/L (ref 3.7–5.3)
POTASSIUM SERPL-SCNC: 4.4 MMOL/L (ref 3.7–5.3)
POTASSIUM SERPL-SCNC: 5.1 MMOL/L (ref 3.7–5.3)
PROCALCITONIN SERPL-MCNC: 16.9 NG/ML (ref 0–0.09)
PROT SERPL-MCNC: 6.2 G/DL (ref 6.6–8.7)
PROT SERPL-MCNC: 6.8 G/DL (ref 6.6–8.7)
PROT SERPL-MCNC: 7.5 G/DL (ref 6.6–8.7)
PROTHROMBIN TIME: 25.4 SEC (ref 11.7–14.1)
PROTHROMBIN TIME: 32.3 SEC (ref 11.7–14.9)
PROTHROMBIN TIME: 84.7 SEC (ref 11.7–14.9)
RBC # BLD AUTO: 3.83 M/UL (ref 4.21–5.77)
RBC # BLD AUTO: 3.96 M/UL (ref 4.21–5.77)
RBC # BLD AUTO: 4.12 M/UL (ref 4.21–5.77)
RBC # BLD AUTO: 4.22 M/UL (ref 4.21–5.77)
RSV RNA NPH QL NAA+NON-PROBE: NOT DETECTED
RV+EV RNA NPH QL NAA+NON-PROBE: NOT DETECTED
S PNEUM AG SPEC QL: NEGATIVE
SAMPLE SITE: ABNORMAL
SAO2 % BLDMV: 58.7 % (ref 60–80)
SAO2 % BLDMV: 59.4 % (ref 60–80)
SAO2 % BLDMV: 61.4 % (ref 60–80)
SAO2 % BLDMV: 72 % (ref 60–80)
SARS-COV-2 RDRP RESP QL NAA+PROBE: NOT DETECTED
SARS-COV-2 RNA NPH QL NAA+NON-PROBE: NOT DETECTED
SERVICE CMNT-IMP: ABNORMAL
SODIUM BLD-SCNC: 132 MMOL/L (ref 138–146)
SODIUM SERPL-SCNC: 129 MMOL/L (ref 136–145)
SODIUM SERPL-SCNC: 132 MMOL/L (ref 136–145)
SODIUM SERPL-SCNC: 133 MMOL/L (ref 136–145)
SODIUM SERPL-SCNC: 133 MMOL/L (ref 136–145)
SODIUM SERPL-SCNC: 136 MMOL/L (ref 136–145)
SPECIMEN DESCRIPTION: ABNORMAL
SPECIMEN DESCRIPTION: NORMAL
SPECIMEN DESCRIPTION: NORMAL
SPECIMEN SOURCE: NORMAL
T4 FREE SERPL-MCNC: 1 NG/DL (ref 0.92–1.68)
TRIGL SERPL-MCNC: 44 MG/DL
TROPONIN I SERPL HS-MCNC: 120 NG/L (ref 0–22)
TROPONIN I SERPL HS-MCNC: 451 NG/L (ref 0–22)
TROPONIN I SERPL HS-MCNC: 664 NG/L (ref 0–22)
TROPONIN I SERPL HS-MCNC: 79 NG/L (ref 0–22)
TSH SERPL DL<=0.05 MIU/L-ACNC: 12.6 UIU/ML (ref 0.27–4.2)
VLDLC SERPL CALC-MCNC: 9 MG/DL (ref 1–30)
WBC OTHER # BLD: 10.9 K/UL (ref 3.5–11.3)
WBC OTHER # BLD: 18.5 K/UL (ref 3.5–11.3)
WBC OTHER # BLD: 21.4 K/UL (ref 3.5–11.3)
WBC OTHER # BLD: 24.1 K/UL (ref 3.5–11.3)

## 2025-01-01 PROCEDURE — 6360000002 HC RX W HCPCS

## 2025-01-01 PROCEDURE — 2500000003 HC RX 250 WO HCPCS: Performed by: EMERGENCY MEDICINE

## 2025-01-01 PROCEDURE — 85610 PROTHROMBIN TIME: CPT

## 2025-01-01 PROCEDURE — 76937 US GUIDE VASCULAR ACCESS: CPT | Performed by: INTERNAL MEDICINE

## 2025-01-01 PROCEDURE — 2709999900 HC NON-CHARGEABLE SUPPLY: Performed by: INTERNAL MEDICINE

## 2025-01-01 PROCEDURE — 36600 WITHDRAWAL OF ARTERIAL BLOOD: CPT

## 2025-01-01 PROCEDURE — 2580000003 HC RX 258: Performed by: INTERNAL MEDICINE

## 2025-01-01 PROCEDURE — 6360000004 HC RX CONTRAST MEDICATION

## 2025-01-01 PROCEDURE — 80048 BASIC METABOLIC PNL TOTAL CA: CPT

## 2025-01-01 PROCEDURE — 85014 HEMATOCRIT: CPT

## 2025-01-01 PROCEDURE — 94640 AIRWAY INHALATION TREATMENT: CPT

## 2025-01-01 PROCEDURE — 94761 N-INVAS EAR/PLS OXIMETRY MLT: CPT

## 2025-01-01 PROCEDURE — 82805 BLOOD GASES W/O2 SATURATION: CPT

## 2025-01-01 PROCEDURE — 82947 ASSAY GLUCOSE BLOOD QUANT: CPT

## 2025-01-01 PROCEDURE — 36415 COLL VENOUS BLD VENIPUNCTURE: CPT

## 2025-01-01 PROCEDURE — 99291 CRITICAL CARE FIRST HOUR: CPT | Performed by: INTERNAL MEDICINE

## 2025-01-01 PROCEDURE — 93451 RIGHT HEART CATH: CPT | Performed by: INTERNAL MEDICINE

## 2025-01-01 PROCEDURE — 71045 X-RAY EXAM CHEST 1 VIEW: CPT

## 2025-01-01 PROCEDURE — 6360000002 HC RX W HCPCS: Performed by: INTERNAL MEDICINE

## 2025-01-01 PROCEDURE — 93306 TTE W/DOPPLER COMPLETE: CPT | Performed by: INTERNAL MEDICINE

## 2025-01-01 PROCEDURE — 83050 HGB METHEMOGLOBIN QUAN: CPT

## 2025-01-01 PROCEDURE — 85730 THROMBOPLASTIN TIME PARTIAL: CPT

## 2025-01-01 PROCEDURE — 2500000003 HC RX 250 WO HCPCS

## 2025-01-01 PROCEDURE — 87804 INFLUENZA ASSAY W/OPTIC: CPT

## 2025-01-01 PROCEDURE — 6370000000 HC RX 637 (ALT 250 FOR IP): Performed by: EMERGENCY MEDICINE

## 2025-01-01 PROCEDURE — 6360000002 HC RX W HCPCS: Performed by: STUDENT IN AN ORGANIZED HEALTH CARE EDUCATION/TRAINING PROGRAM

## 2025-01-01 PROCEDURE — 83605 ASSAY OF LACTIC ACID: CPT

## 2025-01-01 PROCEDURE — 95816 EEG AWAKE AND DROWSY: CPT | Performed by: PSYCHIATRY & NEUROLOGY

## 2025-01-01 PROCEDURE — 83880 ASSAY OF NATRIURETIC PEPTIDE: CPT

## 2025-01-01 PROCEDURE — 6370000000 HC RX 637 (ALT 250 FOR IP)

## 2025-01-01 PROCEDURE — 85652 RBC SED RATE AUTOMATED: CPT

## 2025-01-01 PROCEDURE — 99233 SBSQ HOSP IP/OBS HIGH 50: CPT | Performed by: INTERNAL MEDICINE

## 2025-01-01 PROCEDURE — 82375 ASSAY CARBOXYHB QUANT: CPT

## 2025-01-01 PROCEDURE — 83735 ASSAY OF MAGNESIUM: CPT

## 2025-01-01 PROCEDURE — 87154 CUL TYP ID BLD PTHGN 6+ TRGT: CPT

## 2025-01-01 PROCEDURE — 82803 BLOOD GASES ANY COMBINATION: CPT

## 2025-01-01 PROCEDURE — 93005 ELECTROCARDIOGRAM TRACING: CPT | Performed by: STUDENT IN AN ORGANIZED HEALTH CARE EDUCATION/TRAINING PROGRAM

## 2025-01-01 PROCEDURE — 99152 MOD SED SAME PHYS/QHP 5/>YRS: CPT | Performed by: INTERNAL MEDICINE

## 2025-01-01 PROCEDURE — 84443 ASSAY THYROID STIM HORMONE: CPT

## 2025-01-01 PROCEDURE — 96374 THER/PROPH/DIAG INJ IV PUSH: CPT

## 2025-01-01 PROCEDURE — 6360000002 HC RX W HCPCS: Performed by: NURSE PRACTITIONER

## 2025-01-01 PROCEDURE — 84484 ASSAY OF TROPONIN QUANT: CPT

## 2025-01-01 PROCEDURE — 4A1239Z MONITORING OF CARDIAC OUTPUT, PERCUTANEOUS APPROACH: ICD-10-PCS | Performed by: STUDENT IN AN ORGANIZED HEALTH CARE EDUCATION/TRAINING PROGRAM

## 2025-01-01 PROCEDURE — 87040 BLOOD CULTURE FOR BACTERIA: CPT

## 2025-01-01 PROCEDURE — C1892 INTRO/SHEATH,FIXED,PEEL-AWAY: HCPCS | Performed by: INTERNAL MEDICINE

## 2025-01-01 PROCEDURE — 87635 SARS-COV-2 COVID-19 AMP PRB: CPT

## 2025-01-01 PROCEDURE — 51702 INSERT TEMP BLADDER CATH: CPT

## 2025-01-01 PROCEDURE — 85025 COMPLETE CBC W/AUTO DIFF WBC: CPT

## 2025-01-01 PROCEDURE — C1751 CATH, INF, PER/CENT/MIDLINE: HCPCS | Performed by: INTERNAL MEDICINE

## 2025-01-01 PROCEDURE — 94002 VENT MGMT INPAT INIT DAY: CPT

## 2025-01-01 PROCEDURE — 5A09357 ASSISTANCE WITH RESPIRATORY VENTILATION, LESS THAN 24 CONSECUTIVE HOURS, CONTINUOUS POSITIVE AIRWAY PRESSURE: ICD-10-PCS | Performed by: INTERNAL MEDICINE

## 2025-01-01 PROCEDURE — 86900 BLOOD TYPING SEROLOGIC ABO: CPT

## 2025-01-01 PROCEDURE — 93503 INSERT/PLACE HEART CATHETER: CPT | Performed by: INTERNAL MEDICINE

## 2025-01-01 PROCEDURE — 4A133B3 MONITORING OF ARTERIAL PRESSURE, PULMONARY, PERCUTANEOUS APPROACH: ICD-10-PCS | Performed by: STUDENT IN AN ORGANIZED HEALTH CARE EDUCATION/TRAINING PROGRAM

## 2025-01-01 PROCEDURE — 82330 ASSAY OF CALCIUM: CPT

## 2025-01-01 PROCEDURE — 2580000003 HC RX 258

## 2025-01-01 PROCEDURE — 99222 1ST HOSP IP/OBS MODERATE 55: CPT | Performed by: INTERNAL MEDICINE

## 2025-01-01 PROCEDURE — 87205 SMEAR GRAM STAIN: CPT

## 2025-01-01 PROCEDURE — 2700000000 HC OXYGEN THERAPY PER DAY

## 2025-01-01 PROCEDURE — 0202U NFCT DS 22 TRGT SARS-COV-2: CPT

## 2025-01-01 PROCEDURE — 80176 ASSAY OF LIDOCAINE: CPT

## 2025-01-01 PROCEDURE — 86901 BLOOD TYPING SEROLOGIC RH(D): CPT

## 2025-01-01 PROCEDURE — 95822 EEG COMA OR SLEEP ONLY: CPT

## 2025-01-01 PROCEDURE — 2500000003 HC RX 250 WO HCPCS: Performed by: INTERNAL MEDICINE

## 2025-01-01 PROCEDURE — 86140 C-REACTIVE PROTEIN: CPT

## 2025-01-01 PROCEDURE — 93005 ELECTROCARDIOGRAM TRACING: CPT | Performed by: INTERNAL MEDICINE

## 2025-01-01 PROCEDURE — 84439 ASSAY OF FREE THYROXINE: CPT

## 2025-01-01 PROCEDURE — 03HY32Z INSERTION OF MONITORING DEVICE INTO UPPER ARTERY, PERCUTANEOUS APPROACH: ICD-10-PCS | Performed by: STUDENT IN AN ORGANIZED HEALTH CARE EDUCATION/TRAINING PROGRAM

## 2025-01-01 PROCEDURE — 99223 1ST HOSP IP/OBS HIGH 75: CPT | Performed by: INTERNAL MEDICINE

## 2025-01-01 PROCEDURE — 87899 AGENT NOS ASSAY W/OPTIC: CPT

## 2025-01-01 PROCEDURE — C1894 INTRO/SHEATH, NON-LASER: HCPCS | Performed by: INTERNAL MEDICINE

## 2025-01-01 PROCEDURE — 99222 1ST HOSP IP/OBS MODERATE 55: CPT | Performed by: STUDENT IN AN ORGANIZED HEALTH CARE EDUCATION/TRAINING PROGRAM

## 2025-01-01 PROCEDURE — 6360000002 HC RX W HCPCS: Performed by: EMERGENCY MEDICINE

## 2025-01-01 PROCEDURE — 83036 HEMOGLOBIN GLYCOSYLATED A1C: CPT

## 2025-01-01 PROCEDURE — 70450 CT HEAD/BRAIN W/O DYE: CPT

## 2025-01-01 PROCEDURE — 96375 TX/PRO/DX INJ NEW DRUG ADDON: CPT

## 2025-01-01 PROCEDURE — 37799 UNLISTED PX VASCULAR SURGERY: CPT

## 2025-01-01 PROCEDURE — 2000000000 HC ICU R&B

## 2025-01-01 PROCEDURE — 0BH17EZ INSERTION OF ENDOTRACHEAL AIRWAY INTO TRACHEA, VIA NATURAL OR ARTIFICIAL OPENING: ICD-10-PCS | Performed by: INTERNAL MEDICINE

## 2025-01-01 PROCEDURE — 93005 ELECTROCARDIOGRAM TRACING: CPT | Performed by: EMERGENCY MEDICINE

## 2025-01-01 PROCEDURE — 94660 CPAP INITIATION&MGMT: CPT

## 2025-01-01 PROCEDURE — 74018 RADEX ABDOMEN 1 VIEW: CPT

## 2025-01-01 PROCEDURE — 82565 ASSAY OF CREATININE: CPT

## 2025-01-01 PROCEDURE — 80053 COMPREHEN METABOLIC PANEL: CPT

## 2025-01-01 PROCEDURE — 5A1935Z RESPIRATORY VENTILATION, LESS THAN 24 CONSECUTIVE HOURS: ICD-10-PCS | Performed by: INTERNAL MEDICINE

## 2025-01-01 PROCEDURE — 80076 HEPATIC FUNCTION PANEL: CPT

## 2025-01-01 PROCEDURE — 4A023N6 MEASUREMENT OF CARDIAC SAMPLING AND PRESSURE, RIGHT HEART, PERCUTANEOUS APPROACH: ICD-10-PCS | Performed by: STUDENT IN AN ORGANIZED HEALTH CARE EDUCATION/TRAINING PROGRAM

## 2025-01-01 PROCEDURE — 6360000004 HC RX CONTRAST MEDICATION: Performed by: INTERNAL MEDICINE

## 2025-01-01 PROCEDURE — C1769 GUIDE WIRE: HCPCS | Performed by: INTERNAL MEDICINE

## 2025-01-01 PROCEDURE — 84520 ASSAY OF UREA NITROGEN: CPT

## 2025-01-01 PROCEDURE — 80051 ELECTROLYTE PANEL: CPT

## 2025-01-01 PROCEDURE — 96376 TX/PRO/DX INJ SAME DRUG ADON: CPT

## 2025-01-01 PROCEDURE — 99285 EMERGENCY DEPT VISIT HI MDM: CPT

## 2025-01-01 PROCEDURE — 84145 PROCALCITONIN (PCT): CPT

## 2025-01-01 PROCEDURE — C8929 TTE W OR WO FOL WCON,DOPPLER: HCPCS

## 2025-01-01 PROCEDURE — 86850 RBC ANTIBODY SCREEN: CPT

## 2025-01-01 PROCEDURE — 99223 1ST HOSP IP/OBS HIGH 75: CPT

## 2025-01-01 PROCEDURE — 96361 HYDRATE IV INFUSION ADD-ON: CPT

## 2025-01-01 PROCEDURE — G0545 PR INHERENT VISIT TO INPT: HCPCS | Performed by: INTERNAL MEDICINE

## 2025-01-01 PROCEDURE — 94664 DEMO&/EVAL PT USE INHALER: CPT

## 2025-01-01 PROCEDURE — 80061 LIPID PANEL: CPT

## 2025-01-01 PROCEDURE — 70498 CT ANGIOGRAPHY NECK: CPT

## 2025-01-01 PROCEDURE — 93010 ELECTROCARDIOGRAM REPORT: CPT | Performed by: FAMILY MEDICINE

## 2025-01-01 PROCEDURE — 2580000003 HC RX 258: Performed by: NURSE PRACTITIONER

## 2025-01-01 RX ORDER — MAGNESIUM SULFATE IN WATER 40 MG/ML
2000 INJECTION, SOLUTION INTRAVENOUS PRN
Status: DISCONTINUED | OUTPATIENT
Start: 2025-01-01 | End: 2025-01-01

## 2025-01-01 RX ORDER — IPRATROPIUM BROMIDE AND ALBUTEROL SULFATE 2.5; .5 MG/3ML; MG/3ML
1 SOLUTION RESPIRATORY (INHALATION)
Status: DISCONTINUED | OUTPATIENT
Start: 2025-01-01 | End: 2025-01-01

## 2025-01-01 RX ORDER — MIDODRINE HYDROCHLORIDE 5 MG/1
10 TABLET ORAL
Status: DISCONTINUED | OUTPATIENT
Start: 2025-01-01 | End: 2025-01-01

## 2025-01-01 RX ORDER — FUROSEMIDE 10 MG/ML
40 INJECTION INTRAMUSCULAR; INTRAVENOUS ONCE
Status: COMPLETED | OUTPATIENT
Start: 2025-01-01 | End: 2025-01-01

## 2025-01-01 RX ORDER — SODIUM CHLORIDE 0.9 % (FLUSH) 0.9 %
5-40 SYRINGE (ML) INJECTION EVERY 12 HOURS SCHEDULED
Status: DISCONTINUED | OUTPATIENT
Start: 2025-01-01 | End: 2025-01-01

## 2025-01-01 RX ORDER — MAGNESIUM SULFATE IN WATER 40 MG/ML
2000 INJECTION, SOLUTION INTRAVENOUS ONCE
Status: COMPLETED | OUTPATIENT
Start: 2025-01-01 | End: 2025-01-01

## 2025-01-01 RX ORDER — SODIUM CHLORIDE 0.9 % (FLUSH) 0.9 %
5-40 SYRINGE (ML) INJECTION PRN
Status: DISCONTINUED | OUTPATIENT
Start: 2025-01-01 | End: 2025-01-01

## 2025-01-01 RX ORDER — MIDAZOLAM HYDROCHLORIDE 1 MG/ML
1-10 INJECTION, SOLUTION INTRAVENOUS CONTINUOUS
Status: DISCONTINUED | OUTPATIENT
Start: 2025-01-01 | End: 2025-01-01

## 2025-01-01 RX ORDER — POLYETHYLENE GLYCOL 3350 17 G/17G
17 POWDER, FOR SOLUTION ORAL DAILY PRN
Status: DISCONTINUED | OUTPATIENT
Start: 2025-01-01 | End: 2025-01-01

## 2025-01-01 RX ORDER — NOREPINEPHRINE BITARTRATE 0.06 MG/ML
1-100 INJECTION, SOLUTION INTRAVENOUS CONTINUOUS
Status: DISCONTINUED | OUTPATIENT
Start: 2025-01-01 | End: 2025-01-01 | Stop reason: HOSPADM

## 2025-01-01 RX ORDER — METOPROLOL SUCCINATE 25 MG/1
25 TABLET, EXTENDED RELEASE ORAL NIGHTLY
Status: DISCONTINUED | OUTPATIENT
Start: 2025-01-01 | End: 2025-01-01

## 2025-01-01 RX ORDER — NOREPINEPHRINE BITARTRATE 0.06 MG/ML
1-100 INJECTION, SOLUTION INTRAVENOUS CONTINUOUS
Status: DISCONTINUED | OUTPATIENT
Start: 2025-01-01 | End: 2025-01-01

## 2025-01-01 RX ORDER — GLYCOPYRROLATE 0.2 MG/ML
0.2 INJECTION INTRAMUSCULAR; INTRAVENOUS EVERY 4 HOURS PRN
Status: DISCONTINUED | OUTPATIENT
Start: 2025-01-01 | End: 2025-01-01 | Stop reason: HOSPADM

## 2025-01-01 RX ORDER — MIDODRINE HYDROCHLORIDE 5 MG/1
10 TABLET ORAL 3 TIMES DAILY
Status: DISCONTINUED | OUTPATIENT
Start: 2025-01-01 | End: 2025-01-01

## 2025-01-01 RX ORDER — FENTANYL CITRATE 50 UG/ML
50 INJECTION, SOLUTION INTRAMUSCULAR; INTRAVENOUS
Status: DISCONTINUED | OUTPATIENT
Start: 2025-01-01 | End: 2025-01-01

## 2025-01-01 RX ORDER — ACETAMINOPHEN 650 MG/1
650 SUPPOSITORY RECTAL EVERY 6 HOURS PRN
Status: DISCONTINUED | OUTPATIENT
Start: 2025-01-01 | End: 2025-01-01

## 2025-01-01 RX ORDER — ONDANSETRON 4 MG/1
4 TABLET, ORALLY DISINTEGRATING ORAL EVERY 8 HOURS PRN
Status: DISCONTINUED | OUTPATIENT
Start: 2025-01-01 | End: 2025-01-01

## 2025-01-01 RX ORDER — BUMETANIDE 0.25 MG/ML
1 INJECTION, SOLUTION INTRAMUSCULAR; INTRAVENOUS ONCE
Status: COMPLETED | OUTPATIENT
Start: 2025-01-01 | End: 2025-01-01

## 2025-01-01 RX ORDER — IOPAMIDOL 755 MG/ML
75 INJECTION, SOLUTION INTRAVASCULAR
Status: COMPLETED | OUTPATIENT
Start: 2025-01-01 | End: 2025-01-01

## 2025-01-01 RX ORDER — MAGNESIUM SULFATE IN WATER 40 MG/ML
2000 INJECTION, SOLUTION INTRAVENOUS ONCE
Status: DISCONTINUED | OUTPATIENT
Start: 2025-01-01 | End: 2025-01-01

## 2025-01-01 RX ORDER — ACETAMINOPHEN 325 MG/1
650 TABLET ORAL EVERY 6 HOURS PRN
Status: DISCONTINUED | OUTPATIENT
Start: 2025-01-01 | End: 2025-01-01

## 2025-01-01 RX ORDER — DEXAMETHASONE SODIUM PHOSPHATE 4 MG/ML
4 INJECTION, SOLUTION INTRA-ARTICULAR; INTRALESIONAL; INTRAMUSCULAR; INTRAVENOUS; SOFT TISSUE ONCE
Status: COMPLETED | OUTPATIENT
Start: 2025-01-01 | End: 2025-01-01

## 2025-01-01 RX ORDER — LIDOCAINE HYDROCHLORIDE ANHYDROUS AND DEXTROSE MONOHYDRATE 5; 400 G/100ML; MG/100ML
1 INJECTION, SOLUTION INTRAVENOUS CONTINUOUS
Status: DISCONTINUED | OUTPATIENT
Start: 2025-01-01 | End: 2025-01-01

## 2025-01-01 RX ORDER — POTASSIUM CHLORIDE 7.45 MG/ML
10 INJECTION INTRAVENOUS PRN
Status: DISCONTINUED | OUTPATIENT
Start: 2025-01-01 | End: 2025-01-01

## 2025-01-01 RX ORDER — SODIUM CHLORIDE 9 MG/ML
INJECTION, SOLUTION INTRAVENOUS PRN
Status: DISCONTINUED | OUTPATIENT
Start: 2025-01-01 | End: 2025-01-01

## 2025-01-01 RX ORDER — POTASSIUM CHLORIDE 29.8 MG/ML
20 INJECTION INTRAVENOUS PRN
Status: DISCONTINUED | OUTPATIENT
Start: 2025-01-01 | End: 2025-01-01

## 2025-01-01 RX ORDER — PHENYLEPHRINE HCL IN 0.9% NACL 50MG/250ML
10-300 PLASTIC BAG, INJECTION (ML) INTRAVENOUS CONTINUOUS
Status: DISCONTINUED | OUTPATIENT
Start: 2025-01-01 | End: 2025-01-01

## 2025-01-01 RX ORDER — ASPIRIN 81 MG/1
324 TABLET, CHEWABLE ORAL ONCE
Status: COMPLETED | OUTPATIENT
Start: 2025-01-01 | End: 2025-01-01

## 2025-01-01 RX ORDER — IPRATROPIUM BROMIDE AND ALBUTEROL SULFATE 2.5; .5 MG/3ML; MG/3ML
1 SOLUTION RESPIRATORY (INHALATION) ONCE
Status: COMPLETED | OUTPATIENT
Start: 2025-01-01 | End: 2025-01-01

## 2025-01-01 RX ORDER — CLOPIDOGREL BISULFATE 75 MG/1
75 TABLET ORAL DAILY
Status: DISCONTINUED | OUTPATIENT
Start: 2025-01-01 | End: 2025-01-01

## 2025-01-01 RX ORDER — LORAZEPAM 2 MG/ML
1 INJECTION INTRAMUSCULAR EVERY 4 HOURS PRN
Status: DISCONTINUED | OUTPATIENT
Start: 2025-01-01 | End: 2025-01-01 | Stop reason: HOSPADM

## 2025-01-01 RX ORDER — VASOPRESSIN IN DEXTROSE 5 % 20/100 ML
.01-.03 PLASTIC BAG, INJECTION (ML) INTRAVENOUS CONTINUOUS
Status: DISCONTINUED | OUTPATIENT
Start: 2025-01-01 | End: 2025-01-01

## 2025-01-01 RX ORDER — ALBUTEROL SULFATE 0.83 MG/ML
2.5 SOLUTION RESPIRATORY (INHALATION) EVERY 4 HOURS PRN
Status: DISCONTINUED | OUTPATIENT
Start: 2025-01-01 | End: 2025-01-01

## 2025-01-01 RX ORDER — MILRINONE LACTATE 0.2 MG/ML
.0625-.75 INJECTION, SOLUTION INTRAVENOUS CONTINUOUS
Status: DISCONTINUED | OUTPATIENT
Start: 2025-01-01 | End: 2025-01-01

## 2025-01-01 RX ORDER — GLYCOPYRROLATE 0.2 MG/ML
0.2 INJECTION INTRAMUSCULAR; INTRAVENOUS ONCE
Status: DISCONTINUED | OUTPATIENT
Start: 2025-01-01 | End: 2025-01-01

## 2025-01-01 RX ORDER — BUMETANIDE 0.25 MG/ML
1 INJECTION, SOLUTION INTRAMUSCULAR; INTRAVENOUS 2 TIMES DAILY
Status: DISCONTINUED | OUTPATIENT
Start: 2025-01-01 | End: 2025-01-01

## 2025-01-01 RX ORDER — PHYTONADIONE 1 MG/.5ML
1 INJECTION, EMULSION INTRAMUSCULAR; INTRAVENOUS; SUBCUTANEOUS ONCE
Status: DISCONTINUED | OUTPATIENT
Start: 2025-01-01 | End: 2025-01-01 | Stop reason: DRUGHIGH

## 2025-01-01 RX ORDER — SPIRONOLACTONE 25 MG/1
25 TABLET ORAL DAILY
Status: DISCONTINUED | OUTPATIENT
Start: 2025-01-01 | End: 2025-01-01 | Stop reason: HOSPADM

## 2025-01-01 RX ORDER — HEPARIN SODIUM 5000 [USP'U]/ML
5000 INJECTION, SOLUTION INTRAVENOUS; SUBCUTANEOUS EVERY 8 HOURS SCHEDULED
Status: DISCONTINUED | OUTPATIENT
Start: 2025-01-01 | End: 2025-01-01

## 2025-01-01 RX ORDER — ATORVASTATIN CALCIUM 80 MG/1
80 TABLET, FILM COATED ORAL NIGHTLY
Status: DISCONTINUED | OUTPATIENT
Start: 2025-01-01 | End: 2025-01-01

## 2025-01-01 RX ORDER — ONDANSETRON 2 MG/ML
4 INJECTION INTRAMUSCULAR; INTRAVENOUS EVERY 6 HOURS PRN
Status: DISCONTINUED | OUTPATIENT
Start: 2025-01-01 | End: 2025-01-01

## 2025-01-01 RX ADMIN — CEFEPIME 2000 MG: 2 INJECTION, POWDER, FOR SOLUTION INTRAVENOUS at 16:28

## 2025-01-01 RX ADMIN — MIDODRINE HYDROCHLORIDE 10 MG: 5 TABLET ORAL at 12:31

## 2025-01-01 RX ADMIN — LIDOCAINE HYDROCHLORIDE 1 MG/MIN: 4 INJECTION, SOLUTION INTRAVENOUS at 01:45

## 2025-01-01 RX ADMIN — MIDODRINE HYDROCHLORIDE 10 MG: 5 TABLET ORAL at 17:35

## 2025-01-01 RX ADMIN — MIDODRINE HYDROCHLORIDE 10 MG: 5 TABLET ORAL at 17:12

## 2025-01-01 RX ADMIN — MIDODRINE HYDROCHLORIDE 10 MG: 5 TABLET ORAL at 09:03

## 2025-01-01 RX ADMIN — MIDODRINE HYDROCHLORIDE 10 MG: 5 TABLET ORAL at 08:39

## 2025-01-01 RX ADMIN — MIDODRINE HYDROCHLORIDE 10 MG: 5 TABLET ORAL at 11:50

## 2025-01-01 RX ADMIN — Medication 1 MG/MIN: at 16:41

## 2025-01-01 RX ADMIN — CLOPIDOGREL BISULFATE 75 MG: 75 TABLET ORAL at 09:03

## 2025-01-01 RX ADMIN — MILRINONE LACTATE IN DEXTROSE 0.25 MCG/KG/MIN: 200 INJECTION, SOLUTION INTRAVENOUS at 19:01

## 2025-01-01 RX ADMIN — HEPARIN SODIUM 5000 UNITS: 5000 INJECTION INTRAVENOUS; SUBCUTANEOUS at 07:00

## 2025-01-01 RX ADMIN — Medication 100 MCG/MIN: at 08:14

## 2025-01-01 RX ADMIN — LIDOCAINE HYDROCHLORIDE 2 MG/MIN: 4 INJECTION, SOLUTION INTRAVENOUS at 00:42

## 2025-01-01 RX ADMIN — GLYCOPYRROLATE 0.2 MG: 0.2 INJECTION INTRAMUSCULAR; INTRAVENOUS at 12:31

## 2025-01-01 RX ADMIN — ATORVASTATIN CALCIUM 80 MG: 80 TABLET, FILM COATED ORAL at 20:28

## 2025-01-01 RX ADMIN — MAGNESIUM SULFATE HEPTAHYDRATE 2000 MG: 40 INJECTION, SOLUTION INTRAVENOUS at 09:57

## 2025-01-01 RX ADMIN — METHYLPREDNISOLONE SODIUM SUCCINATE 40 MG: 40 INJECTION, POWDER, LYOPHILIZED, FOR SOLUTION INTRAMUSCULAR; INTRAVENOUS at 08:39

## 2025-01-01 RX ADMIN — IPRATROPIUM BROMIDE AND ALBUTEROL SULFATE 1 DOSE: .5; 2.5 SOLUTION RESPIRATORY (INHALATION) at 20:35

## 2025-01-01 RX ADMIN — SULFUR HEXAFLUORIDE 2 ML: KIT at 10:42

## 2025-01-01 RX ADMIN — SODIUM CHLORIDE, PRESERVATIVE FREE 10 ML: 5 INJECTION INTRAVENOUS at 09:02

## 2025-01-01 RX ADMIN — IPRATROPIUM BROMIDE AND ALBUTEROL SULFATE 1 DOSE: 2.5; .5 SOLUTION RESPIRATORY (INHALATION) at 11:43

## 2025-01-01 RX ADMIN — PHYTONADIONE 10 MG: 10 INJECTION, EMULSION INTRAMUSCULAR; INTRAVENOUS; SUBCUTANEOUS at 11:16

## 2025-01-01 RX ADMIN — BUMETANIDE 1 MG: 0.25 INJECTION INTRAMUSCULAR; INTRAVENOUS at 21:16

## 2025-01-01 RX ADMIN — Medication 11 MCG/MIN: at 07:31

## 2025-01-01 RX ADMIN — ONDANSETRON 4 MG: 2 INJECTION INTRAMUSCULAR; INTRAVENOUS at 19:42

## 2025-01-01 RX ADMIN — VASOPRESSIN 0.03 UNITS/MIN: 0.2 INJECTION INTRAVENOUS at 00:36

## 2025-01-01 RX ADMIN — IPRATROPIUM BROMIDE AND ALBUTEROL SULFATE 1 DOSE: 2.5; .5 SOLUTION RESPIRATORY (INHALATION) at 17:22

## 2025-01-01 RX ADMIN — WATER 1000 MG: 1 INJECTION INTRAMUSCULAR; INTRAVENOUS; SUBCUTANEOUS at 11:50

## 2025-01-01 RX ADMIN — SODIUM CHLORIDE 40 MG: 9 INJECTION INTRAMUSCULAR; INTRAVENOUS; SUBCUTANEOUS at 08:39

## 2025-01-01 RX ADMIN — BUMETANIDE 1 MG: 0.25 INJECTION INTRAMUSCULAR; INTRAVENOUS at 10:20

## 2025-01-01 RX ADMIN — MIDODRINE HYDROCHLORIDE 10 MG: 5 TABLET ORAL at 09:01

## 2025-01-01 RX ADMIN — IPRATROPIUM BROMIDE AND ALBUTEROL SULFATE 1 DOSE: 2.5; .5 SOLUTION RESPIRATORY (INHALATION) at 12:15

## 2025-01-01 RX ADMIN — IPRATROPIUM BROMIDE AND ALBUTEROL SULFATE 1 DOSE: 2.5; .5 SOLUTION RESPIRATORY (INHALATION) at 07:38

## 2025-01-01 RX ADMIN — PIPERACILLIN AND TAZOBACTAM 3375 MG: 3; .375 INJECTION, POWDER, LYOPHILIZED, FOR SOLUTION INTRAVENOUS at 09:02

## 2025-01-01 RX ADMIN — Medication 5 MCG/MIN: at 21:59

## 2025-01-01 RX ADMIN — Medication 2 MG/HR: at 04:16

## 2025-01-01 RX ADMIN — PIPERACILLIN AND TAZOBACTAM 3375 MG: 3; .375 INJECTION, POWDER, LYOPHILIZED, FOR SOLUTION INTRAVENOUS at 00:22

## 2025-01-01 RX ADMIN — SODIUM CHLORIDE 40 MG: 9 INJECTION INTRAMUSCULAR; INTRAVENOUS; SUBCUTANEOUS at 09:01

## 2025-01-01 RX ADMIN — SODIUM CHLORIDE, PRESERVATIVE FREE 10 ML: 5 INJECTION INTRAVENOUS at 09:01

## 2025-01-01 RX ADMIN — MAGNESIUM SULFATE HEPTAHYDRATE 2000 MG: 40 INJECTION, SOLUTION INTRAVENOUS at 23:08

## 2025-01-01 RX ADMIN — SODIUM CHLORIDE, PRESERVATIVE FREE 10 ML: 5 INJECTION INTRAVENOUS at 21:00

## 2025-01-01 RX ADMIN — Medication 1 MG/MIN: at 08:15

## 2025-01-01 RX ADMIN — Medication 150 MG: at 10:17

## 2025-01-01 RX ADMIN — Medication 50 MCG/MIN: at 11:12

## 2025-01-01 RX ADMIN — LORAZEPAM 1 MG: 2 INJECTION INTRAMUSCULAR; INTRAVENOUS at 12:30

## 2025-01-01 RX ADMIN — VASOPRESSIN 0.03 UNITS/MIN: 0.2 INJECTION INTRAVENOUS at 08:15

## 2025-01-01 RX ADMIN — EPINEPHRINE 5 MCG/MIN: 1 INJECTION INTRAMUSCULAR; INTRAVENOUS; SUBCUTANEOUS at 08:08

## 2025-01-01 RX ADMIN — IPRATROPIUM BROMIDE AND ALBUTEROL SULFATE 1 DOSE: 2.5; .5 SOLUTION RESPIRATORY (INHALATION) at 21:23

## 2025-01-01 RX ADMIN — SODIUM CHLORIDE 40 MG: 9 INJECTION INTRAMUSCULAR; INTRAVENOUS; SUBCUTANEOUS at 09:10

## 2025-01-01 RX ADMIN — IPRATROPIUM BROMIDE AND ALBUTEROL SULFATE 1 DOSE: 2.5; .5 SOLUTION RESPIRATORY (INHALATION) at 17:23

## 2025-01-01 RX ADMIN — SODIUM CHLORIDE, PRESERVATIVE FREE 10 ML: 5 INJECTION INTRAVENOUS at 21:17

## 2025-01-01 RX ADMIN — Medication 20 MCG/MIN: at 00:37

## 2025-01-01 RX ADMIN — WATER 2000 MG: 1 INJECTION INTRAMUSCULAR; INTRAVENOUS; SUBCUTANEOUS at 10:54

## 2025-01-01 RX ADMIN — METHYLPREDNISOLONE SODIUM SUCCINATE 40 MG: 40 INJECTION, POWDER, LYOPHILIZED, FOR SOLUTION INTRAMUSCULAR; INTRAVENOUS at 09:01

## 2025-01-01 RX ADMIN — METHYLPREDNISOLONE SODIUM SUCCINATE 40 MG: 40 INJECTION, POWDER, LYOPHILIZED, FOR SOLUTION INTRAMUSCULAR; INTRAVENOUS at 20:27

## 2025-01-01 RX ADMIN — CLOPIDOGREL BISULFATE 75 MG: 75 TABLET ORAL at 09:01

## 2025-01-01 RX ADMIN — IPRATROPIUM BROMIDE AND ALBUTEROL SULFATE 1 DOSE: 2.5; .5 SOLUTION RESPIRATORY (INHALATION) at 21:25

## 2025-01-01 RX ADMIN — FUROSEMIDE 40 MG: 10 INJECTION, SOLUTION INTRAMUSCULAR; INTRAVENOUS at 21:47

## 2025-01-01 RX ADMIN — DEXAMETHASONE SODIUM PHOSPHATE 4 MG: 4 INJECTION INTRA-ARTICULAR; INTRALESIONAL; INTRAMUSCULAR; INTRAVENOUS; SOFT TISSUE at 20:41

## 2025-01-01 RX ADMIN — ASPIRIN 324 MG: 81 TABLET, CHEWABLE ORAL at 22:13

## 2025-01-01 RX ADMIN — Medication 1 MG/MIN: at 00:38

## 2025-01-01 RX ADMIN — Medication 13 MCG/MIN: at 22:04

## 2025-01-01 RX ADMIN — FUROSEMIDE 40 MG: 10 INJECTION, SOLUTION INTRAMUSCULAR; INTRAVENOUS at 20:40

## 2025-01-01 RX ADMIN — HYDROMORPHONE HYDROCHLORIDE 1 MG: 1 INJECTION, SOLUTION INTRAMUSCULAR; INTRAVENOUS; SUBCUTANEOUS at 12:30

## 2025-01-01 RX ADMIN — IOPAMIDOL 75 ML: 755 INJECTION, SOLUTION INTRAVENOUS at 20:22

## 2025-01-01 RX ADMIN — CEFEPIME 2000 MG: 2 INJECTION, POWDER, FOR SOLUTION INTRAVENOUS at 02:58

## 2025-01-01 RX ADMIN — Medication 1 MG/MIN: at 08:40

## 2025-01-01 RX ADMIN — IPRATROPIUM BROMIDE AND ALBUTEROL SULFATE 1 DOSE: 2.5; .5 SOLUTION RESPIRATORY (INHALATION) at 08:39

## 2025-01-01 RX ADMIN — FUROSEMIDE 40 MG: 10 INJECTION, SOLUTION INTRAMUSCULAR; INTRAVENOUS at 11:50

## 2025-01-01 ASSESSMENT — PULMONARY FUNCTION TESTS
PIF_VALUE: 20
PIF_VALUE: 25
PIF_VALUE: 23
PIF_VALUE: 23
PIF_VALUE: 26
PIF_VALUE: 26
PIF_VALUE: 13
PIF_VALUE: 23

## 2025-01-01 ASSESSMENT — ENCOUNTER SYMPTOMS
COUGH: 1
SHORTNESS OF BREATH: 1
WHEEZING: 1

## 2025-01-02 ENCOUNTER — ANTI-COAG VISIT (OUTPATIENT)
Age: 75
End: 2025-01-02
Payer: MEDICARE

## 2025-01-02 VITALS
BODY MASS INDEX: 25.39 KG/M2 | WEIGHT: 167 LBS | HEART RATE: 81 BPM | DIASTOLIC BLOOD PRESSURE: 64 MMHG | SYSTOLIC BLOOD PRESSURE: 118 MMHG

## 2025-01-02 DIAGNOSIS — I48.20 CHRONIC ATRIAL FIBRILLATION (HCC): Primary | ICD-10-CM

## 2025-01-02 LAB
INTERNATIONAL NORMALIZATION RATIO, POC: 2
PROTHROMBIN TIME, POC: 0

## 2025-01-02 PROCEDURE — 85610 PROTHROMBIN TIME: CPT

## 2025-01-02 PROCEDURE — 99211 OFF/OP EST MAY X REQ PHY/QHP: CPT

## 2025-01-02 NOTE — PATIENT INSTRUCTIONS
Continue to monitor urine and stool for signs and symptoms of bleeding.   Please notify the clinic of any medication changes.   Please remember to bring all medications (both prescription and OTC) to your next visit.   Kindly notify the clinic if you are unable to make to your next appointment.   Follow warfarin dosing instructions on dosing calendar provided.      n/a

## 2025-01-02 NOTE — PROGRESS NOTES
[place]  Medication Management  ANTICOAGULATION    Referring Provider: Dr. Soliz     GOAL INR: 2 - 3     TODAY'S INR: 2.0     WARFARIN Dosage: Continue warfarin 2 mg tablet daily.    INR (no units)   Date Value   2024 1.3   2024 1.5   07/15/2024 1.2   2024 1.4   2024 1.7   2024 1.6   2024 1.6     INR,(POC) (no units)   Date Value   2024 3.0   2024 2.4   10/31/2024 1.9   10/16/2024 1.6   2024 1.8   2024 1.6   2024 1.4       Hemoglobin   Date Value Ref Range Status   2024 10.6 (L) 13.0 - 17.0 g/dL Final     Hematocrit   Date Value Ref Range Status   2024 36.6 (L) 40.7 - 50.3 % Final     ALT   Date Value Ref Range Status   2024 15 10 - 50 U/L Final     AST   Date Value Ref Range Status   2024 33 10 - 50 U/L Final       Medication changes:  none    Notes:    Fingerstick INR drawn per clinic protocol. Patient states no visible blood in urine and no black tarry stool. Denies any missed doses of warfarin. No change in other maintenance medications or in diet. Will recheck INR in 4 weeks. Patient acknowledges working in consult agreement with pharmacist as referred by his/her physician.           Patient is swollen/edema from waist down and both eyes have pockets of fluid that look like water blisters with L>R.   Patient follow up with cardiology on 1/15/25.          For Pharmacy Admin Tracking Only    Intervention Detail: Adherence Monitorin  Total # of Interventions Recommended: 1  Total # of Interventions Accepted: 1  Time Spent (min): 20      Nahomy Mace RPH, PharmD

## 2025-01-07 DIAGNOSIS — I50.22 CHRONIC SYSTOLIC HEART FAILURE (HCC): Primary | ICD-10-CM

## 2025-01-10 ENCOUNTER — HOSPITAL ENCOUNTER (OUTPATIENT)
Age: 75
Discharge: HOME | End: 2025-01-10
Payer: MEDICARE

## 2025-01-10 DIAGNOSIS — I50.22 CHRONIC SYSTOLIC HEART FAILURE (HCC): ICD-10-CM

## 2025-01-10 LAB
ANION GAP SERPL CALCULATED.3IONS-SCNC: 13 MMOL/L (ref 9–16)
BASOPHILS # BLD: 0.07 K/UL (ref 0–0.2)
BASOPHILS NFR BLD: 1 % (ref 0–2)
BNP SERPL-MCNC: 8966 PG/ML (ref 0–125)
BUN SERPL-MCNC: 14 MG/DL (ref 8–23)
BUN/CREAT SERPL: 13 (ref 9–20)
CALCIUM SERPL-MCNC: 9.1 MG/DL (ref 8.6–10.4)
CHLORIDE SERPL-SCNC: 96 MMOL/L (ref 98–107)
CO2 SERPL-SCNC: 25 MMOL/L (ref 20–31)
CREAT SERPL-MCNC: 1.1 MG/DL (ref 0.7–1.2)
EOSINOPHIL # BLD: 0 K/UL (ref 0–0.44)
EOSINOPHILS RELATIVE PERCENT: 0 % (ref 1–4)
ERYTHROCYTE [DISTWIDTH] IN BLOOD BY AUTOMATED COUNT: 22.3 % (ref 11.8–14.4)
GFR, ESTIMATED: 69 ML/MIN/1.73M2
GLUCOSE SERPL-MCNC: 97 MG/DL (ref 74–99)
HCT VFR BLD AUTO: 36.5 % (ref 40.7–50.3)
HGB BLD-MCNC: 10.7 G/DL (ref 13–17)
IMM GRANULOCYTES # BLD AUTO: 0 K/UL (ref 0–0.3)
IMM GRANULOCYTES NFR BLD: 0 %
LYMPHOCYTES NFR BLD: 0.86 K/UL (ref 1.1–3.7)
LYMPHOCYTES RELATIVE PERCENT: 13 % (ref 24–43)
MCH RBC QN AUTO: 26.5 PG (ref 25.2–33.5)
MCHC RBC AUTO-ENTMCNC: 29.3 G/DL (ref 28.4–34.8)
MCV RBC AUTO: 90.3 FL (ref 82.6–102.9)
MONOCYTES NFR BLD: 0.73 K/UL (ref 0.1–1.2)
MONOCYTES NFR BLD: 11 % (ref 3–12)
MORPHOLOGY: ABNORMAL
NEUTROPHILS NFR BLD: 75 % (ref 36–65)
NEUTS SEG NFR BLD: 4.94 K/UL (ref 1.5–8.1)
NRBC BLD-RTO: 0 PER 100 WBC
PLATELET # BLD AUTO: 211 K/UL (ref 138–453)
PMV BLD AUTO: 9.8 FL (ref 8.1–13.5)
POTASSIUM SERPL-SCNC: 3.6 MMOL/L (ref 3.7–5.3)
RBC # BLD AUTO: 4.04 M/UL (ref 4.21–5.77)
SODIUM SERPL-SCNC: 134 MMOL/L (ref 136–145)
WBC OTHER # BLD: 6.6 K/UL (ref 3.5–11.3)

## 2025-01-10 PROCEDURE — 85025 COMPLETE CBC W/AUTO DIFF WBC: CPT

## 2025-01-10 PROCEDURE — 83880 ASSAY OF NATRIURETIC PEPTIDE: CPT

## 2025-01-10 PROCEDURE — 36415 COLL VENOUS BLD VENIPUNCTURE: CPT

## 2025-01-10 PROCEDURE — 80048 BASIC METABOLIC PNL TOTAL CA: CPT

## 2025-01-13 ENCOUNTER — TELEPHONE (OUTPATIENT)
Dept: CARDIOLOGY | Age: 75
End: 2025-01-13

## 2025-01-13 NOTE — TELEPHONE ENCOUNTER
----- Message from Dr. Figueroa Soliz MD sent at 1/10/2025 11:57 PM EST -----  Please let Mr. Peters know that their recent test results are largely unremarkable and/or relatively normal for them. No further action is needed at this time. Please continue with your current care plan.

## 2025-01-28 PROBLEM — R57.0 CARDIOGENIC SHOCK: Status: ACTIVE | Noted: 2025-01-01

## 2025-01-28 PROBLEM — I50.33 ACUTE ON CHRONIC DIASTOLIC (CONGESTIVE) HEART FAILURE (HCC): Status: ACTIVE | Noted: 2025-01-01

## 2025-01-28 PROBLEM — J81.0 ACUTE PULMONARY EDEMA: Status: ACTIVE | Noted: 2025-01-01

## 2025-01-28 PROBLEM — J96.01 ACUTE HYPOXEMIC RESPIRATORY FAILURE: Status: ACTIVE | Noted: 2025-01-01

## 2025-01-28 NOTE — PROCEDURES
Insert Arterial Line  Date/Time:  01/28/25, 11:47 AM  Performed by: VITO HANEY RCP    Patient identity confirmed: arm band and provided demographic data   Time out: Immediately prior to procedure a \"time out\" was called to verify the correct patient, procedure, equipment, support staff.    Preparation: Patient was prepped and draped in the usual sterile fashion.    Location:right radial    Delta's test normal: yes  Needle gauge: 20     Number of attempts: 1  Post-procedure: transparent dressing applied and line secured    Patient tolerance: wellPROCEDURE NOTE  Date: 1/28/2025   Name: Pascual Peters Jr.  YOB: 1950    Procedures

## 2025-01-28 NOTE — CARE COORDINATION
Case Management Assessment  Initial Evaluation    Date/Time of Evaluation: 1/28/2025 11:01 AM  Assessment Completed by: EBER COHEN RN    If patient is discharged prior to next notation, then this note serves as note for discharge by case management.    Patient Name: Pascual Peters Jr.                   YOB: 1950  Diagnosis: Acute on chronic diastolic (congestive) heart failure (HCC) [I50.33]                   Date / Time: 1/28/2025  2:10 AM    Patient Admission Status: Inpatient   Readmission Risk (Low < 19, Mod (19-27), High > 27): Readmission Risk Score: 17.7    Current PCP: Jermaine Esparza MD  PCP verified by CM? (P) Yes    Chart Reviewed: Yes      History Provided by: (P) Patient  Patient Orientation: (P) Alert and Oriented    Patient Cognition: (P) Alert    Hospitalization in the last 30 days (Readmission):  No    If yes, Readmission Assessment in CM Navigator will be completed.    Advance Directives:      Code Status: Full Code   Patient's Primary Decision Maker is: (P) Legal Next of Kin      Discharge Planning:    Patient lives with: (P) Alone Type of Home: (P) Apartment  Primary Care Giver: (P) Self  Patient Support Systems include: (P) Children, Family Members   Current Financial resources: (P) Medicare  Current community resources: (P) None  Current services prior to admission: (P) Durable Medical Equipment            Current DME: (P) Cane            Type of Home Care services:  (P) None    ADLS  Prior functional level: (P) Independent in ADLs/IADLs  Current functional level: (P) Independent in ADLs/IADLs    PT AM-PAC:   /24  OT AM-PAC:   /24    Family can provide assistance at DC: (P) Yes  Would you like Case Management to discuss the discharge plan with any other family members/significant others, and if so, who? (P) No  Plans to Return to Present Housing: (P) Yes  Other Identified Issues/Barriers to RETURNING to current housing: none  Potential Assistance needed at discharge:

## 2025-01-28 NOTE — ED PROVIDER NOTES
[facility] EMERGENCY DEPARTMENT  EMERGENCY DEPARTMENT ENCOUNTER      Pt Name: Pascual Peters Jr.  MRN: [id number]  Birthdate 1950  Date of evaluation: 1/27/2025  Provider: Sotero Morales MD    CHIEF COMPLAINT       Chief Complaint   Patient presents with    Respiratory Distress     Pt. Brought in from home for Increased SOB for past 2 hours. Pt. Was found sitting at table in tripod position with SpO2 74% on RA for EMS. Pt. Given duoneb in route with no improvement and placed on CPAP PTA.          HISTORY OF PRESENT ILLNESS   (Location/Symptom, Timing/Onset, Context/Setting, Quality, Duration, Modifying Factors, Severity)  Note limiting factors.   Pascual Peters Jr. is a 75 y.o. male who presents to the emergency department     The patient presents to the emergency room from home by EMS he was called for shortness of breath.  He does wear chronic oxygen at home he has COPD and end-stage heart disease with CHF.  No fever no chills.  There is been no chest pain.  He has had a dry cough with wheezing he has had some swelling of the lower extremities with weight retention but did not weigh himself.  He does take a daily water pill.  No fever chills or bodyaches    HPI    Nursing Notes were reviewed.    REVIEW OF SYSTEMS    (2-9 systems for level 4, 10 or more for level 5)     Review of Systems   Respiratory:  Positive for cough, shortness of breath and wheezing.    All other systems reviewed and are negative.      Except as noted above the remainder of the review of systems was reviewed and negative.       PAST MEDICAL HISTORY     Past Medical History:   Diagnosis Date    Acute non-ST elevation myocardial infarction (NSTEMI) ([facility]) 12/24/2020    Chronic kidney disease     Coronary artery disease     s/p CABG x3 in 2008 and 2 stents on 12/24/2020    H/O echocardiogram 02/08/2021    EF 15-20% LV severly enlarged and LV wall thickness is normal Severe global hypokinesis with segmental abnormalities LA is mod dilated 34-39

## 2025-01-28 NOTE — PROCEDURES
[facility]    R/L CARDIAC CATHETERIZATION    Date:   1/28/2025  Patient name:  Pascual Peters Jr.  Date of admission:  1/28/2025  2:10 AM  MRN:   [id number]  YOB: 1950    Operators:  Primary:   Dr. Soto Hargrove (Attending Physician)    Assistant/CV fellow: Maria Del Carmen Gates MD    Procedure performed:     [] Left Heart Catheterization.   [] Graft Angiography.  [] Left Ventriculography.     [x] Right Heart Catheterization.  [] Coronary Angiography.    [] Aortic Valve Studies.  [] PCI:      [] Other:     Pre Procedure Conscious Sedation Data:  ASA Class:    [] I [x] II [] III [] IV    Mallampati Class:  [] I [x] II [] III [] IV    Indication:  [] STEMI      [] + Stress test  [] ACS      [] + EKG Changes  [] Non Q MI       [] Significant Risk Factors  [] Recurrent Angina             [] Diabetes Mellitus    [] New LBBB      [] Uncontrolled HTN.  [x] CHF / Low EF changes     [] Abnormal CTA / Ca Score    History and Risk Factors    [] Hypertension     [] Family history of CAD  [] Hyperlipidemia     [] Cerebrovascular Disease   [] Prior MI       [] Peripheral Vascular disease   [] Prior PCI              [] Diabetes Mellitus    [] Left Main PCI.     [] Currently on Dialysis.  [] Prior CABG.      [] Currently smoker.    Procedure:  Access:  [x] IJ [] Radial  artery       [x] Right  [] Left    Procedure: After informed consent was obtained with explanation of the risks and benefits, patient was brought to the cath lab. The access area was prepped and draped in sterile fashion. 1% lidocaine was used for local block. The artery was cannulated with 6  Fr sheath with brisk arterial blood return. The side port was frequently flushed and aspirated with normal saline.      Findings:      RIGHT HEART CATHETERIZATION HEMODYNAMIC MEASUREMENTS  Procedure: After informed consent was obtained with explanation of the risks and benefits, the patient was brought to the cath lab. The right groin was prepped and draped

## 2025-01-28 NOTE — ED NOTES
Adult Non-Invasive Positive Pressure Ventilation for Acute Respiratory Distress  Patient & Family Education Note     Patient: Pascual Peters Jr.  Age: 75 y.o.     The patient and/or family has been educated on the following items and have verbalized understanding and agreement:    [x]Patient and/or family have been educated on the purpose and advantages of NIV and have verbalized understanding and agreement.  [x]Patient and/or family is in agreement that the patient will be NPO (Nothing by Mouth) for the duration of NIV use.  [x]Patient and/or  family is in agreement that NIV will not be routinely disrupted except to complete oral care.  [x]Patient and/or family have been educated on the level of care, vital signs frequency and monitoring requirements for NIV and are in agreement.  [x]Patient and/or family have been educated on reporting any nausea, chest discomfort, sudden increase in shortness of breath, or a severe headache to the staff immediately.

## 2025-01-28 NOTE — RT PROTOCOL NOTE
RT Nebulizer Bronchodilator Protocol Note    There is a bronchodilator order in the chart from a provider indicating to follow the RT Bronchodilator Protocol and there is an “Initiate RT Bronchodilator Protocol” order as well (see protocol at bottom of note).    CXR Findings:  XR CHEST PORTABLE    Result Date: 1/27/2025  Cardiomegaly, with diffuse interstitial and early alveolar opacities bilaterally, consistent with pulmonary edema. Small left pleural effusion.       The findings from the last RT Protocol Assessment were as follows:  Smoking: Smoker 15 pack years or more  Respiratory Pattern: Dyspnea on exertion or RR 21-25 bpm  Breath Sounds: Inspiratory and expiratory or bilateral wheezing and/or rhonchi  Cough: Strong, productive  Indication for Bronchodilator Therapy:    Bronchodilator Assessment Score: 10    Aerosolized bronchodilator medication orders have been revised according to the RT Nebulizer Bronchodilator Protocol below.    Respiratory Therapist to perform RT Therapy Protocol Assessment initially then follow the protocol.  Repeat RT Therapy Protocol Assessment PRN for score 0-3 or on second treatment, BID, and PRN for scores above 3.    No Indications - adjust the frequency to every 6 hours PRN wheezing or bronchospasm, if no treatments needed after 48 hours then discontinue using Per Protocol order mode.     If indication present, adjust the RT bronchodilator orders based on the Bronchodilator Assessment Score as indicated below.  If a patient is on this medication at home then do not decrease Frequency below that used at home.    0-3 - enter or revise RT bronchodilator order(s) to equivalent RT Bronchodilator order with Frequency of every 4 hours PRN for wheezing or increased work of breathing using Per Protocol order mode.       4-6 - enter or revise RT Bronchodilator order(s) to two equivalent RT bronchodilator orders with one order with BID Frequency and one order with Frequency of every 4 hours

## 2025-01-28 NOTE — TRANSITION OF CARE
myocardial infarction (NSTEMI) 02/19/2021    History of acute myocardial infarction 12/24/2020    Idiopathic hypotension 12/24/2020    Tachycardia 12/24/2020    New onset left bundle branch block (LBBB) 12/24/2020       Recommended Follow-up:     Cath done today  Monitor        Above mentioned assessment and plan was discussed by me with the admitting medicine resident. The medicine team assigned to the patient by medicine admitting resident will be following up the patient from now onwards on the floor.   Abena Judge MD  Emergency Medicine Resident  Critical Care Service

## 2025-01-30 PROBLEM — A49.1 STREPTOCOCCUS AGALACTIAE INFECTION: Status: ACTIVE | Noted: 2025-01-01

## 2025-01-30 PROBLEM — A41.9 SEPTICEMIA (HCC): Status: ACTIVE | Noted: 2025-01-01

## 2025-01-30 PROBLEM — Z51.5 ENCOUNTER FOR PALLIATIVE CARE: Status: ACTIVE | Noted: 2025-01-01

## 2025-01-30 PROBLEM — I50.1 PULMONARY EDEMA CARDIAC CAUSE (HCC): Status: ACTIVE | Noted: 2025-01-01

## 2025-01-30 PROBLEM — Z71.89 GOALS OF CARE, COUNSELING/DISCUSSION: Status: ACTIVE | Noted: 2025-01-01

## 2025-01-30 NOTE — DISCHARGE SUMMARY
[facility]     Department of Internal Medicine - Critical Care Service    INPATIENT DISCHARGE SUMMARY      PATIENT IDENTIFICATION:  NAME:  Pascual Peters Jr.   :   1950  MRN:    [id number]     Acct:    [id number]   Admit Date:  2025  Discharge date:  No discharge date for patient encounter.   Attending Provider: Kwadwo Uriostegui MD                                     Principal Problem:    Acute on chronic diastolic (congestive) heart failure (HCC)  Active Problems:    Angina pectoris (HCC)    Cardiogenic shock    Acute hypoxemic respiratory failure    Acute pulmonary edema    Encounter for palliative care    Goals of care, counseling/discussion  Resolved Problems:    * No resolved hospital problems. *       REASON FOR HOSPITALIZATION:   No chief complaint on file.         Hospital Course    Pascual Peters Jr. is a 75 y.o. who presented to [facility] emergency department complaining of shortness of breath.  He stated that he had a worsening shortness of breath for approximately 2 hours.  He arrived to the emergency department in tripod position satting 74% on room air.  Patient was given DuoNeb and placed on CPAP.  He has history of COPD, end-stage CHF with a EF approximately 10 to 15%.  Laboratory workup at [facility] was significant for elevated BNP approximate 13,000, elevated troponins, likely type II NSTEMI, chest x-ray showed signs of possible pulmonary edema.  Patient was placed on BiPAP and was transferred to [place] for ICU level care.  Patient is also requiring Levophed for hypotension likely secondary to cardiogenic shock.  Upon arrival patient is awake, alert, speaking in full sentences on a BiPAP machine.  His vital signs are stable.  He states he feels well.  Patient is on Coumadin for history of A-fib.  INR at [facility] was 2.4.  Patient is doing well in the medical ICU and decision was made to transfer to cardiac ICU as most of his acute medical problems were cardiac related.

## 2025-01-30 NOTE — PLAN OF CARE
Problem: Chronic Conditions and Co-morbidities  Goal: Patient's chronic conditions and co-morbidity symptoms are monitored and maintained or improved  1/28/2025 0948 by Shane Rios RN  Outcome: Progressing  1/28/2025 0542 by Theresa Smith RN  Outcome: Progressing     Problem: Discharge Planning  Goal: Discharge to home or other facility with appropriate resources  1/28/2025 0948 by Shane Rios RN  Outcome: Progressing  1/28/2025 0542 by Theresa Smith RN  Outcome: Progressing     Problem: Skin/Tissue Integrity  Goal: Skin integrity remains intact  Description: 1.  Monitor for areas of redness and/or skin breakdown  2.  Assess vascular access sites hourly  3.  Every 4-6 hours minimum:  Change oxygen saturation probe site  4.  Every 4-6 hours:  If on nasal continuous positive airway pressure, respiratory therapy assess nares and determine need for appliance change or resting period  1/28/2025 0948 by Shane Rios RN  Outcome: Progressing  1/28/2025 0542 by Theresa Smith RN  Outcome: Progressing     Problem: Safety - Adult  Goal: Free from fall injury  Outcome: Progressing     
  Problem: Chronic Conditions and Co-morbidities  Goal: Patient's chronic conditions and co-morbidity symptoms are monitored and maintained or improved  Outcome: Progressing     Problem: Discharge Planning  Goal: Discharge to home or other facility with appropriate resources  Outcome: Progressing     Problem: Skin/Tissue Integrity  Goal: Skin integrity remains intact  Description: 1.  Monitor for areas of redness and/or skin breakdown  2.  Assess vascular access sites hourly  3.  Every 4-6 hours minimum:  Change oxygen saturation probe site  4.  Every 4-6 hours:  If on nasal continuous positive airway pressure, respiratory therapy assess nares and determine need for appliance change or resting period  Outcome: Progressing     
  Problem: Chronic Conditions and Co-morbidities  Goal: Patient's chronic conditions and co-morbidity symptoms are monitored and maintained or improved  Outcome: Progressing     Problem: Discharge Planning  Goal: Discharge to home or other facility with appropriate resources  Outcome: Progressing     Problem: Skin/Tissue Integrity  Goal: Skin integrity remains intact  Description: 1.  Monitor for areas of redness and/or skin breakdown  2.  Assess vascular access sites hourly  3.  Every 4-6 hours minimum:  Change oxygen saturation probe site  4.  Every 4-6 hours:  If on nasal continuous positive airway pressure, respiratory therapy assess nares and determine need for appliance change or resting period  Outcome: Progressing  Flowsheets (Taken 1/29/2025 0800 by Vu Miles, RN)  Skin Integrity Remains Intact:   Monitor for areas of redness and/or skin breakdown   Assess vascular access sites hourly   Every 4-6 hours minimum: Change oxygen saturation probe site   Every 4-6 hours: If on nasal continuous positive airway pressure, respiratory therapy assesses nares and determine need for appliance change or resting period     Problem: Safety - Adult  Goal: Free from fall injury  Outcome: Progressing     Problem: ABCDS Injury Assessment  Goal: Absence of physical injury  Outcome: Progressing     Problem: Respiratory - Adult  Goal: Achieves optimal ventilation and oxygenation  1/29/2025 1901 by Ivy Hernandez, RN  Outcome: Progressing  1/29/2025 0741 by Zaida Leslie RCP  Outcome: Progressing     
  Problem: Respiratory - Adult  Goal: Achieves optimal ventilation and oxygenation  1/29/2025 0741 by Zaida Leslie RCBETSY  Outcome: Progressing  1/29/2025 0315 by Alberto Kamara, RN  Outcome: Progressing  1/28/2025 2129 by Trina Son RCP  Outcome: Progressing  Flowsheets (Taken 1/28/2025 2000 by Alberto Kamara, RN)  Achieves optimal ventilation and oxygenation:   Assess for changes in respiratory status   Assess for changes in mentation and behavior   Position to facilitate oxygenation and minimize respiratory effort   Oxygen supplementation based on oxygen saturation or arterial blood gases   Encourage broncho-pulmonary hygiene including cough, deep breathe, incentive spirometry   Assess the need for suctioning and aspirate as needed     
  Problem: Skin/Tissue Integrity  Goal: Skin integrity remains intact  Description: 1.  Monitor for areas of redness and/or skin breakdown  2.  Assess vascular access sites hourly  3.  Every 4-6 hours minimum:  Change oxygen saturation probe site  4.  Every 4-6 hours:  If on nasal continuous positive airway pressure, respiratory therapy assess nares and determine need for appliance change or resting period  1/30/2025 0748 by Lupe Penny RCP  Outcome: Progressing  Flowsheets (Taken 1/29/2025 1945 by Alberto Kamara RN)  Skin Integrity Remains Intact:   Monitor for areas of redness and/or skin breakdown   Assess vascular access sites hourly     Problem: Respiratory - Adult  Goal: Achieves optimal ventilation and oxygenation  1/30/2025 0748 by Lupe Penny RCP  Outcome: Progressing     
75-year-old male with past medical history of CAD status post CABG in 2008 and PCI, status post ICD placement, heart failure with reduced EF, paroxysmal A-fib on Coumadin, hyperlipidemia presented to the [facility] ED with chief complaints of shortness of breath.     On arrival to the ED, patient was in tripod position saturating 70s.  He was also hypotensive.  He was given DuoNeb and placed on CPAP.  Lab workup showed elevated proBNP of 13,000.  Troponin 79> 120> 664.  Patient was transferred to [facility] ICU for further assessment and management.  On arrival to the ICU, patient was hypotensive, requiring Levophed at 11 mics.  Patient denies any chest pain.      Last echo was March 2024, showing EF of 10 to 15%.  Heart cath was done on 6/2024 which showed severe three-vessel CAD involving 100% mid RCA occlusion, 80% ostial circumflex, 90% proximal OM1 as well as mid 100% stenosis in the LAD.  Widely patent LIMA to LAD.     Cardio evlautaed the patient for NSTEMI, likely type I, troponin 70> 120> 600.  Acute heart failure. Did a left and right heart cath was started on milrinone swan catheter was placed.                                               1/29   Milrinone was held because of sustained ventricular tachycardia. Was continued on lidocaine and amiodarone infusion .IV Diuresis with net negative fluid balance.  Strict I's and O's.  Daily weight  He was Continued heparin infusion for A-fib  As per cardiology  swans kept in place and titrate pressors and inotropes as needed      Overnight Patient had altered mental status likely secondary to toxic metabolic abnormality, with concerning of CT head of hypodensity in the [place] area although acute ischemic stroke cannot be ruled out  -CTA with no concerning of LVO, awaiting report  -Last echo yesterday with ejection fraction 10 to 15%  -Will have MRI without contrast, A1c, lipid profile  -Will have routine EEG to rule out background seizure  Was more altered not 
BRONCHOSPASM/BRONCHOCONSTRICTION     [x]         IMPROVE AERATION/BREATH SOUNDS  [x]   ADMINISTER BRONCHODILATOR THERAPY AS APPROPRIATE  [x]   ASSESS BREATH SOUNDS  [x]   IMPLEMENT AEROSOL/MDI PROTOCOL  [x]   PATIENT EDUCATION AS NEEDED    NON INVASIVE VENTILATION  PROVIDE OPTIMAL VENTILATION/ACCEPTABLE SP02  IMPLEMENT NON INVASIVE VENTILATION PROTOCOL  ASSESSMENT SKIN INTEGRITY  PATIENT EDUCATION AS NEEDED  BIPAP AS NEEDED  
Made a call to the niece and discussed with her in detail about the current situation.  Explained to her that the patient is getting more sick and has been requiring more pressor support.  As per her for now the patient to remain full code  And do what ever is needed.  She also mentioned about discussing it with his son and will update us about the goals of care.  For now the patient to remain full code.    Rob was again called at 10 to discuss that the patient is clinically deterioratiing and very sick with guarded prognosis.  She said would disuss with the brother and call back in 5 minutes. Waiting to hear back      Traceywinifred Nuñez MD  Internal Medicine Resident, PGY-2  [facility], [place], [place]  1/30/2025, 9:16 AM    
PROVIDE ADEQUATE OXYGENATION WITH ACCEPTABLE SP02/ABG'S    [x]  IDENTIFY APPROPRIATE OXYGEN THERAPY  [x]   MONITOR SP02/ABG'S AS NEEDED   [x]   PATIENT EDUCATION AS NEEDED    
Patients family arrived at bedside. Patients power of  nicky was contacted. Discussed proceeding with withdraw of care and terminal extubation. Nicky and the rest of the family all in agreement with this. Patient changed to DNR-CC. Comfort medications placed.     Electronically signed by LOCO Madden CNP on 1/30/2025 at 12:24 PM    
RRT called for change in mental status.     Patient nearly obtunded.  There subtle left sided facial droop.   Patient is currently protecting airway.     Glucose within normal limits.     Patient not withdrawing lower extremities from pain.  Minimal bilateral hand  with significant prompting.     Code stroke called.  CT head and neck ordered.   INR yesterday=3.3    Code stroke deep to bedside.  Transporting patient down to CAT scan.     Niece phoned 8:34pm band updated on change in mental status  
Rapid response called on patient.  He is a 75-year-old male initially mated for cardiogenic shock, on Levophed.  Patient had been slightly confused starting approximately 4 5 PM today.  In the last half hour before the rapid response was called that he became obtunded very altered.  Upon my arrival patient vital signs were stable satting 94%, blood pressure 90/60 on Levophed this is around his baseline in the hospital.  On my exam patient is following commands bilateral uppers, does not move the bilateral lower extremities.  He is completely aphasic.  Point-of-care labs showed a stable gas and electrolytes with a sugar of approximate 170.  Strongly believe this is likely neurologic in origin.  Stroke alert critical called.  Stroke team at bedside.  Obtaining CT head CTA head and neck.  Primary service nurse practitioner at bedside.  At this time patient's airway was intact and he is in an ICU bed.  Neurology and nurse practitioner stated they felt they could manage patient from here.  Please contact critical care with any further needs or concerns.    Electronically signed by Freddie Rome MD on 1/29/2025 at 8:17 PM    
Spoke to Claudia from neurology.  Reported no large vessel occlusion noted on CTA of head neck.  Neurologist reviewing images and did not find evidence of acute CVA.      Interval out other causes of possible metabolic encephalopathy.  Niece Claudia updated by phone.     Will consult neurology and infectious disease.  Consider switching cefepime given it could contribute to altered mental status.     Rule out seizure.       
fall injury  Outcome: Progressing     Problem: ABCDS Injury Assessment  Goal: Absence of physical injury  Outcome: Progressing  Flowsheets (Taken 1/28/2025 2000)  Absence of Physical Injury: Implement safety measures based on patient assessment     Problem: Respiratory - Adult  Goal: Achieves optimal ventilation and oxygenation  1/29/2025 0315 by Alberto Kamara, RN  Outcome: Progressing  1/28/2025 2129 by Trina Son RCP  Outcome: Progressing  Flowsheets (Taken 1/28/2025 2000 by Alberto Kamara, RN)  Achieves optimal ventilation and oxygenation:   Assess for changes in respiratory status   Assess for changes in mentation and behavior   Position to facilitate oxygenation and minimize respiratory effort   Oxygen supplementation based on oxygen saturation or arterial blood gases   Encourage broncho-pulmonary hygiene including cough, deep breathe, incentive spirometry   Assess the need for suctioning and aspirate as needed

## 2025-01-30 NOTE — PROCEDURES
PROCEDURE NOTE  Date: 1/30/2025   Name: Pascual Peters Jr.  YOB: 1950    Procedures        LONG-TERM EEG-VIDEO MONITORING   CLINICAL NEUROPHYSIOLOGY LABORATORY  DEPARTMENT OF NEUROLOGY  [facility]     Patient: Pascual Peters Jr.  Age: 75 y.o.  MRN: [id number]    Referring Physician: Sotero Morales MD  History: The patient is a 75 y.o. male who presented breakthrough seizure/encephalopathy. This long-term video-EEG monitoring study was performed to determine the nature of the patient's clinical events. The patient is on neuroactive medications.   Pascual Peters Jr.   Current Facility-Administered Medications   Medication Dose Route Frequency Provider Last Rate Last Admin    HYDROmorphone (DILAUDID) injection 0.5 mg  0.5 mg IntraVENous Q15 Min PRN Santa Alford, APRN - CNP        Or    HYDROmorphone (DILAUDID) injection 1 mg  1 mg IntraVENous Q15 Min PRN Santa Alford, APRN - CNP   1 mg at 01/30/25 1230    LORazepam (ATIVAN) injection 1 mg  1 mg IntraVENous Q4H PRN AlfordKeila vane LAZARA, APRN - CNP   1 mg at 01/30/25 1230    glycopyrrolate (ROBINUL) injection 0.2 mg  0.2 mg IntraVENous Q4H PRN AlfordSanta van, APRN - CNP   0.2 mg at 01/30/25 1231    [Held by provider] spironolactone (ALDACTONE) tablet 25 mg  25 mg Oral Daily Abdirahman Nair MD         Technical Description: This is a 21-channel digital EEG recording with time-locked video. Electrodes were placed in accordance with the 10-20 International System of Electrode Placement. Single lead EKG monitoring was included.    Baseline EEG Recording:  A formal baseline EEG recording was not obtained.      EEG Samples:  In the intubated state, the background rhythm was a symmetric, poorly-modulated, diffusely attenuated 2-4 hz, 20-40 uV rhythm. There was poor anterior posterior amplitude gradient.  Sleep structures were not seen.  The EKG channel revealed no abnormalities.    Ictal EEG Recording / Patient Events: During this period the patient had no

## 2025-01-30 NOTE — SIGNIFICANT EVENT
Pt. Again lethargic, only following commands in lower extremeties, tachypnic with accessory muscle use. Abdomen distended. BiPAP removed d/t inability to move upper extremities and placed on simple mask @ 6lpm. Message sent to Hospitalist, neuro, and cardiology. Repeat ABG and lactic acid drawn (see results below).     Dr. Rome with Critical Care called to bedside and decision was made to intubate.   Timeout performed. Patient preoxygenated for 10 minutes with simple mask on 15lpm. Patient given 20mg of Etomidate followed by 100mg of Succinylcholine per Verbal order from Dr. Rome.     Pt. Intubated with an 8.0 tube 25cm at the lip. Positive color change, positive bilateral breath sounds, negative air in belly.     Neurology and cardiology updated on patient status s/p intubation. Hospitalist notified GAB Saunders.      Latest Reference Range & Units 01/30/25 03:17   POC pH 7.350 - 7.450  7.379   POC pCO2 35.0 - 48.0 mm Hg 35.5   POC PO2 83.0 - 108.0 mm Hg 78.6 (L)   POC HCO3 21.0 - 28.0 mmol/L 21.0   POC O2 SAT 94.0 - 98.0 % 95.4   (L): Data is abnormally low     Latest Reference Range & Units 01/29/25 20:01 01/30/25 03:17   POC Lactic Acid 0.56 - 1.39 mmol/L 2.2 (H) 4.1 (H)   (H): Data is abnormally high    Electronically signed by Alberto Kamara RN on 1/30/2025 at 4:18 AM

## 2025-01-30 NOTE — DEATH NOTES
Death Pronouncement Note  Patient's Name: Pascual Peters Jr.   Patient's YOB: 1950  MRN Number: [id number]    Admitting Provider: Kawdwo Uriostegui MD  Attending Provider: Kwadwo Uriostegui MD    Patient was examined and the following were absent: Pulses, Blood Pressure, and Respiratory effort    I declared the patient dead on 1/30/2025 at 12:49 PM    Preliminary Cause of Death: Cardiopulmonary arrest     Electronically signed by Teresa Gruber MD on 1/30/25 at 12:51 PM EST

## 2025-01-30 NOTE — ACP (ADVANCE CARE PLANNING)
Advance Care Planning      Palliative Medicine Provider (MD/NP)  Advance Care Planning (ACP) Conversation      Date of Conversation: 01/30/25  The patient and/or authorized decision maker consented to a voluntary Advance Care Planning conversation.   Individuals present for the conversation:   Legal healthcare agent named below    Legal Healthcare Agent(s): Nicky Lemons       ACP documents available in EMR prior to discussion:  -Power of  for Healthcare    Primary Palliative Diagnosis(es):  Cardiogenic shock     Conversation Summary: Discussed patients current condition with Nicky. POA paperwork can be found in the EMR. Nicky agreeable to DNR-CCA with intentions of DNR-CC following family arrival.       Resuscitation Status:    Code Status: DNR-CCA    Outcomes / Completed Documentation:  An explanation of advance directives and their importance was provided and the following forms completed:    -Portable DNR    If new document completed, original was provided to patient and/or family member.    Copy was placed for scanning into the [facility] EMR.      I spent 10 minutes providing separately identifiable ACP services with the patient and/or surrogate decision maker in a voluntary, in-person conversation discussing the patient's wishes and goals as detailed in the above note.       Santa Alford, APRN - CNP

## 2025-01-30 NOTE — SIGNIFICANT EVENT
After the initial episode of lethargy, patient started showing some response and he was answering question and was oriented.  Patient on neuroexam at around 3:30 AM again become obtunded and lethargic.  Patient not following any command, not opening his eyes on verbal and tactile stimuli.  Patient placed on 6 L of oxygen and BiPAP was removed.  ICU called.  Family updated and discussed in detail that patient need intubation because he is not protecting his airway.  Agree with intubation.  Patient remains full code.  Critical care team intubated the patient.  Patient tolerated the intubation.  Transferring patient to medical ICU for further management.  Unclear etiology for encephalopathy.  It could be seizure versus metabolic etiology.  ID is consulted for managing antibiotics.  Patient cefepime need to be switched to another antibiotic because cefepime is associated with encephalopathy.  Repeat ABG looks fine.  Patient has elevated lactic acid levels.  Consulting nephrology due to worsening creatinine.  Patient is also received contrast for the CTA head and neck.  Further management per ICU team.

## 2025-01-30 NOTE — H&P
Critical Care - History and Physical Examination    Patient's name:  Pascual Peters Jr.  Medical Record Number: [id number]  Patient's account/billing number: [id number]  Patient's YOB: 1950  Age: 75 y.o.  Date of Admission: 1/28/2025  2:10 AM  Reason of ICU admission:   Date of History and Physical Examination: 1/28/2025      Primary Care Physician: Jermaine Esparza MD  Attending Physician:    Code Status: Full Code    Chief complaint: Shortness of breath     Reason for ICU admission: Acute on chronic congestive heart failure       History Of Present Illness:   History was obtained from chart review and the patient.      Pascual Peters Jr. is a 75 y.o. who presented to [place] emergency department complaining of shortness of breath.  He stated that he had a worsening shortness of breath for approximately 2 hours.  He arrived to the emergency department in tripod position satting 74% on room air.  Patient was given DuoNeb and placed on CPAP.  He has history of COPD, end-stage CHF with a EF approximately 10 to 15%.  Laboratory workup at [place] was significant for elevated BNP approximate 13,000, elevated troponins, likely type II NSTEMI, chest x-ray showed signs of possible pulmonary edema.  Patient was placed on BiPAP and was transferred to [place] for ICU level care.  Patient is also requiring Levophed for hypotension likely secondary to cardiogenic shock.  Upon arrival patient is awake, alert, speaking in full sentences on a BiPAP machine.  His vital signs are stable.  He states he feels well.  Patient is on Coumadin for history of A-fib.  INR at [place] was 2.4.          Past Medical History:        Diagnosis Date    Acute non-ST elevation myocardial infarction (NSTEMI) ([facility]) 12/24/2020    Chronic kidney disease     Coronary artery disease     s/p CABG x3 in 2008 and 2 stents on 12/24/2020    H/O echocardiogram 02/08/2021    EF 15-20% LV severly enlarged and LV wall thickness is normal Severe 
  Critical Care - History and Physical Examination    Patient's name:  Pascual Peters Jr.  Medical Record Number: [id number]  Patient's account/billing number: [id number]  Patient's YOB: 1950  Age: 75 y.o.  Date of Admission: 1/28/2025  2:10 AM  Reason of ICU admission:   Date of History and Physical Examination: 1/30/2025      Primary Care Physician: Jermaine Esparza MD  Attending Physician:    Code Status: Full Code    Chief complaint:     Reason for ICU admission:       History Of Present Illness:   History was obtained from chart review.      Pascual Peters Jr. is a 75 y.o. who presented to [place] emergency department complaining of shortness of breath.  He stated that he had a worsening shortness of breath for approximately 2 hours.  He arrived to the emergency department in tripod position satting 74% on room air.  Patient was given DuoNeb and placed on CPAP.  He has history of COPD, end-stage CHF with a EF approximately 10 to 15%.  Laboratory workup at [place] was significant for elevated BNP approximate 13,000, elevated troponins, likely type II NSTEMI, chest x-ray showed signs of possible pulmonary edema.  Patient was placed on BiPAP and was transferred to [place] for ICU level care.  Patient is also requiring Levophed for hypotension likely secondary to cardiogenic shock.  Upon arrival patient is awake, alert, speaking in full sentences on a BiPAP machine.  His vital signs are stable.  He states he feels well.  Patient is on Coumadin for history of A-fib.  INR at [place] was 2.4.  Patient is doing well in the medical ICU and decision was made to transfer to cardiac ICU as most of his acute medical problems were cardiac related.  In the cardiac ICU he had a [place] placed and right heart catheterization done.  Cardiology was managing his medications.  This evening patient decompensated, had rapid response called on him and was evaluated by neurology for concern for a stroke.  Patient 
cefepime given his uptrending renal function.. He reported his main complaints was sudden onset chills and rigors. Denies cough, urinary symptoms, skin changes, flank pain, fever, ext.   -resp panel unrevealing  -strep urine antigen []  -monitor renal function, change to cefepime []  -dc farxiga         #chronic atrial fibrillation  -rate controlled in a fib.   -On coumadin at home, HH and platlets stable. Daily INR  -resume ac        #LING  -Cr 1.5, stable but baseline appears 1.1  -on farxiga and ramipril, both on hold       #troponemia  -troponin 664, possible post cath induced.   -on plavix, denies cp. As per cardio  -continue cardiac monitoring    #copd  -stable on inhalers and steriods which are being tapered       Consultations:   IP CONSULT TO CARDIOLOGY  IP CONSULT TO ELECTROPHYSIOLOGY  IP CONSULT TO CARDIOLOGY    Patient is admitted as inpatient status because of co-morbidities listed above, severity of signs and symptoms as outlined, requirement for current medical therapies and most importantly because of direct risk to patient if care not provided in a hospital setting.  Expected length of stay > 48 hours.    Sultana Quezada MD  1/29/2025  8:49 AM    Copy sent to Jermaine Bacon MD

## 2025-01-30 NOTE — SIGNIFICANT EVENT
Writer entered room to assess patient and found patient very lethargic, tachypnic, and unable to follow commands, and disoriented. Perfectserve sent to Hospitalist and Cardiologist and Rapid Response was called @1950.     Dr. Rome with critical care & Luisana Rutherford, Hospitalist LOCO arrived at bedside along with Respiratory therapy. Patient became obtunded and aphasic but maintaining his airway. Vital signs remained stable. Decision was then made to call a Stroke Alert. Stroke Alert called @ 1959 with Neuro team arriving to bedside @ 2005. Patient taken to CT @ 2015.     Approximately 15 minutes after returning to room, patients status began to improve. Patient able to converse, however speech is slurred/garbled. Able to move all 4 extremities.     CXR & EKG completed per cardiology.    Electronically signed by Alberto Kamara RN on 1/29/2025 at 10:55 PM

## 2025-01-30 NOTE — PROGRESS NOTES
PULMONARY PROGRESS NOTE      Patient:  Pascual Peters Jr.  YOB: 1950    MRN: [id number]     Acct: [id number]     Admit date: 1/28/2025    REASON FOR CONSULT:-COPD, acute hypoxemic respiratory failure, acute on chronic systolic congestive heart failure, cardiogenic shock, current smoker    Pt seen and Chart reviewed.  1/29/2025  Afebrile  Hemodynamically stable  Patient is on 3 L oxygen by nasal cannula  SpO2 is 90%  Patient with sinus tachycardia  Requiring pressor support along with milrinone  Has orthopnea  No cough or sputum production  No hemoptysis  Fluid balance -0.7 L    Subjective:   Review of Systems -   General ROS: Completed and except as mentioned above were negative   Psychological ROS:  Completed and except as mentioned above were negative  Allergy and Immunology ROS:  Completed and except as mentioned above were negative  Hematological and Lymphatic ROS:  Completed and except as mentioned above were negative  Respiratory ROS:  Completed and except as mentioned above were negative  Cardiovascular ROS:  Completed and except as mentioned above were negative  Gastrointestinal ROS: Completed and except as mentioned above were negative  Genito-Urinary ROS:  Completed and except as mentioned above were negative  Musculoskeletal ROS:  Completed and except as mentioned above were negative  Neurological ROS:  Completed and except as mentioned above were negative  Dermatological ROS:  Completed and except as mentioned above were negative      Diet:  Diet NPO Exceptions are: Sips of Water with Meds, Popsicles, Ice Chips      Medications:Current Inpatient    Scheduled Meds:   sodium chloride flush  5-40 mL IntraVENous 2 times per day    ipratropium 0.5 mg-albuterol 2.5 mg  1 Dose Inhalation Q4H WA RT    pantoprazole (PROTONIX) 40 mg in sodium chloride (PF) 0.9 % 10 mL injection  40 mg IntraVENous Daily    midodrine  10 mg 
Comprehensive Nutrition Assessment    Type and Reason for Visit:  Initial, Positive nutrition screen    Nutrition Recommendations/Plan:   If enteral nutrition support is desired, recommend to start Renal formula (Nepro) at 15 ml/hr and advance by 10 ml/hr every 12 hours to goal rate of 35 ml/hr.  Add 1 protein modular twice daily.  Combined at goal volume this provides total of 1695 kcal, 120 g protein, 611 ml fluid daily.     Malnutrition Assessment:  Malnutrition Status:  Insufficient data (01/30/25 1029)    Context:  Acute Illness     Findings of the 6 clinical characteristics of malnutrition:  Energy Intake:  Unable to assess  Weight Loss:  No weight loss     Body Fat Loss:  Unable to assess     Muscle Mass Loss:  Unable to assess    Fluid Accumulation:  Mild Extremities, Genitals   Strength:  Not Performed    Nutrition Assessment:    Pt seen d/t intubation, admitted with LING, acute on chronic congestive heart failure and cardiogenic shock with Hx COPD, MI, CAD, CKD, Hep A, HLD, CABG and heart failure.  No known food allergies.  Pt currently without any Propofol running, with many blankets/lines over patient so that NFPA unable to be adequately performed.  Weight records show mostly stable weight x 1 year with recent weight gain since October.  This may be d/t fluid as Pt is noted to have edema.  Labs show increasing BUN/Creatinine as well as potassium, with decreasing sodium.  Pt may benefit from Renal TF formula is enteral nutrition support is desired as Pt has OGT in place.    Nutrition Related Findings:    Meds/Labs reviewed.  Labs:  Na 129, K+ 5.1, BUN 47, Creatinine 1.7, HgbA1c 6.3. Wound Type: Multiple, Skin Tears       Current Nutrition Intake & Therapies:    Average Meal Intake: NPO  Average Supplements Intake: NPO  Diet NPO  Additional Calorie Sources:  D5 Amiodarone @ 16.7 ml/hr = 68 kcal/day.  D5 Vaso @ 9 ml/hr = 37 kcal/day.  Combined total = 105 kcal/day    Anthropometric Measures:  Height: 
Date: 1/30/2025  Time: 0347  Patient identity confirmed:  Yes  Indications: Airway Protection  Preoxygenation: yes    Laryngoscope size and type Glidescope  Airway introducer used: No  Evac: No  ETT size:an 8.0 cuffed  Number of attempts:1   Cords visualized:  [x] Clearly  [] Poorly  Breath sounds present bilaterally: Yes   ETCO2   [x] Positive   ETT secured at  25 at lip    ETT secured with Georgina  Chest x-ray ordered: Yes     Difficult airway:    No       If yes, was red tape placed around ETT:   No    Was this a Code Situation:    No             BP: (!) 72/52        Procedure performed by: Dr. Wild Son RCP  4:06 AM                  
Death Entered into CMS log as required.     1/30/25    7208  
EEG completed bedside   
[facility]   Progress Note                   Date:   1/30/2025  Patient name: Pascual Peters Jr.  Date of admission:  1/28/2025  2:10 AM  MRN:   [id number]  YOB: 1950  PCP: Jermaine Esparza MD    Reason for Admission:     Subjective:       Patient seen and examined at bedside.  Overnight events noted.    Patient was lethargic, change in mentation with concern for show droop stroke alert called, workup including CTh, CTA H&N negative for acute findings, chronic multivessel stenoses in the brain with microvascular disease.  Earlier this morning patient again with lethargic, obtunded, not following commands, was intubated and transferred to MICU.    On my eval patient is intubated sedated with Versed  On pressor support with Levophed 100, vasopressin  Had to start Epi as well, will decrease lido and amiodarone infusion rates for now  Remains on lidocaine 2 mg 3 minutes, amiodarone 1 mg/min  LING evolving 1.7      Brief history  75-year-old male with past medical history of CAD status post CABG in 2008 and PCI, status post ICD placement, heart failure with reduced EF, paroxysmal A-fib on Coumadin, hyperlipidemia presented to the [facility] ED with chief complaints of shortness of breath.     On arrival to the ED, patient was in tripod position saturating 70s.  He was also hypotensive.  He was given DuoNeb and placed on CPAP.  Lab workup showed elevated proBNP of 13,000.  Troponin 79> 120> 664.  Patient was transferred to [facility] ICU for further assessment and management.  On arrival to the ICU, patient was hypotensive, requiring Levophed at 11 mics.  Patient denies any chest pain.      Last echo was March 2024, showing EF of 10 to 15%.  Heart cath was done on 6/2024 which showed severe three-vessel CAD involving 100% mid RCA occlusion, 80% ostial circumflex, 90% proximal OM1 as well as mid 100% stenosis in the LAD.  Widely patent PARKER to LAD.    Medications:   Scheduled Meds:   heparin 
[facility]   Patient Death Note  DEATH   Shift date: 2025     Shift day: Thursday  Shift #: 1                 Room # 3016/3016-01   Name: Pascual Peters Jr.            Age: 75 y.o.  Gender: male          Advent: None      Place of Gnosticism:   Admit Date & Time: 2025  2:10 AM     Referral: Nurse   Actual date of death: 2025   TOD: 1249       SITUATION AT DEATH:  Pt was extubated and  shortly after with family present.    IS THIS A 'S CASE?  No    SPIRITUAL/EMOTIONAL INTERVENTION:  Pt's step-daughter and her significant other as well as pt's sister and her  were present. Writer provided support and prayed at bedside at family's request.     Family Received Grief Packet?  No       NAME AND PHONE NUMBER OF DOCTOR SIGNING DEATH CERTIFICATE:  (full name) Anthony Brady     (phone)  [phone]      Copy of COMPLETED Release of Body Form Received?  No    Patient's belongings: With family     HOME:  Name: Suzie Lund Stevinson   City: [place]  Phone Number: [phone]    NEXT OF KIN:  Name: Lewis Farmer  Relationship: Brother-in-law  Street Address: [address] [place] [address]  City: [place]  State: [place]  Zip code: [address]   Phone Number: [phone]    ANY FOLLOW-UP NEEDED?  No    IF SO, WHAT?      Electronically signed by Chaplain Ander, on 2025 at 1:14 PM.  [facility]  [phone]   
[facility]  PROGRESS NOTE    Shift date: 1/29/2025  Shift day: Wednesday   Shift # 2    Room # 1015/1015-01   Name: Pascual Peters Jr.                Catholic: None   Place of Yarsani: N/A    Referral: Rapid Response    Admit Date & Time: 1/28/2025  2:10 AM    Assessment:  Pascual Peters Jr. is a 75 y.o. male in the hospital because heart failure. Upon entering the room writer observes pt unresponsive verbally but follows commands squeezing hands and sticking out tongue.       Intervention:  Writer introduced self and title as  but due to unresponsive nature no contact was made.    Outcome:  Pt being taken to CT scan for further evaluation from [facility] staff.     Plan:  Chaplains will remain available to offer spiritual and emotional support as needed.       01/29/25 2046   Encounter Summary   Encounter Overview/Reason Crisis   Service Provided For Patient   Referral/Consult From Multi-disciplinary team   Support System Family members   Last Encounter  01/29/25   Complexity of Encounter Moderate   Begin Time 2000   End Time  2015   Total Time Calculated 15 min   Crisis   Type Rapid Response;Code Stroke   Assessment/Intervention/Outcome   Assessment Compromised coping;Concerns with suffering;Powerlessness   Intervention Active listening;Sustaining Presence/Ministry of presence   Outcome Coping   Plan and Referrals   Plan/Referrals Continue Support (comment)       Electronically signed by Flako Alvarez,Chaplain Resident, on 1/29/2025 at 8:47 PM.  [facility]  [phone]     
PATIENT REFUSES TO WEAR BIPAP     [x] Risks and benefits explained to patient   [x] Patient refuses to wear Bipap   [x] Patient verbalizes understanding of information presented.   
Patient extubated per physician order. Patient extubated in usual fashion. Patient placed on  room air.     Lupe Penny RCP   12:38 PM  
Ventilator Bronchodilator assessment    Breath sounds: cl/dim  Inspiratory Pressure: 25  Plateau Pressure: 24    Patient assessed at level 1      [x]    Bronchodilator Assessment    BRONCHODILATOR ASSESSMENT SCORE  Score 0 (Home) 1 2 3 4   Breath Sounds   []  Chronic Ventilator: Patient at baseline [x]  Mild Wheezes/ Clear []  Intermittent wheezes with good air entry []  Bilateral/unilateral wheezing with diminished air entry []  Insp/Exp wheeze and/or poor aeration   Ventilator Pressures   []  Chronic Ventilator [x]  Insp. Pressure less than 25 cm H20 []  Insp. Pressure less than 25 cm H20 []  Insp. Pressure exceeds 25 cm H20 []  Insp. Pressure exceeds 30 cm H20   Plateau Pressure []  NA   [x]  Plateau Pressure less than 4  []  Plateau Pressure less than or equal to 5 []  Plateau Pressure greater than or equal to 6 []  Plateau Pressure greater than or equal to 8       Lupe Penny RCP  11:50 AM  
cc  ________________________________________________________________    Impression  Cardiogenic shock with elevated wedge pressure of 16  Inotropic support to keep MAP >65  Judicious diuresis with inotropics support  Kept the Swanz Marleny catheter at 57 cm    Signed by: Jose Osuna MD on 1/28/2025  6:35 PM       Hospital Problems             Last Modified POA    * (Principal) Acute on chronic diastolic (congestive) heart failure (HCC) 1/28/2025 Yes    Cardiogenic shock 1/28/2025 Yes    Acute hypoxemic respiratory failure 1/28/2025 Yes    Acute pulmonary edema 1/28/2025 Yes       Assessment:   Cardiogenic shock  Decompensated heart failure: LVEF 10-15% s/p AICD  Elevated troponin in the setting of above  Moderate mitral regurgitation: Pisa 0.9, ERO 0.38 cm care, 52 mL regurgitant volume  Moderate tricuspid regurgitation, RVSP 34 mmHg  AHRF in the setting of above and pneumonia  Concerns for pneumonia  History of CAD s/p CABG, PCI  Hyperlipidemia  History of paroxysmal A-fib, takes Coumadin at home  History of COPD    Plan:   Milrinone was held because of sustained ventricular tachycardia.  Will continue lidocaine and amiodarone infusion  Consult to electrophysiology  IV Diuresis with net negative fluid balance.  Strict I's and O's.  Daily weight  Continue heparin infusion for A-fib  We will keep swans in place and titrate pressors and inotropes as needed  Replace electrolytes to keep K>4 and Mag>2.   We discussed option of LVAD for him, he wants to take his time to think about it.  If he is agreeable we will transfer him.  Additional recommendations pending clinical course     Please follow the rounding attending's attestation for final recommendations.     Cathy Eng MD  Fellow, cardiovascular diseases  [place]  1/29/2025, 6:22 AM    Attending Physician Statement  I have discussed the case of Pascual Peters Jr. including pertinent history and exam findings with the

## 2025-01-30 NOTE — CONSULTS
[facility] NEUROLOGY & NEUROSCIENCE  IN-PATIENT SERVICE    NEUROLOGY CONSULT  NOTE    Date:   1/29/2025  Patient name:  Pascual Peters Jr.  Date of admission:  1/28/2025  YOB: 1950    Chief Complaint:     No chief complaint on file.      Reason for Consult:      Altered mental status    History of Present Illness:     Pascual Peters Jr., a 75-year-old male with a history of COPD, combined systolic heart failure (ejection fraction 10-15%), chronic atrial fibrillation on Coumadin, and status post CABG x 3 on Plavix, was admitted for cardiogenic shock, acute pulmonary edema, and septicemia following a cardiac catheterization. He was stepped down from the medical ICU today, continuing on Levophed and amiodarone, and is being treated for bacteremia with cefepime. A stroke alert was called due to altered mental status and aphasia.  His last known well time was around 4-5 PM. Upon presentation, his initial systolic blood pressure was 96, with a glucose level in the 180s. His NIH stroke scale was 24, likely due to acute encephalopathy. The patient was able to squeeze his left upper extremity but remained altered, not following commands, and showed no movement to painful stimuli, though he blinked in response to threat. A CT of the head showed no acute abnormalities, only chronic microvascular ischemic changes. A CTA revealed no large vessel occlusion nd diffuse iCAD, with the radiology report pending.  Past Medical History:     Past Medical History:   Diagnosis Date    Acute non-ST elevation myocardial infarction (NSTEMI) ([facility]) 12/24/2020    Chronic kidney disease     Coronary artery disease     s/p CABG x3 in 2008 and 2 stents on 12/24/2020    H/O echocardiogram 02/08/2021    EF 15-20% LV severly enlarged and LV wall thickness is normal Severe global hypokinesis with segmental abnormalities LA is mod dilated 34-39 with LA volume index of 34 ml/m2 Mild mitral regurg Mod tricupid regurg Mild to mod 
    Nephrology Consult Note    Reason for Consult: Pulmonary edema, Cardiogenic shock, acute renal failure  Requesting Physician:   Dr. Brady    Chief Complaint:   admitted with hypoxemia  History Obtained From:  electronic medical record    History of Present Illness:              This is a 75 y.o. male  who has a past medical history significant for heart failure with reduced EF.  His EF is 10 to 15%.  Patient also has a long history of smoking close to 50 years duration and has COPD.  Patient has previous cardiac catheterization and coronary artery bypass surgery in 2008 and stent placement in 2020.  Patient presented to [facility] complaining of shortness of breath on 1/28/2025.  At the time of initial presentation his oxygen saturation was 74%.  He received DuoNeb and placed on CPAP.  Due to low EF he was also started on Levophed.  He has an elevated troponin and suffered an non-ST NORMAN.  Patient was transferred to [facility] for further evaluation and care.  Upon arrival patient was hypotensive and in cardiogenic shock.  Patient was seen and evaluated by cardiology.  He underwent right heart cath and Bennet-Marleny catheter placement.  Patient has an elevated wedge pressure, with moderate mitral regurgitation moderate tricuspid regurgitation.  On further evaluation on imaging there was also concern for pneumonia.  Patient was started on milrinone then he developed ventricular tachycardia.  He was loaded with lidocaine and currently on lidocaine and amiodarone.  Due to changes in mental condition patient was seen and evaluated by neurology.  He underwent CT angio of the brain as well as CT scan which was negative for any new infarct.  Patient also shows bilateral pleural effusion moderate to large on left and mild to moderate on right.  His most recent INR is 11    Nephrology was consulted for elevation in creatinine of 1.7 as well as need for diuresis.  At present patient is on 3 pressors and his pressors 
  [facility]    Consult / H&P               Today's Date: 1/28/2025  Patient Name: Pascual Peters Jr.  Date of admission: 1/28/2025  2:10 AM  Patient's age: 75 y.o., 1950  Admission Dx: Acute on chronic diastolic (congestive) heart failure (HCC) [I50.33]    Requesting Physician: Young Mccloud MD    Cardiac Evaluation Reason:  elevated troponins    History Obtained From:     History of Present Illness:    75-year-old male with past medical history of CAD status post CABG in 2008 and PCI, status post ICD placement, heart failure with reduced EF, paroxysmal A-fib on Coumadin, hyperlipidemia presented to the [place] ED with chief complaints of shortness of breath.    On arrival to the ED, patient was in tripod position saturating 70s.  He was also hypotensive.  He was given DuoNeb and placed on CPAP.  Lab workup showed elevated proBNP of 13,000.  Troponin 79> 120> 664.  Patient was transferred to [facility] ICU for further assessment and management.  On arrival to the ICU, patient was hypotensive, requiring Levophed at 11 mics.  Patient denies any chest pain.     Last echo was March 2024, showing EF of 10 to 15%.  Heart cath was done on 6/2024 which showed severe three-vessel CAD involving 100% mid RCA occlusion, 80% ostial circumflex, 90% proximal OM1 as well as mid 100% stenosis in the LAD.  Widely patent PARKER to LAD.    Past Medical History:   has a past medical history of Acute non-ST elevation myocardial infarction (NSTEMI) ([facility]), Chronic kidney disease, Coronary artery disease, H/O echocardiogram, H/O echocardiogram, Hepatitis A, Hyperlipidemia, Kidney stone, and Smoker.    Past Surgical History:   has a past surgical history that includes Coronary artery bypass graft (2008); Coronary angioplasty with stent (12/24/2020); Cardiac surgery; vascular surgery; Cardiac defibrillator placement (04/23/2021); cystourethroscopy/stent removal (Left, 06/22/2021); Cystoscopy (Left, 06/22/2021); 
  Stroke Neurology Consult Note  Stroke Alert @7:59 PM  Arrival at bedside @8:04 PM    Reason for Consult:  Inpatient Stroke alert  Requesting Physician:  ED team   Endovascular Neurosurgeon: Medardo Strong MD  Stroke Team: Mariann Burns MD  History Obtained From:  electronic medical record, primary staff  Chief Complaint:  Acute neurological deficits   Allergies:  Patient has no known allergies.    History of Presenting Illness     Pascual Peters Jr., a 75-year-old male with a history of COPD, combined systolic heart failure (ejection fraction 10-15%), chronic atrial fibrillation on Coumadin, and status post CABG x 3 on Plavix, was admitted for cardiogenic shock, acute pulmonary edema, and septicemia following a cardiac catheterization. He was stepped down from the medical ICU today, continuing on Levophed and amiodarone, and is being treated for bacteremia with cefepime. A stroke alert was called due to altered mental status and aphasia.  His last known well time was around 4-5 PM. Upon presentation, his initial systolic blood pressure was 96, with a glucose level in the 180s. His NIH stroke scale was 24, likely due to acute encephalopathy. The patient was able to squeeze his left upper extremity but remained altered, not following commands, and showed no movement to painful stimuli, though he blinked in response to threat. A CT of the head showed no acute abnormalities, only chronic microvascular ischemic changes. A CTA revealed no large vessel occlusion but concerning stenosis in the left P3 PCA, with the radiology report pending.    LKW: 4 5 PM  NIHSS: 24  Modified Ascension Scale: 4  SBP: 96  Glucose: 180s  CT head without contrast: No acute abnormality  TNK: Not given, on Coumadin with INR 3.3  Endovascular: No LVO    Unable to assess, patient altered mental status    Past Histories          Diagnosis Date    Acute non-ST elevation myocardial infarction (NSTEMI) ([facility]) 12/24/2020    Chronic kidney disease     
06/22/2021); Cystoscopy (Left, 06/22/2021); Bladder surgery (Left, 06/22/2021); Cardiac procedure (N/A, 06/20/2024); Cardiac catheterization (Left, 06/27/2024); and Cardiac procedure (N/A, 6/27/2024).     Home Medications:    Prior to Admission medications    Medication Sig Start Date End Date Taking? Authorizing Provider   atorvastatin (LIPITOR) 40 MG tablet Take 1 tablet by mouth nightly  Patient taking differently: Take 2 tablets by mouth nightly 11/26/24  Yes Jermaine Esparza MD   warfarin (COUMADIN) 2 MG tablet Take 1 tablet by mouth See Admin Instructions [facility]:  0.5 tablet (=1 mg) po every Monday and Friday and 1 tablet (=2 mg) po all other days (Sun-Tues-Thurs-Sat)  Patient taking differently: Take 1 tablet by mouth See Admin Instructions [facility]:  2 mg daily 9/27/24  Yes Figueroa Soliz MD   midodrine (PROAMATINE) 10 MG tablet Take 1 tablet by mouth 3 times daily (with meals) 8/26/24 8/21/25 Yes Figueroa Soliz MD   clopidogrel (PLAVIX) 75 MG tablet Take 1 tablet by mouth daily 7/29/24  Yes Figueroa Soliz MD   metoprolol succinate (TOPROL XL) 25 MG extended release tablet Take 1 tablet by mouth nightly 7/29/24  Yes Figueroa Soliz MD   bumetanide (BUMEX) 1 MG tablet Take 1 tablet by mouth daily 6/3/24  Yes Jermaine Esparza MD   spironolactone (ALDACTONE) 25 MG tablet Take 1 tablet by mouth daily 6/3/24  Yes Jermaine Esparza MD   albuterol sulfate HFA (VENTOLIN HFA) 108 (90 Base) MCG/ACT inhaler Inhale 2 puffs into the lungs 4 times daily as needed for Wheezing 5/20/24  Yes Young Mccloud MD   ramipril (ALTACE) 5 MG capsule Take 1 capsule by mouth daily 3/7/24  Yes Figueroa Soliz MD   dapagliflozin (FARXIGA) 10 MG tablet Take 1 tablet by mouth every morning  Patient not taking: Reported on 1/28/2025 11/19/24   Figueroa Soliz MD   dapagliflozin (FARXIGA) 10 MG tablet Take 1 tablet by mouth every morning  Patient not taking: Reported on 1/28/2025 11/19/24   Figueroa Soliz, 
edema [J81.0] 01/28/2025       PAST MEDICAL HISTORY      Diagnosis Date    Acute non-ST elevation myocardial infarction (NSTEMI) (HCC) 12/24/2020    Chronic kidney disease     Coronary artery disease     s/p CABG x3 in 2008 and 2 stents on 12/24/2020    H/O echocardiogram 02/08/2021    EF 15-20% LV severly enlarged and LV wall thickness is normal Severe global hypokinesis with segmental abnormalities LA is mod dilated 34-39 with LA volume index of 34 ml/m2 Mild mitral regurg Mod tricupid regurg Mild to mod pulm HTN with est RV systolic pressure of 38 mmhg mod grade II diastolic dysfunciton    H/O echocardiogram 04/01/2021    EF 10-15% LA size was mildly dilated IVC sice is mildly dilated with reduced respiratory phasic changes CVP 5-10 mmHg    Hepatitis A     Hyperlipidemia     Kidney stone 06/2021    Smoker        PAST SURGICAL HISTORY  Past Surgical History:   Procedure Laterality Date    BLADDER SURGERY Left 06/22/2021    CYSTOSCOPY STENT INSERTION performed by Sigifredo Ledezma MD at [facility] OR    CARDIAC CATHETERIZATION Left 06/27/2024    L. brachial / Dr Soliz / [facility]    CARDIAC DEFIBRILLATOR PLACEMENT  04/23/2021    [facility] - [employer] Defibrillator    CARDIAC PROCEDURE N/A 06/20/2024    Left heart cath / coronary angiography performed by Figueroa Soliz MD at [facility] CARDIAC CATH/IR LAB    CARDIAC PROCEDURE N/A 06/27/2024    Left heart cath / coronary angiography performed by Figueroa Soliz MD at [facility] CARDIAC CATH/IR LAB    CARDIAC PROCEDURE N/A 01/28/2025    Right heart cath performed by Jose Osuna MD at [facility] CARDIAC CATH LAB    CARDIAC SURGERY      CORONARY ANGIOPLASTY WITH STENT PLACEMENT  12/24/2020    x 2 at [facility]    CORONARY ARTERY BYPASS GRAFT  2008    x 3 vessels - Chest cleared by Dr Jones for MRI (1/29//2025) No Epicardial leads    CYSTOSCOPY Left 06/22/2021    CYSTOSCOPY URETEROSCOPY LASER performed by Sigifredo Ledezma MD at [facility] OR    CYSTOURETHROSCOPY/STENT 
of intravenous contrast. Multiplanar reformatted images are provided for review.  MIP images are provided for review. Stenosis of the internal carotid arteries measured using NASCET criteria. Automated exposure control, iterative reconstruction, and/or weight based adjustment of the mA/kV was utilized to reduce the radiation dose to as low as reasonably achievable. COMPARISON: CT head from earlier today HISTORY: ORDERING SYSTEM PROVIDED HISTORY: cva TECHNOLOGIST PROVIDED HISTORY: cva Reason for Exam: CVA FINDINGS: CTA NECK: AORTIC ARCH/ARCH VESSELS: No dissection or arterial injury.  No significant stenosis of the brachiocephalic or subclavian arteries. CAROTID ARTERIES: There is 40% stenosis in the distal left internal carotid artery with moderate calcified plaque.  No dissection, arterial injury, or hemodynamically significant stenosis in the remainder of the bilateral internal or common carotid arteries by NASCET criteria.  There is decreased contrast opacification at the junction of cervical and petrous segments of the bilateral internal carotid arteries, likely related to artifacts. VERTEBRAL ARTERIES: There is right dominance of the vertebral arteries with hypoplastic left vertebral artery.  There is superimposed moderate to severe stenosis along the left vertebral artery.  No dissection, arterial injury, or significant stenosis in the right vertebral artery. SOFT TISSUES: The patient is status post median sternotomy.  There are bilateral pleural effusions, moderate to large on the left and mild-to-moderate on the right.  There is mild emphysema.  There are airspace opacities in the bilateral lungs, likely related to atelectasis versus mild pulmonary vascular congestion.  There is diffuse soft tissue anasarca.  No cervical or superior mediastinal lymphadenopathy.  The larynx and pharynx are unremarkable.  No acute abnormality of the salivary and thyroid glands. BONES: No acute osseous abnormality.  There are

## 2025-01-31 LAB
BLOOD BANK DISPENSE STATUS: NORMAL
BPU ID: NORMAL
COMPONENT: NORMAL
TRANSFUSION STATUS: NORMAL
UNIT DIVISION: 0

## 2025-02-01 LAB — LIDOCAIN SERPL-MCNC: 15.3 UG/ML (ref 1.2–5)

## 2025-02-02 LAB
MICROORGANISM SPEC CULT: NORMAL
SERVICE CMNT-IMP: NORMAL
SPECIMEN DESCRIPTION: NORMAL

## 2025-02-03 LAB
MICROORGANISM SPEC CULT: NORMAL
MICROORGANISM SPEC CULT: NORMAL
SERVICE CMNT-IMP: NORMAL
SERVICE CMNT-IMP: NORMAL
SPECIMEN DESCRIPTION: NORMAL
SPECIMEN DESCRIPTION: NORMAL

## 2025-02-04 ENCOUNTER — ANTI-COAG VISIT (OUTPATIENT)
Age: 75
End: 2025-02-04

## 2025-02-04 DIAGNOSIS — I48.20 CHRONIC ATRIAL FIBRILLATION (HCC): Primary | ICD-10-CM

## (undated) DEVICE — RADIFOCUS OPTITORQUE ANGIOGRAPHIC CATHETER: Brand: OPTITORQUE

## (undated) DEVICE — DUAL LUMEN URETERAL CATHETER

## (undated) DEVICE — SYRINGE ONLY,20ML LUER LOCK: Brand: MEDLINE INDUSTRIES, INC.

## (undated) DEVICE — GLOVE ORANGE PI 7 1/2   MSG9075

## (undated) DEVICE — SPONGE GZ W4XL4IN CELOS POSTOP AVANT

## (undated) DEVICE — GUIDEWIRE VASC STR 3 CM 0.035 INX150 CM STD NIT ZIPWIRE

## (undated) DEVICE — MERCY TIFFIN CATH LAB PACK: Brand: MEDLINE INDUSTRIES, INC.

## (undated) DEVICE — Z INACTIVE USE 2660664 SOLUTION IRRIG 3000ML 0.9% SOD CHL USP UROMATIC PLAS CONT

## (undated) DEVICE — CATHETER DIAG 6FR L100CM SPEC IMA CRV SZ DBL BRAID WIRE SFT

## (undated) DEVICE — Device

## (undated) DEVICE — SOLUTION IV IRRIG WATER 1000ML POUR BRL 2F7114

## (undated) DEVICE — Z DISCONTINUED BY MEDLINE USE 2711682 TRAY SKIN PREP DRY W/ PREM GLV

## (undated) DEVICE — DRAPE, RADIAL, STERILE: Brand: MEDLINE

## (undated) DEVICE — FIBER LASER EXCALIBUR HOLM

## (undated) DEVICE — TRAY SURG CUST CRD CATH TOLEDO

## (undated) DEVICE — KIT MICRO INTRO 4FR STIFF 40CM NIGHTENALL TUNGSTEN 7SMT

## (undated) DEVICE — GUIDEWIRE 35/260/FC/PTFE/3J: Brand: GUIDEWIRE

## (undated) DEVICE — CATHETER ANGIO 6FR L110CM ID0.056IN VENT PIG CRV ROBUST

## (undated) DEVICE — 260 CM J TIP WIRE .035

## (undated) DEVICE — CATHETER COR DIAG JUDKINS L 3.5 CRV 6FR 100CM 0 SIDE H

## (undated) DEVICE — GLIDESHEATH NITINOL HYDROPHILIC COATED INTRODUCER SHEATH: Brand: GLIDESHEATH

## (undated) DEVICE — BENTSON WIRE GUIDE 20CM DISTAL FLEXIBILITY WITH SOFTENED TIP: Brand: BENTSON

## (undated) DEVICE — CATHETER 6FR JR4 MEDTRONIC 100CM

## (undated) DEVICE — SOLUTION SURG PREP ANTIMICROBIAL 4 OZ SKIN WND EXIDINE

## (undated) DEVICE — ADAPTER URO SCP UROLOK LL

## (undated) DEVICE — STRIP,CLOSURE,WOUND,MEDI-STRIP,1/2X4: Brand: MEDLINE

## (undated) DEVICE — GOWN,AURORA,NON-REINFORCED,2XL: Brand: MEDLINE

## (undated) DEVICE — GUIDEWIRE ANG TIP 0.035INX150CM

## (undated) DEVICE — SWAN-GANZ CCOMBO V THERMODILUTION CATHETER: Brand: SWAN-GANZ CCOMBO V

## (undated) DEVICE — PINNACLE INTRODUCER SHEATH: Brand: PINNACLE

## (undated) DEVICE — SINGLE ACTION PUMPING SYSTEM